# Patient Record
Sex: MALE | Race: WHITE | NOT HISPANIC OR LATINO | Employment: OTHER | URBAN - METROPOLITAN AREA
[De-identification: names, ages, dates, MRNs, and addresses within clinical notes are randomized per-mention and may not be internally consistent; named-entity substitution may affect disease eponyms.]

---

## 2017-02-15 ENCOUNTER — PATIENT MESSAGE (OUTPATIENT)
Dept: RHEUMATOLOGY | Facility: CLINIC | Age: 72
End: 2017-02-15

## 2017-02-22 ENCOUNTER — PATIENT MESSAGE (OUTPATIENT)
Dept: RHEUMATOLOGY | Facility: CLINIC | Age: 72
End: 2017-02-22

## 2017-02-22 ENCOUNTER — LAB VISIT (OUTPATIENT)
Dept: LAB | Facility: HOSPITAL | Age: 72
End: 2017-02-22
Attending: INTERNAL MEDICINE
Payer: MEDICARE

## 2017-02-22 DIAGNOSIS — M35.3 PMR (POLYMYALGIA RHEUMATICA): ICD-10-CM

## 2017-02-22 LAB — ERYTHROCYTE [SEDIMENTATION RATE] IN BLOOD BY WESTERGREN METHOD: 2 MM/HR

## 2017-02-22 PROCEDURE — 86140 C-REACTIVE PROTEIN: CPT

## 2017-02-22 PROCEDURE — 85651 RBC SED RATE NONAUTOMATED: CPT

## 2017-02-22 PROCEDURE — 36415 COLL VENOUS BLD VENIPUNCTURE: CPT

## 2017-02-23 ENCOUNTER — TELEPHONE (OUTPATIENT)
Dept: RHEUMATOLOGY | Facility: CLINIC | Age: 72
End: 2017-02-23

## 2017-02-23 ENCOUNTER — PATIENT MESSAGE (OUTPATIENT)
Dept: RHEUMATOLOGY | Facility: CLINIC | Age: 72
End: 2017-02-23

## 2017-02-23 DIAGNOSIS — M35.3 PMR (POLYMYALGIA RHEUMATICA): Primary | ICD-10-CM

## 2017-02-23 LAB — CRP SERPL-MCNC: 3.1 MG/L

## 2017-02-23 RX ORDER — PREDNISONE 1 MG/1
4 TABLET ORAL DAILY
Qty: 120 TABLET | Refills: 1 | Status: SHIPPED | OUTPATIENT
Start: 2017-02-23 | End: 2017-03-05

## 2017-02-28 ENCOUNTER — PATIENT MESSAGE (OUTPATIENT)
Dept: RHEUMATOLOGY | Facility: CLINIC | Age: 72
End: 2017-02-28

## 2017-03-08 ENCOUNTER — PATIENT MESSAGE (OUTPATIENT)
Dept: ORTHOPEDICS | Facility: CLINIC | Age: 72
End: 2017-03-08

## 2017-03-17 ENCOUNTER — LAB VISIT (OUTPATIENT)
Dept: LAB | Facility: HOSPITAL | Age: 72
End: 2017-03-17
Attending: INTERNAL MEDICINE
Payer: MEDICARE

## 2017-03-17 DIAGNOSIS — M35.3 PMR (POLYMYALGIA RHEUMATICA): ICD-10-CM

## 2017-03-17 LAB
CORTIS SERPL-MCNC: 16.7 UG/DL
CRP SERPL-MCNC: 4 MG/L
ERYTHROCYTE [SEDIMENTATION RATE] IN BLOOD BY WESTERGREN METHOD: 12 MM/HR

## 2017-03-17 PROCEDURE — 36415 COLL VENOUS BLD VENIPUNCTURE: CPT

## 2017-03-17 PROCEDURE — 82024 ASSAY OF ACTH: CPT

## 2017-03-17 PROCEDURE — 85651 RBC SED RATE NONAUTOMATED: CPT

## 2017-03-17 PROCEDURE — 86140 C-REACTIVE PROTEIN: CPT

## 2017-03-17 PROCEDURE — 82533 TOTAL CORTISOL: CPT

## 2017-03-21 LAB — ACTH PLAS-MCNC: 34 PG/ML

## 2017-03-22 ENCOUNTER — LAB VISIT (OUTPATIENT)
Dept: LAB | Facility: HOSPITAL | Age: 72
End: 2017-03-22
Attending: STUDENT IN AN ORGANIZED HEALTH CARE EDUCATION/TRAINING PROGRAM
Payer: MEDICARE

## 2017-03-22 ENCOUNTER — OFFICE VISIT (OUTPATIENT)
Dept: ORTHOPEDICS | Facility: CLINIC | Age: 72
End: 2017-03-22
Payer: MEDICARE

## 2017-03-22 ENCOUNTER — OFFICE VISIT (OUTPATIENT)
Dept: RHEUMATOLOGY | Facility: CLINIC | Age: 72
End: 2017-03-22
Payer: MEDICARE

## 2017-03-22 ENCOUNTER — PATIENT MESSAGE (OUTPATIENT)
Dept: ORTHOPEDICS | Facility: CLINIC | Age: 72
End: 2017-03-22

## 2017-03-22 ENCOUNTER — TELEPHONE (OUTPATIENT)
Dept: RHEUMATOLOGY | Facility: CLINIC | Age: 72
End: 2017-03-22

## 2017-03-22 VITALS
WEIGHT: 192 LBS | HEART RATE: 61 BPM | SYSTOLIC BLOOD PRESSURE: 143 MMHG | DIASTOLIC BLOOD PRESSURE: 74 MMHG | BODY MASS INDEX: 26.88 KG/M2 | HEIGHT: 71 IN

## 2017-03-22 VITALS
HEART RATE: 56 BPM | DIASTOLIC BLOOD PRESSURE: 69 MMHG | WEIGHT: 191.5 LBS | SYSTOLIC BLOOD PRESSURE: 130 MMHG | BODY MASS INDEX: 26.81 KG/M2 | HEIGHT: 71 IN | RESPIRATION RATE: 16 BRPM

## 2017-03-22 DIAGNOSIS — Z15.89 HLA B27 (HLA B27 POSITIVE): ICD-10-CM

## 2017-03-22 DIAGNOSIS — M17.11 PRIMARY OSTEOARTHRITIS OF RIGHT KNEE: ICD-10-CM

## 2017-03-22 DIAGNOSIS — M35.3 PMR (POLYMYALGIA RHEUMATICA): ICD-10-CM

## 2017-03-22 DIAGNOSIS — S83.511A RIGHT ACL TEAR: ICD-10-CM

## 2017-03-22 DIAGNOSIS — Z98.890 S/P MEDIAL MENISCECTOMY OF RIGHT KNEE: ICD-10-CM

## 2017-03-22 DIAGNOSIS — I10 ESSENTIAL HYPERTENSION: ICD-10-CM

## 2017-03-22 DIAGNOSIS — M17.0 PRIMARY OSTEOARTHRITIS OF BOTH KNEES: Primary | ICD-10-CM

## 2017-03-22 DIAGNOSIS — E83.39 HYPOPHOSPHATEMIA: Primary | ICD-10-CM

## 2017-03-22 DIAGNOSIS — M79.10 MYALGIA: Primary | ICD-10-CM

## 2017-03-22 DIAGNOSIS — M79.10 MYALGIA: ICD-10-CM

## 2017-03-22 DIAGNOSIS — G89.29 CHRONIC PAIN OF RIGHT KNEE: ICD-10-CM

## 2017-03-22 DIAGNOSIS — M25.561 CHRONIC PAIN OF RIGHT KNEE: ICD-10-CM

## 2017-03-22 LAB
MAGNESIUM SERPL-MCNC: 2.4 MG/DL
PHOSPHATE SERPL-MCNC: 2.2 MG/DL

## 2017-03-22 PROCEDURE — 99214 OFFICE O/P EST MOD 30 MIN: CPT | Mod: S$PBB,,, | Performed by: INTERNAL MEDICINE

## 2017-03-22 PROCEDURE — 99999 PR PBB SHADOW E&M-EST. PATIENT-LVL III: CPT | Mod: PBBFAC,,, | Performed by: INTERNAL MEDICINE

## 2017-03-22 PROCEDURE — 36415 COLL VENOUS BLD VENIPUNCTURE: CPT

## 2017-03-22 PROCEDURE — 83735 ASSAY OF MAGNESIUM: CPT

## 2017-03-22 PROCEDURE — 84100 ASSAY OF PHOSPHORUS: CPT

## 2017-03-22 PROCEDURE — 99999 PR PBB SHADOW E&M-EST. PATIENT-LVL IV: CPT | Mod: PBBFAC,,, | Performed by: PHYSICIAN ASSISTANT

## 2017-03-22 PROCEDURE — 99213 OFFICE O/P EST LOW 20 MIN: CPT | Mod: S$PBB,,, | Performed by: PHYSICIAN ASSISTANT

## 2017-03-22 RX ORDER — PREDNISONE 1 MG/1
1 TABLET ORAL DAILY
COMMUNITY
End: 2018-01-30

## 2017-03-22 RX ORDER — DICLOFENAC SODIUM 10 MG/G
2 GEL TOPICAL 4 TIMES DAILY
Qty: 400 G | Refills: 0 | Status: SHIPPED | OUTPATIENT
Start: 2017-03-22 | End: 2018-01-30

## 2017-03-22 RX ORDER — LEVOFLOXACIN 500 MG/1
500 TABLET, FILM COATED ORAL DAILY
COMMUNITY
End: 2018-01-30

## 2017-03-22 ASSESSMENT — ROUTINE ASSESSMENT OF PATIENT INDEX DATA (RAPID3)
MDHAQ FUNCTION SCORE: 0
PAIN SCORE: 4
AM STIFFNESS SCORE: 1, YES
WHEN YOU AWAKENED IN THE MORNING OVER THE LAST WEEK, PLEASE INDICATE THE AMOUNT OF TIME IT TAKES UNTIL YOU ARE AS LIMBER AS YOU WILL BE FOR THE DAY: 24HRS
TOTAL RAPID3 SCORE: 3
PATIENT GLOBAL ASSESSMENT SCORE: 5
FATIGUE SCORE: 5
PSYCHOLOGICAL DISTRESS SCORE: 3.3

## 2017-03-22 NOTE — PROGRESS NOTES
"I have personally taken the history and examined the patient and agree with the resident,s note as stated above. Complains only of burning pain right distal hamstring, popliteal and prox calf, occ medial joint line right knee. No fever, warmth, swelling, erythema. Goes to gym most days of the week. No shoulder or hip girdle symptoms. + am stiffness right post distal thigh, popliteal and proximal calf only.  No headache, eye symptoms, jaw pain    Exam: mild limitation of neck rom. Chest is clear to ausc. Chest expansion 6 cm. Fabere neg. ASIS neg. Right knee cool, neg bulge, from, + PF crepitus, normal stability. Pulses 2 + no edema.          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 However now pt states "never helped"  May need to consider trial of gabapentin for neuropathic sounding pain(or pregabalin or duloxetine)  RTC 3 months  "

## 2017-03-22 NOTE — PROGRESS NOTES
SUBJECTIVE:     Chief Complaint & History of Present Illness:  Ghanshyam Sanford Jr. is a Established patient 71 y.o. male who is seen here today with a complaint of    Chief Complaint   Patient presents with    Right Knee - Pain    Left Knee - Pain    .  He is patient well known to me last seen treated in the clinic 10/31/2016 at which time he completed a round of the Euflexxa injections.  He is having some recurrence of pain in the right knee although not severe seen earlier today by rheumatology 's polymyalgia is well-controlled  On a scale of 1-10, with 10 being worst pain imaginable, he rates this pain as 3 on good days and 5 on bad days.  he describes the pain as sore and achy.    Review of patient's allergies indicates:   Allergen Reactions    Dexamethasone sod phosphate Other (See Comments)    Cortisone      Inj- pt reports hiccups and feels spasm     Flomax [tamsulosin] Diarrhea         Current Outpatient Prescriptions   Medication Sig Dispense Refill    aspirin (ECOTRIN) 81 MG EC tablet Take 81 mg by mouth once daily.      diltiazem (TIAZAC) 360 MG CpSR Take 360 mg by mouth once daily.      docusate sodium (COLACE) 100 MG capsule Take 100 mg by mouth 2 (two) times daily.      fish oil-omega-3 fatty acids 300-1,000 mg capsule Take 2 g by mouth once daily.      irbesartan-hydrochlorothiazide (AVALIDE) 300-12.5 mg per tablet Take 1 tablet by mouth once daily.      lansoprazole (PREVACID) 30 MG capsule Take 30 mg by mouth once daily.      levoFLOXacin (LEVAQUIN) 500 MG tablet Take 500 mg by mouth once daily.      multivitamin (THERAGRAN) per tablet Take 1 tablet by mouth once daily.      pravastatin (PRAVACHOL) 40 MG tablet Take 40 mg by mouth once daily.      predniSONE (DELTASONE) 1 MG tablet Take 4 mg by mouth once daily.       vitamin D 1000 units Tab Take 370 mg by mouth once daily.      zolpidem (AMBIEN) 10 mg Tab Take 5 mg by mouth nightly as needed.      diclofenac sodium 1 % Gel  "Apply 2 g topically 4 (four) times daily. 400 g 0     No current facility-administered medications for this visit.        Past Medical History:   Diagnosis Date    Heart valve disease     patient states leaking valve    Hypertension     Osteoarthritis of right knee 3/22/2017       Past Surgical History:   Procedure Laterality Date    HERNIA REPAIR      KNEE ARTHROPLASTY Right        Vital Signs (Most Recent)  Vitals:    03/22/17 1129   BP: 130/69   Pulse: (!) 56   Resp: 16           Review of Systems:  ROS:  Constitutional: no fever or chills  Eyes: no visual changes  ENT: no nasal congestion or sore throat  Respiratory: no cough or shortness of breath  Cardiovascular: no chest pain or palpitations  Gastrointestinal: no nausea or vomiting, tolerating diet  Genitourinary: no hematuria or dysuria  Integument/Breast: no rash or pruritis  Hematologic/Lymphatic: no easy bruising or lymphadenopathy  Musculoskeletal: positive for arthralgias and stiff joints, negative for bone pain and muscle weakness polymyalgia rheumatica  Neurological: no seizures or tremors  Behavioral/Psych: no auditory or visual hallucinations  Endocrine: no heat or cold intolerance              OBJECTIVE:     PHYSICAL EXAM:  Height: 5' 10.5" (179.1 cm) Weight: 86.8 kg (191 lb 7.5 oz), General Appearance: Well nourished, well developed, in no acute distress.  Neurological: Mood & affect are normal.  right Knee Exam:  Knee Range of Motion:0-120 degrees flexion   Effusion:not significant  Condition of skin:intact  Location of tenderness: Globally   Strength:5 of 5  Stability:  stable to testing  Varus /Valgus stress:  normal  Heaven:   negative/negative  Hip Examination:  normal          ASSESSMENT/PLAN:     Plan: We discussed with the patient at length all the different treatment options available for arthrosis of the knee including anti-inflammatories, acetaminophen, rest, ice, knee strengthening exercise, occasional cortisone injections for " temporary relief, Viscosupplimentation injections, arthroscopic debridement osteotomy, and finally knee arthroplasty.   Patient is unable to undergo a cortisone injection secondary to ALLERGIC reaction to injected steroids.  With this in mind we'll prescribe  Diclofenac 1% gel to apply to affected area up to 4 times a day when necessary  We will see him back in approximately 6 weeks with plans to restart Euflexxa injections

## 2017-03-22 NOTE — PROGRESS NOTES
Subjective:        Patient ID: Ghanshyam Sanford Jr. is a 71 y.o. male.     Chief Complaint: h/o PMR, OA knees, HLA B27+.  HPI:   Last seen 11/21/16. Continues to have calf pain increasing since last visit R>L- most pain during the day when walking around but also at night, the pain feels like muscle stiffness and he feels like his legs are heavy, does not radiate into feet. Was off pravastatin for a month without remission of calf pain. Now on Levoquin for 14 days for prostatitis from PCP. Now on 4mg prednisone daily without remission of pain. Denies fevers, constitutional symptoms, scalp tenderness, acute visual changes or jaw pain. Endorses AM stiffness since the calf pain has been going on and nocturnal pain and myalgia consistent with calf pain. Goes to gym 4 x/wk 45 min each with elliptical, weight machines etc.    Review of Systems   Constitutional: Negative for appetite change, chills, fatigue, fever and unexpected weight change.   HENT: Negative for mouth sores.   Eyes: Negative for pain, redness and visual disturbance.   Respiratory: Negative for cough, shortness of breath and wheezing.   Cardiovascular: Negative for chest pain, palpitations and leg swelling.   Gastrointestinal: Negative for abdominal pain, blood in stool, constipation, diarrhea and nausea.   Genitourinary: Negative for difficulty urinating, dysuria and hematuria.   Musculoskeletal: Negative for arthralgias, back pain, gait problem, joint swelling, myalgias, neck pain and neck stiffness.   Skin: Negative for color change, pallor and rash.   Neurological: Negative for weakness and headaches.   Psychiatric/Behavioral: Negative for dysphoric mood and sleep disturbance.      Objective:     Vitals:    03/22/17 0934   BP: (!) 143/74   Pulse: 61     Physical Exam   Constitutional: He is oriented to person, place, and time and well-developed, well-nourished, and in no distress.   HENT:   Head: Normocephalic and atraumatic.   Mouth/Throat: Oropharynx  is clear and moist.   Eyes: Conjunctivae and EOM are normal. Pupils are equal, round, and reactive to light.   Neck: Normal range of motion. Neck supple. No thyromegaly present.   No cervical bruits   Cardiovascular: Normal rate, regular rhythm, intact distal pulses, blowing systolic heart sound noted. Exam reveals no gallop and no friction rub.   No murmur heard.  Pulmonary/Chest: Breath sounds normal. He has no wheezes. He has no rales. He exhibits no tenderness.   Abdominal: Soft. He exhibits no distension and no mass. There is no tenderness.   Ext: Peripheral pulses intact in BUE and BLE including BL popliteal and DP and PT.     Right Side Rheumatological Exam   Examination finds the shoulder, elbow, wrist, knee, 1st PIP, 1st MCP, 2nd PIP, 2nd MCP, 3rd PIP, 3rd MCP, 4th PIP, 4th MCP, 5th PIP and 5th MCP normal.     Knee Exam   Tenderness Location: medial joint line  Patellofemoral Crepitus: positive  Effusion: negative  Warmth: negative     Left Side Rheumatological Exam   Examination finds the shoulder, elbow, wrist, knee, 1st PIP, 1st MCP, 2nd PIP, 2nd MCP, 3rd PIP, 3rd MCP, 4th PIP, 4th MCP and 5th PIP normal.     Knee Exam   Patellofemoral Crepitus: negative  Effusion: negative  Warmth: negative    Lymphadenopathy:   He has no cervical adenopathy.   Neurological: He is alert and oriented to person, place, and time. He displays normal reflexes. He exhibits normal muscle tone. Gait normal.   Motor strength 5/5 in UE and LE bilateral   Skin: Skin is warm and dry. No rash noted. No erythema. No pallor.   Psychiatric: Mood, memory, affect and judgment normal.   Chest expansion- 6cm  Minimally reduced cervical ROM    Results for ELISABETH MCCOLLUM JR. (MRN [id number]) as of 3/22/2017 09:37   Ref. Range 3/17/2017 07:50   Sed Rate Latest Ref Range: 0 - 10 mm/Hr 12 (H)   CRP Latest Ref Range: 0.0 - 8.2 mg/L 4.0   Cortisol -8 AM Latest Ref Range: 4.30 - 22.40 ug/dL 16.7   ACTH Latest Ref Range: 0 - 46 pg/mL 34     BL Knee  Xray AP and Lateral 10/7/16  3 views both knees. Bone, joint and soft tissues.  IMPRESSION:   Normal knee exam.      Assessment:   1) Right > left knee pain and posterior and proximal calf pain R>L, s/p medial meniscectomy 3/10/16, h/o ACL tear markedly improved with Euflexxa previously  2) PMR in remission, no GCA  3) HLA-B27 Positivity  4) Osteopenia < NOF FRAX therapy threshold     Plan:   1) Continue seeing Ortho for Euflexxa injections for calf pain- likely referred from knee pain. CBC, CMP, Mg, Phos.  2) Decrease prednisone 4 mg daily x1wk, then 3mg daily x1wk, then 2mg daily x1wk, then 1mg daily until next visit. Repeat ESR and CRP in 1 month.  3) No symptoms, signs or radiographic evidence of spondylitis  4) 1000mg dietary calcium, 1000units vitamin D3 OTC daily    RTC 3 months. ESR, CRP in 1 month.

## 2017-03-22 NOTE — PATIENT INSTRUCTIONS
1) Continue seeing Ortho for Euflexxa injections for calf pain- likely referred from knee pain.  3) Decrease prednisone 4 mg daily x1wk, then 3mg daily x1wk, then 2mg daily x1wk, then 1mg daily until next visit. Repeat ESR and CRP in 1 month  4) No symptoms, signs or radiographic evidence of spondylitis  5) 1000mg dietary calcium, 1000units vitamin D3 OTC daily  6) CBC, CMP, Mg, Phos     RTC 3 months. ESR, CRP in 1 month.

## 2017-03-22 NOTE — MR AVS SNAPSHOT
[facility] - Rheumatology  [address]  [facility] [address]  Phone: [phone]  Fax: [phone]                  Ghanshyam Sanford Jr.   3/22/2017 10:00 AM   Office Visit    Description:  Male : 1945   Provider:  Georgi Smith MD   Department:  [facility] - Rheumatology           Diagnoses this Visit        Comments    Myalgia    -  Primary            To Do List           Future Appointments        Provider Department Dept Phone    3/22/2017 11:15 AM SHASHI Petersen [facility] - Orthopedics [phone]      Goals (5 Years of Data)     None      [facility] On Call     [facility] On Call Nurse Care Line -  Assistance  Registered nurses in the [facility] On Call Center provide clinical advisement, health education, appointment booking, and other advisory services.  Call for this free service at [phone].             Medications           Message regarding Medications     Verify the changes and/or additions to your medication regime listed below are the same as discussed with your clinician today.  If any of these changes or additions are incorrect, please notify your healthcare provider.             Verify that the below list of medications is an accurate representation of the medications you are currently taking.  If none reported, the list may be blank. If incorrect, please contact your healthcare provider. Carry this list with you in case of emergency.           Current Medications     aspirin (ECOTRIN) 81 MG EC tablet Take 81 mg by mouth once daily.    diltiazem (TIAZAC) 360 MG CpSR Take 360 mg by mouth once daily.    docusate sodium (COLACE) 100 MG capsule Take 100 mg by mouth 2 (two) times daily.    fish oil-omega-3 fatty acids 300-1,000 mg capsule Take 2 g by mouth once daily.    lansoprazole (PREVACID) 30 MG capsule Take 30 mg by mouth once daily.    levoFLOXacin (LEVAQUIN) 500 MG tablet Take 500 mg by mouth once daily.    multivitamin (THERAGRAN) per tablet Take 1 tablet by  "mouth once daily.    pravastatin (PRAVACHOL) 40 MG tablet Take 40 mg by mouth once daily.    predniSONE (DELTASONE) 1 MG tablet Take 4 mg by mouth once daily.     vitamin D 1000 units Tab Take 370 mg by mouth once daily.    zolpidem (AMBIEN) 10 mg Tab Take 5 mg by mouth nightly as needed.    irbesartan-hydrochlorothiazide (AVALIDE) 300-12.5 mg per tablet Take 1 tablet by mouth once daily.           Clinical Reference Information           Your Vitals Were     BP Pulse Height Weight BMI    143/74 61 5' 10.8" (1.798 m) 87.1 kg (192 lb) 26.93 kg/m2      Blood Pressure          Most Recent Value    BP  (!)  143/74      Allergies as of 3/22/2017     Dexamethasone Sod Phosphate    Cortisone    Flomax [Tamsulosin]      Immunizations Administered on Date of Encounter - 3/22/2017     None      Orders Placed During Today's Visit     Future Labs/Procedures Expected by Expires    CBC W/ AUTO DIFFERENTIAL  3/22/2017 5/21/2018    COMPREHENSIVE METABOLIC PANEL  3/22/2017 5/21/2018    Magnesium  3/22/2017 5/21/2018    PHOSPHORUS  3/22/2017 5/21/2018      Instructions    1) Continue seeing Ortho for Euflexxa injections for calf pain- likely referred from knee pain.  3) Decrease prednisone 4 mg daily x1wk, then 3mg daily x1wk, then 2mg daily x1wk, then 1mg daily until next visit. Repeat ESR and CRP in 1 month  4) No symptoms, signs or radiographic evidence of spondylitis  5) 1000mg dietary calcium, 1000units vitamin D3 OTC daily  6) CBC, CMP, Mg, Phos     RTC 3 months. ESR, CRP in 1 month.       Language Assistance Services     ATTENTION: Language assistance services are available, free of charge. Please call [phone].      ATENCIÓN: Si habla souleymane, tiene a dsouza disposición servicios gratuitos de asistencia lingüística. Llame al [phone].     MARY Ý: N?u b?n nói Ti?ng Vi?t, có các d?ch v? h? tr? ngôn ng? mi?n phí dành cho b?n. G?i s? [phone].         [facility] complies with applicable Federal civil " rights laws and does not discriminate on the basis of race, color, national origin, age, disability, or sex.

## 2017-03-23 ENCOUNTER — TELEPHONE (OUTPATIENT)
Dept: RHEUMATOLOGY | Facility: CLINIC | Age: 72
End: 2017-03-23

## 2017-03-23 ENCOUNTER — PATIENT MESSAGE (OUTPATIENT)
Dept: RHEUMATOLOGY | Facility: CLINIC | Age: 72
End: 2017-03-23

## 2017-03-23 NOTE — TELEPHONE ENCOUNTER
Please tell  Pt the magnesium is normal. The phosphorus mildly reduced, suggest he drink 2 glasses of skim milk daily. Please schedule repeat phosphorus and vitamin D and immunofixation in 2 wks. Thanks. BRISEYDA

## 2017-03-29 ENCOUNTER — PATIENT MESSAGE (OUTPATIENT)
Dept: RHEUMATOLOGY | Facility: CLINIC | Age: 72
End: 2017-03-29

## 2017-03-31 ENCOUNTER — PATIENT MESSAGE (OUTPATIENT)
Dept: ORTHOPEDICS | Facility: CLINIC | Age: 72
End: 2017-03-31

## 2017-04-04 ENCOUNTER — LAB VISIT (OUTPATIENT)
Dept: LAB | Facility: HOSPITAL | Age: 72
End: 2017-04-04
Attending: INTERNAL MEDICINE
Payer: MEDICARE

## 2017-04-04 DIAGNOSIS — E83.39 HYPOPHOSPHATEMIA: ICD-10-CM

## 2017-04-04 LAB — PHOSPHATE SERPL-MCNC: 2.3 MG/DL

## 2017-04-04 PROCEDURE — 36415 COLL VENOUS BLD VENIPUNCTURE: CPT

## 2017-04-04 PROCEDURE — 86334 IMMUNOFIX E-PHORESIS SERUM: CPT | Mod: 26,,, | Performed by: PATHOLOGY

## 2017-04-04 PROCEDURE — 84100 ASSAY OF PHOSPHORUS: CPT

## 2017-04-04 PROCEDURE — 82306 VITAMIN D 25 HYDROXY: CPT

## 2017-04-04 PROCEDURE — 86334 IMMUNOFIX E-PHORESIS SERUM: CPT

## 2017-04-04 PROCEDURE — 84105 ASSAY OF URINE PHOSPHORUS: CPT

## 2017-04-05 ENCOUNTER — TELEPHONE (OUTPATIENT)
Dept: RHEUMATOLOGY | Facility: CLINIC | Age: 72
End: 2017-04-05

## 2017-04-05 DIAGNOSIS — E83.39 HYPOPHOSPHATEMIA: Primary | ICD-10-CM

## 2017-04-05 LAB
25(OH)D3+25(OH)D2 SERPL-MCNC: 44 NG/ML
INTERPRETATION SERPL IFE-IMP: NORMAL
PATHOLOGIST INTERPRETATION IFE: NORMAL
PHOSPHATE 24H UR-MRATE: 47.1 MG/HR
PHOSPHATE UR-MCNC: 58 MG/DL
PHOSPHORUS, URINE (MG/SPEC): 1131 MG/SPEC
URINE COLLECTION DURATION: 24 HR
URINE VOLUME: 1950 ML

## 2017-04-05 NOTE — TELEPHONE ENCOUNTER
Please add iPTH, PTHRP and 1, 25 vitamin D  to labs. If not able to, please draw separately. Thanks. BRISEYDA

## 2017-04-06 ENCOUNTER — LAB VISIT (OUTPATIENT)
Dept: LAB | Facility: HOSPITAL | Age: 72
End: 2017-04-06
Attending: INTERNAL MEDICINE
Payer: MEDICARE

## 2017-04-06 DIAGNOSIS — E83.39 HYPOPHOSPHATEMIA: ICD-10-CM

## 2017-04-06 LAB — PTH-INTACT SERPL-MCNC: 57 PG/ML

## 2017-04-06 PROCEDURE — 82652 VIT D 1 25-DIHYDROXY: CPT

## 2017-04-06 PROCEDURE — 36415 COLL VENOUS BLD VENIPUNCTURE: CPT

## 2017-04-06 PROCEDURE — 83970 ASSAY OF PARATHORMONE: CPT

## 2017-04-10 LAB — 1,25(OH)2D3 SERPL-MCNC: 70 PG/ML

## 2017-04-11 ENCOUNTER — TELEPHONE (OUTPATIENT)
Dept: RHEUMATOLOGY | Facility: CLINIC | Age: 72
End: 2017-04-11

## 2017-04-11 DIAGNOSIS — E83.39 HYPOPHOSPHATEMIA: Primary | ICD-10-CM

## 2017-04-11 DIAGNOSIS — E83.39: ICD-10-CM

## 2017-04-11 LAB — PTH RELATED PROT SERPL-SCNC: 0.2 PMOL/L

## 2017-04-19 ENCOUNTER — TELEPHONE (OUTPATIENT)
Dept: RHEUMATOLOGY | Facility: CLINIC | Age: 72
End: 2017-04-19

## 2017-04-19 ENCOUNTER — PATIENT MESSAGE (OUTPATIENT)
Dept: RHEUMATOLOGY | Facility: CLINIC | Age: 72
End: 2017-04-19

## 2017-04-19 ENCOUNTER — LAB VISIT (OUTPATIENT)
Dept: LAB | Facility: HOSPITAL | Age: 72
End: 2017-04-19
Attending: INTERNAL MEDICINE
Payer: MEDICARE

## 2017-04-19 DIAGNOSIS — M35.3 PMR (POLYMYALGIA RHEUMATICA): Primary | ICD-10-CM

## 2017-04-19 DIAGNOSIS — M35.3 PMR (POLYMYALGIA RHEUMATICA): ICD-10-CM

## 2017-04-19 LAB — ERYTHROCYTE [SEDIMENTATION RATE] IN BLOOD BY WESTERGREN METHOD: 4 MM/HR

## 2017-04-19 PROCEDURE — 36415 COLL VENOUS BLD VENIPUNCTURE: CPT

## 2017-04-19 PROCEDURE — 85651 RBC SED RATE NONAUTOMATED: CPT

## 2017-04-19 PROCEDURE — 86140 C-REACTIVE PROTEIN: CPT

## 2017-04-19 NOTE — TELEPHONE ENCOUNTER
Please schedule esr and crp today. Please find out how much prednisone he is currently taking as well.  Thanks. BRISEYDA

## 2017-04-19 NOTE — TELEPHONE ENCOUNTER
Received note from AARON Roldan MD [facility] [place]  4/18/17: cc: itchiness scalp x 7-10 days.    Assessment: 1. Scar left posterior neck; 2. SK scattered upper body; 3. Dermatitis, ant scalp, erythematous and scaly plaque; LPP v seb derm: Rx clobetasol, if no better consider biopsy.

## 2017-04-20 LAB — CRP SERPL-MCNC: 1.8 MG/L

## 2017-04-24 ENCOUNTER — LAB VISIT (OUTPATIENT)
Dept: LAB | Facility: HOSPITAL | Age: 72
End: 2017-04-24
Attending: INTERNAL MEDICINE
Payer: MEDICARE

## 2017-04-24 DIAGNOSIS — M35.3 PMR (POLYMYALGIA RHEUMATICA): ICD-10-CM

## 2017-04-24 LAB
CRP SERPL-MCNC: 1.4 MG/L
ERYTHROCYTE [SEDIMENTATION RATE] IN BLOOD BY WESTERGREN METHOD: 3 MM/HR

## 2017-04-24 PROCEDURE — 85651 RBC SED RATE NONAUTOMATED: CPT

## 2017-04-24 PROCEDURE — 36415 COLL VENOUS BLD VENIPUNCTURE: CPT

## 2017-04-24 PROCEDURE — 86140 C-REACTIVE PROTEIN: CPT

## 2017-04-27 ENCOUNTER — OFFICE VISIT (OUTPATIENT)
Dept: NEPHROLOGY | Facility: CLINIC | Age: 72
End: 2017-04-27
Payer: MEDICARE

## 2017-04-27 ENCOUNTER — TELEPHONE (OUTPATIENT)
Dept: RHEUMATOLOGY | Facility: CLINIC | Age: 72
End: 2017-04-27

## 2017-04-27 ENCOUNTER — LAB VISIT (OUTPATIENT)
Dept: LAB | Facility: HOSPITAL | Age: 72
End: 2017-04-27
Attending: INTERNAL MEDICINE
Payer: MEDICARE

## 2017-04-27 VITALS
HEART RATE: 63 BPM | DIASTOLIC BLOOD PRESSURE: 62 MMHG | BODY MASS INDEX: 27.55 KG/M2 | SYSTOLIC BLOOD PRESSURE: 130 MMHG | HEIGHT: 70 IN | WEIGHT: 192.44 LBS | OXYGEN SATURATION: 99 %

## 2017-04-27 DIAGNOSIS — E83.39 HYPOPHOSPHATEMIA: ICD-10-CM

## 2017-04-27 DIAGNOSIS — E83.39 HYPOPHOSPHATEMIA: Primary | ICD-10-CM

## 2017-04-27 LAB
BILIRUB UR QL STRIP: NEGATIVE
CLARITY UR REFRACT.AUTO: CLEAR
COLOR UR AUTO: YELLOW
CREAT UR-MCNC: 102 MG/DL
GLUCOSE UR QL STRIP: NEGATIVE
HGB UR QL STRIP: NEGATIVE
KETONES UR QL STRIP: NEGATIVE
LEUKOCYTE ESTERASE UR QL STRIP: NEGATIVE
NITRITE UR QL STRIP: NEGATIVE
PH UR STRIP: 5 [PH] (ref 5–8)
PROT UR QL STRIP: NEGATIVE
SP GR UR STRIP: 1.02 (ref 1–1.03)
URN SPEC COLLECT METH UR: NORMAL
UROBILINOGEN UR STRIP-ACNC: NEGATIVE EU/DL

## 2017-04-27 PROCEDURE — 99205 OFFICE O/P NEW HI 60 MIN: CPT | Mod: S$PBB,,, | Performed by: INTERNAL MEDICINE

## 2017-04-27 PROCEDURE — 81003 URINALYSIS AUTO W/O SCOPE: CPT

## 2017-04-27 PROCEDURE — 82570 ASSAY OF URINE CREATININE: CPT

## 2017-04-27 PROCEDURE — 99999 PR PBB SHADOW E&M-EST. PATIENT-LVL III: CPT | Mod: PBBFAC,,, | Performed by: INTERNAL MEDICINE

## 2017-04-27 NOTE — LETTER
April 27, 2017      Georgi Smith MD  [address]  [facility] [address]           [facility]  [address]  [facility] [address]  Phone: [phone]  Fax: [phone]          Patient: Ghanshyam Sanford Jr.   MR Number: [id number]   YOB: 1945   Date of Visit: 4/27/2017       Dear Dr. Georgi Smith:    Thank you for referring Ghanshyam Sanford to me for evaluation. Attached you will find relevant portions of my assessment and plan of care.    If you have questions, please do not hesitate to call me. I look forward to following Ghanshyam Sanford along with you.    Sincerely,    Jed Corona MD    Enclosure  CC:  No Recipients    If you would like to receive this communication electronically, please contact [email or web address] or [phone] to request more information on [employer] access.    For providers and/or their staff who would like to refer a patient to [facility], please contact us through our one-stop-shop provider referral line, [facility], at [phone].    If you feel you have received this communication in error or would no longer like to receive these types of communications, please e-mail [email or web address]

## 2017-04-27 NOTE — PROGRESS NOTES
New Consultation Report  Nephrology      Consult Requested By: Dr. Smith, rheumatology  Reason for Consult: hypophosphatemia    History of Present Illness:  Patient is a 71 y.o. male presents for a new consultation for evaluation of hypophosphatemia identified on routine laboratory testing. One month ago, his serum phosphorus was 2.2 mg/dL. Three weeks ago, it was again low at 2.3 mg/dL. No prior value available on our records for comparison. He denies a prior diagnosis of phosphorus disorder. Denies weight loss, diarrhea, weakness. He has PMH of polymyalgia rheumatica well controlled with very low dose of prednisone. He recently took levofloxacin for an infection, but no there new medications.    The patient denies taking NSAIDs, plant extracts, herbal supplements, recreational drugs, recent episode of dehydration, nausea or vomiting, acute illness or hospitalization.     Past Medical History:   Diagnosis Date    Heart valve disease     patient states leaking valve    Hypertension     Osteoarthritis of right knee 3/22/2017         Current Outpatient Prescriptions:     aspirin (ECOTRIN) 81 MG EC tablet, Take 81 mg by mouth once daily., Disp: , Rfl:     diltiazem (TIAZAC) 360 MG CpSR, Take 360 mg by mouth once daily., Disp: , Rfl:     docusate sodium (COLACE) 100 MG capsule, Take 100 mg by mouth 2 (two) times daily., Disp: , Rfl:     fish oil-omega-3 fatty acids 300-1,000 mg capsule, Take 2 g by mouth once daily., Disp: , Rfl:     irbesartan-hydrochlorothiazide (AVALIDE) 300-12.5 mg per tablet, Take 1 tablet by mouth once daily., Disp: , Rfl:     lansoprazole (PREVACID) 30 MG capsule, Take 30 mg by mouth once daily., Disp: , Rfl:     levoFLOXacin (LEVAQUIN) 500 MG tablet, Take 500 mg by mouth once daily., Disp: , Rfl:     multivitamin (THERAGRAN) per tablet, Take 1 tablet by mouth once daily., Disp: , Rfl:     pravastatin (PRAVACHOL) 40 MG tablet, Take 40 mg by mouth once daily., Disp: , Rfl:      predniSONE (DELTASONE) 1 MG tablet, Take 4 mg by mouth once daily. , Disp: , Rfl:     vitamin D 1000 units Tab, Take 370 mg by mouth once daily., Disp: , Rfl:     zolpidem (AMBIEN) 10 mg Tab, Take 5 mg by mouth nightly as needed., Disp: , Rfl:     diclofenac sodium 1 % Gel, Apply 2 g topically 4 (four) times daily., Disp: 400 g, Rfl: 0  Review of patient's allergies indicates:   Allergen Reactions    Dexamethasone sod phosphate Other (See Comments)    Cortisone      Inj- pt reports hiccups and feels spasm     Flomax [tamsulosin] Diarrhea        Past Surgical History:   Procedure Laterality Date    HERNIA REPAIR      KNEE ARTHROPLASTY Right      Family History   Problem Relation Age of Onset    Heart disease Mother     Hypertension Mother     Stroke Mother     Diabetes Mellitus Father     Heart disease Paternal Grandmother     Stroke Paternal Grandmother      Social History   Substance Use Topics    Smoking status: Never Smoker    Smokeless tobacco: Not on file    Alcohol use Not on file       Review of Systems   Constitutional: Negative.    Eyes: Negative.    Respiratory: Negative.    Cardiovascular: Negative.    Gastrointestinal: Positive for constipation. Negative for abdominal pain, blood in stool, diarrhea, heartburn, melena, nausea and vomiting.   Genitourinary: Negative.    Musculoskeletal: Positive for joint pain.        Right knee pain   Skin: Negative.    All other systems reviewed and are negative.      Vitals:    04/27/17 1305   BP: 130/62   Pulse: 63       PHYSICAL EXAMINATION:  General: no distress, well nourished  Skin: color, texture, turgor normal. No rash or lesions  HEENT: Eyes: reactive pupils, normal conjunctiva. Oral mucosa moist, no ulcers. Throat: no erythema.  Neck: supple, symmetrical, trachea midline, no JVD, no carotid bruit  Lungs: clear to auscultation bilaterally and normal respiratory effort  Cardiovascular: Heart: regular rate and rhythm, S1, S2 normal, loud  holosystolic murmur, no rub or gallop. Pulses: 2+ and symmetric.  Abdomen: bowel sounds present, no abdominal bruit, soft, non-tender non-distented; no masses, organomegaly or ascites.   Musculoskeletal: no pitting edema in lower extremities, no clubbing or cyanosis  Lymph Nodes: No cervical or supraclavicular adenopathy  Neurologic: AAOx3, normal strength and tone. No focal deficit. No asterixis.       LABORATORY DATA:  Lab Results   Component Value Date    CREATININE 1.0 05/13/2016       No results found for: UTPCR    Lab Results   Component Value Date     05/13/2016    K 4.3 05/13/2016    CO2 33 (H) 05/13/2016       last PTH   Lab Results   Component Value Date    PTH 57.0 04/06/2017    CALCIUM 9.6 05/13/2016    PHOS 2.3 (L) 04/04/2017       Lab Results   Component Value Date    HGB 14.6 05/13/2016        IMPRESSION / RECOMMENDATIONS:     1. Hypophosphatemia  No obvious etiology. He is chronically on a thiazide diuretic that only rarely can cause urinary phosphate wasting. No evidence of vitamin D deficiency or hyperparathyroidism when values were checked 3 weeks ago. Will order the following labs, including urine phosphorus to look for urine phos wasting as a source.   Comprehensive metabolic panel    Phosphorus    Magnesium    Urinalysis    Creatinine, urine, random    Phosphorus, urine, timed    PTH, intact    Vitamin D    FIBROBLAST GROWTH FACTOR 23, PLASMA     SUMMARY OF PLAN:  1. Ordering above listed labs  2. RTC in 6 weeks

## 2017-04-27 NOTE — MR AVS SNAPSHOT
[facility]  [address]  [facility] [address]  Phone: [phone]  Fax: [phone]                  Ghanshyam Sanford Jr.   2017 1:20 PM   Office Visit    Description:  Male : 1945   Provider:  Jed Corona MD   Department:  [facility]           Diagnoses this Visit        Comments    Hypophosphatemia    -  Primary            To Do List           Future Appointments        Provider Department Dept Phone    2017 10:10 AM LAB, [facility] [phone]    2017 11:00 AM Georgi Smith MD [facility] - Rhe[facility]ology [phone]      Goals (5 Years of Data)     None      Follow-Up and Disposition     Return in about 6 weeks (around 2017).      [facility]     [facility] -  Assistance  Unless otherwise directed by your provider, please contact [facility], our nurse care line that is available for  assistance.     Registered nurses in the [facility] provide: appointment scheduling, clinical advisement, health education, and other advisory services.  Call: [phone] (toll free)               Medications           Message regarding Medications     Verify the changes and/or additions to your medication regime listed below are the same as discussed with your clinician today.  If any of these changes or additions are incorrect, please notify your healthcare provider.             Verify that the below list of medications is an accurate representation of the medications you are currently taking.  If none reported, the list may be blank. If incorrect, please contact your healthcare provider. Carry this list with you in case of emergency.           Current Medications     aspirin (ECOTRIN) 81 MG EC tablet Take 81 mg by mouth once daily.    diltiazem (TIAZAC) 360 MG CpSR Take 360 mg by mouth once daily.    docusate sodium (COLACE) 100 MG capsule Take 100 mg by mouth 2 (two)  "times daily.    fish oil-omega-3 fatty acids 300-1,000 mg capsule Take 2 g by mouth once daily.    irbesartan-hydrochlorothiazide (AVALIDE) 300-12.5 mg per tablet Take 1 tablet by mouth once daily.    lansoprazole (PREVACID) 30 MG capsule Take 30 mg by mouth once daily.    levoFLOXacin (LEVAQUIN) 500 MG tablet Take 500 mg by mouth once daily.    multivitamin (THERAGRAN) per tablet Take 1 tablet by mouth once daily.    pravastatin (PRAVACHOL) 40 MG tablet Take 40 mg by mouth once daily.    predniSONE (DELTASONE) 1 MG tablet Take 4 mg by mouth once daily.     vitamin D 1000 units Tab Take 370 mg by mouth once daily.    zolpidem (AMBIEN) 10 mg Tab Take 5 mg by mouth nightly as needed.    diclofenac sodium 1 % Gel Apply 2 g topically 4 (four) times daily.           Clinical Reference Information           Your Vitals Were     BP Pulse Height Weight SpO2 BMI    130/62 63 5' 10" (1.778 m) 87.3 kg (192 lb 7.4 oz) 99% 27.62 kg/m2      Blood Pressure          Most Recent Value    BP  130/62      Allergies as of 4/27/2017     Dexamethasone Sod Phosphate    Cortisone    Flomax [Tamsulosin]      Immunizations Administered on Date of Encounter - 4/27/2017     None      Orders Placed During Today's Visit     Future Labs/Procedures Expected by Expires    Comprehensive metabolic panel  4/27/2017 6/26/2018    Creatinine, urine, random  4/27/2017 4/27/2018    Magnesium  4/27/2017 6/26/2018    Phosphorus  4/27/2017 6/26/2018    PTH, intact  4/27/2017 6/26/2018    Urinalysis  4/27/2017 4/27/2018    Vitamin D  4/27/2017 6/26/2018    Phosphorus, urine, timed  As directed 4/27/2018      Language Assistance Services     ATTENTION: Language assistance services are available, free of charge. Please call [phone].      ATENCIÓN: Si edsonla souleymane, tiene a dsouza disposición servicios gratuitos de asistencia lingüística. Llame al [phone].     CHÚ Ý: N?u b?n nói Ti?ng Vi?t, có các d?ch v? h? tr? ngôn ng? mi?n phí dành cho b?n. G?i s? " [phone].         [facility] complies with applicable Federal civil rights laws and does not discriminate on the basis of race, color, national origin, age, disability, or sex.

## 2017-05-30 ENCOUNTER — PATIENT MESSAGE (OUTPATIENT)
Dept: RHEUMATOLOGY | Facility: CLINIC | Age: 72
End: 2017-05-30

## 2017-05-30 ENCOUNTER — PATIENT MESSAGE (OUTPATIENT)
Dept: NEPHROLOGY | Facility: CLINIC | Age: 72
End: 2017-05-30

## 2017-05-31 DIAGNOSIS — E83.39 HYPOPHOSPHATEMIA: Primary | ICD-10-CM

## 2017-06-22 ENCOUNTER — LAB VISIT (OUTPATIENT)
Dept: LAB | Facility: HOSPITAL | Age: 72
End: 2017-06-22
Attending: INTERNAL MEDICINE
Payer: MEDICARE

## 2017-06-22 DIAGNOSIS — E83.39 HYPOPHOSPHATEMIA: ICD-10-CM

## 2017-06-22 DIAGNOSIS — M79.10 MYALGIA: ICD-10-CM

## 2017-06-22 DIAGNOSIS — M35.3 PMR (POLYMYALGIA RHEUMATICA): ICD-10-CM

## 2017-06-22 LAB
ALBUMIN SERPL BCP-MCNC: 3.8 G/DL
ALP SERPL-CCNC: 63 U/L
ALT SERPL W/O P-5'-P-CCNC: 16 U/L
ANION GAP SERPL CALC-SCNC: 7 MMOL/L
AST SERPL-CCNC: 16 U/L
BASOPHILS # BLD AUTO: 0.05 K/UL
BASOPHILS NFR BLD: 0.6 %
BILIRUB SERPL-MCNC: 0.7 MG/DL
BUN SERPL-MCNC: 21 MG/DL
CALCIUM SERPL-MCNC: 9.4 MG/DL
CHLORIDE SERPL-SCNC: 105 MMOL/L
CO2 SERPL-SCNC: 28 MMOL/L
CREAT SERPL-MCNC: 1.1 MG/DL
CRP SERPL-MCNC: 2.3 MG/L
DIFFERENTIAL METHOD: NORMAL
EOSINOPHIL # BLD AUTO: 0.3 K/UL
EOSINOPHIL NFR BLD: 3.6 %
ERYTHROCYTE [DISTWIDTH] IN BLOOD BY AUTOMATED COUNT: 13.7 %
ERYTHROCYTE [SEDIMENTATION RATE] IN BLOOD BY WESTERGREN METHOD: 3 MM/HR
EST. GFR  (AFRICAN AMERICAN): >60 ML/MIN/1.73 M^2
EST. GFR  (NON AFRICAN AMERICAN): >60 ML/MIN/1.73 M^2
GLUCOSE SERPL-MCNC: 93 MG/DL
HCT VFR BLD AUTO: 44.3 %
HGB BLD-MCNC: 15 G/DL
LYMPHOCYTES # BLD AUTO: 2.6 K/UL
LYMPHOCYTES NFR BLD: 30.3 %
MCH RBC QN AUTO: 28.6 PG
MCHC RBC AUTO-ENTMCNC: 33.9 %
MCV RBC AUTO: 84 FL
MONOCYTES # BLD AUTO: 0.7 K/UL
MONOCYTES NFR BLD: 7.8 %
NEUTROPHILS # BLD AUTO: 5 K/UL
NEUTROPHILS NFR BLD: 57.7 %
PHOSPHATE SERPL-MCNC: 2.9 MG/DL
PLATELET # BLD AUTO: 211 K/UL
PMV BLD AUTO: 10.8 FL
POTASSIUM SERPL-SCNC: 4.7 MMOL/L
PROT SERPL-MCNC: 7.2 G/DL
RBC # BLD AUTO: 5.25 M/UL
SODIUM SERPL-SCNC: 140 MMOL/L
WBC # BLD AUTO: 8.7 K/UL

## 2017-06-22 PROCEDURE — 86140 C-REACTIVE PROTEIN: CPT

## 2017-06-22 PROCEDURE — 80053 COMPREHEN METABOLIC PANEL: CPT

## 2017-06-22 PROCEDURE — 85651 RBC SED RATE NONAUTOMATED: CPT

## 2017-06-22 PROCEDURE — 84100 ASSAY OF PHOSPHORUS: CPT

## 2017-06-22 PROCEDURE — 85025 COMPLETE CBC W/AUTO DIFF WBC: CPT

## 2017-06-22 PROCEDURE — 36415 COLL VENOUS BLD VENIPUNCTURE: CPT

## 2017-07-18 ENCOUNTER — TELEPHONE (OUTPATIENT)
Dept: RHEUMATOLOGY | Facility: CLINIC | Age: 72
End: 2017-07-18

## 2017-07-18 ENCOUNTER — OFFICE VISIT (OUTPATIENT)
Dept: RHEUMATOLOGY | Facility: CLINIC | Age: 72
End: 2017-07-18
Payer: MEDICARE

## 2017-07-18 VITALS
HEART RATE: 53 BPM | WEIGHT: 190.88 LBS | SYSTOLIC BLOOD PRESSURE: 145 MMHG | BODY MASS INDEX: 26.72 KG/M2 | HEIGHT: 71 IN | DIASTOLIC BLOOD PRESSURE: 68 MMHG

## 2017-07-18 DIAGNOSIS — S83.511A RIGHT ACL TEAR: ICD-10-CM

## 2017-07-18 DIAGNOSIS — M25.562 CHRONIC PAIN OF BOTH KNEES: ICD-10-CM

## 2017-07-18 DIAGNOSIS — Z15.89 HLA B27 (HLA B27 POSITIVE): ICD-10-CM

## 2017-07-18 DIAGNOSIS — G89.29 CHRONIC PAIN OF BOTH KNEES: ICD-10-CM

## 2017-07-18 DIAGNOSIS — M25.561 CHRONIC PAIN OF BOTH KNEES: ICD-10-CM

## 2017-07-18 DIAGNOSIS — M35.3 PMR (POLYMYALGIA RHEUMATICA): ICD-10-CM

## 2017-07-18 DIAGNOSIS — M17.11 PRIMARY OSTEOARTHRITIS OF RIGHT KNEE: ICD-10-CM

## 2017-07-18 DIAGNOSIS — Z98.890 S/P MEDIAL MENISCECTOMY OF RIGHT KNEE: ICD-10-CM

## 2017-07-18 DIAGNOSIS — M25.561 CHRONIC PAIN OF RIGHT KNEE: Primary | ICD-10-CM

## 2017-07-18 DIAGNOSIS — G89.29 CHRONIC PAIN OF RIGHT KNEE: Primary | ICD-10-CM

## 2017-07-18 PROCEDURE — 1125F AMNT PAIN NOTED PAIN PRSNT: CPT | Mod: ,,, | Performed by: INTERNAL MEDICINE

## 2017-07-18 PROCEDURE — 99213 OFFICE O/P EST LOW 20 MIN: CPT | Mod: S$PBB,,, | Performed by: INTERNAL MEDICINE

## 2017-07-18 PROCEDURE — 99213 OFFICE O/P EST LOW 20 MIN: CPT | Mod: PBBFAC | Performed by: INTERNAL MEDICINE

## 2017-07-18 PROCEDURE — 99999 PR PBB SHADOW E&M-EST. PATIENT-LVL III: CPT | Mod: PBBFAC,,, | Performed by: INTERNAL MEDICINE

## 2017-07-18 PROCEDURE — 1159F MED LIST DOCD IN RCRD: CPT | Mod: ,,, | Performed by: INTERNAL MEDICINE

## 2017-07-18 RX ORDER — GABAPENTIN 100 MG/1
100 CAPSULE ORAL 3 TIMES DAILY
COMMUNITY
End: 2018-01-30

## 2017-07-18 ASSESSMENT — ROUTINE ASSESSMENT OF PATIENT INDEX DATA (RAPID3)
PATIENT GLOBAL ASSESSMENT SCORE: 5.5
TOTAL RAPID3 SCORE: 3.5
MDHAQ FUNCTION SCORE: .3
PAIN SCORE: 4
PSYCHOLOGICAL DISTRESS SCORE: 3.3
FATIGUE SCORE: 0
AM STIFFNESS SCORE: 0, NO

## 2017-07-18 NOTE — ASSESSMENT & PLAN NOTE
-No classic signs or symptoms of HLA-B27 diseases.   -no symptoms or radiographic evidence of spondylitis, inflammatory back pain or spondyloarthritis  osteopenia < NOF FRAX therapy threshold  -Patient's back pain is 2/2 to a fall on dock while fishing 3 weeks ago. PCP appointment today.

## 2017-07-18 NOTE — PATIENT INSTRUCTIONS
1. Discontinue Prednisone.     2. F/U PCP for back pain s/p fall.    3. F/U with Ortho 7/20/17 for knee and calf pain s/p R knee scope.

## 2017-07-18 NOTE — ASSESSMENT & PLAN NOTE
-Patient has full ROM and strength, no fluid or swelling appreciated. His only complaint is pain behind R knee, for which he is f/u with Ortho on 7/20/17 (Dr. Latham, [place]).  -Discussed aggressive stretching for 20-30 minutes a day for 2 weeks to see if he finds relief.

## 2017-07-18 NOTE — PROGRESS NOTES
"Subjective:       Patient ID: Ghanshyam Sanford Jr. is a 72 y.o. male.    Chief Complaint: 3 month f/u    Patient is doing great today. His only complaint is pain behind is right knee that extends into his upper calf, for which his is seeing ortho (Dr. Latham in [place] 7/20/17). No continual PMR signs or symptoms. Pt staying active working out 3-4 times a week for about 1 hour, including bicycling, stretching, and other activities.       Review of Systems   Constitutional: Negative for activity change, fever and unexpected weight change.   HENT: Negative for trouble swallowing.    Eyes: Negative for redness.   Respiratory: Negative for cough and shortness of breath.    Cardiovascular: Negative for chest pain and leg swelling.   Gastrointestinal: Positive for constipation. Negative for diarrhea.   Musculoskeletal: Positive for back pain and myalgias. Negative for gait problem, joint swelling and neck pain.        Back pain from fall 3 weeks ago.  Pain in Right upper calf.   Skin: Negative for rash.   Neurological: Negative for headaches.   Hematological: Does not bruise/bleed easily.   Psychiatric/Behavioral: Positive for sleep disturbance. Negative for agitation, behavioral problems and confusion.       Answers for HPI/ROS submitted by the patient on 7/15/2017   swollen glands: No  heartburn: No  tingling: No    Objective:   BP (!) 145/68   Pulse (!) 53   Ht 5' 10.8" (1.798 m)   Wt 86.6 kg (190 lb 14.4 oz)   BMI 26.78 kg/m²      Physical Exam   Constitutional: He is oriented to person, place, and time and well-developed, well-nourished, and in no distress.   HENT:   Head: Normocephalic and atraumatic.   Neck: Normal range of motion. Neck supple.       Right Side Rheumatological Exam     Examination finds the shoulder, elbow, wrist, knee, 1st PIP, 1st MCP, 2nd PIP, 2nd MCP, 3rd PIP, 3rd MCP, 4th PIP, 4th MCP, 5th PIP and 5th MCP normal.    Knee Exam   Tenderness Location: medial joint line    Range of Motion "   The patient has normal right knee ROM.  Extension: normal   Flexion: normal   Patellofemoral Crepitus: positive  Sensation: normal    Muscle Strength (0-5 scale):  Neck Flexion:  5  Neck Extension: 5  Deltoid:  5  Biceps: 5/5   Triceps:  5  : 5/5   Iliopsoas: 5  Quadriceps:  5   Distal Lower Extremity: 5    Left Side Rheumatological Exam     Examination finds the shoulder, elbow, wrist, knee, 1st PIP, 1st MCP, 2nd PIP, 2nd MCP, 3rd PIP, 3rd MCP, 4th PIP, 4th MCP, 5th PIP and 5th MCP normal.    Knee Exam     Range of Motion   The patient has normal left knee ROM.  Extension: normal   Flexion: normal     Patellofemoral Crepitus: positive  Sensation: normal    Muscle Strength (0-5 scale):  Neck Flexion:  5  Neck Extension: 5  Deltoid:  5  Biceps: 5/5   Triceps:  5  :  5/5   Iliopsoas: 5  Quadriceps:  5   Distal Lower Extremity: 5      Back/Neck Exam   General Inspection   Gait: normal         Neurological: He is alert and oriented to person, place, and time.   Reflex Scores:       Tricep reflexes are 2+ on the right side and 2+ on the left side.       Bicep reflexes are 2+ on the right side and 2+ on the left side.       Brachioradialis reflexes are 2+ on the right side and 2+ on the left side.       Patellar reflexes are 2+ on the right side and 2+ on the left side.       Achilles reflexes are 2+ on the right side and 2+ on the left side.  Skin: Skin is warm and dry. No rash noted. No erythema.     Psychiatric: Affect normal.   Musculoskeletal: Normal range of motion. He exhibits edema and deformity. He exhibits no tenderness.           Assessment and Plan:     1.PMR  --ESR 2.3; SED 3  -No signs or symptoms of active PMR, inflamatory markers have normalized.   -D/C Prednisone    2.HLA-B27+  -No classic signs or symptoms of HLA-B27 diseases.   -no symptoms or radiographic evidence of spondylitis, inflammatory back pain or spondyloarthritis  osteopenia < NOF FRAX therapy threshold  -Patient's back pain is 2/2  to a fall on dock while fishing 3 weeks ago. PCP appointment today.       3.Right knee and calf pain (s/p meniscus repair)  -Patient has full ROM and strength, no fluid or swelling appreciated. His only complaint is pain behind R knee extending into his calf, for which he is meant to f/u with Ortho on 7/20/17 (Dr. Latham, [place]).  -Discussed aggressive calf stretching for 20-30 minutes a day for 2 weeks to see if he finds relief.

## 2017-07-18 NOTE — ASSESSMENT & PLAN NOTE
-ESR 2.3; SED 3  -No signs or symptoms of current PMR, inflamatory markers have normalized.   -D/C Prednisone

## 2017-07-19 ENCOUNTER — PATIENT MESSAGE (OUTPATIENT)
Dept: RHEUMATOLOGY | Facility: CLINIC | Age: 72
End: 2017-07-19

## 2017-09-13 ENCOUNTER — PATIENT MESSAGE (OUTPATIENT)
Dept: RHEUMATOLOGY | Facility: CLINIC | Age: 72
End: 2017-09-13

## 2017-09-25 ENCOUNTER — PATIENT MESSAGE (OUTPATIENT)
Dept: RHEUMATOLOGY | Facility: CLINIC | Age: 72
End: 2017-09-25

## 2017-11-08 ENCOUNTER — PATIENT MESSAGE (OUTPATIENT)
Dept: RHEUMATOLOGY | Facility: CLINIC | Age: 72
End: 2017-11-08

## 2017-11-27 ENCOUNTER — PATIENT MESSAGE (OUTPATIENT)
Dept: RHEUMATOLOGY | Facility: CLINIC | Age: 72
End: 2017-11-27

## 2017-11-27 ENCOUNTER — LAB VISIT (OUTPATIENT)
Dept: LAB | Facility: HOSPITAL | Age: 72
End: 2017-11-27
Attending: INTERNAL MEDICINE
Payer: MEDICARE

## 2017-11-27 ENCOUNTER — TELEPHONE (OUTPATIENT)
Dept: RHEUMATOLOGY | Facility: CLINIC | Age: 72
End: 2017-11-27

## 2017-11-27 DIAGNOSIS — M35.3 PMR (POLYMYALGIA RHEUMATICA): ICD-10-CM

## 2017-11-27 DIAGNOSIS — M13.0 POLYARTHRITIS: Primary | ICD-10-CM

## 2017-11-27 DIAGNOSIS — M13.0 POLYARTHRITIS: ICD-10-CM

## 2017-11-27 LAB
CRP SERPL-MCNC: 2 MG/L
ERYTHROCYTE [SEDIMENTATION RATE] IN BLOOD BY WESTERGREN METHOD: 0 MM/HR

## 2017-11-27 PROCEDURE — 86200 CCP ANTIBODY: CPT

## 2017-11-27 PROCEDURE — 86431 RHEUMATOID FACTOR QUANT: CPT

## 2017-11-27 PROCEDURE — 86038 ANTINUCLEAR ANTIBODIES: CPT

## 2017-11-27 PROCEDURE — 85651 RBC SED RATE NONAUTOMATED: CPT

## 2017-11-27 PROCEDURE — 86140 C-REACTIVE PROTEIN: CPT

## 2017-11-27 PROCEDURE — 36415 COLL VENOUS BLD VENIPUNCTURE: CPT

## 2017-11-28 ENCOUNTER — TELEPHONE (OUTPATIENT)
Dept: RHEUMATOLOGY | Facility: CLINIC | Age: 72
End: 2017-11-28

## 2017-11-28 LAB
ANA SER QL IF: NORMAL
CCP AB SER IA-ACNC: 1 U/ML
RHEUMATOID FACT SERPL-ACNC: <10 IU/ML

## 2018-01-26 ENCOUNTER — LAB VISIT (OUTPATIENT)
Dept: LAB | Facility: HOSPITAL | Age: 73
End: 2018-01-26
Attending: INTERNAL MEDICINE
Payer: MEDICARE

## 2018-01-26 DIAGNOSIS — M35.3 PMR (POLYMYALGIA RHEUMATICA): ICD-10-CM

## 2018-01-26 LAB
CRP SERPL-MCNC: 2.5 MG/L
ERYTHROCYTE [SEDIMENTATION RATE] IN BLOOD BY WESTERGREN METHOD: 1 MM/HR

## 2018-01-26 PROCEDURE — 36415 COLL VENOUS BLD VENIPUNCTURE: CPT

## 2018-01-26 PROCEDURE — 86140 C-REACTIVE PROTEIN: CPT

## 2018-01-26 PROCEDURE — 85651 RBC SED RATE NONAUTOMATED: CPT

## 2018-01-30 ENCOUNTER — OFFICE VISIT (OUTPATIENT)
Dept: RHEUMATOLOGY | Facility: CLINIC | Age: 73
End: 2018-01-30
Payer: MEDICARE

## 2018-01-30 ENCOUNTER — PATIENT MESSAGE (OUTPATIENT)
Dept: RHEUMATOLOGY | Facility: CLINIC | Age: 73
End: 2018-01-30

## 2018-01-30 VITALS
SYSTOLIC BLOOD PRESSURE: 148 MMHG | HEIGHT: 71 IN | BODY MASS INDEX: 27.53 KG/M2 | WEIGHT: 196.63 LBS | DIASTOLIC BLOOD PRESSURE: 76 MMHG | HEART RATE: 62 BPM

## 2018-01-30 DIAGNOSIS — M85.88 OTHER SPECIFIED DISORDERS OF BONE DENSITY AND STRUCTURE, OTHER SITE: ICD-10-CM

## 2018-01-30 DIAGNOSIS — M25.561 CHRONIC PAIN OF RIGHT KNEE: ICD-10-CM

## 2018-01-30 DIAGNOSIS — M89.9 DISORDER OF BONE: ICD-10-CM

## 2018-01-30 DIAGNOSIS — M85.80 OSTEOPENIA, UNSPECIFIED LOCATION: ICD-10-CM

## 2018-01-30 DIAGNOSIS — S83.511S RUPTURE OF ANTERIOR CRUCIATE LIGAMENT OF RIGHT KNEE, SEQUELA: ICD-10-CM

## 2018-01-30 DIAGNOSIS — M17.11 PRIMARY OSTEOARTHRITIS OF RIGHT KNEE: ICD-10-CM

## 2018-01-30 DIAGNOSIS — Z98.890 S/P MEDIAL MENISCECTOMY OF RIGHT KNEE: Primary | ICD-10-CM

## 2018-01-30 DIAGNOSIS — Z15.89 HLA B27 (HLA B27 POSITIVE): ICD-10-CM

## 2018-01-30 DIAGNOSIS — R53.83 OTHER FATIGUE: ICD-10-CM

## 2018-01-30 DIAGNOSIS — S32.010D CLOSED COMPRESSION FRACTURE OF L1 LUMBAR VERTEBRA WITH ROUTINE HEALING, SUBSEQUENT ENCOUNTER: ICD-10-CM

## 2018-01-30 DIAGNOSIS — G89.29 CHRONIC PAIN OF RIGHT KNEE: ICD-10-CM

## 2018-01-30 DIAGNOSIS — M35.3 PMR (POLYMYALGIA RHEUMATICA): ICD-10-CM

## 2018-01-30 DIAGNOSIS — S22.000S CLOSED COMPRESSION FRACTURE OF THORACIC VERTEBRA, SEQUELA: ICD-10-CM

## 2018-01-30 DIAGNOSIS — I10 ESSENTIAL HYPERTENSION: ICD-10-CM

## 2018-01-30 PROBLEM — S32.010A COMPRESSION FRACTURE OF FIRST LUMBAR VERTEBRA: Status: ACTIVE | Noted: 2018-01-30

## 2018-01-30 PROBLEM — S22.000A CLOSED COMPRESSION FRACTURE OF THORACIC VERTEBRA: Status: ACTIVE | Noted: 2018-01-30

## 2018-01-30 PROCEDURE — 99213 OFFICE O/P EST LOW 20 MIN: CPT | Mod: S$PBB,,, | Performed by: INTERNAL MEDICINE

## 2018-01-30 PROCEDURE — 1159F MED LIST DOCD IN RCRD: CPT | Mod: ,,, | Performed by: INTERNAL MEDICINE

## 2018-01-30 PROCEDURE — 99214 OFFICE O/P EST MOD 30 MIN: CPT | Mod: PBBFAC | Performed by: INTERNAL MEDICINE

## 2018-01-30 PROCEDURE — 99999 PR PBB SHADOW E&M-EST. PATIENT-LVL IV: CPT | Mod: PBBFAC,,, | Performed by: INTERNAL MEDICINE

## 2018-01-30 RX ORDER — DULOXETIN HYDROCHLORIDE 30 MG/1
30 CAPSULE, DELAYED RELEASE ORAL DAILY
Qty: 60 CAPSULE | Refills: 2 | Status: SHIPPED | OUTPATIENT
Start: 2018-01-30 | End: 2018-01-31 | Stop reason: SDUPTHER

## 2018-01-30 RX ORDER — ALENDRONATE SODIUM 70 MG/1
70 TABLET ORAL
Qty: 4 TABLET | Refills: 11 | Status: SHIPPED | OUTPATIENT
Start: 2018-01-30 | End: 2018-01-31 | Stop reason: SDUPTHER

## 2018-01-30 ASSESSMENT — ROUTINE ASSESSMENT OF PATIENT INDEX DATA (RAPID3)
FATIGUE SCORE: 0
PAIN SCORE: 6
AM STIFFNESS SCORE: 1, YES
PATIENT GLOBAL ASSESSMENT SCORE: 3
WHEN YOU AWAKENED IN THE MORNING OVER THE LAST WEEK, PLEASE INDICATE THE AMOUNT OF TIME IT TAKES UNTIL YOU ARE AS LIMBER AS YOU WILL BE FOR THE DAY: 1 HR
TOTAL RAPID3 SCORE: 3
PSYCHOLOGICAL DISTRESS SCORE: 1.1
MDHAQ FUNCTION SCORE: 0

## 2018-01-30 NOTE — PATIENT INSTRUCTIONS
Alendronate weekly tablets  What is this medicine?  ALENDRONATE (a UDSTY droe melissa) slows calcium loss from bones. It helps to make healthy bone and to slow bone loss in people with osteoporosis. It may be used to treat Paget's disease.  How should I use this medicine?  You must take this medicine exactly as directed or you will lower the amount of medicine you absorb into your body or you may cause yourself harm. Take your dose by mouth first thing in the morning, after you are up for the day. Do not eat or drink anything before you take this medicine. Swallow your medicine with a full glass (6 to 8 fluid ounces) of plain water. Do not take this tablet with any other drink. Do not chew or crush the tablet. After taking this medicine, do not eat breakfast, drink, or take any medicines or vitamins for at least 30 minutes. Stand or sit up for at least 30 minutes after you take this medicine; do not lie down. Take this medicine on the same day every week. Do not take your medicine more often than directed.  Talk to your pediatrician regarding the use of this medicine in children. Special care may be needed.  What side effects may I notice from receiving this medicine?  Side effects that you should report to your doctor or health care professional as soon as possible:  · allergic reactions like skin rash, itching or hives, swelling of the face, lips, or tongue  · black or tarry stools  · bone, muscle or joint pain  · changes in vision  · chest pain  · heartburn or stomach pain  · jaw pain, especially after dental work  · pain or trouble when swallowing  · redness, blistering, peeling or loosening of the skin, including inside the mouth  Side effects that usually do not require medical attention (report to your doctor or health care professional if they continue or are bothersome):  · changes in taste  · diarrhea or constipation  · eye pain or itching  · headache  · nausea or vomiting  · stomach gas or fullness  What may  interact with this medicine?  · aluminum hydroxide  · antacids  · aspirin  · calcium supplements  · drugs for inflammation like ibuprofen, naproxen, and others  · iron supplements  · magnesium supplements  · vitamins with minerals  What if I miss a dose?  If you miss a dose, take the dose on the morning after you remember. Then take your next dose on your regular day of the week. Never take 2 tablets on the same day. Do not take double or extra doses.  Where should I keep my medicine?  Keep out of the reach of children.  Store at room temperature of 15 and 30 degrees C (59 and 86 degrees F). Throw away any unused medicine after the expiration date.  What should I tell my health care provider before I take this medicine?  They need to know if you have any of these conditions:  · esophagus, stomach, or intestine problems, like acid-reflux or GERD  · dental disease  · kidney disease  · low blood calcium  · low vitamin D  · problems swallowing  · problems sitting or standing for 30 minutes  · an unusual or allergic reaction to alendronate, other medicines, foods, dyes, or preservatives  · pregnant or trying to get pregnant  · breast-feeding  What should I watch for while using this medicine?  Visit your doctor or health care professional for regular checks ups. It may be some time before you see benefit from this medicine. Do not stop taking your medication except on your doctor's advice. Your doctor or health care professional may order blood tests and other tests to see how you are doing.  You should make sure you get enough calcium and vitamin D while you are taking this medicine, unless your doctor tells you not to. Discuss the foods you eat and the vitamins you take with your health care professional.  Some people who take this medicine have severe bone, joint, and/or muscle pain. This medicine may also increase your risk for a broken thigh bone. Tell your doctor right away if you have pain in your upper leg or  groin. Tell your doctor if you have any pain that does not go away or that gets worse.  This medicine can make you more sensitive to the sun. If you get a rash while taking this medicine, sunlight may cause the rash to get worse. Keep out of the sun. If you cannot avoid being in the sun, wear protective clothing and use sunscreen. Do not use sun lamps or tanning beds/booths.  NOTE:This sheet is a summary. It may not cover all possible information. If you have questions about this medicine, talk to your doctor, pharmacist, or health care provider. Copyright© 2017 [employer]        Duloxetine delayed-release capsules  What is this medicine?  DULOXETINE (doo LOX e teen) is used to treat depression, anxiety, and different types of chronic pain.  How should I use this medicine?  Take this medicine by mouth with a glass of water. Follow the directions on the prescription label. Do not cut, crush or chew this medicine. You can take this medicine with or without food. Take your medicine at regular intervals. Do not take your medicine more often than directed. Do not stop taking this medicine suddenly except upon the advice of your doctor. Stopping this medicine too quickly may cause serious side effects or your condition may worsen.  A special MedGuide will be given to you by the pharmacist with each prescription and refill. Be sure to read this information carefully each time.  Talk to your pediatrician regarding the use of this medicine in children. While this drug may be prescribed for children as young as 7 years of age for selected conditions, precautions do apply.  What side effects may I notice from receiving this medicine?  Side effects that you should report to your doctor or health care professional as soon as possible:  · allergic reactions like skin rash, itching or hives, swelling of the face, lips, or tongue  · changes in blood pressure  · confusion  · dark urine  · dizziness  · fast talking and excited  feelings or actions that are out of control  · fast, irregular heartbeat  · fever  · general ill feeling or flu-like symptoms  · hallucination, loss of contact with reality  · light-colored stools  · loss of balance or coordination  · redness, blistering, peeling or loosening of the skin, including inside the mouth  · right upper belly pain  · seizures  · suicidal thoughts or other mood changes  · trouble concentrating  · trouble passing urine or change in the amount of urine  · unusual bleeding or bruising  · unusually weak or tired  · yellowing of the eyes or skin  Side effects that usually do not require medical attention (report to your doctor or health care professional if they continue or are bothersome):  · blurred vision  · change in appetite  · change in sex drive or performance  · headache  · increased sweating  · nausea  What may interact with this medicine?  Do not take this medicine with any of the following medications:  · certain diet drugs like dexfenfluramine, fenfluramine  · desvenlafaxine  · linezolid  · MAOIs like Azilect, Carbex, Eldepryl, Marplan, Nardil, and Parnate  · methylene blue (intravenous)  · milnacipran  · thioridazine  · venlafaxine  This medicine may also interact with the following medications:  · alcohol  · aspirin and aspirin-like medicines  · certain antibiotics like ciprofloxacin and enoxacin  · certain medicines for blood pressure, heart disease, irregular heart beat  · certain medicines for depression, anxiety, or psychotic disturbances  · certain medicines for migraine headache like almotriptan, eletriptan, frovatriptan, naratriptan, rizatriptan, sumatriptan, zolmitriptan  · certain medicines that treat or prevent blood clots like warfarin, enoxaparin, and dalteparin  · cimetidine  · fentanyl  · lithium  · NSAIDS, medicines for pain and inflammation, like ibuprofen or naproxen  · phentermine  · procarbazine  · sibutramine  · Alena's  wort  · theophylline  · tramadol  · tryptophan  What if I miss a dose?  If you miss a dose, take it as soon as you can. If it is almost time for your next dose, take only that dose. Do not take double or extra doses.  Where should I keep my medicine?  Keep out of the reach of children.  Store at room temperature between 20 and 25 degrees C (68 to 77 degrees F). Throw away any unused medicine after the expiration date.  What should I tell my health care provider before I take this medicine?  They need to know if you have any of these conditions:  · bipolar disorder or a family history of bipolar disorder  · glaucoma  · kidney disease  · liver disease  · suicidal thoughts or a previous suicide attempt  · taken medicines called MAOIs like Carbex, Eldepryl, Marplan, Nardil, and Parnate within 14 days  · an unusual reaction to duloxetine, other medicines, foods, dyes, or preservatives  · pregnant or trying to get pregnant  · breast-feeding  What should I watch for while using this medicine?  Tell your doctor if your symptoms do not get better or if they get worse. Visit your doctor or health care professional for regular checks on your progress. Because it may take several weeks to see the full effects of this medicine, it is important to continue your treatment as prescribed by your doctor.  Patients and their families should watch out for new or worsening thoughts of suicide or depression. Also watch out for sudden changes in feelings such as feeling anxious, agitated, panicky, irritable, hostile, aggressive, impulsive, severely restless, overly excited and hyperactive, or not being able to sleep. If this happens, especially at the beginning of treatment or after a change in dose, call your health care professional.  You may get drowsy or dizzy. Do not drive, use machinery, or do anything that needs mental alertness until you know how this medicine affects you. Do not stand or sit up quickly, especially if you are an  older patient. This reduces the risk of dizzy or fainting spells. Alcohol may interfere with the effect of this medicine. Avoid alcoholic drinks.  This medicine can cause an increase in blood pressure. This medicine can also cause a sudden drop in your blood pressure, which may make you feel faint and increase the chance of a fall. These effects are most common when you first start the medicine or when the dose is increased, or during use of other medicines that can cause a sudden drop in blood pressure. Check with your doctor for instructions on monitoring your blood pressure while taking this medicine.  Your mouth may get dry. Chewing sugarless gum or sucking hard candy, and drinking plenty of water may help. Contact your doctor if the problem does not go away or is severe.  NOTE:This sheet is a summary. It may not cover all possible information. If you have questions about this medicine, talk to your doctor, pharmacist, or health care provider. Copyright© 2017 [employer]

## 2018-01-30 NOTE — PROGRESS NOTES
"Subjective:       Patient ID: Ghanshyam Sanford Jr. is a 72 y.o. male.    Chief Complaint: H/o PMR, + B27, s/p right meniscal repair/ ACL tear, minimal OA knees ;osteopenia   HPI off prednisone since 7/18/17. Has 1 hr am stiffness right knee. Still has pain right medial knee and slightly posterior. Saw Dr. Yeison Alfonso in  for second opinion, multiple x-rays all normal. Rec exercise and stretching.  Wonders about MRI. Discomfort mainly when resting and at night. No trouble at gym or walking. Taking naproxen 500mg twice daily prescribed for back, taking now for right knee, without help. Attended PT prior to last visit without relief. Attends gym 1 hr 6 days/wk. No trouble 90 rpms for 1 hr without pain.  Only when I relax. EMG nl 2016. Tried gabapentin. No Lyrica Cymbalta.  Review of Systems   Constitutional: Negative for appetite change, chills, fatigue, fever and unexpected weight change.   HENT: Negative for mouth sores.    Eyes: Negative for pain, redness and visual disturbance.   Respiratory: Negative for cough, shortness of breath and wheezing.    Cardiovascular: Negative for chest pain, palpitations and leg swelling.   Gastrointestinal: Negative for abdominal pain, blood in stool, constipation, diarrhea and nausea.   Genitourinary: Negative for difficulty urinating, dysuria and hematuria.   Musculoskeletal: Negative for arthralgias, back pain, gait problem, joint swelling, myalgias, neck pain and neck stiffness.   Skin: Negative for color change, pallor and rash.   Neurological: Negative for weakness and headaches.   Psychiatric/Behavioral: Negative for dysphoric mood and sleep disturbance.         Objective:   BP (!) 148/76   Pulse 62   Ht 5' 10.8" (1.798 m)   Wt 89.2 kg (196 lb 9.6 oz)   BMI 27.58 kg/m²      Physical Exam       Right Side Rheumatological Exam     The patient is tender to palpation of the knee    Knee Exam   Patellofemoral Crepitus: positive  Effusion: negative  Warmth: negative    Left " Side Rheumatological Exam     Knee Exam     Range of Motion   The patient has normal left knee ROM.    Patellofemoral Crepitus: positive  Effusion: negative  Warmth: negative            Assessment/Plan         Problem List Items Addressed This Visit     Hypertension    Overview     148/76         HLA B27 (HLA B27 positive)    Overview     No classic signs or symptoms of HLA-B27 diseases.   -no symptoms or radiographic evidence of spondylitis, inflammatory back pain or spondyloarthritis  osteopenia < NOF FRAX therapy threshold  -Patient's back pain is 2/2 to a fall on dock while fishing 3 weeks ago. PCP appointment today.          PMR (polymyalgia rheumatica)    Current Assessment & Plan     ESR= 1  CRP 2.5         S/P medial meniscectomy of right knee - Primary    Right ACL tear    Right knee pain    Overview     3 views both knees.  Bone, joint and soft tissues.   Impression      Normal knee exam.      Electronically signed by: MYA CAPONE MD  Date: 10/07/16  Time: 13:36               Current Assessment & Plan     Normal knee exam except tender medial joint line  No response to NSAIDs topical and oral, PT, injection of steroids, viscosupplementation;  Will try duloxetine 30mg daily x 1 wk, then may increase to twice daily as  Needed/tolerated  Cont gym program  RTC 3months         Relevant Medications    DULoxetine (CYMBALTA) 30 MG capsule    Osteoarthritis of right knee    Overview     Patient has full ROM and strength, no fluid or swelling appreciated. His only complaint is pain behind R knee extending into his calf, for which he is meant to f/u with Ortho on 7/20/17 (Dr. Latham, [place]).  -Discussed aggressive calf stretching for 20-30 minutes a day for 2 weeks to see if he finds relief.          Osteopenia    Overview     The bone density shows osteopenia/low bone mass, but not low enough to warrant drug therapy. RJQ   External Result Report     External Result Report   Narrative     Procedure: Bone  mineral density DEXA.     Technique: Dual energy x-ray absorptiometry.    Comparison: None         Results: Bone density measurement was performed to evaluate for bone mineral loss. Dual energy X-ray absorptiometry was used to determine the bone density of the AP spine and hips using a dual energy X-ray bone densitometer.    The bone mineral density of the Lumbar spine measured at the L1-L4 region was 1.018 g/cm2. This corresponds to a Z-score of  -1.3 and a T-score of -1.8, which is in the osteopenic category of bone density by WHO criteria.      The bone mineral density of the bilateral hips measured at the neck was 0.952 g/cm2. This corresponds to a Z-score of 0.3 and a T-score of -0.9, which is in the normal category of bone density by WHO criteria.   Impression       Compared to the young normal reference, this study indicates osteopenia of the Lumbar spine and normal bone mineral density of the bilateral hips.    The probability of a major osteoporotic fracture is 2.3% within the next ten years.    The probability of a hip fracture is 0.3% within the next ten years.    **Note: Degenerative changes can falsely elevate measured bone mineral density, particularly in the lumbar spine, and should be considered as part of the final clinical recommendation.  ______________________________________     Electronically signed by: Drea Catalan MD  Date: 07/07/16  Time: 13:17     Encounter     View Encounter           MRI thoracic spine 7/14/17  Post major trauma acute compression fractures of T 11 and L1 no canal narrowing or neural foraminal narrowing.    NM WB bone scan 8/10/17:    Fairly acute compression deformities L1 and T 11 other fracture deformities or arthritic degenerative changes right costovertebral junctions, T 3, T4 and T 7      Had vertebroplasty at both levels         Current Assessment & Plan     Cmp, vitamin d, tsh today  Then start alendronate 70mg po once weekly  Per protocol reviewed verbally and  printed lit    DXA > 7/7/18         Relevant Medications    alendronate (FOSAMAX) 70 MG tablet    Compression fracture of first lumbar vertebra    Relevant Medications    alendronate (FOSAMAX) 70 MG tablet    Other Relevant Orders    DXA Bone Density Spine And Hip    Comprehensive metabolic panel    Vitamin D    Closed compression fracture of thoracic vertebra      Other Visit Diagnoses     Other specified disorders of bone density and structure, other site         Relevant Orders    DXA Bone Density Spine And Hip    Disorder of bone         Relevant Orders    Vitamin D    TSH    Other fatigue         Relevant Orders    TSH

## 2018-01-30 NOTE — ASSESSMENT & PLAN NOTE
Cmp, vitamin d, tsh today  Then start alendronate 70mg po once weekly  Per protocol reviewed verbally and printed lit    DXA > 7/7/18

## 2018-01-30 NOTE — ASSESSMENT & PLAN NOTE
Normal knee exam except tender medial joint line  No response to NSAIDs topical and oral, PT, injection of steroids, viscosupplementation;  Will try duloxetine 30mg daily x 1 wk, then may increase to twice daily as  Needed/tolerated  Cont gym program  RTC 3months

## 2018-01-31 DIAGNOSIS — M25.561 CHRONIC PAIN OF RIGHT KNEE: ICD-10-CM

## 2018-01-31 DIAGNOSIS — S32.010D CLOSED COMPRESSION FRACTURE OF L1 LUMBAR VERTEBRA WITH ROUTINE HEALING, SUBSEQUENT ENCOUNTER: ICD-10-CM

## 2018-01-31 DIAGNOSIS — G89.29 CHRONIC PAIN OF RIGHT KNEE: ICD-10-CM

## 2018-01-31 DIAGNOSIS — M85.80 OSTEOPENIA, UNSPECIFIED LOCATION: ICD-10-CM

## 2018-01-31 RX ORDER — ALENDRONATE SODIUM 70 MG/1
70 TABLET ORAL
Qty: 4 TABLET | Refills: 11 | Status: CANCELLED | OUTPATIENT
Start: 2018-01-31 | End: 2019-01-31

## 2018-01-31 RX ORDER — ALENDRONATE SODIUM 70 MG/1
70 TABLET ORAL
Qty: 4 TABLET | Refills: 11 | Status: SHIPPED | OUTPATIENT
Start: 2018-01-31 | End: 2018-05-10

## 2018-01-31 RX ORDER — DULOXETIN HYDROCHLORIDE 30 MG/1
30 CAPSULE, DELAYED RELEASE ORAL DAILY
Qty: 60 CAPSULE | Refills: 2 | Status: CANCELLED | OUTPATIENT
Start: 2018-01-31 | End: 2019-01-31

## 2018-01-31 RX ORDER — DULOXETIN HYDROCHLORIDE 30 MG/1
30 CAPSULE, DELAYED RELEASE ORAL DAILY
Qty: 60 CAPSULE | Refills: 2 | Status: SHIPPED | OUTPATIENT
Start: 2018-01-31 | End: 2018-05-10

## 2018-02-02 ENCOUNTER — PATIENT MESSAGE (OUTPATIENT)
Dept: RHEUMATOLOGY | Facility: CLINIC | Age: 73
End: 2018-02-02

## 2018-05-07 ENCOUNTER — LAB VISIT (OUTPATIENT)
Dept: LAB | Facility: HOSPITAL | Age: 73
End: 2018-05-07
Attending: INTERNAL MEDICINE
Payer: MEDICARE

## 2018-05-07 DIAGNOSIS — M35.3 PMR (POLYMYALGIA RHEUMATICA): ICD-10-CM

## 2018-05-07 LAB
CRP SERPL-MCNC: 1.6 MG/L
ERYTHROCYTE [SEDIMENTATION RATE] IN BLOOD BY WESTERGREN METHOD: 1 MM/HR

## 2018-05-07 PROCEDURE — 86140 C-REACTIVE PROTEIN: CPT

## 2018-05-07 PROCEDURE — 85651 RBC SED RATE NONAUTOMATED: CPT

## 2018-05-07 PROCEDURE — 36415 COLL VENOUS BLD VENIPUNCTURE: CPT

## 2018-05-10 ENCOUNTER — OFFICE VISIT (OUTPATIENT)
Dept: RHEUMATOLOGY | Facility: CLINIC | Age: 73
End: 2018-05-10
Payer: MEDICARE

## 2018-05-10 VITALS
BODY MASS INDEX: 26.38 KG/M2 | WEIGHT: 188.13 LBS | DIASTOLIC BLOOD PRESSURE: 67 MMHG | RESPIRATION RATE: 18 BRPM | HEART RATE: 49 BPM | SYSTOLIC BLOOD PRESSURE: 159 MMHG

## 2018-05-10 DIAGNOSIS — M35.3 PMR (POLYMYALGIA RHEUMATICA): ICD-10-CM

## 2018-05-10 DIAGNOSIS — G25.81 RESTLESS LEGS SYNDROME (RLS): ICD-10-CM

## 2018-05-10 DIAGNOSIS — G25.81 RESTLESS LEG SYNDROME: Primary | ICD-10-CM

## 2018-05-10 DIAGNOSIS — Z98.890 S/P MEDIAL MENISCECTOMY OF RIGHT KNEE: ICD-10-CM

## 2018-05-10 DIAGNOSIS — M85.80 OSTEOPENIA, UNSPECIFIED LOCATION: ICD-10-CM

## 2018-05-10 PROCEDURE — 99999 PR PBB SHADOW E&M-EST. PATIENT-LVL IV: CPT | Mod: PBBFAC,,, | Performed by: INTERNAL MEDICINE

## 2018-05-10 PROCEDURE — 99213 OFFICE O/P EST LOW 20 MIN: CPT | Mod: S$PBB,,, | Performed by: INTERNAL MEDICINE

## 2018-05-10 PROCEDURE — 99214 OFFICE O/P EST MOD 30 MIN: CPT | Mod: PBBFAC | Performed by: INTERNAL MEDICINE

## 2018-05-10 RX ORDER — ROPINIROLE 0.25 MG/1
0.25 TABLET, FILM COATED ORAL NIGHTLY PRN
Qty: 30 TABLET | Refills: 0 | Status: SHIPPED | OUTPATIENT
Start: 2018-05-10 | End: 2018-09-24 | Stop reason: SDUPTHER

## 2018-05-10 RX ORDER — MELOXICAM 15 MG/1
TABLET ORAL
COMMUNITY
Start: 2018-03-18 | End: 2018-05-10

## 2018-05-10 ASSESSMENT — ROUTINE ASSESSMENT OF PATIENT INDEX DATA (RAPID3)
PATIENT GLOBAL ASSESSMENT SCORE: 2
TOTAL RAPID3 SCORE: 1.33
PAIN SCORE: 2
MDHAQ FUNCTION SCORE: 0
FATIGUE SCORE: 7
AM STIFFNESS SCORE: 1, YES
PSYCHOLOGICAL DISTRESS SCORE: 2.2
WHEN YOU AWAKENED IN THE MORNING OVER THE LAST WEEK, PLEASE INDICATE THE AMOUNT OF TIME IT TAKES UNTIL YOU ARE AS LIMBER AS YOU WILL BE FOR THE DAY: 30 MIN

## 2018-05-10 NOTE — PATIENT INSTRUCTIONS
Please forward recent MRI right knee report  Please forward bone density from 2017  Denosumab injection  What is this medicine?  DENOSUMAB (den oh elba mab) slows bone breakdown. Prolia is used to treat osteoporosis in women after menopause and in men. Xgeva is used to prevent bone fractures and other bone problems caused by cancer bone metastases. Xgeva is also used to treat giant cell tumor of the bone.  How should I use this medicine?  This medicine is for injection under the skin. It is given by a health care professional in a hospital or clinic setting.  If you are getting Prolia, a special MedGuide will be given to you by the pharmacist with each prescription and refill. Be sure to read this information carefully each time.  For Prolia, talk to your pediatrician regarding the use of this medicine in children. Special care may be needed. For Xgeva, talk to your pediatrician regarding the use of this medicine in children. While this drug may be prescribed for children as young as 13 years for selected conditions, precautions do apply.  What side effects may I notice from receiving this medicine?  Side effects that you should report to your doctor or health care professional as soon as possible:  · allergic reactions like skin rash, itching or hives, swelling of the face, lips, or tongue  · breathing problems  · chest pain  · fast, irregular heartbeat  · feeling faint or lightheaded, falls  · fever, chills, or any other sign of infection  · muscle spasms, tightening, or twitches  · numbness or tingling  · skin blisters or bumps, or is dry, peels, or red  · slow healing or unexplained pain in the mouth or jaw  · unusual bleeding or bruising  Side effects that usually do not require medical attention (Report these to your doctor or health care professional if they continue or are bothersome.):  · muscle pain  · stomach upset, gas  What may interact with this medicine?  Do not take this medicine with any of the  following medications:  · other medicines containing denosumab  This medicine may also interact with the following medications:  · medicines that suppress the immune system  · medicines that treat cancer  · steroid medicines like prednisone or cortisone  What if I miss a dose?  It is important not to miss your dose. Call your doctor or health care professional if you are unable to keep an appointment.  Where should I keep my medicine?  This medicine is only given in a clinic, doctor's office, or other health care setting and will not be stored at home.  What should I tell my health care provider before I take this medicine?  They need to know if you have any of these conditions:  · dental disease  · eczema  · infection or history of infections  · kidney disease or on dialysis  · low blood calcium or vitamin D  · malabsorption syndrome  · scheduled to have surgery or tooth extraction  · taking medicine that contains denosumab  · thyroid or parathyroid disease  · an unusual reaction to denosumab, other medicines, foods, dyes, or preservatives  · pregnant or trying to get pregnant  · breast-feeding  What should I watch for while using this medicine?  Visit your doctor or health care professional for regular checks on your progress. Your doctor or health care professional may order blood tests and other tests to see how you are doing.  Call your doctor or health care professional if you get a cold or other infection while receiving this medicine. Do not treat yourself. This medicine may decrease your body's ability to fight infection.  You should make sure you get enough calcium and vitamin D while you are taking this medicine, unless your doctor tells you not to. Discuss the foods you eat and the vitamins you take with your health care professional.  See your dentist regularly. Brush and floss your teeth as directed. Before you have any dental work done, tell your dentist you are receiving this medicine.  Do not become  pregnant while taking this medicine or for 5 months after stopping it. Women should inform their doctor if they wish to become pregnant or think they might be pregnant. There is a potential for serious side effects to an unborn child. Talk to your health care professional or pharmacist for more information.  NOTE:This sheet is a summary. It may not cover all possible information. If you have questions about this medicine, talk to your doctor, pharmacist, or health care provider. Copyright© 2017 [employer]        Denosumab injection  What is this medicine?  DENOSUMAB (den oh elba mab) slows bone breakdown. Prolia is used to treat osteoporosis in women after menopause and in men. Xgeva is used to prevent bone fractures and other bone problems caused by cancer bone metastases. Xgeva is also used to treat giant cell tumor of the bone.  How should I use this medicine?  This medicine is for injection under the skin. It is given by a health care professional in a hospital or clinic setting.  If you are getting Prolia, a special MedGuide will be given to you by the pharmacist with each prescription and refill. Be sure to read this information carefully each time.  For Prolia, talk to your pediatrician regarding the use of this medicine in children. Special care may be needed. For Xgeva, talk to your pediatrician regarding the use of this medicine in children. While this drug may be prescribed for children as young as 13 years for selected conditions, precautions do apply.  What side effects may I notice from receiving this medicine?  Side effects that you should report to your doctor or health care professional as soon as possible:  · allergic reactions like skin rash, itching or hives, swelling of the face, lips, or tongue  · breathing problems  · chest pain  · fast, irregular heartbeat  · feeling faint or lightheaded, falls  · fever, chills, or any other sign of infection  · muscle spasms, tightening, or  twitches  · numbness or tingling  · skin blisters or bumps, or is dry, peels, or red  · slow healing or unexplained pain in the mouth or jaw  · unusual bleeding or bruising  Side effects that usually do not require medical attention (Report these to your doctor or health care professional if they continue or are bothersome.):  · muscle pain  · stomach upset, gas  What may interact with this medicine?  Do not take this medicine with any of the following medications:  · other medicines containing denosumab  This medicine may also interact with the following medications:  · medicines that suppress the immune system  · medicines that treat cancer  · steroid medicines like prednisone or cortisone  What if I miss a dose?  It is important not to miss your dose. Call your doctor or health care professional if you are unable to keep an appointment.  Where should I keep my medicine?  This medicine is only given in a clinic, doctor's office, or other health care setting and will not be stored at home.  What should I tell my health care provider before I take this medicine?  They need to know if you have any of these conditions:  · dental disease  · eczema  · infection or history of infections  · kidney disease or on dialysis  · low blood calcium or vitamin D  · malabsorption syndrome  · scheduled to have surgery or tooth extraction  · taking medicine that contains denosumab  · thyroid or parathyroid disease  · an unusual reaction to denosumab, other medicines, foods, dyes, or preservatives  · pregnant or trying to get pregnant  · breast-feeding  What should I watch for while using this medicine?  Visit your doctor or health care professional for regular checks on your progress. Your doctor or health care professional may order blood tests and other tests to see how you are doing.  Call your doctor or health care professional if you get a cold or other infection while receiving this medicine. Do not treat yourself. This  medicine may decrease your body's ability to fight infection.  You should make sure you get enough calcium and vitamin D while you are taking this medicine, unless your doctor tells you not to. Discuss the foods you eat and the vitamins you take with your health care professional.  See your dentist regularly. Brush and floss your teeth as directed. Before you have any dental work done, tell your dentist you are receiving this medicine.  Do not become pregnant while taking this medicine or for 5 months after stopping it. Women should inform their doctor if they wish to become pregnant or think they might be pregnant. There is a potential for serious side effects to an unborn child. Talk to your health care professional or pharmacist for more information.  NOTE:This sheet is a summary. It may not cover all possible information. If you have questions about this medicine, talk to your doctor, pharmacist, or health care provider. Copyright© 2017 [employer]

## 2018-05-10 NOTE — ASSESSMENT & PLAN NOTE
off prednisone since 7/18/17    Results for ELISABETH MCCOLLUM JR. (MRN [id number]) as of 5/10/2018 10:43   Ref. Range 5/7/2018 10:30   Sed Rate Latest Ref Range: 0 - 10 mm/Hr 1   CRP Latest Ref Range: 0.0 - 8.2 mg/L 1.6

## 2018-05-10 NOTE — ASSESSMENT & PLAN NOTE
Intolerant alendronate b/o dyspepsia  Renal function panel just prior to denosumab([facility])  denosumab 60mg sc q  6months([facility]). Risks and benefits discussed including minimally increased risk of infection, ben cellulitis  Len and 's brochures both provided.  DXA > 7/7/18. Clarify if he had DXA last year or not at home.  RTC  6months

## 2018-05-10 NOTE — ASSESSMENT & PLAN NOTE
Pt. Requests ropinirole 0.25mg hs prn(takes about once a week) tolerates and helps    Ropinirole 0.25mg hs prn #30 nr

## 2018-05-10 NOTE — PROGRESS NOTES
"Subjective:       Patient ID: Ghanshyam Sanford Jr. is a 72 y.o. male.    Chief Complaint: right knee pain, OA,  s/p medial meniscectomy/ACL tear ; h/o PMR; B27+; osteopenia with h/o L1 T 11 compression fractures  7/7/16 s/p vertebroplasty T11 L1  HPI still right knee pain,medial. Saw Dr. Latham in [place] ; MRI "still some remaining cartilage" Advised if symptoms continue/worsen, consider repeat arthroscopy. Advised additional stretches which he does at gym 5 d/wk Also had dyspepsia with alendronate and stopped after one month. No symptoms of pmr. No back pain  Review of Systems   Constitutional: Positive for fatigue. Negative for appetite change, chills, fever and unexpected weight change.   HENT: Negative for mouth sores.         Dry     Eyes: Negative for pain, redness and visual disturbance.   Respiratory: Negative for cough, shortness of breath and wheezing.    Cardiovascular: Negative for chest pain, palpitations and leg swelling.   Gastrointestinal: Negative for abdominal pain, blood in stool, constipation, diarrhea and nausea.   Genitourinary: Negative for difficulty urinating, dysuria and hematuria.   Musculoskeletal: Negative for arthralgias, back pain, gait problem, joint swelling, myalgias, neck pain and neck stiffness.   Skin: Negative for color change, pallor and rash.   Neurological: Negative for weakness and headaches.   Psychiatric/Behavioral: Negative for dysphoric mood and sleep disturbance.         Objective:   BP (!) 159/67   Pulse (!) 49   Resp 18   Wt 85.3 kg (188 lb 1.6 oz)   BMI 26.38 kg/m²      Physical Exam       Right Side Rheumatological Exam     Examination finds the shoulder, elbow, wrist, knee, 1st PIP, 1st MCP, 2nd PIP, 2nd MCP, 3rd PIP, 3rd MCP, 4th PIP, 4th MCP, 5th PIP and 5th MCP normal.    Knee Exam   Tenderness Location: medial joint line    Range of Motion   The patient has normal right knee ROM.  Patellofemoral Crepitus: positive  Effusion: negative  Warmth: negative    Left " Side Rheumatological Exam     Examination finds the shoulder, elbow, wrist, knee, 1st PIP, 1st MCP, 2nd PIP, 2nd MCP, 3rd PIP, 3rd MCP, 4th PIP, 4th MCP, 5th PIP and 5th MCP normal.    Knee Exam     Range of Motion   The patient has normal left knee ROM.    Patellofemoral Crepitus: positive  Effusion: negative            Assessment/Plan         Problem List Items Addressed This Visit     PMR (polymyalgia rheumatica)    Current Assessment & Plan     off prednisone since 7/18/17    Results for ELISABETH MCCOLLUM JR. (MRN [id number]) as of 5/10/2018 10:43   Ref. Range 5/7/2018 10:30   Sed Rate Latest Ref Range: 0 - 10 mm/Hr 1   CRP Latest Ref Range: 0.0 - 8.2 mg/L 1.6            S/P medial meniscectomy of right knee    Current Assessment & Plan     Cont knee exercises, gym 5 days/wk  Request recent MRI right knee         Osteopenia    Overview     The bone density shows osteopenia/low bone mass, but not low enough to warrant drug therapy. RJQ   External Result Report     External Result Report   Narrative     Procedure: Bone mineral density DEXA.     Technique: Dual energy x-ray absorptiometry.    Comparison: None         Results: Bone density measurement was performed to evaluate for bone mineral loss. Dual energy X-ray absorptiometry was used to determine the bone density of the AP spine and hips using a dual energy X-ray bone densitometer.    The bone mineral density of the Lumbar spine measured at the L1-L4 region was 1.018 g/cm2. This corresponds to a Z-score of  -1.3 and a T-score of -1.8, which is in the osteopenic category of bone density by WHO criteria.      The bone mineral density of the bilateral hips measured at the neck was 0.952 g/cm2. This corresponds to a Z-score of 0.3 and a T-score of -0.9, which is in the normal category of bone density by WHO criteria.   Impression       Compared to the young normal reference, this study indicates osteopenia of the Lumbar spine and normal bone mineral density of the  bilateral hips.    The probability of a major osteoporotic fracture is 2.3% within the next ten years.    The probability of a hip fracture is 0.3% within the next ten years.    **Note: Degenerative changes can falsely elevate measured bone mineral density, particularly in the lumbar spine, and should be considered as part of the final clinical recommendation.  ______________________________________     Electronically signed by: Drea Catalan MD  Date: 07/07/16  Time: 13:17     Encounter     View Encounter           MRI thoracic spine 7/14/17  Post major trauma acute compression fractures of T 11 and L1 no canal narrowing or neural foraminal narrowing.    NM WB bone scan 8/10/17:    Fairly acute compression deformities L1 and T 11 other fracture deformities or arthritic degenerative changes right costovertebral junctions, T 3, T4 and T 7.  Had vertebroplasty at both levels  Results for ELISABETH MCCOLLUM JR. (MRN [id number]) as of 5/10/2018 10:43   Ref. Range 1/30/2018 15:11   Sodium Latest Ref Range: 136 - 145 mmol/L 139   Potassium Latest Ref Range: 3.5 - 5.1 mmol/L 4.4   Chloride Latest Ref Range: 95 - 110 mmol/L 103   CO2 Latest Ref Range: 23 - 29 mmol/L 27   Anion Gap Latest Ref Range: 8 - 16 mmol/L 9   BUN, Bld Latest Ref Range: 8 - 23 mg/dL 21   Creatinine Latest Ref Range: 0.5 - 1.4 mg/dL 1.1   eGFR if non African American Latest Ref Range: >60 mL/min/1.73 m^2 >60.0   eGFR if African American Latest Ref Range: >60 mL/min/1.73 m^2 >60.0   Glucose Latest Ref Range: 70 - 110 mg/dL 85   Calcium Latest Ref Range: 8.7 - 10.5 mg/dL 9.8   Alkaline Phosphatase Latest Ref Range: 55 - 135 U/L 72   Total Protein Latest Ref Range: 6.0 - 8.4 g/dL 7.4   Albumin Latest Ref Range: 3.5 - 5.2 g/dL 3.8   Total Bilirubin Latest Ref Range: 0.1 - 1.0 mg/dL 0.8   AST Latest Ref Range: 10 - 40 U/L 18   ALT Latest Ref Range: 10 - 44 U/L 20   Results for ELISABETH MCCOLLUM JR. (MRN [id number]) as of 5/10/2018 10:43   Ref. Range 1/30/2018  15:11   Vit D, 25-Hydroxy Latest Ref Range: 30 - 96 ng/mL 44     Results for ELISABETH MCCOLLUM JR. (MRN [id number]) as of 5/10/2018 10:43   Ref. Range 1/30/2018 15:11   TSH Latest Ref Range: 0.400 - 4.000 uIU/mL 2.166            Current Assessment & Plan     Intolerant alendronate b/o dyspepsia  Renal function panel just prior to denosumab([facility])  denosumab 60mg sc q  6months([facility]). Risks and benefits discussed including minimally increased risk of infection, ben cellulitis  Len and 's brochures both provided.  DXA > 7/7/18. Clarify if he had DXA last year or not at home.  RTC  6months         Relevant Orders    RENAL FUNCTION PANEL    Restless legs syndrome (RLS)    Current Assessment & Plan     Pt. Requests ropinirole 0.25mg hs prn(takes about once a week) tolerates and helps    Ropinirole 0.25mg hs prn #30 nr           Other Visit Diagnoses     Restless leg syndrome    -  Primary    Relevant Medications    rOPINIRole (REQUIP) 0.25 MG tablet

## 2018-05-15 ENCOUNTER — PATIENT MESSAGE (OUTPATIENT)
Dept: CARDIOLOGY | Facility: CLINIC | Age: 73
End: 2018-05-15

## 2018-05-28 ENCOUNTER — PATIENT MESSAGE (OUTPATIENT)
Dept: RHEUMATOLOGY | Facility: CLINIC | Age: 73
End: 2018-05-28

## 2018-07-25 ENCOUNTER — HOSPITAL ENCOUNTER (OUTPATIENT)
Dept: RADIOLOGY | Facility: HOSPITAL | Age: 73
Discharge: HOME | End: 2018-07-25
Attending: INTERNAL MEDICINE
Payer: MEDICARE

## 2018-07-25 DIAGNOSIS — S32.010D CLOSED COMPRESSION FRACTURE OF L1 LUMBAR VERTEBRA WITH ROUTINE HEALING, SUBSEQUENT ENCOUNTER: ICD-10-CM

## 2018-07-25 DIAGNOSIS — M85.88 OTHER SPECIFIED DISORDERS OF BONE DENSITY AND STRUCTURE, OTHER SITE: ICD-10-CM

## 2018-07-25 PROCEDURE — 77080 DXA BONE DENSITY AXIAL: CPT | Mod: TC

## 2018-07-25 PROCEDURE — 77080 DXA BONE DENSITY AXIAL: CPT | Mod: 26,,, | Performed by: RADIOLOGY

## 2018-07-26 ENCOUNTER — PATIENT MESSAGE (OUTPATIENT)
Dept: RHEUMATOLOGY | Facility: CLINIC | Age: 73
End: 2018-07-26

## 2018-09-24 DIAGNOSIS — G25.81 RESTLESS LEG SYNDROME: ICD-10-CM

## 2018-09-24 RX ORDER — ROPINIROLE 0.25 MG/1
TABLET, FILM COATED ORAL
Qty: 90 TABLET | Refills: 0 | Status: SHIPPED | OUTPATIENT
Start: 2018-09-24 | End: 2019-10-14 | Stop reason: SDUPTHER

## 2018-11-13 ENCOUNTER — LAB VISIT (OUTPATIENT)
Dept: LAB | Facility: HOSPITAL | Age: 73
End: 2018-11-13
Attending: INTERNAL MEDICINE
Payer: MEDICARE

## 2018-11-13 DIAGNOSIS — M35.3 PMR (POLYMYALGIA RHEUMATICA): ICD-10-CM

## 2018-11-13 LAB
CRP SERPL-MCNC: 1.2 MG/L
ERYTHROCYTE [SEDIMENTATION RATE] IN BLOOD BY WESTERGREN METHOD: 0 MM/HR

## 2018-11-13 PROCEDURE — 36415 COLL VENOUS BLD VENIPUNCTURE: CPT

## 2018-11-13 PROCEDURE — 85651 RBC SED RATE NONAUTOMATED: CPT

## 2018-11-13 PROCEDURE — 86140 C-REACTIVE PROTEIN: CPT

## 2018-11-15 ENCOUNTER — OFFICE VISIT (OUTPATIENT)
Dept: RHEUMATOLOGY | Facility: CLINIC | Age: 73
End: 2018-11-15
Payer: MEDICARE

## 2018-11-15 VITALS
HEART RATE: 50 BPM | DIASTOLIC BLOOD PRESSURE: 65 MMHG | WEIGHT: 188 LBS | BODY MASS INDEX: 26.32 KG/M2 | HEIGHT: 71 IN | SYSTOLIC BLOOD PRESSURE: 147 MMHG

## 2018-11-15 DIAGNOSIS — M79.662 BILATERAL CALF PAIN: ICD-10-CM

## 2018-11-15 DIAGNOSIS — G25.81 RESTLESS LEGS SYNDROME (RLS): ICD-10-CM

## 2018-11-15 DIAGNOSIS — M17.11 PRIMARY OSTEOARTHRITIS OF RIGHT KNEE: ICD-10-CM

## 2018-11-15 DIAGNOSIS — M25.561 CHRONIC PAIN OF RIGHT KNEE: ICD-10-CM

## 2018-11-15 DIAGNOSIS — G89.29 CHRONIC PAIN OF RIGHT KNEE: ICD-10-CM

## 2018-11-15 DIAGNOSIS — Z15.89 HLA B27 (HLA B27 POSITIVE): Primary | ICD-10-CM

## 2018-11-15 DIAGNOSIS — M79.661 BILATERAL CALF PAIN: ICD-10-CM

## 2018-11-15 DIAGNOSIS — M85.80 OSTEOPENIA, UNSPECIFIED LOCATION: ICD-10-CM

## 2018-11-15 PROCEDURE — 99214 OFFICE O/P EST MOD 30 MIN: CPT | Mod: PBBFAC | Performed by: INTERNAL MEDICINE

## 2018-11-15 PROCEDURE — 99213 OFFICE O/P EST LOW 20 MIN: CPT | Mod: S$PBB,,, | Performed by: INTERNAL MEDICINE

## 2018-11-15 PROCEDURE — 99999 PR PBB SHADOW E&M-EST. PATIENT-LVL IV: CPT | Mod: PBBFAC,,, | Performed by: INTERNAL MEDICINE

## 2018-11-15 ASSESSMENT — ROUTINE ASSESSMENT OF PATIENT INDEX DATA (RAPID3)
WHEN YOU AWAKENED IN THE MORNING OVER THE LAST WEEK, PLEASE INDICATE THE AMOUNT OF TIME IT TAKES UNTIL YOU ARE AS LIMBER AS YOU WILL BE FOR THE DAY: 30 MINS
FATIGUE SCORE: 0
PSYCHOLOGICAL DISTRESS SCORE: 3.3
MDHAQ FUNCTION SCORE: 0
TOTAL RAPID3 SCORE: 1.33
PAIN SCORE: 3
AM STIFFNESS SCORE: 1, YES
PATIENT GLOBAL ASSESSMENT SCORE: 1

## 2018-11-15 NOTE — PATIENT INSTRUCTIONS
Would get Shingrix when available x 2  Check with Dr. Lombardi about Prevnar 13, Pneumovax,  Tdap in [facility]

## 2018-11-15 NOTE — PROGRESS NOTES
"Subjective:       Patient ID: Ghanshyam Sanford Jr. is a 73 y.o. male.    Chief Complaint: PMR, restless leg syndrome, osteopenia, OA right knee    He has been going to the gym 3-4 times. Right knee pain is not quite as bad, but he is now complaining of bilateral calf pain for about 3 months. His calf pain is worse when he gets up in the morning. Denies any fevers or chills. His restless leg syndrome has been "acting up" lately. He is taking the ropinerole. He is still taking vitamin D everyday. Denies any weight changes or appetite changes.       Review of Systems   Constitutional: Negative for fever and unexpected weight change.   HENT: Negative for trouble swallowing.    Eyes: Negative for redness.   Respiratory: Negative for cough and shortness of breath.    Cardiovascular: Negative for chest pain.   Gastrointestinal: Negative for constipation and diarrhea.   Genitourinary: Negative for genital sores.   Skin: Negative for rash.   Neurological: Negative for headaches.   Hematological: Does not bruise/bleed easily.         Objective:   BP (!) 147/65   Pulse (!) 50   Ht 5' 11" (1.803 m)   Wt 85.3 kg (188 lb)   BMI 26.22 kg/m²      Physical Exam   Constitutional: He is oriented to person, place, and time and well-developed, well-nourished, and in no distress. No distress.   HENT:   Head: Normocephalic and atraumatic.   Right Ear: External ear normal.   Left Ear: External ear normal.   Nose: Nose normal.   Mouth/Throat: No oropharyngeal exudate.   Eyes: Conjunctivae and EOM are normal. Right eye exhibits no discharge. Left eye exhibits no discharge. No scleral icterus.   Cardiovascular: Normal rate.    Murmur heard.  Pulmonary/Chest: Effort normal and breath sounds normal. No respiratory distress. He has no wheezes.   Abdominal: He exhibits no distension. There is no tenderness.       Right Side Rheumatological Exam     Examination finds the shoulder, elbow, wrist, knee, 1st PIP, 1st MCP, 2nd PIP, 2nd MCP, 3rd PIP, " 3rd MCP, 4th PIP, 4th MCP, 5th PIP and 5th MCP normal.    Shoulder Exam     Range of Motion   The patient has normal right shoulder ROM.    Knee Exam   Patellofemoral Crepitus: positive    Muscle Strength (0-5 scale):  Biceps: 5/5   Triceps:  5  : 5/5   Iliopsoas: 5  Quadriceps:  5   Distal Lower Extremity: 5    Left Side Rheumatological Exam     Examination finds the shoulder, elbow, wrist, knee, 1st PIP, 1st MCP, 2nd PIP, 2nd MCP, 3rd PIP, 3rd MCP, 4th PIP, 4th MCP, 5th PIP and 5th MCP normal.    Shoulder Exam     Range of Motion   The patient has normal left shoulder ROM.    Muscle Strength (0-5 scale):  Deltoid:  5  Biceps: 5/5   Triceps:  5  :  5/5   Iliopsoas: 5  Quadriceps:  5   Distal Lower Extremity: 5      Neurological: He is alert and oriented to person, place, and time. He has normal reflexes. He displays normal reflexes. No cranial nerve deficit. He exhibits normal muscle tone. Gait normal. Coordination normal. GCS score is 15.   Reflex Scores:       Bicep reflexes are 2+ on the right side and 2+ on the left side.       Brachioradialis reflexes are 2+ on the right side and 2+ on the left side.       Patellar reflexes are 2+ on the right side and 2+ on the left side.       Achilles reflexes are 2+ on the right side and 2+ on the left side.  Skin: Skin is warm and dry. No rash noted. He is not diaphoretic. No erythema. No pallor.     Psychiatric: Mood, memory, affect and judgment normal.   Musculoskeletal: Normal range of motion. He exhibits no edema, tenderness or deformity.       Results for ELISABETH MCCOLLUM JR. (MRN [id number]) as of 11/15/2018 12:09   Ref. Range 11/13/2018 13:17   Sed Rate Latest Ref Range: 0 - 10 mm/Hr 0   CRP Latest Ref Range: 0.0 - 8.2 mg/L 1.2     Results for ELISABETH MCCOLLUM JR. (MRN [id number]) as of 11/15/2018 12:09   Ref. Range 7/25/2018 13:10   DEXA BONE DENSITY SPINE HIP Unknown Rpt   The bone density is low(osteopenia) but not low enough to require drug therapy. Cont  vitamin D3 1000 units daily and 1200mg daily dietary calcium.    Assessment:     PMR, remission: symptoms well controlled, functioning well  Restless leg syndrome: Still having symptoms periodically-on Ropinirole 0.25 mg  Osteopenia w/low FRAX score; prior vertebral fx due to high impact: Density not low enough to require drug therapy-He says he is exercising 3-4 days per week and taking his Vitamin D  Aortic Stenosis  Minimal OA right knee  Intermittent calf pain as before    Plan:   Continue Exercise program and Vitamin D for Osteopenia  Continue Ropinirole 0.25 mg twice weekly PRN for Restless leg syndrome  Tdap and Shingrix printed for patient to take to [facility]  Return to clinic in 1 year/PRN

## 2018-11-15 NOTE — PROGRESS NOTES
I have personally taken the history and examined the patient and agree with the resident,s note as stated above Feels well. Intermittent calf pain as before.     Exam: min crepitus right knee, from rom. No edema Nl peripheral pulses 3/6 aortic stenosis murmur     PMR in remission   RLS  Minimal  OA right knee  Aortic stenosis  Osteopenia with low FRAX, prior vertebral fracture due to high impact    Ropinirole 0.25mg twice weekly prn  Cont exercise program  RTC 1 yea/prn

## 2019-03-15 ENCOUNTER — PATIENT MESSAGE (OUTPATIENT)
Dept: RHEUMATOLOGY | Facility: CLINIC | Age: 74
End: 2019-03-15

## 2019-03-15 ENCOUNTER — LAB VISIT (OUTPATIENT)
Dept: LAB | Facility: HOSPITAL | Age: 74
End: 2019-03-15
Attending: PHYSICAL MEDICINE & REHABILITATION
Payer: MEDICARE

## 2019-03-15 ENCOUNTER — TELEPHONE (OUTPATIENT)
Dept: RHEUMATOLOGY | Facility: CLINIC | Age: 74
End: 2019-03-15

## 2019-03-15 DIAGNOSIS — M35.3 PMR (POLYMYALGIA RHEUMATICA): ICD-10-CM

## 2019-03-15 LAB
CRP SERPL-MCNC: 2.1 MG/L
ERYTHROCYTE [SEDIMENTATION RATE] IN BLOOD BY WESTERGREN METHOD: 2 MM/HR

## 2019-03-15 PROCEDURE — 36415 COLL VENOUS BLD VENIPUNCTURE: CPT

## 2019-03-15 PROCEDURE — 85651 RBC SED RATE NONAUTOMATED: CPT

## 2019-03-15 PROCEDURE — 86140 C-REACTIVE PROTEIN: CPT

## 2019-04-01 ENCOUNTER — OFFICE VISIT (OUTPATIENT)
Dept: RHEUMATOLOGY | Facility: CLINIC | Age: 74
End: 2019-04-01
Payer: MEDICARE

## 2019-04-01 VITALS
SYSTOLIC BLOOD PRESSURE: 125 MMHG | HEART RATE: 60 BPM | DIASTOLIC BLOOD PRESSURE: 63 MMHG | WEIGHT: 192.25 LBS | BODY MASS INDEX: 26.91 KG/M2 | HEIGHT: 71 IN

## 2019-04-01 DIAGNOSIS — I10 ESSENTIAL HYPERTENSION: ICD-10-CM

## 2019-04-01 DIAGNOSIS — G62.9 NEUROPATHY: ICD-10-CM

## 2019-04-01 DIAGNOSIS — G25.81 RESTLESS LEGS SYNDROME (RLS): ICD-10-CM

## 2019-04-01 DIAGNOSIS — M77.12 LATERAL EPICONDYLITIS OF LEFT ELBOW: ICD-10-CM

## 2019-04-01 DIAGNOSIS — M77.02 MEDIAL EPICONDYLITIS OF LEFT ELBOW: ICD-10-CM

## 2019-04-01 DIAGNOSIS — M35.3 PMR (POLYMYALGIA RHEUMATICA): ICD-10-CM

## 2019-04-01 PROBLEM — M77.00 MEDIAL EPICONDYLITIS: Status: ACTIVE | Noted: 2019-04-01

## 2019-04-01 PROBLEM — M77.10 LATERAL EPICONDYLITIS (TENNIS ELBOW): Status: ACTIVE | Noted: 2019-04-01

## 2019-04-01 PROCEDURE — 99999 PR PBB SHADOW E&M-EST. PATIENT-LVL IV: CPT | Mod: PBBFAC,,, | Performed by: INTERNAL MEDICINE

## 2019-04-01 PROCEDURE — 99213 OFFICE O/P EST LOW 20 MIN: CPT | Mod: S$PBB,,, | Performed by: INTERNAL MEDICINE

## 2019-04-01 PROCEDURE — 99214 OFFICE O/P EST MOD 30 MIN: CPT | Mod: PBBFAC | Performed by: INTERNAL MEDICINE

## 2019-04-01 PROCEDURE — 99213 PR OFFICE/OUTPT VISIT, EST, LEVL III, 20-29 MIN: ICD-10-PCS | Mod: S$PBB,,, | Performed by: INTERNAL MEDICINE

## 2019-04-01 PROCEDURE — 99999 PR PBB SHADOW E&M-EST. PATIENT-LVL IV: ICD-10-PCS | Mod: PBBFAC,,, | Performed by: INTERNAL MEDICINE

## 2019-04-01 RX ORDER — DULOXETIN HYDROCHLORIDE 30 MG/1
30 CAPSULE, DELAYED RELEASE ORAL DAILY
Qty: 60 CAPSULE | Refills: 2 | Status: SHIPPED | OUTPATIENT
Start: 2019-04-01 | End: 2019-07-08

## 2019-04-01 ASSESSMENT — ROUTINE ASSESSMENT OF PATIENT INDEX DATA (RAPID3)
PATIENT GLOBAL ASSESSMENT SCORE: 1.5
PSYCHOLOGICAL DISTRESS SCORE: 1.1
PAIN SCORE: 2
FATIGUE SCORE: .5
MDHAQ FUNCTION SCORE: 0
TOTAL RAPID3 SCORE: 1.17
AM STIFFNESS SCORE: 0, NO

## 2019-04-01 NOTE — PROGRESS NOTES
"Subjective:       Patient ID: Ghanshyam Sanford Jr. is a 73 y.o. male.    Chief Complaint: h/o PMR; RLS; minimal OA right knee; osteopenia with low FRAX s/p  high impact vertebral fracture T11-L1(kyphoplasty)  HPI In  Jan developed pain calves. Had US LEs which was normal. Had lumbar MRI without signs of nerve or spinal compression. The calf pain resolved spontaneously after 4 wks.  Also had transient left medial and lateral epicondyle after using upper body equipment SNAPS after doubling weight lifted now resolving. Denies headaches states made mistake on checking it on intake ROS form.  Review of Systems   Constitutional: Positive for fatigue. Negative for appetite change, chills, fever and unexpected weight change.   HENT: Positive for mouth sores. Negative for trouble swallowing.         Dry  mouth   Eyes: Negative for pain, redness and visual disturbance.        Dry   Respiratory: Negative for cough, shortness of breath and wheezing.    Cardiovascular: Negative for chest pain, palpitations and leg swelling.   Gastrointestinal: Negative for abdominal pain, blood in stool, constipation, diarrhea and nausea.   Genitourinary: Negative for difficulty urinating, dysuria, genital sores and hematuria.   Musculoskeletal: Negative for arthralgias, back pain, gait problem, joint swelling, myalgias, neck pain and neck stiffness.   Skin: Negative for color change, pallor and rash.   Neurological: Negative for weakness.   Hematological: Bruises/bleeds easily.   Psychiatric/Behavioral: Negative for dysphoric mood and sleep disturbance.         Objective:   /63   Pulse 60   Ht 5' 10.5" (1.791 m)   Wt 87.2 kg (192 lb 3.9 oz)   BMI 27.19 kg/m²      Physical Exam   Constitutional: He is oriented to person, place, and time and well-developed, well-nourished, and in no distress.   HENT:   Head: Normocephalic and atraumatic.   Mouth/Throat: Oropharynx is clear and moist.   Eyes: Conjunctivae and EOM are normal. Pupils are " equal, round, and reactive to light.   Neck: Normal range of motion. Neck supple. No thyromegaly present.   No cervical bruits   Cardiovascular: Normal rate, regular rhythm and intact distal pulses.  Exam reveals no gallop and no friction rub.    Murmur (4/6) heard.  Pulmonary/Chest: Breath sounds normal. He has no wheezes. He has no rales. He exhibits no tenderness.   Abdominal: Soft. He exhibits no distension and no mass. There is no tenderness.       Right Side Rheumatological Exam     Examination finds the shoulder, elbow, wrist, knee, 1st PIP, 1st MCP, 2nd PIP, 2nd MCP, 3rd PIP, 3rd MCP, 4th PIP, 4th MCP, 5th PIP and 5th MCP normal.    Knee Exam     Range of Motion   The patient has normal right knee ROM.  Patellofemoral Crepitus: positive  Effusion: negative  Warmth: negative    Left Side Rheumatological Exam     Examination finds the shoulder, elbow, wrist, knee, 1st PIP, 1st MCP, 2nd PIP, 2nd MCP, 3rd PIP, 3rd MCP, 4th PIP, 4th MCP, 5th PIP and 5th MCP normal.      Lymphadenopathy:     He has no cervical adenopathy.   Neurological: He is alert and oriented to person, place, and time. He displays normal reflexes. He exhibits normal muscle tone. Gait normal.   Nl motor strength UE and LE bilateral   Skin: Skin is warm and dry. No rash noted. No erythema. No pallor.     Psychiatric: Mood, memory, affect and judgment normal.   Musculoskeletal: He exhibits no edema.           Assessment/Plan         Problem List Items Addressed This Visit     Hypertension    Current Assessment & Plan     125/63         RESOLVED: PMR (polymyalgia rheumatica)    Current Assessment & Plan     Results for ELISABETH MCCOLLUM JR. (MRN [id number]) as of 4/1/2019 14:52   Ref. Range 3/15/2019 13:03   Sed Rate Latest Ref Range: 0 - 10 mm/Hr 2   CRP Latest Ref Range: 0.0 - 8.2 mg/L 2.1            Restless legs syndrome (RLS)    Current Assessment & Plan     Ropinirole 0.25mg hs prn about 3x/wk currently         Neuropathy    Current Assessment  & Plan     Right medial knee post arthroscopic surgery    Failed gabapentin in  Past  Trial of duloxetine 30mg daily x 1wk, then may increase to 60mg daily as needed/tolerated  Disc risks and benefits. Len handout provided.  RTC 3 months         Medial epicondylitis    Lateral epicondylitis (tennis elbow)

## 2019-04-01 NOTE — ASSESSMENT & PLAN NOTE
Results for ELISABETH MCCOLLUM JR. (MRN [id number]) as of 4/1/2019 14:52   Ref. Range 3/15/2019 13:03   Sed Rate Latest Ref Range: 0 - 10 mm/Hr 2   CRP Latest Ref Range: 0.0 - 8.2 mg/L 2.1

## 2019-04-01 NOTE — ASSESSMENT & PLAN NOTE
Right medial knee post arthroscopic surgery    Failed gabapentin in  Past  Trial of duloxetine 30mg daily x 1wk, then may increase to 60mg daily as needed/tolerated  Disc risks and benefits. Len handout provided.  RTC 3 months

## 2019-04-01 NOTE — PROGRESS NOTES
Rapid3 Question Responses and Scores 3/31/2019   MDHAQ Score 0   Psychologic Score 1.1   Pain Score 2   When you awakened in the morning OVER THE LAST WEEK, did you feel stiff? No   If Yes, please indicate the number of hours until you are as limber as you will be for the day -   Fatigue Score 0.5   Global Health Score 1.5   RAPID3 Score 1.16       Answers for HPI/ROS submitted by the patient on 3/31/2019   fever: No  eye redness: No  headaches: No  shortness of breath: No  chest pain: No  trouble swallowing: No  diarrhea: No  constipation: No  unexpected weight change: No  genital sore: No  During the last 3 days, have you had a skin rash?: No  Bruises or bleeds easily: No  cough: No

## 2019-04-01 NOTE — PATIENT INSTRUCTIONS
Duloxetine delayed-release capsules  What is this medicine?  DULOXETINE (doo LOX e teen) is used to treat depression, anxiety, and different types of chronic pain.  How should I use this medicine?  Take this medicine by mouth with a glass of water. Follow the directions on the prescription label. Do not cut, crush or chew this medicine. You can take this medicine with or without food. Take your medicine at regular intervals. Do not take your medicine more often than directed. Do not stop taking this medicine suddenly except upon the advice of your doctor. Stopping this medicine too quickly may cause serious side effects or your condition may worsen.  A special MedGuide will be given to you by the pharmacist with each prescription and refill. Be sure to read this information carefully each time.  Talk to your pediatrician regarding the use of this medicine in children. While this drug may be prescribed for children as young as 7 years of age for selected conditions, precautions do apply.  What side effects may I notice from receiving this medicine?  Side effects that you should report to your doctor or health care professional as soon as possible:  · allergic reactions like skin rash, itching or hives, swelling of the face, lips, or tongue  · changes in blood pressure  · confusion  · dark urine  · dizziness  · fast talking and excited feelings or actions that are out of control  · fast, irregular heartbeat  · fever  · general ill feeling or flu-like symptoms  · hallucination, loss of contact with reality  · light-colored stools  · loss of balance or coordination  · redness, blistering, peeling or loosening of the skin, including inside the mouth  · right upper belly pain  · seizures  · suicidal thoughts or other mood changes  · trouble concentrating  · trouble passing urine or change in the amount of urine  · unusual bleeding or bruising  · unusually weak or tired  · yellowing of the eyes or skin  Side effects that  usually do not require medical attention (report to your doctor or health care professional if they continue or are bothersome):  · blurred vision  · change in appetite  · change in sex drive or performance  · headache  · increased sweating  · nausea  What may interact with this medicine?  Do not take this medicine with any of the following medications:  · certain diet drugs like dexfenfluramine, fenfluramine  · desvenlafaxine  · linezolid  · MAOIs like Azilect, Carbex, Eldepryl, Marplan, Nardil, and Parnate  · methylene blue (intravenous)  · milnacipran  · thioridazine  · venlafaxine  This medicine may also interact with the following medications:  · alcohol  · aspirin and aspirin-like medicines  · certain antibiotics like ciprofloxacin and enoxacin  · certain medicines for blood pressure, heart disease, irregular heart beat  · certain medicines for depression, anxiety, or psychotic disturbances  · certain medicines for migraine headache like almotriptan, eletriptan, frovatriptan, naratriptan, rizatriptan, sumatriptan, zolmitriptan  · certain medicines that treat or prevent blood clots like warfarin, enoxaparin, and dalteparin  · cimetidine  · fentanyl  · lithium  · NSAIDS, medicines for pain and inflammation, like ibuprofen or naproxen  · phentermine  · procarbazine  · sibutramine  · Alena's wort  · theophylline  · tramadol  · tryptophan  What if I miss a dose?  If you miss a dose, take it as soon as you can. If it is almost time for your next dose, take only that dose. Do not take double or extra doses.  Where should I keep my medicine?  Keep out of the reach of children.  Store at room temperature between 20 and 25 degrees C (68 to 77 degrees F). Throw away any unused medicine after the expiration date.  What should I tell my health care provider before I take this medicine?  They need to know if you have any of these conditions:  · bipolar disorder or a family history of bipolar  disorder  · glaucoma  · kidney disease  · liver disease  · suicidal thoughts or a previous suicide attempt  · taken medicines called MAOIs like Carbex, Eldepryl, Marplan, Nardil, and Parnate within 14 days  · an unusual reaction to duloxetine, other medicines, foods, dyes, or preservatives  · pregnant or trying to get pregnant  · breast-feeding  What should I watch for while using this medicine?  Tell your doctor if your symptoms do not get better or if they get worse. Visit your doctor or health care professional for regular checks on your progress. Because it may take several weeks to see the full effects of this medicine, it is important to continue your treatment as prescribed by your doctor.  Patients and their families should watch out for new or worsening thoughts of suicide or depression. Also watch out for sudden changes in feelings such as feeling anxious, agitated, panicky, irritable, hostile, aggressive, impulsive, severely restless, overly excited and hyperactive, or not being able to sleep. If this happens, especially at the beginning of treatment or after a change in dose, call your health care professional.  You may get drowsy or dizzy. Do not drive, use machinery, or do anything that needs mental alertness until you know how this medicine affects you. Do not stand or sit up quickly, especially if you are an older patient. This reduces the risk of dizzy or fainting spells. Alcohol may interfere with the effect of this medicine. Avoid alcoholic drinks.  This medicine can cause an increase in blood pressure. This medicine can also cause a sudden drop in your blood pressure, which may make you feel faint and increase the chance of a fall. These effects are most common when you first start the medicine or when the dose is increased, or during use of other medicines that can cause a sudden drop in blood pressure. Check with your doctor for instructions on monitoring your blood pressure while taking this  medicine.  Your mouth may get dry. Chewing sugarless gum or sucking hard candy, and drinking plenty of water may help. Contact your doctor if the problem does not go away or is severe.  NOTE:This sheet is a summary. It may not cover all possible information. If you have questions about this medicine, talk to your doctor, pharmacist, or health care provider. Copyright© 2017 [employer]        Duloxetine delayed-release capsules  What is this medicine?  DULOXETINE (doo LOX e teen) is used to treat depression, anxiety, and different types of chronic pain.  How should I use this medicine?  Take this medicine by mouth with a glass of water. Follow the directions on the prescription label. Do not cut, crush or chew this medicine. You can take this medicine with or without food. Take your medicine at regular intervals. Do not take your medicine more often than directed. Do not stop taking this medicine suddenly except upon the advice of your doctor. Stopping this medicine too quickly may cause serious side effects or your condition may worsen.  A special MedGuide will be given to you by the pharmacist with each prescription and refill. Be sure to read this information carefully each time.  Talk to your pediatrician regarding the use of this medicine in children. While this drug may be prescribed for children as young as 7 years of age for selected conditions, precautions do apply.  What side effects may I notice from receiving this medicine?  Side effects that you should report to your doctor or health care professional as soon as possible:  · allergic reactions like skin rash, itching or hives, swelling of the face, lips, or tongue  · changes in blood pressure  · confusion  · dark urine  · dizziness  · fast talking and excited feelings or actions that are out of control  · fast, irregular heartbeat  · fever  · general ill feeling or flu-like symptoms  · hallucination, loss of contact with reality  · light-colored  stools  · loss of balance or coordination  · redness, blistering, peeling or loosening of the skin, including inside the mouth  · right upper belly pain  · seizures  · suicidal thoughts or other mood changes  · trouble concentrating  · trouble passing urine or change in the amount of urine  · unusual bleeding or bruising  · unusually weak or tired  · yellowing of the eyes or skin  Side effects that usually do not require medical attention (report to your doctor or health care professional if they continue or are bothersome):  · blurred vision  · change in appetite  · change in sex drive or performance  · headache  · increased sweating  · nausea  What may interact with this medicine?  Do not take this medicine with any of the following medications:  · certain diet drugs like dexfenfluramine, fenfluramine  · desvenlafaxine  · linezolid  · MAOIs like Azilect, Carbex, Eldepryl, Marplan, Nardil, and Parnate  · methylene blue (intravenous)  · milnacipran  · thioridazine  · venlafaxine  This medicine may also interact with the following medications:  · alcohol  · aspirin and aspirin-like medicines  · certain antibiotics like ciprofloxacin and enoxacin  · certain medicines for blood pressure, heart disease, irregular heart beat  · certain medicines for depression, anxiety, or psychotic disturbances  · certain medicines for migraine headache like almotriptan, eletriptan, frovatriptan, naratriptan, rizatriptan, sumatriptan, zolmitriptan  · certain medicines that treat or prevent blood clots like warfarin, enoxaparin, and dalteparin  · cimetidine  · fentanyl  · lithium  · NSAIDS, medicines for pain and inflammation, like ibuprofen or naproxen  · phentermine  · procarbazine  · sibutramine  · Alena's wort  · theophylline  · tramadol  · tryptophan  What if I miss a dose?  If you miss a dose, take it as soon as you can. If it is almost time for your next dose, take only that dose. Do not take double or extra doses.  Where  should I keep my medicine?  Keep out of the reach of children.  Store at room temperature between 20 and 25 degrees C (68 to 77 degrees F). Throw away any unused medicine after the expiration date.  What should I tell my health care provider before I take this medicine?  They need to know if you have any of these conditions:  · bipolar disorder or a family history of bipolar disorder  · glaucoma  · kidney disease  · liver disease  · suicidal thoughts or a previous suicide attempt  · taken medicines called MAOIs like Carbex, Eldepryl, Marplan, Nardil, and Parnate within 14 days  · an unusual reaction to duloxetine, other medicines, foods, dyes, or preservatives  · pregnant or trying to get pregnant  · breast-feeding  What should I watch for while using this medicine?  Tell your doctor if your symptoms do not get better or if they get worse. Visit your doctor or health care professional for regular checks on your progress. Because it may take several weeks to see the full effects of this medicine, it is important to continue your treatment as prescribed by your doctor.  Patients and their families should watch out for new or worsening thoughts of suicide or depression. Also watch out for sudden changes in feelings such as feeling anxious, agitated, panicky, irritable, hostile, aggressive, impulsive, severely restless, overly excited and hyperactive, or not being able to sleep. If this happens, especially at the beginning of treatment or after a change in dose, call your health care professional.  You may get drowsy or dizzy. Do not drive, use machinery, or do anything that needs mental alertness until you know how this medicine affects you. Do not stand or sit up quickly, especially if you are an older patient. This reduces the risk of dizzy or fainting spells. Alcohol may interfere with the effect of this medicine. Avoid alcoholic drinks.  This medicine can cause an increase in blood pressure. This medicine can also  cause a sudden drop in your blood pressure, which may make you feel faint and increase the chance of a fall. These effects are most common when you first start the medicine or when the dose is increased, or during use of other medicines that can cause a sudden drop in blood pressure. Check with your doctor for instructions on monitoring your blood pressure while taking this medicine.  Your mouth may get dry. Chewing sugarless gum or sucking hard candy, and drinking plenty of water may help. Contact your doctor if the problem does not go away or is severe.  NOTE:This sheet is a summary. It may not cover all possible information. If you have questions about this medicine, talk to your doctor, pharmacist, or health care provider. Copyright© 2017 [employer]

## 2019-04-08 ENCOUNTER — PATIENT MESSAGE (OUTPATIENT)
Dept: RHEUMATOLOGY | Facility: CLINIC | Age: 74
End: 2019-04-08

## 2019-06-22 ENCOUNTER — PATIENT MESSAGE (OUTPATIENT)
Dept: RHEUMATOLOGY | Facility: CLINIC | Age: 74
End: 2019-06-22

## 2019-07-02 ENCOUNTER — LAB VISIT (OUTPATIENT)
Dept: LAB | Facility: HOSPITAL | Age: 74
End: 2019-07-02
Attending: INTERNAL MEDICINE
Payer: MEDICARE

## 2019-07-02 DIAGNOSIS — M35.3 PMR (POLYMYALGIA RHEUMATICA): ICD-10-CM

## 2019-07-02 LAB
CRP SERPL-MCNC: 1.8 MG/L (ref 0–8.2)
ERYTHROCYTE [SEDIMENTATION RATE] IN BLOOD BY WESTERGREN METHOD: 1 MM/HR (ref 0–10)

## 2019-07-02 PROCEDURE — 85651 RBC SED RATE NONAUTOMATED: CPT

## 2019-07-02 PROCEDURE — 36415 COLL VENOUS BLD VENIPUNCTURE: CPT

## 2019-07-02 PROCEDURE — 86140 C-REACTIVE PROTEIN: CPT

## 2019-07-08 ENCOUNTER — OFFICE VISIT (OUTPATIENT)
Dept: RHEUMATOLOGY | Facility: CLINIC | Age: 74
End: 2019-07-08
Payer: MEDICARE

## 2019-07-08 VITALS
BODY MASS INDEX: 26.55 KG/M2 | HEART RATE: 59 BPM | HEIGHT: 71 IN | DIASTOLIC BLOOD PRESSURE: 68 MMHG | SYSTOLIC BLOOD PRESSURE: 148 MMHG | WEIGHT: 189.63 LBS

## 2019-07-08 DIAGNOSIS — G62.9 NEUROPATHY: ICD-10-CM

## 2019-07-08 DIAGNOSIS — M94.9 DISORDER OF CARTILAGE: ICD-10-CM

## 2019-07-08 DIAGNOSIS — G25.81 RESTLESS LEGS SYNDROME (RLS): ICD-10-CM

## 2019-07-08 DIAGNOSIS — Z00.00 PREVENTATIVE HEALTH CARE: ICD-10-CM

## 2019-07-08 DIAGNOSIS — S22.089S CLOSED FRACTURE OF ELEVENTH THORACIC VERTEBRA, UNSPECIFIED FRACTURE MORPHOLOGY, SEQUELA: Primary | ICD-10-CM

## 2019-07-08 PROBLEM — M77.00 MEDIAL EPICONDYLITIS: Status: RESOLVED | Noted: 2019-04-01 | Resolved: 2019-07-08

## 2019-07-08 PROBLEM — M77.10 LATERAL EPICONDYLITIS (TENNIS ELBOW): Status: RESOLVED | Noted: 2019-04-01 | Resolved: 2019-07-08

## 2019-07-08 PROCEDURE — 99999 PR PBB SHADOW E&M-EST. PATIENT-LVL IV: CPT | Mod: PBBFAC,,, | Performed by: INTERNAL MEDICINE

## 2019-07-08 PROCEDURE — 99213 OFFICE O/P EST LOW 20 MIN: CPT | Mod: S$PBB,,, | Performed by: INTERNAL MEDICINE

## 2019-07-08 PROCEDURE — 99999 PR PBB SHADOW E&M-EST. PATIENT-LVL IV: ICD-10-PCS | Mod: PBBFAC,,, | Performed by: INTERNAL MEDICINE

## 2019-07-08 PROCEDURE — 99213 PR OFFICE/OUTPT VISIT, EST, LEVL III, 20-29 MIN: ICD-10-PCS | Mod: S$PBB,,, | Performed by: INTERNAL MEDICINE

## 2019-07-08 PROCEDURE — 99214 OFFICE O/P EST MOD 30 MIN: CPT | Mod: PBBFAC | Performed by: INTERNAL MEDICINE

## 2019-07-08 ASSESSMENT — ROUTINE ASSESSMENT OF PATIENT INDEX DATA (RAPID3)
MDHAQ FUNCTION SCORE: 1
AM STIFFNESS SCORE: 0, NO
TOTAL RAPID3 SCORE: 1.44
FATIGUE SCORE: 1
PATIENT GLOBAL ASSESSMENT SCORE: 0
PAIN SCORE: 1
PSYCHOLOGICAL DISTRESS SCORE: 1.1

## 2019-07-08 NOTE — PROGRESS NOTES
"I have personally taken the history and examined the patient and agree with the resident,s note as stated above.  Dulloxetine 30mg daily "made me crazy" lost 10#, stopped after one week.      Results for ELISABETH MCCOLLUM JRNorm (MRN [id number]) as of 7/8/2019 11:44   Ref. Range 7/2/2019 10:03   Sed Rate Latest Ref Range: 0 - 10 mm/Hr 1   CRP Latest Ref Range: 0.0 - 8.2 mg/L 1.8         H/o PMR, in remission no symptoms or signs of GCA  RLS only needs ropinorole every 1-2 wks  Minimal OA right knee  Osteopenia with low FRAX, s/p high impact vertebral fracture T11-L1(kyphoplasty)   Hypertension 148/68, 144/75  HLA B27+ but no evidence of spondyloarthritis    *Shingrix  Cont exercise program at gym  Monitor BP  ropinorole prn  Knee sleeve hs is fine  RTC 1 year with DXA > 7/25/19  "

## 2019-07-08 NOTE — PATIENT INSTRUCTIONS
Follow up in one year unless you feel the need to return before then.  Continue exercises and wearing knee brace at night.  Continue Ropinirole at night for restless legs.  Get Shingles vaccine. If [facility] is out of stock contact our office and [facility] can schedule the vaccination.

## 2019-07-08 NOTE — PROGRESS NOTES
"Subjective:       Patient ID: Ghanshyam Sanford Jr. is a 74 y.o. male.    Chief Complaint: No chief complaint on file.    HPI   Hx PMR, osteopenia with low FRAX, restless leg syndrome, OA right knee, high impact vertebral fracture s/p kyphoplasty    He received his Tdap with Dr. Lombardi in [place] 2 months.  He discontinued duloxetine due to dizziness, weight loss, cognitive slowing.  Calf pain is no longer existent.  His right medial neuropathic type knee pain continues but is bearable and usually only occurs at night. It is decreased by wearing a knee brace at night.   Exercising 4 x a week, including light weight machines, walking, and stationary bike.  States ankle pain and knee pain from last time have resolved.  Fever  RLS - taking ropinerole  Continues to take Vit D daily  No Wt changes or appetitie    Review of Systems   Constitutional: Negative for fever and unexpected weight change.   HENT: Negative for trouble swallowing.    Eyes: Negative for redness.   Respiratory: Negative for cough and shortness of breath.    Cardiovascular: Negative for chest pain.   Gastrointestinal: Negative for constipation and diarrhea.   Genitourinary: Negative for genital sores.   Skin: Negative for rash.   Neurological: Negative for headaches.   Hematological: Does not bruise/bleed easily.         Objective:   BP (!) 148/68   Pulse (!) 59   Ht 5' 11" (1.803 m)   Wt 86 kg (189 lb 9.6 oz)   BMI 26.44 kg/m²       Physical Exam   Constitutional: He is well-developed, well-nourished, and in no distress.   HENT:   Head: Normocephalic.   Eyes: Pupils are equal, round, and reactive to light.   Neck: Normal range of motion.   Cardiovascular:    Murmur heard.  Holosystolic murmur auscultated, patient confirms history of aortic stenosis   Pulmonary/Chest: Effort normal and breath sounds normal.   Abdominal: Soft.       Right Side Rheumatological Exam     Examination finds the shoulder, elbow, wrist, knee, 1st PIP, 1st MCP, 2nd PIP, " 2nd MCP, 3rd PIP, 3rd MCP, 4th PIP, 4th MCP, 5th PIP and 5th MCP normal.    Left Side Rheumatological Exam     Examination finds the shoulder, elbow, wrist, knee, 1st PIP, 1st MCP, 2nd PIP, 2nd MCP, 3rd PIP, 3rd MCP, 4th PIP, 4th MCP, 5th PIP and 5th MCP normal.      Lymphadenopathy:     He has no cervical adenopathy.   Neurological: He is alert. Gait normal.   Skin: Skin is warm and dry.     Musculoskeletal: Normal range of motion. He exhibits no edema, tenderness or deformity.         Results for ELISABETH MCCOLLUM MATT SANDERS (MRN [id number]) as of 7/8/2019 12:11   Ref. Range 7/2/2019 10:03   Sed Rate Latest Ref Range: 0 - 10 mm/Hr 1   CRP Latest Ref Range: 0.0 - 8.2 mg/L 1.8     Assessment:       1. Closed fracture of eleventh thoracic vertebra, unspecified fracture morphology, sequela    2. Disorder of cartilage     3. Restless legs syndrome (RLS)    4. Neuropathy        1. Blood Pressure elevated, patient will follow up with his cardiologist.  2. PMR in remission  3. OA knees asymptomatic currently  4. Right knee local neuropathic pain controlled by knee brace, no physiologic explanation but treatment is innocuous, advised to continue  5. DEXA scan repeat near next clinic visit in one year  6. Ropinerole effective for RLS, used once every 1-2 weeks      Plan:   Shingrix  Cont exercise program at gym  Monitor BP, f/u cardiologist  ropinorole prn  Knee sleeve hs is fine  RTC 1 year with DXA > 7/25/19

## 2019-10-14 DIAGNOSIS — G25.81 RESTLESS LEG SYNDROME: ICD-10-CM

## 2019-10-15 RX ORDER — ROPINIROLE 0.25 MG/1
TABLET, FILM COATED ORAL
Qty: 90 TABLET | Refills: 0 | Status: SHIPPED | OUTPATIENT
Start: 2019-10-15 | End: 2020-03-31 | Stop reason: SDUPTHER

## 2019-12-30 ENCOUNTER — PATIENT MESSAGE (OUTPATIENT)
Dept: RHEUMATOLOGY | Facility: CLINIC | Age: 74
End: 2019-12-30

## 2020-01-07 ENCOUNTER — LAB VISIT (OUTPATIENT)
Dept: LAB | Facility: HOSPITAL | Age: 75
End: 2020-01-07
Attending: PHYSICAL MEDICINE & REHABILITATION
Payer: MEDICARE

## 2020-01-07 DIAGNOSIS — M35.3 PMR (POLYMYALGIA RHEUMATICA): ICD-10-CM

## 2020-01-07 LAB
CRP SERPL-MCNC: 7.6 MG/L (ref 0–8.2)
ERYTHROCYTE [SEDIMENTATION RATE] IN BLOOD BY WESTERGREN METHOD: 2 MM/HR (ref 0–10)

## 2020-01-07 PROCEDURE — 36415 COLL VENOUS BLD VENIPUNCTURE: CPT

## 2020-01-07 PROCEDURE — 85651 RBC SED RATE NONAUTOMATED: CPT

## 2020-01-07 PROCEDURE — 86140 C-REACTIVE PROTEIN: CPT

## 2020-03-18 ENCOUNTER — PATIENT MESSAGE (OUTPATIENT)
Dept: RHEUMATOLOGY | Facility: CLINIC | Age: 75
End: 2020-03-18

## 2020-03-30 ENCOUNTER — TELEPHONE (OUTPATIENT)
Dept: RHEUMATOLOGY | Facility: CLINIC | Age: 75
End: 2020-03-30

## 2020-03-31 ENCOUNTER — OFFICE VISIT (OUTPATIENT)
Dept: RHEUMATOLOGY | Facility: CLINIC | Age: 75
End: 2020-03-31
Payer: MEDICARE

## 2020-03-31 ENCOUNTER — PATIENT MESSAGE (OUTPATIENT)
Dept: RHEUMATOLOGY | Facility: CLINIC | Age: 75
End: 2020-03-31

## 2020-03-31 ENCOUNTER — TELEPHONE (OUTPATIENT)
Dept: RHEUMATOLOGY | Facility: CLINIC | Age: 75
End: 2020-03-31

## 2020-03-31 VITALS
HEART RATE: 62 BPM | DIASTOLIC BLOOD PRESSURE: 65 MMHG | HEIGHT: 70 IN | BODY MASS INDEX: 26.77 KG/M2 | WEIGHT: 187 LBS | SYSTOLIC BLOOD PRESSURE: 115 MMHG

## 2020-03-31 DIAGNOSIS — G25.81 RESTLESS LEG SYNDROME: ICD-10-CM

## 2020-03-31 PROCEDURE — 99212 OFFICE O/P EST SF 10 MIN: CPT | Mod: S$PBB,,, | Performed by: INTERNAL MEDICINE

## 2020-03-31 PROCEDURE — 99212 PR OFFICE/OUTPT VISIT, EST, LEVL II, 10-19 MIN: ICD-10-PCS | Mod: S$PBB,,, | Performed by: INTERNAL MEDICINE

## 2020-03-31 PROCEDURE — 99213 OFFICE O/P EST LOW 20 MIN: CPT | Mod: PBBFAC | Performed by: INTERNAL MEDICINE

## 2020-03-31 PROCEDURE — 99999 PR PBB SHADOW E&M-EST. PATIENT-LVL III: ICD-10-PCS | Mod: PBBFAC,,, | Performed by: INTERNAL MEDICINE

## 2020-03-31 PROCEDURE — 99999 PR PBB SHADOW E&M-EST. PATIENT-LVL III: CPT | Mod: PBBFAC,,, | Performed by: INTERNAL MEDICINE

## 2020-03-31 RX ORDER — ROPINIROLE 0.25 MG/1
TABLET, FILM COATED ORAL
Qty: 90 TABLET | Refills: 0 | Status: SHIPPED | OUTPATIENT
Start: 2020-03-31 | End: 2021-03-31 | Stop reason: SDUPTHER

## 2020-03-31 RX ORDER — PRASUGREL 10 MG/1
10 TABLET, FILM COATED ORAL DAILY
COMMUNITY
End: 2021-10-25

## 2020-03-31 ASSESSMENT — ROUTINE ASSESSMENT OF PATIENT INDEX DATA (RAPID3)
PATIENT GLOBAL ASSESSMENT SCORE: 0
AM STIFFNESS SCORE: 0, NO
FATIGUE SCORE: 0
TOTAL RAPID3 SCORE: 0
PSYCHOLOGICAL DISTRESS SCORE: 1.1
MDHAQ FUNCTION SCORE: 0
PAIN SCORE: 0

## 2020-03-31 NOTE — PROGRESS NOTES
I have personally taken the history and examined the patient and agree with the resident,s note as stated above     The patient location is: home  The chief complaint leading to consultation is: right knee pain; h/o PMR;RLS osteopenia/osteoporosis  Visit type: Virtual visit with synchronous audio and video  Total time spent with patient: 15 min  Each patient to whom he or she provides medical services by telemedicine is:  (1) informed of the relationship between the physician and patient and the respective role of any other health care provider with respect to management of the patient; and (2) notified that he or she may decline to receive medical services by telemedicine and may withdraw from such care at any time.    Notes:       OA right knee  RLS  Osteopenia  S/p 4 stents 6 wks ago @ cardiac cath done b/o abnormal nuclear stress test, asymptomatic    Turmeric 500-1000 mg daily for right knee  Ropinirole 0.25mg hs prn  Cont walking program

## 2020-03-31 NOTE — PROGRESS NOTES
"Subjective:       Patient ID: Ghanshyam Mccollum Jr. is a 74 y.o. male.    Chief Complaint: PMR, osteopenia with low FRAX, restless leg syndrome, OA right knee, high impact vertebral fracture s/p kyphoplasty  HPI   Mr. Mccollum here for 6 month follow up. Sed rate 2, CRP 2.6.   Since last being here he is doing very well. He underwent multiple coronary vessel stent placement about 1.5 months ago. Otherwise his health status is unchanged and he has no complaints. He denies any muscle or joint pain today. He does endorse continued pain in his knee secondary to right knee OA. He has been told he needs to undergo a scope procedure which is delayed for now. He denies any other symptoms or issues. Denies cough, SOB, rash, fever, weight loss, night sweats, hair loss, oral ulcers, N/V/D or otherwise. He continues to take reqiup for RLS. He is still exercising 4 x a week, including light weight machines, walking, and stationary bike. He continues to take Vit D daily. He did receive the shingles vaccine since last visit.         Review of Systems   Constitutional: Negative.    HENT: Negative.    Respiratory: Negative.    Cardiovascular: Negative.    Gastrointestinal: Negative.    Genitourinary: Negative.    Musculoskeletal: Positive for arthralgias (only in the knee as mentioned in the HPI).   Neurological: Negative.    Psychiatric/Behavioral: Negative.          Objective:   /65   Pulse 62   Ht 5' 10" (1.778 m)   Wt 84.8 kg (187 lb)   BMI 26.83 kg/m²      Physical Exam      Results for GHANSHYAM MCCOLLUM JR. (MRN [id number]) as of 3/31/2020 13:10   Ref. Range 1/7/2020 10:04   Sed Rate Latest Ref Range: 0 - 10 mm/Hr 2   CRP Latest Ref Range: 0.0 - 8.2 mg/L 7.6     Assessment:       H/o PMR, in remission no symptoms or signs of GCA  RLS only needs ropinorole every 1-2 wks  Minimal OA right knee  Osteopenia with low FRAX, s/p high impact vertebral fracture T11-L1(kyphoplasty)   Hypertension 115/65  HLA B27+ but no evidence of " spondyloarthritis      Plan:       Problem List Items Addressed This Visit     None      - continue exercise program  - reqiup re-ordered  - continue taking tumeric 500-1000 mg daily or Cucumin  - bone scan in August  - return to clinic in one year

## 2020-05-22 ENCOUNTER — PATIENT MESSAGE (OUTPATIENT)
Dept: RHEUMATOLOGY | Facility: CLINIC | Age: 75
End: 2020-05-22

## 2020-06-05 ENCOUNTER — OFFICE VISIT (OUTPATIENT)
Dept: URGENT CARE | Facility: CLINIC | Age: 75
End: 2020-06-05
Payer: MEDICARE

## 2020-06-05 VITALS
HEIGHT: 70 IN | TEMPERATURE: 98 F | DIASTOLIC BLOOD PRESSURE: 70 MMHG | HEART RATE: 57 BPM | RESPIRATION RATE: 16 BRPM | OXYGEN SATURATION: 99 % | BODY MASS INDEX: 26.63 KG/M2 | WEIGHT: 186 LBS | SYSTOLIC BLOOD PRESSURE: 141 MMHG

## 2020-06-05 DIAGNOSIS — Z20.822 CLOSE EXPOSURE TO COVID-19 VIRUS: Primary | ICD-10-CM

## 2020-06-05 PROCEDURE — 99202 PR OFFICE/OUTPT VISIT, NEW, LEVL II, 15-29 MIN: ICD-10-PCS | Mod: S$GLB,,, | Performed by: NURSE PRACTITIONER

## 2020-06-05 PROCEDURE — U0003 INFECTIOUS AGENT DETECTION BY NUCLEIC ACID (DNA OR RNA); SEVERE ACUTE RESPIRATORY SYNDROME CORONAVIRUS 2 (SARS-COV-2) (CORONAVIRUS DISEASE [COVID-19]), AMPLIFIED PROBE TECHNIQUE, MAKING USE OF HIGH THROUGHPUT TECHNOLOGIES AS DESCRIBED BY CMS-2020-01-R: HCPCS

## 2020-06-05 PROCEDURE — 99202 OFFICE O/P NEW SF 15 MIN: CPT | Mod: S$GLB,,, | Performed by: NURSE PRACTITIONER

## 2020-06-05 NOTE — PROGRESS NOTES
Subjective:       Patient ID: Ghanshyam Sanford Jr. is a 75 y.o. male.    Chief Complaint: COVID-19 Concerns (EXPOSED X6 DAYS )    HPI: pt new to me presents with c/o exp to covid 19. No s/s.     Review of Systems   Constitutional: Negative for chills, diaphoresis, fatigue and fever.   HENT: Negative for congestion, postnasal drip, sinus pressure, sinus pain, sneezing, sore throat and trouble swallowing.    Eyes: Negative for visual disturbance.   Respiratory: Negative for cough, shortness of breath and wheezing.    Cardiovascular: Negative for chest pain.   Gastrointestinal: Negative for abdominal distention, abdominal pain, constipation, diarrhea, nausea and vomiting.   Musculoskeletal: Negative for arthralgias, back pain and myalgias.   Neurological: Negative for headaches.   Psychiatric/Behavioral: Negative for sleep disturbance.       Objective:      Physical Exam   Constitutional: He is oriented to person, place, and time. He appears well-developed and well-nourished.   HENT:   Head: Normocephalic and atraumatic.   Pulmonary/Chest: Effort normal.   Musculoskeletal: He exhibits no edema.   Neurological: He is alert and oriented to person, place, and time.   Skin: Skin is warm and dry. No pallor.   Psychiatric: He has a normal mood and affect. His behavior is normal. Judgment and thought content normal.   Nursing note and vitals reviewed.      Assessment:       1. Close Exposure to Covid-19 Virus        Plan:     1. Close Exposure to Covid-19 Virus    Close Exposure to Covid-19 Virus  -     COVID-19 Routine Screening    Extensive counseling done in regards to covid screening as well as antibody testing. Advised pt to wear mask until 14 day post exposure lapses.     - COVID-19 Routine Screening

## 2020-06-05 NOTE — PATIENT INSTRUCTIONS
Instructions for Patients with Confirmed or Suspected COVID-19    If you are awaiting your test result, you will either be called or it will be released to the patient portal.  If you have any questions about your test, please visit [email or web address] or call our COVID-19 information line at [phone].      Preventing the Spread of Coronavirus Disease 2019 (COVID-19) in Homes and Residential Communities -- Patients     Prevention steps for people with confirmed or suspected COVID-19 (including persons under investigation) who do not need to be hospitalized and people with confirmed COVID-19 who were hospitalized and determined to be medically stable to go home.    Stay home except to get medical care.    Separate yourself from other people and animals in your home.    Call ahead before visiting your doctor.    Wear a face mask.    Cover your coughs and sneezes.    Clean your hands often.    Avoid sharing personal household items.    Clean all high-touch surfaces every day.    Monitor your symptoms. Seek prompt medical attention if your illness is worsening (e.g., difficulty breathing). Before seeking care, call your healthcare provider.    If you have a medical emergency and must call 911, notify the dispatcher that you have or are being evaluated for COVID-19. If possible, put on a face mask before emergency medical services arrive.    Use the following symptom-based strategy to return to normal activity following a suspected or confirmed case of COVID-19. Continue isolation until:   o At least 3 days (72 hours) have passed since recovery defined as resolution of fever without the use of fever-reducing medications and improvement in respiratory symptoms (e.g. cough, shortness of breath), and   o At least 10 days have passed since symptoms first appeared.     Precautions for household members, intimate partners and caregivers in a non-healthcare setting of a patient with symptomatic  laboratory-confirmed COVID-19 or a patient under investigation.     Household members, intimate partners and caregivers in a non-healthcare setting may have close contact with a person with symptomatic, laboratory-confirmed COVID-19 or a person under investigation. Close contacts should monitor their health; they should call their healthcare provider right away if they develop symptoms suggestive of COVID-19 (e.g., fever, cough, shortness of breath). Close contacts should also follow these recommendations:      Make sure that you understand and can help the patient follow their healthcare providers instructions for medication(s) and care. You should help the patient with basic needs in the home and provide support for getting groceries, prescriptions, and other personal needs.    Monitor the patients symptoms. If the patient is getting sicker, call his or her healthcare provider and tell them that the patient has laboratory-confirmed COVID-19. This will help the healthcare providers office take steps to keep people in the office or waiting room from getting infected. Ask the healthcare provider to call the local or [facility] health department for additional guidance. If the patient has a medical emergency and you need to call 911, notify the dispatch personnel that the patient has or is being evaluated for COVID-19.    Household members should stay in another room or be  from the patient as much as possible. Household members should use a separate bedroom and bathroom, if available.    Prohibit visitors who do not have an essential need to be in the home.    Household members should care for any pets. Do not handle pets or other animals while sick.    Make sure that shared spaces in the home have good air flow, such as by an air conditioner.    Perform hand hygiene frequently. Wash your hands often with soap and water for at least 20 seconds or use an alcohol-based hand  that contains 60 to  95% alcohol, covering all surfaces of your hands and rubbing them together until they feel dry. Soap and water are preferred if hands are visibly dirty.    Avoid touching your eyes, nose and mouth with unwashed hands.    The patient should wear a face mask when you are around other people. If the patient is not able to wear a face mask (for example, because it causes trouble breathing), you, as the caregiver, should wear a mask when you are in the same room as the patient.    Wear a disposable face mask and gloves when you touch or have contact with the patients blood, stool or body fluids, such as saliva, sputum, nasal mucus, vomit and urine.   o Throw out disposable face masks and gloves after using them. Do not reuse.   o When removing personal protective equipment, first remove and dispose of gloves. Then, immediately clean your hands with soap and water or alcohol-based hand . Next, remove and dispose of face mask, and immediately clean your hands again with soap and water or alcohol-based hand .    Avoid sharing household items with the patient. You should not share dishes, drinking glasses, cups, eating utensils, towels, bedding or other items. After the patient uses these items, you should wash them thoroughly (see below Wash laundry thoroughly).    Clean all high-touch surfaces, such as counters, tabletops, doorknobs, bathroom fixtures, toilets, phones, keyboards, tablets and bedside tables, every day. Also, clean any surfaces that may have blood, stool or body fluids on them.   o Use a household cleaning spray or wipe, according to the label instructions. Labels contain instructions for safe and effective use of the cleaning product including precautions you should take when applying the product, such as wearing gloves and making sure you have good ventilation during use of the product.    Wash laundry thoroughly.   o Immediately remove and wash clothes or bedding that have  blood, stool or body fluids on them.  o Wear disposable gloves while handling soiled items and keep soiled items away from your body. Clean your hands (with soap and water or an alcohol-based hand ) immediately after removing your gloves.   o Read and follow directions on labels of laundry or clothing items and detergent. In general, using a normal laundry detergent according to washing machine instructions and dry thoroughly using the warmest temperatures recommended on the clothing label.    Place all used disposable gloves, face masks and other contaminated items in a lined container before disposing of them with other household waste. Clean your hands (with soap and water or an alcohol-based hand ) immediately after handling these items. Soap and water should be used preferentially if hands are visibly dirty.   Discuss any additional questions with your state or local health department

## 2020-06-06 LAB — SARS-COV-2 RNA RESP QL NAA+PROBE: NOT DETECTED

## 2020-07-31 ENCOUNTER — OFFICE VISIT (OUTPATIENT)
Dept: URGENT CARE | Facility: CLINIC | Age: 75
End: 2020-07-31
Payer: MEDICARE

## 2020-07-31 VITALS
RESPIRATION RATE: 16 BRPM | HEART RATE: 62 BPM | HEIGHT: 70 IN | BODY MASS INDEX: 26.92 KG/M2 | OXYGEN SATURATION: 98 % | SYSTOLIC BLOOD PRESSURE: 134 MMHG | WEIGHT: 188 LBS | TEMPERATURE: 97 F | DIASTOLIC BLOOD PRESSURE: 65 MMHG

## 2020-07-31 DIAGNOSIS — R30.0 DYSURIA: ICD-10-CM

## 2020-07-31 DIAGNOSIS — N30.00 ACUTE CYSTITIS WITHOUT HEMATURIA: Primary | ICD-10-CM

## 2020-07-31 LAB
BILIRUB UR QL STRIP: NEGATIVE
GLUCOSE UR QL STRIP: NEGATIVE
KETONES UR QL STRIP: NEGATIVE
LEUKOCYTE ESTERASE UR QL STRIP: POSITIVE
PH, POC UA: 7 (ref 5–8)
POC BLOOD, URINE: NEGATIVE
POC NITRATES, URINE: NEGATIVE
PROT UR QL STRIP: NEGATIVE
SP GR UR STRIP: 1 (ref 1–1.03)
UROBILINOGEN UR STRIP-ACNC: NORMAL (ref 0.3–2.2)

## 2020-07-31 PROCEDURE — 99203 PR OFFICE/OUTPT VISIT, NEW, LEVL III, 30-44 MIN: ICD-10-PCS | Mod: 25,S$GLB,, | Performed by: INTERNAL MEDICINE

## 2020-07-31 PROCEDURE — 99203 OFFICE O/P NEW LOW 30 MIN: CPT | Mod: 25,S$GLB,, | Performed by: INTERNAL MEDICINE

## 2020-07-31 PROCEDURE — 81003 POCT URINALYSIS, DIPSTICK, AUTOMATED, W/O SCOPE: ICD-10-PCS | Mod: QW,S$GLB,, | Performed by: INTERNAL MEDICINE

## 2020-07-31 PROCEDURE — 81003 URINALYSIS AUTO W/O SCOPE: CPT | Mod: QW,S$GLB,, | Performed by: INTERNAL MEDICINE

## 2020-07-31 RX ORDER — CLOPIDOGREL BISULFATE 75 MG/1
75 TABLET ORAL DAILY
COMMUNITY

## 2020-07-31 RX ORDER — DOXAZOSIN 4 MG/1
4 TABLET ORAL NIGHTLY
COMMUNITY

## 2020-07-31 RX ORDER — SULFAMETHOXAZOLE AND TRIMETHOPRIM 800; 160 MG/1; MG/1
1 TABLET ORAL 2 TIMES DAILY
Qty: 14 TABLET | Refills: 0 | Status: SHIPPED | OUTPATIENT
Start: 2020-07-31 | End: 2020-08-07

## 2020-07-31 RX ORDER — AZITHROMYCIN 250 MG/1
250 TABLET, FILM COATED ORAL DAILY
Qty: 6 TABLET | Refills: 0 | Status: SHIPPED | OUTPATIENT
Start: 2020-07-31 | End: 2020-08-05

## 2020-07-31 RX ORDER — DILTIAZEM HYDROCHLORIDE 300 MG/1
300 CAPSULE, COATED, EXTENDED RELEASE ORAL DAILY
COMMUNITY
End: 2023-04-06

## 2020-07-31 RX ORDER — HYDROCHLOROTHIAZIDE 12.5 MG/1
12.5 CAPSULE ORAL DAILY
COMMUNITY
End: 2021-10-25

## 2020-07-31 NOTE — PATIENT INSTRUCTIONS
Bladder Infection, Male (Adult)    You have a bladder infection.  Urine is normally free of bacteria. But bacteria can get into the urinary tract from the skin around the rectum or it may travel in the blood from elsewhere in the body.  This is called a urinary tract infection (UTI). An infection can occur anywhere in the urinary tract. It could be in a kidney (pyelonephritis)or in the bladder (cystitis) and urethra (urethritis). The urethra is the tube that drains the urine from the bladder through the tip of the penis.  The most common place for a UTI is in the bladder. This is called a bladder infection. Most bladder infections are easily treated. They are not serious unless the infection spreads up to the kidney.  The terms bladder infection, UTI, and cystitis are often used to describe the same thing, but they arent always the same. Cystitis is an inflammation of the bladder. The most common cause of cystitis is an infection.   Keep in mind:  · Infections in the urine are called UTIs.  · Cystitis is usually caused by a UTI.  · Not all UTIs and cases of cystitis are bladder infections.  · Bladder infections are the most common type of cystitis.  Symptoms of a bladder infection  The infection causes inflammation in the urethra and bladder. This inflammation causes many of the symptoms. The most common symptoms of a bladder infection are:  · Pain or burning when urinating  · Having to go more often than usual  · Feeling like you need to go right away  · Only a small amount comes out  · Blood in urine  · Discomfort in your belly (abdomen), usually in the lower abdomen, above the pubic bone  · Cloudy, strong, or bad smelling urine  · Unable to urinate (retention)  · Urinary incontinence  · Fever  · Loss of appetite  Older adults may also feel confused.  Causes of a bladder infection  Bladder infections are not contagious. You can't get one from someone else, from a toilet seat, or from sharing a bath.  The most  common cause of bladder infections is bacteria from the bowels. The bacteria get onto the skin around the opening of the urethra. From there they can get into the urine and travel up to the bladder. This causes inflammation and an infection. This usually happens because of:  · An enlarged prostate  · Poor cleaning of the genitals  · Procedures that put a tube in your bladder, like a Ramirez catheter  · Bowel incontinence  · Older age  · Not emptying your bladder (The urine stays there, giving the bacteria a chance to grow.)  · Dehydration (This allows urine to stay in the bladder longer.)  · Constipation (This can cause the bowels to push on the bladder or urethra and keep the bladder from emptying.)  Treatment  Bladder infections are treated with antibiotics. They usually clear up quickly without complications. Treatment helps prevent a more serious kidney infection.  Medicines  Medicines can help in the treatment of a bladder infection:  · You may have been given phenazopyridine to ease burning when you urinate. It will cause your urine to be bright orange. It can stain clothing.  · You may have been prescribed antibiotics. Take this medicine until you have finished it, even if you feel better. Taking all of the medicine will make sure the infection has cleared.  You can use acetaminophen or ibuprofen for pain, fever, or discomfort, unless another medicine was prescribed. You can also alternate them, or use both together. They work differently and are a different class of medicines, so taking them together is not an overdose. If you have chronic liver or kidney disease, talk with your healthcare provider before using these medicines. Also talk with your provider if youve had a stomach ulcer or GI bleeding or are taking blood thinner medicines.  Home care  Here are some guidelines to help you care for yourself at home:  · Drink plenty of fluids, unless your healthcare provider told you not to. Fluids will prevent  dehydration and flush out your bladder.  · Use good personal hygiene. Wipe from front to back after using the toilet, and clean your penis regularly. If you arent circumcised, retract the foreskin when cleaning.  · Urinate more frequently, and dont try to hold it in for long periods of time, if possible.  · Wear loose-fitting clothes and cotton underwear. Avoid tight-fitting pants. This helps keep you clean and dry.  · Change your diet to prevent constipation. This means eating more fresh foods and more fiber, and less junk and fatty foods.  · Avoid sex until your symptoms are gone.  · Avoid caffeine, alcohol, and spicy foods. These can irritate the bladder.  Follow-up care  Follow up with your healthcare provider, or as advised if all symptoms have not cleared up within 5 days. It is important to keep your follow-up appointment. You can talk with your provider to see if you need more tests of the urinary tract. This is especially important if you have infections that keep coming back.  If a culture was done, you will be told if your treatment needs to be changed. If directed, you can call to find out the results.  If X-rays were taken, you will be told of any findings that may affect your care.  Call 911  Call 911 if any of these occur:  · Trouble breathing  · Difficulty waking up  · Feeling confused  · Fainting or loss of consciousness  · Rapid heart rate  When to seek medical advice  Call your healthcare provider right away if any of these occur:  · Fever of 100.4ºF (38ºC) or higher, or as directed by your healthcare provider  · Your symptoms dont improve after 2 days of treatment  · Back or abdominal pain that gets worse  · Repeated vomiting, or you arent able to keep medicine down  · Weakness or dizziness  Date Last Reviewed: 10/1/2016  © 3185-7147 [employer]. [address] [place] [address], [place] [address]. All rights reserved. This information is not intended as a substitute for professional  medical care. Always follow your healthcare professional's instructions.    *Urinary Tract Infections*    1) Urinate after [place](Adults).  2)No Fizzy drinks/Soda drinks.  3)Wear only Cotton Underwear.  4) Take the antibiotic you were given until it is all gone and drink a lot of fluids for 5 to 7 days to Flush Your Kidneys. Studies Show that cranberry Juice does not cure a UTI nor drinking it daily or taking Cranberry tablets will prevent a UTI.

## 2020-07-31 NOTE — PROGRESS NOTES
"Subjective:       Patient ID: Ghanshyam Sanford Jr. is a 75 y.o. male.    Vitals:  height is 5' 10" (1.778 m) and weight is 85.3 kg (188 lb). His temperature is 97.2 °F (36.2 °C). His blood pressure is 134/65 and his pulse is 62. His respiration is 16 and oxygen saturation is 98%.     Chief Complaint: Dysuria    Dysuria   This is a new problem. The current episode started acute onset (Burning sensation x1 day ). The problem occurs every urination. The quality of the pain is described as burning. The pain is at a severity of 0/10. The patient is experiencing no pain. He is sexually active. There is no history of pyelonephritis. Pertinent negatives include no chills, frequency, hematuria, nausea, urgency, vomiting or rash. He has tried increased fluids for the symptoms. The treatment provided no relief. His past medical history is significant for hypertension.       Constitution: Negative. Negative for chills and fever.   HENT: Negative.    Neck: negative. Negative for painful lymph nodes.   Cardiovascular: Negative.    Eyes: Negative.    Respiratory: Negative.    Gastrointestinal: Negative.  Negative for abdominal pain, nausea and vomiting.   Endocrine: negative.   Genitourinary: Positive for dysuria. Negative for frequency, urgency, urine decreased, hematuria, history of kidney stones, genital trauma, painful intercourse, genital sore, penile discharge, painful ejaculation, penile pain, penile swelling, scrotal swelling and testicular pain.   Musculoskeletal: Negative.  Negative for back pain.   Skin: Negative.  Negative for rash and lesion.   Allergic/Immunologic: Negative.    Neurological: Negative.    Hematologic/Lymphatic: Negative.  Negative for swollen lymph nodes.   Psychiatric/Behavioral: Negative.        Objective:      Physical Exam   Constitutional: He is oriented to person, place, and time. He appears well-developed. He is cooperative.  Non-toxic appearance. He does not appear ill. No distress.   HENT: "   Head: Normocephalic and atraumatic.   Ears:   Right Ear: Hearing, tympanic membrane, external ear and ear canal normal.   Left Ear: Hearing, tympanic membrane, external ear and ear canal normal.   Nose: Nose normal. No mucosal edema, rhinorrhea or nasal deformity. No epistaxis. Right sinus exhibits no maxillary sinus tenderness and no frontal sinus tenderness. Left sinus exhibits no maxillary sinus tenderness and no frontal sinus tenderness.   Mouth/Throat: Uvula is midline, oropharynx is clear and moist and mucous membranes are normal. No trismus in the jaw. Normal dentition. No uvula swelling. No oropharyngeal exudate, posterior oropharyngeal edema or posterior oropharyngeal erythema.   Eyes: Conjunctivae and lids are normal. No scleral icterus.   Neck: Trachea normal, full passive range of motion without pain and phonation normal. Neck supple. No neck rigidity. No edema and no erythema present.   Cardiovascular: Normal rate, regular rhythm, S1 normal, S2 normal, normal heart sounds and normal pulses.   No murmur heard.  Pulmonary/Chest: Effort normal and breath sounds normal. No accessory muscle usage. No respiratory distress. He has no decreased breath sounds. He has no wheezes. He has no rhonchi. He has no rales.   Abdominal: Normal appearance.   Genitourinary:    Testes normal.   Cremasteric reflex is present. Circumcised. No discharge found.         Musculoskeletal: Normal range of motion.         General: No deformity.   Neurological: He is alert and oriented to person, place, and time. He exhibits normal muscle tone. Coordination normal.   Skin: Skin is warm, dry, intact, not diaphoretic and not pale. Psychiatric: His speech is normal and behavior is normal. Judgment and thought content normal.   Nursing note and vitals reviewed.        Assessment:       1. Acute cystitis without hematuria    2. Dysuria        Plan:         Acute cystitis without hematuria  -     POCT Urinalysis, Dipstick, Automated, W/O  Scope  -     sulfamethoxazole-trimethoprim 800-160mg (BACTRIM DS) 800-160 mg Tab; Take 1 tablet by mouth 2 (two) times daily. for 7 days  Dispense: 14 tablet; Refill: 0  -     azithromycin (ZITHROMAX) 250 MG tablet; Take 1 tablet (250 mg total) by mouth once daily. Double first dose for 5 doses  Dispense: 6 tablet; Refill: 0    Dysuria  -     POCT Urinalysis, Dipstick, Automated, W/O Scope  -     sulfamethoxazole-trimethoprim 800-160mg (BACTRIM DS) 800-160 mg Tab; Take 1 tablet by mouth 2 (two) times daily. for 7 days  Dispense: 14 tablet; Refill: 0  -     azithromycin (ZITHROMAX) 250 MG tablet; Take 1 tablet (250 mg total) by mouth once daily. Double first dose for 5 doses  Dispense: 6 tablet; Refill: 0    take meds

## 2020-08-03 ENCOUNTER — TELEPHONE (OUTPATIENT)
Dept: UROLOGY | Facility: CLINIC | Age: 75
End: 2020-08-03

## 2020-08-03 NOTE — TELEPHONE ENCOUNTER
----- Message from Adore Person sent at 8/3/2020  9:42 AM CDT -----  Regarding: Appt  Contact: Pt. [phone]  The patient would like to speak to someone regarding scheduling an appointment. He would like to discuss his symptoms with a nurse prior to scheduling. Please contact the patient to discuss further.

## 2020-08-03 NOTE — TELEPHONE ENCOUNTER
Pt was seen in urgent care and given antibiotics for urine. poct was positive for leuks. Pt reports sxs better. He will complete rx and call if sxs persist or return.

## 2020-09-16 ENCOUNTER — TELEPHONE (OUTPATIENT)
Dept: RHEUMATOLOGY | Facility: CLINIC | Age: 75
End: 2020-09-16

## 2020-09-16 ENCOUNTER — PATIENT MESSAGE (OUTPATIENT)
Dept: RHEUMATOLOGY | Facility: CLINIC | Age: 75
End: 2020-09-16

## 2020-09-16 DIAGNOSIS — M79.10 MYALGIA: Primary | ICD-10-CM

## 2020-09-17 ENCOUNTER — LAB VISIT (OUTPATIENT)
Dept: LAB | Facility: HOSPITAL | Age: 75
End: 2020-09-17
Attending: INTERNAL MEDICINE
Payer: MEDICARE

## 2020-09-17 DIAGNOSIS — M79.10 MYALGIA: ICD-10-CM

## 2020-09-17 LAB
ALBUMIN SERPL BCP-MCNC: 4 G/DL (ref 3.5–5.2)
ALP SERPL-CCNC: 66 U/L (ref 55–135)
ALT SERPL W/O P-5'-P-CCNC: 21 U/L (ref 10–44)
ANION GAP SERPL CALC-SCNC: 9 MMOL/L (ref 8–16)
AST SERPL-CCNC: 20 U/L (ref 10–40)
BASOPHILS # BLD AUTO: 0.05 K/UL (ref 0–0.2)
BASOPHILS NFR BLD: 0.7 % (ref 0–1.9)
BILIRUB SERPL-MCNC: 0.9 MG/DL (ref 0.1–1)
BUN SERPL-MCNC: 24 MG/DL (ref 8–23)
CALCIUM SERPL-MCNC: 9.4 MG/DL (ref 8.7–10.5)
CHLORIDE SERPL-SCNC: 102 MMOL/L (ref 95–110)
CK SERPL-CCNC: 133 U/L (ref 20–200)
CO2 SERPL-SCNC: 29 MMOL/L (ref 23–29)
CREAT SERPL-MCNC: 1.2 MG/DL (ref 0.5–1.4)
DIFFERENTIAL METHOD: NORMAL
EOSINOPHIL # BLD AUTO: 0.2 K/UL (ref 0–0.5)
EOSINOPHIL NFR BLD: 2.5 % (ref 0–8)
ERYTHROCYTE [DISTWIDTH] IN BLOOD BY AUTOMATED COUNT: 13.8 % (ref 11.5–14.5)
ERYTHROCYTE [SEDIMENTATION RATE] IN BLOOD BY WESTERGREN METHOD: 0 MM/HR (ref 0–10)
EST. GFR  (AFRICAN AMERICAN): >60 ML/MIN/1.73 M^2
EST. GFR  (NON AFRICAN AMERICAN): 60 ML/MIN/1.73 M^2
GLUCOSE SERPL-MCNC: 87 MG/DL (ref 70–110)
HCT VFR BLD AUTO: 44 % (ref 40–54)
HGB BLD-MCNC: 14.5 G/DL (ref 14–18)
IMM GRANULOCYTES # BLD AUTO: 0.01 K/UL (ref 0–0.04)
IMM GRANULOCYTES NFR BLD AUTO: 0.1 % (ref 0–0.5)
LYMPHOCYTES # BLD AUTO: 2.1 K/UL (ref 1–4.8)
LYMPHOCYTES NFR BLD: 31.3 % (ref 18–48)
MCH RBC QN AUTO: 28.8 PG (ref 27–31)
MCHC RBC AUTO-ENTMCNC: 33 G/DL (ref 32–36)
MCV RBC AUTO: 88 FL (ref 82–98)
MONOCYTES # BLD AUTO: 0.6 K/UL (ref 0.3–1)
MONOCYTES NFR BLD: 9 % (ref 4–15)
NEUTROPHILS # BLD AUTO: 3.8 K/UL (ref 1.8–7.7)
NEUTROPHILS NFR BLD: 56.4 % (ref 38–73)
NRBC BLD-RTO: 0 /100 WBC
PLATELET # BLD AUTO: 178 K/UL (ref 150–350)
PMV BLD AUTO: 10.5 FL (ref 9.2–12.9)
POTASSIUM SERPL-SCNC: 4.6 MMOL/L (ref 3.5–5.1)
PROT SERPL-MCNC: 7.1 G/DL (ref 6–8.4)
RBC # BLD AUTO: 5.03 M/UL (ref 4.6–6.2)
SODIUM SERPL-SCNC: 140 MMOL/L (ref 136–145)
TSH SERPL DL<=0.005 MIU/L-ACNC: 2.14 UIU/ML (ref 0.4–4)
WBC # BLD AUTO: 6.75 K/UL (ref 3.9–12.7)

## 2020-09-17 PROCEDURE — 82550 ASSAY OF CK (CPK): CPT

## 2020-09-17 PROCEDURE — 84443 ASSAY THYROID STIM HORMONE: CPT

## 2020-09-17 PROCEDURE — 82085 ASSAY OF ALDOLASE: CPT

## 2020-09-17 PROCEDURE — 85025 COMPLETE CBC W/AUTO DIFF WBC: CPT

## 2020-09-17 PROCEDURE — 80053 COMPREHEN METABOLIC PANEL: CPT

## 2020-09-17 PROCEDURE — 85651 RBC SED RATE NONAUTOMATED: CPT

## 2020-09-17 PROCEDURE — 36415 COLL VENOUS BLD VENIPUNCTURE: CPT

## 2020-09-17 PROCEDURE — 86140 C-REACTIVE PROTEIN: CPT

## 2020-09-18 LAB
ALDOLASE SERPL-CCNC: 3.7 U/L (ref 1.2–7.6)
CRP SERPL-MCNC: 1.8 MG/L (ref 0–8.2)

## 2021-01-06 ENCOUNTER — PATIENT MESSAGE (OUTPATIENT)
Dept: RHEUMATOLOGY | Facility: CLINIC | Age: 76
End: 2021-01-06

## 2021-01-14 ENCOUNTER — PATIENT MESSAGE (OUTPATIENT)
Dept: RHEUMATOLOGY | Facility: CLINIC | Age: 76
End: 2021-01-14

## 2021-01-22 ENCOUNTER — PATIENT MESSAGE (OUTPATIENT)
Dept: ADMINISTRATIVE | Facility: OTHER | Age: 76
End: 2021-01-22

## 2021-01-30 ENCOUNTER — IMMUNIZATION (OUTPATIENT)
Dept: OBSTETRICS AND GYNECOLOGY | Facility: CLINIC | Age: 76
End: 2021-01-30
Payer: MEDICARE

## 2021-01-30 DIAGNOSIS — Z23 NEED FOR VACCINATION: Primary | ICD-10-CM

## 2021-01-30 PROCEDURE — 0002A COVID-19, MRNA, LNP-S, PF, 30 MCG/0.3 ML DOSE VACCINE: CPT | Mod: PBBFAC

## 2021-01-30 PROCEDURE — 91300 COVID-19, MRNA, LNP-S, PF, 30 MCG/0.3 ML DOSE VACCINE: CPT | Mod: PBBFAC

## 2021-02-23 ENCOUNTER — PATIENT MESSAGE (OUTPATIENT)
Dept: RHEUMATOLOGY | Facility: CLINIC | Age: 76
End: 2021-02-23

## 2021-03-29 ENCOUNTER — LAB VISIT (OUTPATIENT)
Dept: LAB | Facility: HOSPITAL | Age: 76
End: 2021-03-29
Attending: INTERNAL MEDICINE
Payer: MEDICARE

## 2021-03-29 DIAGNOSIS — M35.3 PMR (POLYMYALGIA RHEUMATICA): ICD-10-CM

## 2021-03-29 LAB
CRP SERPL-MCNC: 1.9 MG/L (ref 0–8.2)
ERYTHROCYTE [SEDIMENTATION RATE] IN BLOOD BY WESTERGREN METHOD: 3 MM/HR (ref 0–10)

## 2021-03-29 PROCEDURE — 86140 C-REACTIVE PROTEIN: CPT | Performed by: PHYSICAL MEDICINE & REHABILITATION

## 2021-03-29 PROCEDURE — 85651 RBC SED RATE NONAUTOMATED: CPT | Performed by: PHYSICAL MEDICINE & REHABILITATION

## 2021-03-29 PROCEDURE — 36415 COLL VENOUS BLD VENIPUNCTURE: CPT | Performed by: PHYSICAL MEDICINE & REHABILITATION

## 2021-03-31 ENCOUNTER — OFFICE VISIT (OUTPATIENT)
Dept: RHEUMATOLOGY | Facility: CLINIC | Age: 76
End: 2021-03-31
Payer: MEDICARE

## 2021-03-31 VITALS
BODY MASS INDEX: 26.74 KG/M2 | DIASTOLIC BLOOD PRESSURE: 63 MMHG | HEIGHT: 71 IN | WEIGHT: 191 LBS | HEART RATE: 62 BPM | SYSTOLIC BLOOD PRESSURE: 132 MMHG

## 2021-03-31 DIAGNOSIS — M84.68XA PATHOLOGICAL FRACTURE IN OTHER DISEASE, OTHER SITE, INITIAL ENCOUNTER FOR FRACTURE: ICD-10-CM

## 2021-03-31 DIAGNOSIS — G25.81 RESTLESS LEG SYNDROME: Primary | ICD-10-CM

## 2021-03-31 DIAGNOSIS — M85.80 OSTEOPENIA, UNSPECIFIED LOCATION: ICD-10-CM

## 2021-03-31 DIAGNOSIS — S32.010S COMPRESSION FRACTURE OF L1 VERTEBRA, SEQUELA: ICD-10-CM

## 2021-03-31 DIAGNOSIS — M35.3 PMR (POLYMYALGIA RHEUMATICA): ICD-10-CM

## 2021-03-31 DIAGNOSIS — M17.11 PRIMARY OSTEOARTHRITIS OF RIGHT KNEE: ICD-10-CM

## 2021-03-31 DIAGNOSIS — Z15.89 HLA B27 (HLA B27 POSITIVE): ICD-10-CM

## 2021-03-31 PROCEDURE — 99999 PR PBB SHADOW E&M-EST. PATIENT-LVL III: ICD-10-PCS | Mod: PBBFAC,,, | Performed by: INTERNAL MEDICINE

## 2021-03-31 PROCEDURE — 99214 OFFICE O/P EST MOD 30 MIN: CPT | Mod: S$PBB,,, | Performed by: INTERNAL MEDICINE

## 2021-03-31 PROCEDURE — 99999 PR PBB SHADOW E&M-EST. PATIENT-LVL III: CPT | Mod: PBBFAC,,, | Performed by: INTERNAL MEDICINE

## 2021-03-31 PROCEDURE — 99214 PR OFFICE/OUTPT VISIT, EST, LEVL IV, 30-39 MIN: ICD-10-PCS | Mod: S$PBB,,, | Performed by: INTERNAL MEDICINE

## 2021-03-31 PROCEDURE — 99213 OFFICE O/P EST LOW 20 MIN: CPT | Mod: PBBFAC | Performed by: INTERNAL MEDICINE

## 2021-03-31 RX ORDER — ALFUZOSIN HYDROCHLORIDE 10 MG/1
10 TABLET, EXTENDED RELEASE ORAL
COMMUNITY

## 2021-03-31 RX ORDER — ROPINIROLE 0.25 MG/1
TABLET, FILM COATED ORAL
Qty: 90 TABLET | Refills: 1 | Status: SHIPPED | OUTPATIENT
Start: 2021-03-31 | End: 2024-02-01

## 2021-03-31 RX ORDER — ROSUVASTATIN CALCIUM 20 MG/1
20 TABLET, COATED ORAL DAILY
COMMUNITY

## 2021-06-07 ENCOUNTER — PATIENT MESSAGE (OUTPATIENT)
Dept: RHEUMATOLOGY | Facility: CLINIC | Age: 76
End: 2021-06-07

## 2021-06-07 ENCOUNTER — TELEPHONE (OUTPATIENT)
Dept: RHEUMATOLOGY | Facility: CLINIC | Age: 76
End: 2021-06-07

## 2021-06-07 DIAGNOSIS — M79.10 MYALGIA: Primary | ICD-10-CM

## 2021-06-08 ENCOUNTER — LAB VISIT (OUTPATIENT)
Dept: LAB | Facility: HOSPITAL | Age: 76
End: 2021-06-08
Attending: INTERNAL MEDICINE
Payer: MEDICARE

## 2021-06-08 ENCOUNTER — TELEPHONE (OUTPATIENT)
Dept: RHEUMATOLOGY | Facility: CLINIC | Age: 76
End: 2021-06-08

## 2021-06-08 DIAGNOSIS — N17.9 AKI (ACUTE KIDNEY INJURY): Primary | ICD-10-CM

## 2021-06-08 DIAGNOSIS — M79.10 MYALGIA: ICD-10-CM

## 2021-06-08 LAB
ALBUMIN SERPL BCP-MCNC: 3.7 G/DL (ref 3.5–5.2)
ALP SERPL-CCNC: 75 U/L (ref 55–135)
ALT SERPL W/O P-5'-P-CCNC: 16 U/L (ref 10–44)
ANION GAP SERPL CALC-SCNC: 9 MMOL/L (ref 8–16)
AST SERPL-CCNC: 13 U/L (ref 10–40)
BASOPHILS # BLD AUTO: 0.07 K/UL (ref 0–0.2)
BASOPHILS NFR BLD: 0.8 % (ref 0–1.9)
BILIRUB SERPL-MCNC: 0.9 MG/DL (ref 0.1–1)
BUN SERPL-MCNC: 24 MG/DL (ref 8–23)
CALCIUM SERPL-MCNC: 9.7 MG/DL (ref 8.7–10.5)
CHLORIDE SERPL-SCNC: 103 MMOL/L (ref 95–110)
CK SERPL-CCNC: 61 U/L (ref 20–200)
CO2 SERPL-SCNC: 27 MMOL/L (ref 23–29)
CREAT SERPL-MCNC: 1.3 MG/DL (ref 0.5–1.4)
DIFFERENTIAL METHOD: NORMAL
EOSINOPHIL # BLD AUTO: 0.2 K/UL (ref 0–0.5)
EOSINOPHIL NFR BLD: 2.8 % (ref 0–8)
ERYTHROCYTE [DISTWIDTH] IN BLOOD BY AUTOMATED COUNT: 13.2 % (ref 11.5–14.5)
ERYTHROCYTE [SEDIMENTATION RATE] IN BLOOD BY WESTERGREN METHOD: 2 MM/HR (ref 0–10)
EST. GFR  (AFRICAN AMERICAN): >60 ML/MIN/1.73 M^2
EST. GFR  (NON AFRICAN AMERICAN): 54 ML/MIN/1.73 M^2
GLUCOSE SERPL-MCNC: 100 MG/DL (ref 70–110)
HCT VFR BLD AUTO: 43.7 % (ref 40–54)
HGB BLD-MCNC: 14.2 G/DL (ref 14–18)
IMM GRANULOCYTES # BLD AUTO: 0.02 K/UL (ref 0–0.04)
IMM GRANULOCYTES NFR BLD AUTO: 0.2 % (ref 0–0.5)
LYMPHOCYTES # BLD AUTO: 2.2 K/UL (ref 1–4.8)
LYMPHOCYTES NFR BLD: 26.1 % (ref 18–48)
MCH RBC QN AUTO: 28.2 PG (ref 27–31)
MCHC RBC AUTO-ENTMCNC: 32.5 G/DL (ref 32–36)
MCV RBC AUTO: 87 FL (ref 82–98)
MONOCYTES # BLD AUTO: 0.6 K/UL (ref 0.3–1)
MONOCYTES NFR BLD: 7.3 % (ref 4–15)
NEUTROPHILS # BLD AUTO: 5.2 K/UL (ref 1.8–7.7)
NEUTROPHILS NFR BLD: 62.8 % (ref 38–73)
NRBC BLD-RTO: 0 /100 WBC
PLATELET # BLD AUTO: 202 K/UL (ref 150–450)
PMV BLD AUTO: 10.2 FL (ref 9.2–12.9)
POTASSIUM SERPL-SCNC: 3.9 MMOL/L (ref 3.5–5.1)
PROT SERPL-MCNC: 6.9 G/DL (ref 6–8.4)
RBC # BLD AUTO: 5.04 M/UL (ref 4.6–6.2)
SODIUM SERPL-SCNC: 139 MMOL/L (ref 136–145)
WBC # BLD AUTO: 8.35 K/UL (ref 3.9–12.7)

## 2021-06-08 PROCEDURE — 82550 ASSAY OF CK (CPK): CPT | Performed by: INTERNAL MEDICINE

## 2021-06-08 PROCEDURE — 36415 COLL VENOUS BLD VENIPUNCTURE: CPT | Performed by: INTERNAL MEDICINE

## 2021-06-08 PROCEDURE — 82085 ASSAY OF ALDOLASE: CPT | Performed by: INTERNAL MEDICINE

## 2021-06-08 PROCEDURE — 80053 COMPREHEN METABOLIC PANEL: CPT | Performed by: INTERNAL MEDICINE

## 2021-06-08 PROCEDURE — 86140 C-REACTIVE PROTEIN: CPT | Performed by: INTERNAL MEDICINE

## 2021-06-08 PROCEDURE — 85025 COMPLETE CBC W/AUTO DIFF WBC: CPT | Performed by: INTERNAL MEDICINE

## 2021-06-08 PROCEDURE — 85651 RBC SED RATE NONAUTOMATED: CPT | Performed by: INTERNAL MEDICINE

## 2021-06-09 LAB
ALDOLASE SERPL-CCNC: 3.3 U/L (ref 1.2–7.6)
CRP SERPL-MCNC: 2.6 MG/L (ref 0–8.2)

## 2021-06-23 ENCOUNTER — LAB VISIT (OUTPATIENT)
Dept: LAB | Facility: HOSPITAL | Age: 76
End: 2021-06-23
Attending: INTERNAL MEDICINE
Payer: MEDICARE

## 2021-06-23 DIAGNOSIS — N17.9 AKI (ACUTE KIDNEY INJURY): ICD-10-CM

## 2021-06-23 LAB
ALBUMIN SERPL BCP-MCNC: 3.6 G/DL (ref 3.5–5.2)
ANION GAP SERPL CALC-SCNC: 8 MMOL/L (ref 8–16)
BUN SERPL-MCNC: 23 MG/DL (ref 8–23)
CALCIUM SERPL-MCNC: 9.3 MG/DL (ref 8.7–10.5)
CHLORIDE SERPL-SCNC: 103 MMOL/L (ref 95–110)
CO2 SERPL-SCNC: 27 MMOL/L (ref 23–29)
CREAT SERPL-MCNC: 1.1 MG/DL (ref 0.5–1.4)
EST. GFR  (AFRICAN AMERICAN): >60 ML/MIN/1.73 M^2
EST. GFR  (NON AFRICAN AMERICAN): >60 ML/MIN/1.73 M^2
GLUCOSE SERPL-MCNC: 93 MG/DL (ref 70–110)
PHOSPHATE SERPL-MCNC: 3.1 MG/DL (ref 2.7–4.5)
POTASSIUM SERPL-SCNC: 4.1 MMOL/L (ref 3.5–5.1)
SODIUM SERPL-SCNC: 138 MMOL/L (ref 136–145)

## 2021-06-23 PROCEDURE — 36415 COLL VENOUS BLD VENIPUNCTURE: CPT | Performed by: INTERNAL MEDICINE

## 2021-06-23 PROCEDURE — 80069 RENAL FUNCTION PANEL: CPT | Performed by: INTERNAL MEDICINE

## 2021-07-09 ENCOUNTER — HOSPITAL ENCOUNTER (OUTPATIENT)
Dept: RADIOLOGY | Facility: HOSPITAL | Age: 76
Discharge: HOME | End: 2021-07-09
Attending: INTERNAL MEDICINE
Payer: MEDICARE

## 2021-07-09 ENCOUNTER — PATIENT MESSAGE (OUTPATIENT)
Dept: RHEUMATOLOGY | Facility: CLINIC | Age: 76
End: 2021-07-09

## 2021-07-09 DIAGNOSIS — M85.80 OSTEOPENIA, UNSPECIFIED LOCATION: ICD-10-CM

## 2021-07-09 DIAGNOSIS — M84.68XA PATHOLOGICAL FRACTURE IN OTHER DISEASE, OTHER SITE, INITIAL ENCOUNTER FOR FRACTURE: ICD-10-CM

## 2021-07-09 DIAGNOSIS — S32.010S COMPRESSION FRACTURE OF L1 VERTEBRA, SEQUELA: ICD-10-CM

## 2021-07-09 PROCEDURE — 77080 DXA BONE DENSITY AXIAL: CPT | Mod: 26,,, | Performed by: RADIOLOGY

## 2021-07-09 PROCEDURE — 77080 DEXA BONE DENSITY SPINE HIP: ICD-10-PCS | Mod: 26,,, | Performed by: RADIOLOGY

## 2021-07-09 PROCEDURE — 77080 DXA BONE DENSITY AXIAL: CPT | Mod: TC

## 2021-08-15 ENCOUNTER — PATIENT MESSAGE (OUTPATIENT)
Dept: RHEUMATOLOGY | Facility: CLINIC | Age: 76
End: 2021-08-15

## 2021-08-16 ENCOUNTER — TELEPHONE (OUTPATIENT)
Dept: RHEUMATOLOGY | Facility: CLINIC | Age: 76
End: 2021-08-16

## 2021-08-16 ENCOUNTER — HOSPITAL ENCOUNTER (OUTPATIENT)
Dept: RADIOLOGY | Facility: HOSPITAL | Age: 76
Discharge: HOME | End: 2021-08-16
Attending: INTERNAL MEDICINE
Payer: MEDICARE

## 2021-08-16 ENCOUNTER — OFFICE VISIT (OUTPATIENT)
Dept: RHEUMATOLOGY | Facility: CLINIC | Age: 76
End: 2021-08-16
Payer: MEDICARE

## 2021-08-16 VITALS
HEIGHT: 70 IN | BODY MASS INDEX: 27.35 KG/M2 | SYSTOLIC BLOOD PRESSURE: 132 MMHG | DIASTOLIC BLOOD PRESSURE: 63 MMHG | WEIGHT: 191 LBS | HEART RATE: 62 BPM

## 2021-08-16 DIAGNOSIS — M54.50 CHRONIC LOW BACK PAIN WITHOUT SCIATICA, UNSPECIFIED BACK PAIN LATERALITY: Primary | ICD-10-CM

## 2021-08-16 DIAGNOSIS — M79.642 PAIN IN BOTH HANDS: Primary | ICD-10-CM

## 2021-08-16 DIAGNOSIS — M25.512 CHRONIC PAIN OF BOTH SHOULDERS: ICD-10-CM

## 2021-08-16 DIAGNOSIS — M35.3 PMR (POLYMYALGIA RHEUMATICA): ICD-10-CM

## 2021-08-16 DIAGNOSIS — M54.50 CHRONIC LOW BACK PAIN WITHOUT SCIATICA, UNSPECIFIED BACK PAIN LATERALITY: ICD-10-CM

## 2021-08-16 DIAGNOSIS — M54.9 DORSALGIA, UNSPECIFIED: ICD-10-CM

## 2021-08-16 DIAGNOSIS — G89.29 CHRONIC PAIN OF RIGHT KNEE: ICD-10-CM

## 2021-08-16 DIAGNOSIS — M25.559 HIP PAIN: ICD-10-CM

## 2021-08-16 DIAGNOSIS — M79.641 PAIN IN BOTH HANDS: ICD-10-CM

## 2021-08-16 DIAGNOSIS — M25.561 CHRONIC PAIN OF RIGHT KNEE: ICD-10-CM

## 2021-08-16 DIAGNOSIS — M25.511 CHRONIC PAIN OF BOTH SHOULDERS: ICD-10-CM

## 2021-08-16 DIAGNOSIS — G89.29 CHRONIC PAIN OF BOTH SHOULDERS: ICD-10-CM

## 2021-08-16 DIAGNOSIS — G89.29 CHRONIC LOW BACK PAIN WITHOUT SCIATICA, UNSPECIFIED BACK PAIN LATERALITY: ICD-10-CM

## 2021-08-16 DIAGNOSIS — M79.641 PAIN IN BOTH HANDS: Primary | ICD-10-CM

## 2021-08-16 DIAGNOSIS — G89.29 CHRONIC LOW BACK PAIN WITHOUT SCIATICA, UNSPECIFIED BACK PAIN LATERALITY: Primary | ICD-10-CM

## 2021-08-16 DIAGNOSIS — M79.642 PAIN IN BOTH HANDS: ICD-10-CM

## 2021-08-16 PROCEDURE — 73030 X-RAY EXAM OF SHOULDER: CPT | Mod: TC,50

## 2021-08-16 PROCEDURE — 99214 PR OFFICE/OUTPT VISIT, EST, LEVL IV, 30-39 MIN: ICD-10-PCS | Mod: S$PBB,,, | Performed by: INTERNAL MEDICINE

## 2021-08-16 PROCEDURE — 72100 X-RAY EXAM L-S SPINE 2/3 VWS: CPT | Mod: 26,,, | Performed by: RADIOLOGY

## 2021-08-16 PROCEDURE — 72100 X-RAY EXAM L-S SPINE 2/3 VWS: CPT | Mod: TC

## 2021-08-16 PROCEDURE — 73030 XR SHOULDER COMPLETE 2 OR MORE VIEWS BILATERAL: ICD-10-PCS | Mod: 26,50,, | Performed by: RADIOLOGY

## 2021-08-16 PROCEDURE — 73130 X-RAY EXAM OF HAND: CPT | Mod: TC,50

## 2021-08-16 PROCEDURE — 73521 X-RAY EXAM HIPS BI 2 VIEWS: CPT | Mod: TC

## 2021-08-16 PROCEDURE — 73030 X-RAY EXAM OF SHOULDER: CPT | Mod: 26,50,, | Performed by: RADIOLOGY

## 2021-08-16 PROCEDURE — 72100 XR LUMBAR SPINE AP AND LATERAL: ICD-10-PCS | Mod: 26,,, | Performed by: RADIOLOGY

## 2021-08-16 PROCEDURE — 73521 X-RAY EXAM HIPS BI 2 VIEWS: CPT | Mod: 26,,, | Performed by: RADIOLOGY

## 2021-08-16 PROCEDURE — 99999 PR PBB SHADOW E&M-EST. PATIENT-LVL V: CPT | Mod: PBBFAC,,, | Performed by: INTERNAL MEDICINE

## 2021-08-16 PROCEDURE — 73130 XR HAND COMPLETE 3 VIEWS BILATERAL: ICD-10-PCS | Mod: 26,50,, | Performed by: RADIOLOGY

## 2021-08-16 PROCEDURE — 73521 XR HIPS BILATERAL 2 VIEW INCL AP PELVIS: ICD-10-PCS | Mod: 26,,, | Performed by: RADIOLOGY

## 2021-08-16 PROCEDURE — 99999 PR PBB SHADOW E&M-EST. PATIENT-LVL V: ICD-10-PCS | Mod: PBBFAC,,, | Performed by: INTERNAL MEDICINE

## 2021-08-16 PROCEDURE — 73130 X-RAY EXAM OF HAND: CPT | Mod: 26,50,, | Performed by: RADIOLOGY

## 2021-08-16 PROCEDURE — 99215 OFFICE O/P EST HI 40 MIN: CPT | Mod: PBBFAC | Performed by: INTERNAL MEDICINE

## 2021-08-16 PROCEDURE — 99214 OFFICE O/P EST MOD 30 MIN: CPT | Mod: S$PBB,,, | Performed by: INTERNAL MEDICINE

## 2021-08-16 RX ORDER — PREDNISONE 2.5 MG/1
10 TABLET ORAL DAILY
Qty: 30 TABLET | Refills: 1 | Status: SHIPPED | OUTPATIENT
Start: 2021-08-16 | End: 2021-10-27

## 2021-08-16 RX ORDER — IRBESARTAN 300 MG/1
300 TABLET ORAL NIGHTLY
COMMUNITY
End: 2023-04-06

## 2021-08-16 RX ORDER — PREDNISONE 2.5 MG/1
2.5 TABLET ORAL DAILY
Qty: 30 TABLET | Refills: 1 | Status: SHIPPED | OUTPATIENT
Start: 2021-08-16 | End: 2021-08-16

## 2021-08-16 RX ORDER — PREDNISONE 2.5 MG/1
10 TABLET ORAL DAILY
Qty: 30 TABLET | Refills: 1 | Status: SHIPPED | OUTPATIENT
Start: 2021-08-16 | End: 2021-08-16 | Stop reason: SDUPTHER

## 2021-08-16 ASSESSMENT — ROUTINE ASSESSMENT OF PATIENT INDEX DATA (RAPID3)
FATIGUE SCORE: 5
TOTAL RAPID3 SCORE: 5.94
MDHAQ FUNCTION SCORE: 0.7
PSYCHOLOGICAL DISTRESS SCORE: 3.3
PATIENT GLOBAL ASSESSMENT SCORE: 5.5
AM STIFFNESS SCORE: 1, YES
PAIN SCORE: 10
WHEN YOU AWAKENED IN THE MORNING OVER THE LAST WEEK, PLEASE INDICATE THE AMOUNT OF TIME IT TAKES UNTIL YOU ARE AS LIMBER AS YOU WILL BE FOR THE DAY: 10

## 2021-08-17 ENCOUNTER — TELEPHONE (OUTPATIENT)
Dept: RHEUMATOLOGY | Facility: CLINIC | Age: 76
End: 2021-08-17

## 2021-08-17 DIAGNOSIS — M25.9 WRIST LESION: Primary | ICD-10-CM

## 2021-08-18 ENCOUNTER — TELEPHONE (OUTPATIENT)
Dept: RHEUMATOLOGY | Facility: CLINIC | Age: 76
End: 2021-08-18

## 2021-08-18 ENCOUNTER — PATIENT MESSAGE (OUTPATIENT)
Dept: RHEUMATOLOGY | Facility: CLINIC | Age: 76
End: 2021-08-18

## 2021-08-25 ENCOUNTER — HOSPITAL ENCOUNTER (OUTPATIENT)
Dept: RADIOLOGY | Facility: HOSPITAL | Age: 76
Discharge: HOME | End: 2021-08-25
Attending: INTERNAL MEDICINE
Payer: MEDICARE

## 2021-08-25 DIAGNOSIS — M25.9 WRIST LESION: ICD-10-CM

## 2021-08-25 PROCEDURE — 25500020 PHARM REV CODE 255: Performed by: INTERNAL MEDICINE

## 2021-08-25 PROCEDURE — 73223 MRI JOINT UPR EXTR W/O&W/DYE: CPT | Mod: TC,LT

## 2021-08-25 PROCEDURE — 73223 MRI WRIST W WO CONTRAST LEFT: ICD-10-PCS | Mod: 26,LT,, | Performed by: RADIOLOGY

## 2021-08-25 PROCEDURE — A9585 GADOBUTROL INJECTION: HCPCS | Performed by: INTERNAL MEDICINE

## 2021-08-25 PROCEDURE — 73223 MRI JOINT UPR EXTR W/O&W/DYE: CPT | Mod: 26,LT,, | Performed by: RADIOLOGY

## 2021-08-25 RX ORDER — GADOBUTROL 604.72 MG/ML
9 INJECTION INTRAVENOUS
Status: COMPLETED | OUTPATIENT
Start: 2021-08-25 | End: 2021-08-25

## 2021-08-25 RX ADMIN — GADOBUTROL 10 ML: 604.72 INJECTION INTRAVENOUS at 12:08

## 2021-11-18 ENCOUNTER — PATIENT MESSAGE (OUTPATIENT)
Dept: RHEUMATOLOGY | Facility: CLINIC | Age: 76
End: 2021-11-18
Payer: MEDICARE

## 2021-12-14 PROBLEM — I50.32 CHRONIC DIASTOLIC HEART FAILURE, NYHA CLASS 2: Status: ACTIVE | Noted: 2021-12-14

## 2021-12-15 PROBLEM — I44.7 LBBB (LEFT BUNDLE BRANCH BLOCK): Status: ACTIVE | Noted: 2021-12-15

## 2022-03-25 ENCOUNTER — LAB VISIT (OUTPATIENT)
Dept: LAB | Facility: HOSPITAL | Age: 77
End: 2022-03-25
Attending: INTERNAL MEDICINE
Payer: MEDICARE

## 2022-03-25 ENCOUNTER — TELEPHONE (OUTPATIENT)
Dept: RHEUMATOLOGY | Facility: CLINIC | Age: 77
End: 2022-03-25
Payer: MEDICARE

## 2022-03-25 DIAGNOSIS — N17.9 AKI (ACUTE KIDNEY INJURY): Primary | ICD-10-CM

## 2022-03-25 DIAGNOSIS — M79.10 MYALGIA: ICD-10-CM

## 2022-03-25 LAB
ALBUMIN SERPL BCP-MCNC: 4.1 G/DL (ref 3.5–5.2)
ALP SERPL-CCNC: 80 U/L (ref 55–135)
ALT SERPL W/O P-5'-P-CCNC: 19 U/L (ref 10–44)
ANION GAP SERPL CALC-SCNC: 8 MMOL/L (ref 8–16)
AST SERPL-CCNC: 20 U/L (ref 10–40)
BASOPHILS # BLD AUTO: 0.04 K/UL (ref 0–0.2)
BASOPHILS NFR BLD: 0.6 % (ref 0–1.9)
BILIRUB SERPL-MCNC: 0.8 MG/DL (ref 0.1–1)
BUN SERPL-MCNC: 20 MG/DL (ref 8–23)
CALCIUM SERPL-MCNC: 10.3 MG/DL (ref 8.7–10.5)
CHLORIDE SERPL-SCNC: 103 MMOL/L (ref 95–110)
CK SERPL-CCNC: 186 U/L (ref 20–200)
CO2 SERPL-SCNC: 30 MMOL/L (ref 23–29)
CREAT SERPL-MCNC: 1.2 MG/DL (ref 0.5–1.4)
CRP SERPL-MCNC: 4.4 MG/L (ref 0–8.2)
DIFFERENTIAL METHOD: NORMAL
EOSINOPHIL # BLD AUTO: 0.2 K/UL (ref 0–0.5)
EOSINOPHIL NFR BLD: 2.9 % (ref 0–8)
ERYTHROCYTE [DISTWIDTH] IN BLOOD BY AUTOMATED COUNT: 13.5 % (ref 11.5–14.5)
ERYTHROCYTE [SEDIMENTATION RATE] IN BLOOD BY WESTERGREN METHOD: 4 MM/HR (ref 0–10)
EST. GFR  (AFRICAN AMERICAN): >60 ML/MIN/1.73 M^2
EST. GFR  (NON AFRICAN AMERICAN): 59 ML/MIN/1.73 M^2
GLUCOSE SERPL-MCNC: 93 MG/DL (ref 70–110)
HCT VFR BLD AUTO: 45.3 % (ref 40–54)
HGB BLD-MCNC: 14.5 G/DL (ref 14–18)
IMM GRANULOCYTES # BLD AUTO: 0.02 K/UL (ref 0–0.04)
IMM GRANULOCYTES NFR BLD AUTO: 0.3 % (ref 0–0.5)
LYMPHOCYTES # BLD AUTO: 1.7 K/UL (ref 1–4.8)
LYMPHOCYTES NFR BLD: 25.7 % (ref 18–48)
MCH RBC QN AUTO: 27.5 PG (ref 27–31)
MCHC RBC AUTO-ENTMCNC: 32 G/DL (ref 32–36)
MCV RBC AUTO: 86 FL (ref 82–98)
MONOCYTES # BLD AUTO: 0.6 K/UL (ref 0.3–1)
MONOCYTES NFR BLD: 8.7 % (ref 4–15)
NEUTROPHILS # BLD AUTO: 4.1 K/UL (ref 1.8–7.7)
NEUTROPHILS NFR BLD: 61.8 % (ref 38–73)
NRBC BLD-RTO: 0 /100 WBC
PLATELET # BLD AUTO: 186 K/UL (ref 150–450)
PMV BLD AUTO: 10.5 FL (ref 9.2–12.9)
POTASSIUM SERPL-SCNC: 4.4 MMOL/L (ref 3.5–5.1)
PROT SERPL-MCNC: 7.5 G/DL (ref 6–8.4)
RBC # BLD AUTO: 5.28 M/UL (ref 4.6–6.2)
SODIUM SERPL-SCNC: 141 MMOL/L (ref 136–145)
WBC # BLD AUTO: 6.58 K/UL (ref 3.9–12.7)

## 2022-03-25 PROCEDURE — 85025 COMPLETE CBC W/AUTO DIFF WBC: CPT | Performed by: INTERNAL MEDICINE

## 2022-03-25 PROCEDURE — 82085 ASSAY OF ALDOLASE: CPT | Performed by: INTERNAL MEDICINE

## 2022-03-25 PROCEDURE — 82550 ASSAY OF CK (CPK): CPT | Performed by: INTERNAL MEDICINE

## 2022-03-25 PROCEDURE — 36415 COLL VENOUS BLD VENIPUNCTURE: CPT | Performed by: INTERNAL MEDICINE

## 2022-03-25 PROCEDURE — 80053 COMPREHEN METABOLIC PANEL: CPT | Performed by: INTERNAL MEDICINE

## 2022-03-25 PROCEDURE — 86140 C-REACTIVE PROTEIN: CPT | Performed by: INTERNAL MEDICINE

## 2022-03-25 PROCEDURE — 85651 RBC SED RATE NONAUTOMATED: CPT | Performed by: INTERNAL MEDICINE

## 2022-03-27 LAB — ALDOLASE SERPL-CCNC: 4.2 U/L (ref 1.2–7.6)

## 2022-03-31 ENCOUNTER — OFFICE VISIT (OUTPATIENT)
Dept: RHEUMATOLOGY | Facility: CLINIC | Age: 77
End: 2022-03-31
Payer: MEDICARE

## 2022-03-31 VITALS
WEIGHT: 191 LBS | HEART RATE: 63 BPM | HEIGHT: 70 IN | SYSTOLIC BLOOD PRESSURE: 136 MMHG | DIASTOLIC BLOOD PRESSURE: 62 MMHG | BODY MASS INDEX: 27.35 KG/M2

## 2022-03-31 DIAGNOSIS — M25.561 CHRONIC PAIN OF RIGHT KNEE: Primary | ICD-10-CM

## 2022-03-31 DIAGNOSIS — G89.29 CHRONIC PAIN OF RIGHT KNEE: Primary | ICD-10-CM

## 2022-03-31 PROCEDURE — 99999 PR PBB SHADOW E&M-EST. PATIENT-LVL V: ICD-10-PCS | Mod: PBBFAC,,, | Performed by: INTERNAL MEDICINE

## 2022-03-31 PROCEDURE — 99999 PR PBB SHADOW E&M-EST. PATIENT-LVL V: CPT | Mod: PBBFAC,,, | Performed by: INTERNAL MEDICINE

## 2022-03-31 PROCEDURE — 99213 OFFICE O/P EST LOW 20 MIN: CPT | Mod: S$PBB,,, | Performed by: INTERNAL MEDICINE

## 2022-03-31 PROCEDURE — 99213 PR OFFICE/OUTPT VISIT, EST, LEVL III, 20-29 MIN: ICD-10-PCS | Mod: S$PBB,,, | Performed by: INTERNAL MEDICINE

## 2022-03-31 PROCEDURE — 99215 OFFICE O/P EST HI 40 MIN: CPT | Mod: PBBFAC | Performed by: INTERNAL MEDICINE

## 2022-03-31 RX ORDER — CAPSAICIN 0.75 MG/G
CREAM TOPICAL 4 TIMES DAILY PRN
Qty: 57 G | Refills: 3 | Status: SHIPPED | OUTPATIENT
Start: 2022-03-31 | End: 2023-04-06

## 2022-03-31 NOTE — PROGRESS NOTES
I have personally taken the history and examined the patient and agree with the resident,s note as stated above        Latest Reference Range & Units 08/16/21 12:35   CCP Antibodies <5.0 U/mL 1.1   Rheumatoid Factor 0.0 - 15.0 IU/mL <13.0      Latest Reference Range & Units 08/16/21 12:35   IMAN Screen Negative <1:80  Negative <1:80 [1]   [1] IMAN test was performed by Immunofluorescence on HEP2 cells.            Upon further review, there is a subtle lucency along the distal radius measuring 2.6 cm.  I see no cortical breakthrough, periosteal reaction, or associated soft tissue mass.  Finding can be correlated with MRI.        Electronically signed by: Ada Broussard  Date:                                            08/17/2021  Time:                                           15:27  Signed by Ada Broussard MD on 8/17/2021 15:29  Narrative & Impression  EXAMINATION:  XR HAND COMPLETE 3 VIEWS BILATERAL     CLINICAL HISTORY:  . Pain in right hand     TECHNIQUE:  PA, lateral, and oblique views of both hands were performed.     COMPARISON:  None     FINDINGS:  No acute fracture or dislocation seen.  No significant soft tissue edema or radiopaque retained foreign body.     No significant osteophyte formation, suspicious osseous erosions, or joint space narrowing.     Impression:     No acute osseous abnormality seen.        Electronically signed by: Ada Broussard  Date:                                            08/16/2021  Time:                                           14:33    EXAMINATION:  MRI WRIST W WO CONTRAST LEFT     CLINICAL HISTORY:  75 y/o Menchondroma like area left distal radius;.     COMPARISON:  08/16/2021     TECHNIQUE:  Imaging was performed without and with.  Multiplanar, multisequence MR images of the left wrist were obtained on a 1.5 Diann magnet.     FINDINGS:  There is a T1 hyperintense lesion in the left distal radius which follows marrow signal on the STIR images without associated  enhancement.  Findings are in keeping with a lipoma.  Subchondral cystic changes in the lunate.  Visualized flexor and extensor tendons appear intact.  Median nerve has a normal appearance.     No abnormal enhancement is seen.     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        Electronically signed by: Drea Catalan MD  Date:                                            08/25/2021  Time:                                           13:35  William iNeto MD  [phone]  [phone] 8/16/2021 Routine   Omayra Mcgregor MD  [phone]  [phone] 8/16/2021      Narrative & Impression  EXAMINATION:  XR HIPS BILATERAL 2 VIEW INCL AP PELVIS     CLINICAL HISTORY:  Pain in unspecified hip     TECHNIQUE:  AP view of the pelvis and frogleg lateral views of both hips were performed.     COMPARISON:  Bilateral hip radiograph 05/13/2016.     FINDINGS:  Right: No fracture or dislocation.  Mild joint space narrowing associated with mild marginal osteophyte and subchondral cyst formation, similar to prior exam.     Left: No fracture or dislocation.  Mild joint space narrowing associated with mild marginal osteophyte and subchondral cyst formation, similar to prior exam.     Pelvis: No lytic or blastic lesion.     Miscellaneous: Bony mineralization is within normal limits.  Bilateral SI joints are unremarkable.  No significant soft tissue abnormality.     Impression:     Mild DJD of bilateral hips, similar to prior exam.     Electronically signed by resident: Omayra Mcgregor  Date:                                            08/16/2021  Time:                                           14:23     Electronically signed by: William Nieto MD  Date:                                            08/16/2021  Time:                                           14:38             Exam Ended: 08/16/21 13:59           The lumbar x-rays show the previous vertebroplasty T11 and L1 as well as  degenerative disc disease at the lowest disc and some facet joint osteoarthritis. Sacroiliac joints appear normal nothing to suggest sacroiliitis. RJQ              PACS Images for [facility] [place] Viewer     Show images for X-Ray Lumbar Spine AP And Lateral    All Reviewers List    Georgi Smtih MD on 8/16/2021 19:19     X-Ray Lumbar Spine AP And Lateral  Order: [id number]   Status: Final result       Visible to patient: Yes (seen)       Next appt: 04/08/2022 at 10:10 AM in Lab (LAB, [facility])       Dx: Dorsalgia, unspecified; Chronic low b...       1 Result Note       1 Patient Communication      Details    Reading Physician Reading Date Result Priority   Ada Broussard MD  [phone]  [phone] 8/16/2021 Routine     Narrative & Impression  EXAMINATION:  XR LUMBAR SPINE AP AND LATERAL     CLINICAL HISTORY:  Back pain or radiculopathy, > 6 wks;Dorsalgia, unspecified     TECHNIQUE:  AP, lateral and spot images were performed of the lumbar spine.     COMPARISON:  None     FINDINGS:  Previous vertebroplasty T11 and L1.  Remaining vertebral body heights are maintained.  Mild disc space narrowing L5-S1.  Facet arthropathy L4-5 and L5-S1.  AP alignment is anatomic.     Impression:     Please see above.        Electronically signed by: Ada Broussard  Date:                                            08/16/2021  Time:                                           14:24     EXAMINATION:  XR SHOULDER COMPLETE 2 OR MORE VIEWS BILATERAL     CLINICAL HISTORY:  Polymyalgia rheumatica     TECHNIQUE:  Three views right and left shoulder     COMPARISON:  None     FINDINGS:  No acute fracture or dislocation seen.  Mild osteoarthritis of the acromioclavicular joints.  No soft tissue edema or radiopaque retained foreign body.     Impression:     No acute osseous abnormality seen.        Electronically signed by: Ada Broussard  Date:                                            08/16/2021  Time:                                            14:22    CLINICAL HISTORY:  suspected osteoporosis; Other specified disorders of bone density and structure, unspecified site     TECHNIQUE:  DXA scanning was performed over the both hip and lumbar spine.  Review of the images confirms satisfactory positioning and technique.     COMPARISON:  None     FINDINGS:  The L1 to L4 vertebral bone mineral density is equal to 1.099 g/cm squared with a T score of -1.1.  There has been no significant change relative to the prior study.     The left femoral neck bone mineral density is equal to 0.946 g/cm squared with a T score of -1.0.  There has been  no significant change relative to the prior study.     The right femoral neck bone mineral density is equal to 0.985 grams/centimeter sq with a T-score of -0.7.     There is a 2.4% risk of a major osteoporotic fracture and a 0.5% risk of hip fracture in the next 10 years (FRAX).     Impression:     Osteopenia of the lumbar spine with the left femoral neck at the junction of normal and osteopenia.  Normal bone mineral density of the right femoral neck.     ESR 4  CRP 4.4  Creat 1.2 eGFR 59      Burning pain posterolateral right knee of of longstanding, remains major problem, occurs only at night. Has seen multiple orthopedists in past, and is s/p arthroscopic meniscectomy and multiple injections, burning preceded these.   S/p hand and shoulder pain resolved with brief course of prednisone 10mg daily for 2 wks  Lumbar spondylosis asymptomatic  OA hips asymptomatic  B27+ no evidence of peripheral or axial spondyloarthritis      Capsaicin to posterolateral right knee  Ref to Pain Management to consider RFA right knee  Renal function panel, UA UPCR 4/8/22  RTC 6 months  Answers for HPI/ROS submitted by the patient on 3/28/2022  fever: No  eye redness: No  mouth sores: No  headaches: No  shortness of breath: No  chest pain: No  trouble swallowing: No  diarrhea: No  constipation: No  unexpected weight change: No  genital  sore: No  During the last 3 days, have you had a skin rash?: No  Bruises or bleeds easily: No  cough: No

## 2022-03-31 NOTE — PROGRESS NOTES
"  Subjective:       Patient ID: Ghanshyam Sanford Jr. is a 76 y.o. male.    Chief Complaint: PMR, b/l hand pain, b/l shoulder pain    75 yo male presenting for f/u. At [facility] he was having pain in b/l shoulders, hands and low back with morning stiffness in these areas. He had multiple XRs performed all of which demonstrated mild to moderate OA without signs of inflammatory arthritis.     Today he reports his hands and shoulders have improved. He still has some hand stiffness in the morning but it resolves within an hour and does not bother him to a great deal. He still has some low back soreness but reports he thinks it is related to all of the yard work he performs. His main concern is chronic right knee pain that is burning in nature. He reports that since 2015 he has had burning pain in the back of his right knee. Imaging and knee scope were all WNL. He recently saw an orthopaedic surgeon who told him he had minimal arthritis but that he should consider a TKA if the pain persists. The patient wishes to avoid surgery. Gabapentin 300 mg qhs helps with pain at night and Voltaren gel works intermittently. Not doing anything else for pain. Very active, exercises at gym and performs yard work every day.     Review of Systems   Constitutional: Negative for fever and unexpected weight change.   HENT: Negative for mouth sores and trouble swallowing.    Eyes: Negative for redness.   Respiratory: Negative for cough and shortness of breath.    Cardiovascular: Negative for chest pain.   Gastrointestinal: Negative for constipation and diarrhea.   Genitourinary: Negative for genital sores.   Skin: Negative for rash.   Neurological: Negative for headaches.   Hematological: Does not bruise/bleed easily.         Objective:   /62   Pulse 63   Ht 5' 10" (1.778 m)   Wt 86.6 kg (191 lb)   BMI 27.41 kg/m²          Physical Exam   Constitutional: No distress.   HENT:   Head: Normocephalic and atraumatic.   Eyes: Pupils are equal, " round, and reactive to light. Conjunctivae are normal.   Cardiovascular: Normal rate, regular rhythm and normal pulses.   Pulmonary/Chest: Effort normal and breath sounds normal. No respiratory distress.   Abdominal: Soft. Normal appearance. He exhibits no distension. There is no abdominal tenderness.   Musculoskeletal:      Cervical back: Normal range of motion. No tenderness.   Neurological: He is alert.   Skin: No rash noted. No erythema.   Psychiatric: His behavior is normal. Mood and thought content normal.     Spine Exam:  Mild paraspinal TTP b/l in lumbar region  Negative facet loading  Negative SLR  Negative SIJ TTP    Right Side Rheumatological Exam     Knee Exam   Range of Motion   The patient has normal right knee ROM.  Patellofemoral Crepitus: mildly positive    Muscle Strength (0-5 scale):  Deltoid:  5  Biceps: 5/5   Triceps:  5  : 5/5   Iliopsoas: 5  Quadriceps:  5   Distal Lower Extremity: 5    Left Side Rheumatological Exam     Muscle Strength (0-5 scale):  Deltoid:  5  Biceps: 5/5   Triceps:  5  :  5/5   Iliopsoas: 5  Quadriceps:  5   Distal Lower Extremity: 5      Component      Latest Ref Rng & Units 3/25/2022 8/16/2021   WBC      3.90 - 12.70 K/uL 6.58    RBC      4.60 - 6.20 M/uL 5.28    Hemoglobin      14.0 - 18.0 g/dL 14.5    Hematocrit      40.0 - 54.0 % 45.3    MCV      82 - 98 fL 86    MCH      27.0 - 31.0 pg 27.5    MCHC      32.0 - 36.0 g/dL 32.0    RDW      11.5 - 14.5 % 13.5    Platelets      150 - 450 K/uL 186    MPV      9.2 - 12.9 fL 10.5    Immature Granulocytes      0.0 - 0.5 % 0.3    Gran # (ANC)      1.8 - 7.7 K/uL 4.1    Immature Grans (Abs)      0.00 - 0.04 K/uL 0.02    Lymph #      1.0 - 4.8 K/uL 1.7    Mono #      0.3 - 1.0 K/uL 0.6    Eos #      0.0 - 0.5 K/uL 0.2    Baso #      0.00 - 0.20 K/uL 0.04    nRBC      0 /100 WBC 0    Gran %      38.0 - 73.0 % 61.8    Lymph %      18.0 - 48.0 % 25.7    Mono %      4.0 - 15.0 % 8.7    Eosinophil %      0.0 - 8.0 % 2.9     Basophil %      0.0 - 1.9 % 0.6    Differential Method       Automated    Sodium      136 - 145 mmol/L 141    Potassium      3.5 - 5.1 mmol/L 4.4    Chloride      95 - 110 mmol/L 103    CO2      23 - 29 mmol/L 30 (H)    Glucose      70 - 110 mg/dL 93    BUN      8 - 23 mg/dL 20    Creatinine      0.5 - 1.4 mg/dL 1.2    Calcium      8.7 - 10.5 mg/dL 10.3    PROTEIN TOTAL      6.0 - 8.4 g/dL 7.5    Albumin      3.5 - 5.2 g/dL 4.1    BILIRUBIN TOTAL      0.1 - 1.0 mg/dL 0.8    Alkaline Phosphatase      55 - 135 U/L 80    AST      10 - 40 U/L 20    ALT      10 - 44 U/L 19    Anion Gap      8 - 16 mmol/L 8    eGFR if African American      >60 mL/min/1.73 m:2 >60    eGFR if non African American      >60 mL/min/1.73 m:2 59 (A)    IMAN Screen      Negative <1:80  Negative <1:80   Rheumatoid Factor      0.0 - 15.0 IU/mL  <13.0   CCP Antibodies      <5.0 U/mL  1.1   Sed Rate      0 - 10 mm/Hr 4 7   CRP      0.0 - 8.2 mg/L 4.4 2.8   CPK      20 - 200 U/L 186    Aldolase      1.2 - 7.6 U/L 4.2        Assessment/Plan   75 yo male with h/o PMR. No signs of active disease. Patient has mild OA in the right knee but it is not impacting his functional activity currently. He does not appear to be in need of TKA at this point. Will trial Capsaicin cream for possible neuropathic pain behind knee as well as refer to Pain Management for possible right genicular nerve RFA.     - Capsaicin cream topically up to four times daily for right knee pain  - Referral to Pain Management for possible radiofrequency ablation of the right genicular nerves   - Kidney function labs and urine sample in two weeks as scheduled  - Return to clinic in 6 months       Chronic pain of right knee  -     Ambulatory referral/consult to Pain Clinic; Future; Expected date: 04/07/2022    Other orders  -     capsicum 0.075% (ZOSTRIX) 0.075 % topical cream; Apply topically 4 (four) times daily as needed (Burning knee pain).  Dispense: 57 g; Refill: 3       Problem List  Items Addressed This Visit        Orthopedic    Right knee pain - Primary    Overview     3 views both knees.  Bone, joint and soft tissues.   Impression      Normal knee exam.      Electronically signed by: MYA CAPONE MD  Date: 10/07/16  Time: 13:36                 Relevant Orders    Ambulatory referral/consult to Pain Clinic          Hermes Hutchinson MD  [facility] PM&R PGY1  Pt was seen and discussed with Dr. Smith who is in agreement with the plan.

## 2022-03-31 NOTE — PATIENT INSTRUCTIONS
- Apply Capsaicin cream topically up to four times daily for right knee pain    - Referral to Pain Management for possible radiofrequency ablation of the right genicular nerves     - Kidney function labs and urine sample in two weeks as scheduled    - Return to clinic in 6 months

## 2022-03-31 NOTE — PROGRESS NOTES
Pre chart    Answers for HPI/ROS submitted by the patient on 3/28/2022  fever: No  eye redness: No  mouth sores: No  headaches: No  shortness of breath: No  chest pain: No  trouble swallowing: No  diarrhea: No  constipation: No  unexpected weight change: No  genital sore: No  During the last 3 days, have you had a skin rash?: No  Bruises or bleeds easily: No  cough: No

## 2022-04-08 ENCOUNTER — LAB VISIT (OUTPATIENT)
Dept: LAB | Facility: HOSPITAL | Age: 77
End: 2022-04-08
Attending: INTERNAL MEDICINE
Payer: MEDICARE

## 2022-04-08 DIAGNOSIS — N17.9 AKI (ACUTE KIDNEY INJURY): ICD-10-CM

## 2022-04-08 LAB
ALBUMIN SERPL BCP-MCNC: 3.6 G/DL (ref 3.5–5.2)
ANION GAP SERPL CALC-SCNC: 9 MMOL/L (ref 8–16)
BILIRUB UR QL STRIP: NEGATIVE
BUN SERPL-MCNC: 23 MG/DL (ref 8–23)
CALCIUM SERPL-MCNC: 9.3 MG/DL (ref 8.7–10.5)
CHLORIDE SERPL-SCNC: 104 MMOL/L (ref 95–110)
CLARITY UR: CLEAR
CO2 SERPL-SCNC: 27 MMOL/L (ref 23–29)
COLOR UR: YELLOW
CREAT SERPL-MCNC: 1.1 MG/DL (ref 0.5–1.4)
CREAT UR-MCNC: 83.7 MG/DL (ref 23–375)
EST. GFR  (AFRICAN AMERICAN): >60 ML/MIN/1.73 M^2
EST. GFR  (NON AFRICAN AMERICAN): >60 ML/MIN/1.73 M^2
GLUCOSE SERPL-MCNC: 87 MG/DL (ref 70–110)
GLUCOSE UR QL STRIP: NEGATIVE
HGB UR QL STRIP: NEGATIVE
KETONES UR QL STRIP: NEGATIVE
LEUKOCYTE ESTERASE UR QL STRIP: NEGATIVE
NITRITE UR QL STRIP: NEGATIVE
PH UR STRIP: 7 [PH] (ref 5–8)
PHOSPHATE SERPL-MCNC: 2.7 MG/DL (ref 2.7–4.5)
POTASSIUM SERPL-SCNC: 4 MMOL/L (ref 3.5–5.1)
PROT UR QL STRIP: NEGATIVE
PROT UR-MCNC: <7 MG/DL (ref 0–15)
PROT/CREAT UR: NORMAL MG/G{CREAT} (ref 0–0.2)
SODIUM SERPL-SCNC: 140 MMOL/L (ref 136–145)
SP GR UR STRIP: 1.02 (ref 1–1.03)
URN SPEC COLLECT METH UR: NORMAL
UROBILINOGEN UR STRIP-ACNC: NEGATIVE EU/DL

## 2022-04-08 PROCEDURE — 81003 URINALYSIS AUTO W/O SCOPE: CPT | Performed by: INTERNAL MEDICINE

## 2022-04-08 PROCEDURE — 80069 RENAL FUNCTION PANEL: CPT | Performed by: INTERNAL MEDICINE

## 2022-04-08 PROCEDURE — 84156 ASSAY OF PROTEIN URINE: CPT | Performed by: INTERNAL MEDICINE

## 2022-04-08 PROCEDURE — 36415 COLL VENOUS BLD VENIPUNCTURE: CPT | Performed by: INTERNAL MEDICINE

## 2022-07-01 ENCOUNTER — PATIENT MESSAGE (OUTPATIENT)
Dept: RHEUMATOLOGY | Facility: CLINIC | Age: 77
End: 2022-07-01
Payer: MEDICARE

## 2022-07-01 ENCOUNTER — TELEPHONE (OUTPATIENT)
Dept: RHEUMATOLOGY | Facility: CLINIC | Age: 77
End: 2022-07-01
Payer: MEDICARE

## 2022-07-01 ENCOUNTER — LAB VISIT (OUTPATIENT)
Dept: LAB | Facility: HOSPITAL | Age: 77
End: 2022-07-01
Attending: INTERNAL MEDICINE
Payer: MEDICARE

## 2022-07-01 DIAGNOSIS — M35.3 PMR (POLYMYALGIA RHEUMATICA): Primary | ICD-10-CM

## 2022-07-01 DIAGNOSIS — M35.3 PMR (POLYMYALGIA RHEUMATICA): ICD-10-CM

## 2022-07-01 LAB
ALBUMIN SERPL BCP-MCNC: 3.7 G/DL (ref 3.5–5.2)
ALP SERPL-CCNC: 65 U/L (ref 55–135)
ALT SERPL W/O P-5'-P-CCNC: 12 U/L (ref 10–44)
ANION GAP SERPL CALC-SCNC: 9 MMOL/L (ref 8–16)
AST SERPL-CCNC: 14 U/L (ref 10–40)
BASOPHILS # BLD AUTO: 0.04 K/UL (ref 0–0.2)
BASOPHILS NFR BLD: 0.5 % (ref 0–1.9)
BILIRUB SERPL-MCNC: 1 MG/DL (ref 0.1–1)
BUN SERPL-MCNC: 19 MG/DL (ref 8–23)
CALCIUM SERPL-MCNC: 9.5 MG/DL (ref 8.7–10.5)
CHLORIDE SERPL-SCNC: 104 MMOL/L (ref 95–110)
CO2 SERPL-SCNC: 25 MMOL/L (ref 23–29)
CREAT SERPL-MCNC: 1 MG/DL (ref 0.5–1.4)
CRP SERPL-MCNC: 3.3 MG/L (ref 0–8.2)
DIFFERENTIAL METHOD: NORMAL
EOSINOPHIL # BLD AUTO: 0.3 K/UL (ref 0–0.5)
EOSINOPHIL NFR BLD: 4 % (ref 0–8)
ERYTHROCYTE [DISTWIDTH] IN BLOOD BY AUTOMATED COUNT: 13.6 % (ref 11.5–14.5)
ERYTHROCYTE [SEDIMENTATION RATE] IN BLOOD BY WESTERGREN METHOD: 4 MM/HR (ref 0–10)
EST. GFR  (AFRICAN AMERICAN): >60 ML/MIN/1.73 M^2
EST. GFR  (NON AFRICAN AMERICAN): >60 ML/MIN/1.73 M^2
GLUCOSE SERPL-MCNC: 93 MG/DL (ref 70–110)
HCT VFR BLD AUTO: 43.6 % (ref 40–54)
HGB BLD-MCNC: 14.1 G/DL (ref 14–18)
IMM GRANULOCYTES # BLD AUTO: 0.02 K/UL (ref 0–0.04)
IMM GRANULOCYTES NFR BLD AUTO: 0.3 % (ref 0–0.5)
LYMPHOCYTES # BLD AUTO: 2.3 K/UL (ref 1–4.8)
LYMPHOCYTES NFR BLD: 29.3 % (ref 18–48)
MCH RBC QN AUTO: 27.6 PG (ref 27–31)
MCHC RBC AUTO-ENTMCNC: 32.3 G/DL (ref 32–36)
MCV RBC AUTO: 85 FL (ref 82–98)
MONOCYTES # BLD AUTO: 0.6 K/UL (ref 0.3–1)
MONOCYTES NFR BLD: 7.8 % (ref 4–15)
NEUTROPHILS # BLD AUTO: 4.7 K/UL (ref 1.8–7.7)
NEUTROPHILS NFR BLD: 58.1 % (ref 38–73)
NRBC BLD-RTO: 0 /100 WBC
PLATELET # BLD AUTO: 193 K/UL (ref 150–450)
PMV BLD AUTO: 10.9 FL (ref 9.2–12.9)
POTASSIUM SERPL-SCNC: 4 MMOL/L (ref 3.5–5.1)
PROT SERPL-MCNC: 6.9 G/DL (ref 6–8.4)
RBC # BLD AUTO: 5.11 M/UL (ref 4.6–6.2)
SODIUM SERPL-SCNC: 138 MMOL/L (ref 136–145)
WBC # BLD AUTO: 8 K/UL (ref 3.9–12.7)

## 2022-07-01 PROCEDURE — 85651 RBC SED RATE NONAUTOMATED: CPT | Performed by: INTERNAL MEDICINE

## 2022-07-01 PROCEDURE — 80053 COMPREHEN METABOLIC PANEL: CPT | Performed by: INTERNAL MEDICINE

## 2022-07-01 PROCEDURE — 85025 COMPLETE CBC W/AUTO DIFF WBC: CPT | Performed by: INTERNAL MEDICINE

## 2022-07-01 PROCEDURE — 36415 COLL VENOUS BLD VENIPUNCTURE: CPT | Performed by: INTERNAL MEDICINE

## 2022-07-01 PROCEDURE — 86140 C-REACTIVE PROTEIN: CPT | Performed by: INTERNAL MEDICINE

## 2022-08-10 ENCOUNTER — PATIENT MESSAGE (OUTPATIENT)
Dept: RHEUMATOLOGY | Facility: CLINIC | Age: 77
End: 2022-08-10
Payer: MEDICARE

## 2022-08-12 ENCOUNTER — PATIENT MESSAGE (OUTPATIENT)
Dept: RHEUMATOLOGY | Facility: CLINIC | Age: 77
End: 2022-08-12
Payer: MEDICARE

## 2022-08-12 ENCOUNTER — TELEPHONE (OUTPATIENT)
Dept: RHEUMATOLOGY | Facility: CLINIC | Age: 77
End: 2022-08-12
Payer: MEDICARE

## 2022-08-15 ENCOUNTER — TELEPHONE (OUTPATIENT)
Dept: RHEUMATOLOGY | Facility: CLINIC | Age: 77
End: 2022-08-15
Payer: MEDICARE

## 2022-08-15 ENCOUNTER — LAB VISIT (OUTPATIENT)
Dept: LAB | Facility: HOSPITAL | Age: 77
End: 2022-08-15
Attending: INTERNAL MEDICINE
Payer: MEDICARE

## 2022-08-15 DIAGNOSIS — M35.3 PMR (POLYMYALGIA RHEUMATICA): ICD-10-CM

## 2022-08-15 LAB — ERYTHROCYTE [SEDIMENTATION RATE] IN BLOOD BY WESTERGREN METHOD: 20 MM/HR (ref 0–10)

## 2022-08-15 PROCEDURE — 86140 C-REACTIVE PROTEIN: CPT | Performed by: INTERNAL MEDICINE

## 2022-08-15 PROCEDURE — 85651 RBC SED RATE NONAUTOMATED: CPT | Performed by: INTERNAL MEDICINE

## 2022-08-15 PROCEDURE — 36415 COLL VENOUS BLD VENIPUNCTURE: CPT | Performed by: INTERNAL MEDICINE

## 2022-08-16 ENCOUNTER — TELEPHONE (OUTPATIENT)
Dept: RHEUMATOLOGY | Facility: CLINIC | Age: 77
End: 2022-08-16
Payer: MEDICARE

## 2022-08-16 DIAGNOSIS — M13.0 POLYARTHRITIS: ICD-10-CM

## 2022-08-16 DIAGNOSIS — R70.0 ELEVATED SED RATE: ICD-10-CM

## 2022-08-16 DIAGNOSIS — M35.3 PMR (POLYMYALGIA RHEUMATICA): Primary | ICD-10-CM

## 2022-08-16 LAB — CRP SERPL-MCNC: 3.1 MG/L (ref 0–8.2)

## 2022-08-16 RX ORDER — PREDNISONE 2.5 MG/1
10 TABLET ORAL DAILY
Qty: 120 TABLET | Refills: 1 | Status: SHIPPED | OUTPATIENT
Start: 2022-08-16 | End: 2023-04-06

## 2022-08-16 NOTE — TELEPHONE ENCOUNTER
Esperanza please tell him the sed rate is mildly elevated and let me speak with him. Thank you BRISEYDA

## 2022-08-16 NOTE — TELEPHONE ENCOUNTER
Has developed pain both shoulders, right upper arm, and pain and swelling both hands, small finger joints. Marked am stiffness. No fever, headache, acute visual symptoms, jaw claudication or scalp tenderness.   ESR 20    PMR v. RA v. CPPD    X-ray hands  RF, ACPA, IMAN  Prednisone 10mg daily  Message me with status in one week  F/u appt

## 2022-08-24 ENCOUNTER — HOSPITAL ENCOUNTER (OUTPATIENT)
Dept: RADIOLOGY | Facility: HOSPITAL | Age: 77
Discharge: HOME | End: 2022-08-24
Attending: INTERNAL MEDICINE
Payer: MEDICARE

## 2022-08-24 DIAGNOSIS — M35.3 PMR (POLYMYALGIA RHEUMATICA): ICD-10-CM

## 2022-08-24 DIAGNOSIS — R70.0 ELEVATED SED RATE: ICD-10-CM

## 2022-08-24 DIAGNOSIS — M13.0 POLYARTHRITIS: ICD-10-CM

## 2022-08-24 PROCEDURE — 73130 X-RAY EXAM OF HAND: CPT | Mod: 26,50,, | Performed by: RADIOLOGY

## 2022-08-24 PROCEDURE — 73130 X-RAY EXAM OF HAND: CPT | Mod: TC,50

## 2022-08-24 PROCEDURE — 73130 XR HAND COMPLETE 3 VIEWS BILATERAL: ICD-10-PCS | Mod: 26,50,, | Performed by: RADIOLOGY

## 2022-08-24 NOTE — PATIENT INSTRUCTIONS
- reqiup re-ordered  - continue taking tumeric 500-1000 mg daily or Cucumin  - bone scan in August  - return to clinic in one year    Transposition Flap Text: The defect edges were debeveled with a #15 scalpel blade.  Given the location of the defect and the proximity to free margins a transposition flap was deemed most appropriate.  Using a sterile surgical marker, an appropriate transposition flap was drawn incorporating the defect.    The area thus outlined was incised deep to adipose tissue with a #15 scalpel blade.  The skin margins were undermined to an appropriate distance in all directions utilizing iris scissors.

## 2022-08-26 ENCOUNTER — PATIENT MESSAGE (OUTPATIENT)
Dept: RHEUMATOLOGY | Facility: CLINIC | Age: 77
End: 2022-08-26
Payer: MEDICARE

## 2022-10-10 ENCOUNTER — PATIENT MESSAGE (OUTPATIENT)
Dept: RHEUMATOLOGY | Facility: CLINIC | Age: 77
End: 2022-10-10
Payer: MEDICARE

## 2022-10-25 ENCOUNTER — PATIENT MESSAGE (OUTPATIENT)
Dept: RHEUMATOLOGY | Facility: CLINIC | Age: 77
End: 2022-10-25
Payer: MEDICARE

## 2023-01-11 ENCOUNTER — TELEPHONE (OUTPATIENT)
Dept: RHEUMATOLOGY | Facility: CLINIC | Age: 78
End: 2023-01-11
Payer: MEDICARE

## 2023-01-11 ENCOUNTER — PATIENT MESSAGE (OUTPATIENT)
Dept: RHEUMATOLOGY | Facility: CLINIC | Age: 78
End: 2023-01-11
Payer: MEDICARE

## 2023-01-18 ENCOUNTER — LAB VISIT (OUTPATIENT)
Dept: LAB | Facility: HOSPITAL | Age: 78
End: 2023-01-18
Attending: INTERNAL MEDICINE
Payer: MEDICARE

## 2023-01-18 ENCOUNTER — TELEPHONE (OUTPATIENT)
Dept: RHEUMATOLOGY | Facility: CLINIC | Age: 78
End: 2023-01-18
Payer: MEDICARE

## 2023-01-18 DIAGNOSIS — N17.9 AKI (ACUTE KIDNEY INJURY): Primary | ICD-10-CM

## 2023-01-18 DIAGNOSIS — M35.3 PMR (POLYMYALGIA RHEUMATICA): ICD-10-CM

## 2023-01-18 LAB
ALBUMIN SERPL BCP-MCNC: 3.7 G/DL (ref 3.5–5.2)
ALP SERPL-CCNC: 60 U/L (ref 55–135)
ALT SERPL W/O P-5'-P-CCNC: 17 U/L (ref 10–44)
ANION GAP SERPL CALC-SCNC: 9 MMOL/L (ref 8–16)
AST SERPL-CCNC: 17 U/L (ref 10–40)
BASOPHILS # BLD AUTO: 0.07 K/UL (ref 0–0.2)
BASOPHILS NFR BLD: 0.8 % (ref 0–1.9)
BILIRUB SERPL-MCNC: 0.9 MG/DL (ref 0.1–1)
BUN SERPL-MCNC: 23 MG/DL (ref 8–23)
CALCIUM SERPL-MCNC: 9.6 MG/DL (ref 8.7–10.5)
CHLORIDE SERPL-SCNC: 104 MMOL/L (ref 95–110)
CO2 SERPL-SCNC: 26 MMOL/L (ref 23–29)
CREAT SERPL-MCNC: 1.3 MG/DL (ref 0.5–1.4)
DIFFERENTIAL METHOD: NORMAL
EOSINOPHIL # BLD AUTO: 0.3 K/UL (ref 0–0.5)
EOSINOPHIL NFR BLD: 3.7 % (ref 0–8)
ERYTHROCYTE [DISTWIDTH] IN BLOOD BY AUTOMATED COUNT: 13.6 % (ref 11.5–14.5)
ERYTHROCYTE [SEDIMENTATION RATE] IN BLOOD BY WESTERGREN METHOD: 9 MM/HR (ref 0–10)
EST. GFR  (NO RACE VARIABLE): 57 ML/MIN/1.73 M^2
GLUCOSE SERPL-MCNC: 92 MG/DL (ref 70–110)
HCT VFR BLD AUTO: 43.5 % (ref 40–54)
HGB BLD-MCNC: 14 G/DL (ref 14–18)
IMM GRANULOCYTES # BLD AUTO: 0.03 K/UL (ref 0–0.04)
IMM GRANULOCYTES NFR BLD AUTO: 0.3 % (ref 0–0.5)
LYMPHOCYTES # BLD AUTO: 2.5 K/UL (ref 1–4.8)
LYMPHOCYTES NFR BLD: 26.9 % (ref 18–48)
MCH RBC QN AUTO: 27.9 PG (ref 27–31)
MCHC RBC AUTO-ENTMCNC: 32.2 G/DL (ref 32–36)
MCV RBC AUTO: 87 FL (ref 82–98)
MONOCYTES # BLD AUTO: 0.8 K/UL (ref 0.3–1)
MONOCYTES NFR BLD: 9.2 % (ref 4–15)
NEUTROPHILS # BLD AUTO: 5.4 K/UL (ref 1.8–7.7)
NEUTROPHILS NFR BLD: 59.1 % (ref 38–73)
NRBC BLD-RTO: 0 /100 WBC
PLATELET # BLD AUTO: 201 K/UL (ref 150–450)
PMV BLD AUTO: 10.3 FL (ref 9.2–12.9)
POTASSIUM SERPL-SCNC: 3.7 MMOL/L (ref 3.5–5.1)
PROT SERPL-MCNC: 7 G/DL (ref 6–8.4)
RBC # BLD AUTO: 5.02 M/UL (ref 4.6–6.2)
SODIUM SERPL-SCNC: 139 MMOL/L (ref 136–145)
WBC # BLD AUTO: 9.13 K/UL (ref 3.9–12.7)

## 2023-01-18 PROCEDURE — 85651 RBC SED RATE NONAUTOMATED: CPT | Performed by: INTERNAL MEDICINE

## 2023-01-18 PROCEDURE — 36415 COLL VENOUS BLD VENIPUNCTURE: CPT | Performed by: INTERNAL MEDICINE

## 2023-01-18 PROCEDURE — 85025 COMPLETE CBC W/AUTO DIFF WBC: CPT | Performed by: INTERNAL MEDICINE

## 2023-01-18 PROCEDURE — 80053 COMPREHEN METABOLIC PANEL: CPT | Performed by: INTERNAL MEDICINE

## 2023-01-18 PROCEDURE — 86140 C-REACTIVE PROTEIN: CPT | Performed by: INTERNAL MEDICINE

## 2023-01-19 LAB — CRP SERPL-MCNC: 4.2 MG/L (ref 0–8.2)

## 2023-02-01 ENCOUNTER — LAB VISIT (OUTPATIENT)
Dept: LAB | Facility: HOSPITAL | Age: 78
End: 2023-02-01
Attending: INTERNAL MEDICINE
Payer: MEDICARE

## 2023-02-01 DIAGNOSIS — N17.9 AKI (ACUTE KIDNEY INJURY): ICD-10-CM

## 2023-02-01 LAB
ALBUMIN SERPL BCP-MCNC: 3.6 G/DL (ref 3.5–5.2)
ANION GAP SERPL CALC-SCNC: 8 MMOL/L (ref 8–16)
BILIRUB UR QL STRIP: NEGATIVE
BUN SERPL-MCNC: 14 MG/DL (ref 8–23)
CALCIUM SERPL-MCNC: 9.5 MG/DL (ref 8.7–10.5)
CHLORIDE SERPL-SCNC: 104 MMOL/L (ref 95–110)
CLARITY UR: CLEAR
CO2 SERPL-SCNC: 27 MMOL/L (ref 23–29)
COLOR UR: YELLOW
CREAT SERPL-MCNC: 1 MG/DL (ref 0.5–1.4)
CREAT UR-MCNC: 45.3 MG/DL (ref 23–375)
EST. GFR  (NO RACE VARIABLE): >60 ML/MIN/1.73 M^2
GLUCOSE SERPL-MCNC: 100 MG/DL (ref 70–110)
GLUCOSE UR QL STRIP: NEGATIVE
HGB UR QL STRIP: NEGATIVE
KETONES UR QL STRIP: NEGATIVE
LEUKOCYTE ESTERASE UR QL STRIP: NEGATIVE
NITRITE UR QL STRIP: NEGATIVE
PH UR STRIP: 6 [PH] (ref 5–8)
PHOSPHATE SERPL-MCNC: 2.7 MG/DL (ref 2.7–4.5)
POTASSIUM SERPL-SCNC: 3.6 MMOL/L (ref 3.5–5.1)
PROT UR QL STRIP: NEGATIVE
PROT UR-MCNC: <7 MG/DL (ref 0–15)
PROT/CREAT UR: NORMAL MG/G{CREAT} (ref 0–0.2)
SODIUM SERPL-SCNC: 139 MMOL/L (ref 136–145)
SP GR UR STRIP: 1.01 (ref 1–1.03)
URN SPEC COLLECT METH UR: NORMAL
UROBILINOGEN UR STRIP-ACNC: NEGATIVE EU/DL

## 2023-02-01 PROCEDURE — 81003 URINALYSIS AUTO W/O SCOPE: CPT | Performed by: INTERNAL MEDICINE

## 2023-02-01 PROCEDURE — 80069 RENAL FUNCTION PANEL: CPT | Performed by: INTERNAL MEDICINE

## 2023-02-01 PROCEDURE — 36415 COLL VENOUS BLD VENIPUNCTURE: CPT | Performed by: INTERNAL MEDICINE

## 2023-02-01 PROCEDURE — 84156 ASSAY OF PROTEIN URINE: CPT | Performed by: INTERNAL MEDICINE

## 2023-02-02 ENCOUNTER — OFFICE VISIT (OUTPATIENT)
Dept: RHEUMATOLOGY | Facility: CLINIC | Age: 78
End: 2023-02-02
Payer: MEDICARE

## 2023-02-02 VITALS
DIASTOLIC BLOOD PRESSURE: 70 MMHG | WEIGHT: 199.31 LBS | BODY MASS INDEX: 28.53 KG/M2 | HEART RATE: 69 BPM | SYSTOLIC BLOOD PRESSURE: 144 MMHG | HEIGHT: 70 IN

## 2023-02-02 DIAGNOSIS — M75.81 ROTATOR CUFF TENDONITIS, RIGHT: Primary | ICD-10-CM

## 2023-02-02 PROCEDURE — 99214 OFFICE O/P EST MOD 30 MIN: CPT | Mod: PBBFAC | Performed by: INTERNAL MEDICINE

## 2023-02-02 PROCEDURE — 99213 PR OFFICE/OUTPT VISIT, EST, LEVL III, 20-29 MIN: ICD-10-PCS | Mod: S$PBB,,, | Performed by: INTERNAL MEDICINE

## 2023-02-02 PROCEDURE — 99213 OFFICE O/P EST LOW 20 MIN: CPT | Mod: S$PBB,,, | Performed by: INTERNAL MEDICINE

## 2023-02-02 PROCEDURE — 99999 PR PBB SHADOW E&M-EST. PATIENT-LVL IV: ICD-10-PCS | Mod: PBBFAC,,, | Performed by: INTERNAL MEDICINE

## 2023-02-02 PROCEDURE — 99999 PR PBB SHADOW E&M-EST. PATIENT-LVL IV: CPT | Mod: PBBFAC,,, | Performed by: INTERNAL MEDICINE

## 2023-02-02 RX ORDER — GABAPENTIN 300 MG/1
300 CAPSULE ORAL 3 TIMES DAILY
Qty: 90 CAPSULE | Refills: 2 | Status: SHIPPED | OUTPATIENT
Start: 2023-02-02 | End: 2023-02-13 | Stop reason: SDUPTHER

## 2023-02-02 NOTE — PROGRESS NOTES
Subjective:       Patient ID: Ghanshyam Sanford Jr. is a 77 y.o. male.    Chief Complaint: No chief complaint on file.    77 year old male with PMHx of PMR and Osteopenia. At last visit, there were no signs of active PMR disease. Patient was with some mild OA to Right knee. We trialed Capsaicin cream and referred him to Pain Management for possible right genicular nerve RFA.     Interval: Since last visit 3/2022, patient reports continued Right knee pain. Last knee xray was at [facility] approximately 1 year ago. Described as burning from postero-medial aspect of knee to calf. He had a cortisone shot, gel injection to right knee which only gave days of improvement. Radiofrequency ablation in July 2022 followed by Nerve block in August 2022, both of which lasted approximately 2 weeks. He went to PT 3x/week all November and December. The best thing he found that helped him was the myofascial release done by PT which was the best relief but only lasted ~6 hours. He has taken Gabapentin 300 BID off and on but restarted it 2 weeks ago. He was told by Ortho that he was a candidate for TKA but also that it may not relieve his pain; patient is hoping for clarification of this so they can decide how to proceed. Also with injections for Right trigger finger. Continues with BL shoulder and hip pain on waking in the morning which he says is at his baseline. Recent Renal function panel, Urinalysis, and Urine Protein/Creatinine ratio were all WNLs. He continues with gardening, fishing, gym.     Review of Systems   Constitutional:  Negative for fever and unexpected weight change.   HENT:  Negative for mouth sores and trouble swallowing.    Eyes:  Negative for redness.   Respiratory:  Negative for cough and shortness of breath.    Cardiovascular:  Negative for chest pain.   Gastrointestinal:  Negative for constipation and diarrhea.   Genitourinary:  Negative for genital sores.   Musculoskeletal:  Positive for arthralgias and  myalgias (Pain in L3 distribution to Right posteromedial knee thigh, calf).   Skin:  Negative for rash.   Neurological:  Negative for headaches.   Hematological:  Does not bruise/bleed easily.       Objective:   There were no vitals taken for this visit.     Physical Exam   Constitutional: He is oriented to person, place, and time. normal appearance.   HENT:   Head: Normocephalic and atraumatic.   Nose: No rhinorrhea or nasal congestion.   Mouth/Throat: Mucous membranes are moist. No oropharyngeal exudate or posterior oropharyngeal erythema. Oropharynx is clear.   Diminished hearing bilaterally   Eyes: Conjunctivae are normal. Right eye exhibits no discharge. Left eye exhibits no discharge.   Cardiovascular: Normal rate and regular rhythm.   Murmur (Mechanical valve present) heard.  Pulmonary/Chest: Effort normal and breath sounds normal.   Abdominal: Soft. He exhibits no distension and no mass. There is no abdominal tenderness. There is no guarding.   Musculoskeletal:         General: No swelling or tenderness. Normal range of motion.      Cervical back: Normal range of motion and neck supple. No rigidity or tenderness.      Right lower leg: No edema.      Left lower leg: No edema.      Comments: Crepitus to Right knee. Pain on BUE Abduction. Positive Perez, Neers, Empty Can   Neurological: He is alert and oriented to person, place, and time. A cranial nerve deficit (Intact except with diminished hearing BL) is present.   Skin: Skin is warm and dry.   Psychiatric: His behavior is normal. Mood normal.      No data to display     Assessment:       78 y/o male with PMHx of PMR. No signs of active disease. Patient continues with OA in the right knee which is very bothersome. Also with Right rotator cuff tendonitis, and some diminished hearing bilaterally.    Plan:       -Increase gabapentin to 300mg BID for 1 week and if tolerated increase dose to 300mg morning and 600mg at night before bed.  -Referral to PT for  Right shoulder   -Patient was told to follow up with PCP regarding diminished hearing  -Return to clinic in 6 months

## 2023-02-02 NOTE — PROGRESS NOTES
Rapid3 Question Responses and Scores 1/26/2023   MDHAQ Score 0   Psychologic Score 2.2   Pain Score 8   When you awakened in the morning OVER THE LAST WEEK, did you feel stiff? Yes   If Yes, please indicate the number of hours until you are as limber as you will be for the day -   Fatigue Score 1   Global Health Score 8   RAPID3 Score 5.33     Answers submitted by the patient for this visit:  Rheumatology Questionnaire (Submitted on 1/26/2023)  fever: No  eye redness: No  mouth sores: No  headaches: No  shortness of breath: No  chest pain: No  trouble swallowing: No  diarrhea: No  constipation: No  unexpected weight change: No  genital sore: No  During the last 3 days, have you had a skin rash?: No  Bruises or bleeds easily: No  cough: No

## 2023-02-02 NOTE — PATIENT INSTRUCTIONS
-Increase gabapentin to 300mg BID for 1 week and if tolerated increase dose to 300mg morning and 600mg at night before bed.  -Referral to PT for Right shoulder   -Return to clinic in 6 months

## 2023-02-02 NOTE — PROGRESS NOTES
I have personally taken the history and examined the patient and agree with the resident,s note as stated above      Latest Reference Range & Units 08/16/21 12:35   CCP Antibodies <5.0 U/mL 1.1   Rheumatoid Factor 0.0 - 15.0 IU/mL <13.0        Latest Reference Range & Units 08/16/21 12:35   IMAN Screen Negative <1:80  Negative <1:80 [1]   [1] IMAN test was performed by Immunofluorescence on HEP2 cells.              Upon further review, there is a subtle lucency along the distal radius measuring 2.6 cm.  I see no cortical breakthrough, periosteal reaction, or associated soft tissue mass.  Finding can be correlated with MRI.        Electronically signed by: Ada Broussard  Date:                                            08/17/2021  Time:                                           15:27  Signed by Ada Broussard MD on 8/17/2021 15:29  Narrative & Impression  EXAMINATION:  XR HAND COMPLETE 3 VIEWS BILATERAL     CLINICAL HISTORY:  . Pain in right hand     TECHNIQUE:  PA, lateral, and oblique views of both hands were performed.     COMPARISON:  None     FINDINGS:  No acute fracture or dislocation seen.  No significant soft tissue edema or radiopaque retained foreign body.     No significant osteophyte formation, suspicious osseous erosions, or joint space narrowing.     Impression:     No acute osseous abnormality seen.        Electronically signed by: Ada Broussard  Date:                                            08/16/2021  Time:                                           14:33     EXAMINATION:  MRI WRIST W WO CONTRAST LEFT     CLINICAL HISTORY:  77 y/o Menchondroma like area left distal radius;.     COMPARISON:  08/16/2021     TECHNIQUE:  Imaging was performed without and with.  Multiplanar, multisequence MR images of the left wrist were obtained on a 1.5 Diann magnet.     FINDINGS:  There is a T1 hyperintense lesion in the left distal radius which follows marrow signal on the STIR images without associated  enhancement.  Findings are in keeping with a lipoma.  Subchondral cystic changes in the lunate.  Visualized flexor and extensor tendons appear intact.  Median nerve has a normal appearance.     No abnormal enhancement is seen.     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        Electronically signed by: Drea Catalan MD  Date:                                            08/25/2021  Time:                                           13:35  William Nieto MD  [phone]  [phone] 8/16/2021 Routine   Omayra Mcgregor MD  [phone]  [phone] 8/16/2021        Narrative & Impression  EXAMINATION:  XR HIPS BILATERAL 2 VIEW INCL AP PELVIS     CLINICAL HISTORY:  Pain in unspecified hip     TECHNIQUE:  AP view of the pelvis and frogleg lateral views of both hips were performed.     COMPARISON:  Bilateral hip radiograph 05/13/2016.     FINDINGS:  Right: No fracture or dislocation.  Mild joint space narrowing associated with mild marginal osteophyte and subchondral cyst formation, similar to prior exam.     Left: No fracture or dislocation.  Mild joint space narrowing associated with mild marginal osteophyte and subchondral cyst formation, similar to prior exam.     Pelvis: No lytic or blastic lesion.     Miscellaneous: Bony mineralization is within normal limits.  Bilateral SI joints are unremarkable.  No significant soft tissue abnormality.     Impression:     Mild DJD of bilateral hips, similar to prior exam.     Electronically signed by resident: Omayra Mcgregor  Date:                                            08/16/2021  Time:                                           14:23     Electronically signed by: William Nieto MD  Date:                                            08/16/2021  Time:                                           14:38                Exam Ended: 08/16/21 13:59            The lumbar x-rays show the previous vertebroplasty T11 and L1 as well as  degenerative disc disease at the lowest disc and some facet joint osteoarthritis. Sacroiliac joints appear normal nothing to suggest sacroiliitis. RJQ                 PACS Images for [facility] [employer] [place] Viewer      Show images for X-Ray Lumbar Spine AP And Lateral     All Reviewers List     Georgi Smith MD on 8/16/2021 19:19      X-Ray Lumbar Spine AP And Lateral  Order: [id number]  Status: Final result       Visible to patient: Yes (seen)       Next appt: 04/08/2022 at 10:10 AM in Lab (LAB, [facility])       Dx: Dorsalgia, unspecified; Chronic low b...       1 Result Note       1 Patient Communication        Details     Reading Physician Reading Date Result Priority   Ada Broussard MD  [phone]  [phone] 8/16/2021 Routine      Narrative & Impression  EXAMINATION:  XR LUMBAR SPINE AP AND LATERAL     CLINICAL HISTORY:  Back pain or radiculopathy, > 6 wks;Dorsalgia, unspecified     TECHNIQUE:  AP, lateral and spot images were performed of the lumbar spine.     COMPARISON:  None     FINDINGS:  Previous vertebroplasty T11 and L1.  Remaining vertebral body heights are maintained.  Mild disc space narrowing L5-S1.  Facet arthropathy L4-5 and L5-S1.  AP alignment is anatomic.     Impression:     Please see above.        Electronically signed by: Ada Broussard  Date:                                            08/16/2021  Time:                                           14:24      EXAMINATION:  XR SHOULDER COMPLETE 2 OR MORE VIEWS BILATERAL     CLINICAL HISTORY:  Polymyalgia rheumatica     TECHNIQUE:  Three views right and left shoulder     COMPARISON:  None     FINDINGS:  No acute fracture or dislocation seen.  Mild osteoarthritis of the acromioclavicular joints.  No soft tissue edema or radiopaque retained foreign body.     Impression:     No acute osseous abnormality seen.        Electronically signed by: Ada Broussard  Date:                                            08/16/2021  Time:                                            14:22     CLINICAL HISTORY:  suspected osteoporosis; Other specified disorders of bone density and structure, unspecified site     TECHNIQUE:  DXA scanning was performed over the both hip and lumbar spine.  Review of the images confirms satisfactory positioning and technique.     COMPARISON:  None     FINDINGS:  The L1 to L4 vertebral bone mineral density is equal to 1.099 g/cm squared with a T score of -1.1.  There has been no significant change relative to the prior study.     The left femoral neck bone mineral density is equal to 0.946 g/cm squared with a T score of -1.0.  There has been  no significant change relative to the prior study.     The right femoral neck bone mineral density is equal to 0.985 grams/centimeter sq with a T-score of -0.7.     There is a 2.4% risk of a major osteoporotic fracture and a 0.5% risk of hip fracture in the next 10 years (FRAX).     Impression:     Osteopenia of the lumbar spine with the left femoral neck at the junction of normal and osteopenia.  Normal bone mineral density of the right femoral neck.     ESR 4  CRP 4.4  Creat 1.2 eGFR 59       8/16/22:Has developed pain both shoulders, right upper arm, and pain and swelling both hands, small finger joints. Marked am stiffness. No fever, headache, acute visual symptoms, jaw claudication or scalp tenderness.   ESR 20    EXAMINATION:  XR HAND COMPLETE 3 VIEWS BILATERAL     CLINICAL HISTORY:  Polymyalgia rheumatica     TECHNIQUE:  Three views of the bilateral hands     COMPARISON:  08/16/2021     FINDINGS:  Right: Mild radiocarpal joint space narrowing is seen.  Subchondral cyst seen in the scaphoid.  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  Metacarpophalangeal interphalangeal joint spaces are intact.  No osseous erosions are seen.     Left: Lucency in the distal radius compatible with a lipoma as seen on prior MRI.  There is radiocarpal joint space narrowing.  Metacarpophalangeal and interphalangeal  joint spaces are intact.  No osseous erosions.     Impression:     As above.        Electronically signed by: Drea Catalan MD  Date:                                            08/24/2022  Time:                                           10:23     Latest Reference Range & Units 08/24/22 10:09   CCP Antibodies <5.0 U/mL 2.7   Rheumatoid Factor 0.0 - 15.0 IU/mL <13.0      Latest Reference Range & Units 08/24/22 10:09   IMAN Screen Negative <1:80  Negative <1:80   ESR 20 8/15/22 ,  9   1/18/23  CRP 3.1 8/15/22   4.2 1/18/23      Lipid panel  1/25/23        LDL 64  HDA 41           Dr. Montana arthroscopy right knee 2017   Dr. Sebastien Adams right knee arthroscopy 2022  Dr.Jason Clifton cortisone shot in knee, get injection in knee 2022  [facility]    Spinal needling back 7/18/22  RFA  right knee July 222  Nerve block knee August 2022  PT x 2 months     all transiently helpful  Started gabapentin 300mg once or twice daily with some improvement    Prednisone 10mg daily ordered 8/16/22 decreased to 7.5mg daily 8/26/22, then 10/25/22 to decrease prednisone to 5mg daily x 2 wks, then 2.5mg daily. Has been off x 2 months      PMR no evidence of RA. Peripheral spondyloarthritis, CPPD  nl ESR and CRP currently asymptomatic  Bilateral rotator cuff tendinitis  Burning pain posterolateral right knee of of longstanding, remains major problem, occurs only at night. Has seen multiple orthopedists in past, and is s/p arthroscopic meniscectomy and multiple injections, burning preceded these.   In past, S/p hand and shoulder pain resolved with brief course of prednisone 10mg daily for 2 wks  Lumbar spondylosis asymptomatic  OA hips asymptomatic  B27+ no evidence of peripheral or axial spondyloarthritis  TAVR implant 34mm Dr. Lockett 12/21/21    kyphoplasty vertebro plasty T 11 L1  8/20/17  S/p right 3,4 5 flexor tenosynovitis  Rx with [facility]  1 month ago    Gabapentin 300mg twice daily, may increase to 300mg am  and 600mg pm as needed/tolerated. Decrease evening dose if daytime sedation  Ref to PT for bilateral rotator cuff tendinitis  RTC 6 months  Answers submitted by the patient for this visit:  Rheumatology Questionnaire (Submitted on 1/26/2023)  fever: No  eye redness: No  mouth sores: No  headaches: No  shortness of breath: No  chest pain: No  trouble swallowing: No  diarrhea: No  constipation: No  unexpected weight change: No  genital sore: No  During the last 3 days, have you had a skin rash?: No  Bruises or bleeds easily: No  cough: No

## 2023-02-12 ENCOUNTER — PATIENT MESSAGE (OUTPATIENT)
Dept: RHEUMATOLOGY | Facility: CLINIC | Age: 78
End: 2023-02-12
Payer: MEDICARE

## 2023-02-13 RX ORDER — GABAPENTIN 300 MG/1
CAPSULE ORAL
Qty: 270 CAPSULE | Refills: 1 | Status: SHIPPED | OUTPATIENT
Start: 2023-02-13 | End: 2023-04-24

## 2023-03-16 ENCOUNTER — TELEPHONE (OUTPATIENT)
Dept: RHEUMATOLOGY | Facility: CLINIC | Age: 78
End: 2023-03-16
Payer: MEDICARE

## 2023-03-16 ENCOUNTER — PATIENT MESSAGE (OUTPATIENT)
Dept: RHEUMATOLOGY | Facility: CLINIC | Age: 78
End: 2023-03-16
Payer: MEDICARE

## 2023-03-16 DIAGNOSIS — Z87.39 H/O BURNING PAIN IN LEG: Primary | ICD-10-CM

## 2023-03-28 ENCOUNTER — HOSPITAL ENCOUNTER (OUTPATIENT)
Dept: RADIOLOGY | Facility: HOSPITAL | Age: 78
Discharge: HOME | End: 2023-03-28
Attending: ORTHOPAEDIC SURGERY
Payer: MEDICARE

## 2023-03-28 ENCOUNTER — TELEPHONE (OUTPATIENT)
Dept: ORTHOPEDICS | Facility: CLINIC | Age: 78
End: 2023-03-28
Payer: MEDICARE

## 2023-03-28 ENCOUNTER — OFFICE VISIT (OUTPATIENT)
Dept: ORTHOPEDICS | Facility: CLINIC | Age: 78
End: 2023-03-28
Payer: MEDICARE

## 2023-03-28 VITALS — WEIGHT: 199 LBS | HEIGHT: 70 IN | BODY MASS INDEX: 28.49 KG/M2

## 2023-03-28 DIAGNOSIS — S86.111A STRAIN OF RIGHT GASTROCNEMIUS MUSCLE, INITIAL ENCOUNTER: ICD-10-CM

## 2023-03-28 DIAGNOSIS — M79.661 RIGHT CALF PAIN: ICD-10-CM

## 2023-03-28 DIAGNOSIS — M62.89 HAMSTRING TIGHTNESS OF RIGHT LOWER EXTREMITY: Primary | ICD-10-CM

## 2023-03-28 DIAGNOSIS — M25.561 RIGHT KNEE PAIN, UNSPECIFIED CHRONICITY: Primary | ICD-10-CM

## 2023-03-28 DIAGNOSIS — M25.561 RIGHT KNEE PAIN, UNSPECIFIED CHRONICITY: ICD-10-CM

## 2023-03-28 PROCEDURE — 73564 XR KNEE ORTHO RIGHT WITH FLEXION: ICD-10-PCS | Mod: 26,RT,, | Performed by: RADIOLOGY

## 2023-03-28 PROCEDURE — 73562 XR KNEE ORTHO RIGHT WITH FLEXION: ICD-10-PCS | Mod: 26,LT,, | Performed by: RADIOLOGY

## 2023-03-28 PROCEDURE — 99204 OFFICE O/P NEW MOD 45 MIN: CPT | Mod: S$PBB,,, | Performed by: ORTHOPAEDIC SURGERY

## 2023-03-28 PROCEDURE — 73562 X-RAY EXAM OF KNEE 3: CPT | Mod: 26,LT,, | Performed by: RADIOLOGY

## 2023-03-28 PROCEDURE — 99999 PR PBB SHADOW E&M-EST. PATIENT-LVL IV: CPT | Mod: PBBFAC,,, | Performed by: ORTHOPAEDIC SURGERY

## 2023-03-28 PROCEDURE — 93971 EXTREMITY STUDY: CPT | Mod: 26,RT,, | Performed by: RADIOLOGY

## 2023-03-28 PROCEDURE — 73564 X-RAY EXAM KNEE 4 OR MORE: CPT | Mod: TC,RT

## 2023-03-28 PROCEDURE — 99214 OFFICE O/P EST MOD 30 MIN: CPT | Mod: PBBFAC,25 | Performed by: ORTHOPAEDIC SURGERY

## 2023-03-28 PROCEDURE — 73564 X-RAY EXAM KNEE 4 OR MORE: CPT | Mod: 26,RT,, | Performed by: RADIOLOGY

## 2023-03-28 PROCEDURE — 93971 EXTREMITY STUDY: CPT | Mod: TC,RT

## 2023-03-28 PROCEDURE — 93971 US LOWER EXTREMITY VEINS RIGHT: ICD-10-PCS | Mod: 26,RT,, | Performed by: RADIOLOGY

## 2023-03-28 PROCEDURE — 99204 PR OFFICE/OUTPT VISIT, NEW, LEVL IV, 45-59 MIN: ICD-10-PCS | Mod: S$PBB,,, | Performed by: ORTHOPAEDIC SURGERY

## 2023-03-28 PROCEDURE — 99999 PR PBB SHADOW E&M-EST. PATIENT-LVL IV: ICD-10-PCS | Mod: PBBFAC,,, | Performed by: ORTHOPAEDIC SURGERY

## 2023-03-28 RX ORDER — OLMESARTAN MEDOXOMIL 40 MG/1
20 TABLET ORAL DAILY
COMMUNITY

## 2023-03-28 NOTE — TELEPHONE ENCOUNTER
Called patient regarding message below. Patient wants second opinion on right knee. Previously treated by Dr. Clifton at [facility] in [place]. Scheduled with patient preference.     ----- Message from Marine Nur sent at 3/28/2023 11:02 AM CDT -----  Regarding: appt  Contact: [phone]  Pt requesting NP appt type. Pt needs second opinion about right leg/knee. Pt is over 55. Pls call to discuss.

## 2023-03-28 NOTE — PROGRESS NOTES
Patient ID: Ghanshyam Sanford Jr.  YOB: 1945  MRN: [id number]    Chief Complaint: Pain of the Right Knee      Referred By: Dr. Kevin Lombardi    History of Present Illness: Ghanshyam Sanford Jr. is a  77 y.o. male   Data Unavailable with a chief complaint of Pain of the Right Knee    Ghanshyam presents to the clinic today with R knee pain and a history of knee pain. Sees pain management Dr. Dorian [facility] who completed R knee radiculopathy in July 2022 and a nerve block of the R knee in August 2022. He had previous treatment with physical therapy in 2022 with [facility].     HPI    Past Medical History:   Past Medical History:   Diagnosis Date    Anticoagulant long-term use     Aortic valve regurgitation     Aortic valve stenosis, nonrheumatic     Carotid stenosis, bilateral     Chronic diastolic heart failure, NYHA class 2     Chronic total occlusion of coronary artery     Coronary artery disease     Elevated PSA     UNDER CARE    Hypertension     Hypertensive heart disease     Osteoarthritis of right knee 3/22/2017    Polymyalgia     Restless legs     SOB (shortness of breath)      Past Surgical History:   Procedure Laterality Date    BACK SURGERY      CAROTID STENT      HERNIA REPAIR      KNEE ARTHROPLASTY Right     PERCUTANEOUS TRANSCATHETER AORTIC VALVE REPLACEMENT (TAVR) Right 12/14/2021    Procedure: REPLACEMENT, AORTIC VALVE, PERCUTANEOUS, TRANSCATHETER;  Surgeon: Johny Lockett MD;  Location: [facility] CATH;  Service: Cardiology;  Laterality: Right;  ops # [id number]    PERCUTANEOUS TRANSCATHETER AORTIC VALVE REPLACEMENT (TAVR) Right 12/14/2021    Procedure: REPLACEMENT, AORTIC VALVE, PERCUTANEOUS, TRANSCATHETER;  Surgeon: Sushma Piña MD;  Location: [facility] CATH;  Service: Cardiovascular;  Laterality: Right;     Family History   Problem Relation Age of Onset    Heart disease Mother     Hypertension Mother     Stroke Mother     Diabetes Mellitus Father     Heart disease Paternal Grandmother      Stroke Paternal Grandmother      Social History     Socioeconomic History    Marital status:    Tobacco Use    Smoking status: Never    Smokeless tobacco: Never   Substance and Sexual Activity    Alcohol use: Never    Drug use: Never     Medication List with Changes/Refills   Current Medications    ALFUZOSIN (UROXATRAL) 10 MG TB24    Take 10 mg by mouth daily with breakfast.    ASCORBIC ACID, VITAMIN C, (VITAMIN C) 1000 MG TABLET    Take 1,000 mg by mouth once daily.    ASPIRIN (ECOTRIN) 81 MG EC TABLET    Take 81 mg by mouth once daily.    CAPSICUM 0.075% (ZOSTRIX) 0.075 % TOPICAL CREAM    Apply topically 4 (four) times daily as needed (Burning knee pain).    CLOPIDOGREL (PLAVIX) 75 MG TABLET    Take 75 mg by mouth once daily.    COENZYME Q10 100 MG CAPSULE    Take 200 mg by mouth once daily.    DILTIAZEM (CARDIZEM CD) 300 MG 24 HR CAPSULE    Take 300 mg by mouth once daily.    DOCUSATE SODIUM (COLACE) 100 MG CAPSULE    Take 100 mg by mouth 2 (two) times daily.    DOXAZOSIN (CARDURA) 2 MG TABLET    Take 2 mg by mouth every evening.    GABAPENTIN (NEURONTIN) 300 MG CAPSULE    Take 300mg twice daily. May increase to 300mg in am and 600mg in pm as tolerated/needed    IRBESARTAN (AVAPRO) 300 MG TABLET    Take 300 mg by mouth every evening.    MAGNESIUM 250 MG TAB    Take 1 tablet by mouth once daily.    MULTIVITAMIN (THERAGRAN) PER TABLET    Take 1 tablet by mouth once daily.    MV-MN/IRON/FOLIC ACID/HERB 190 (VITAMIN D3 COMPLETE ORAL)    Take 5,000 Units by mouth Daily.    NITROGLYCERIN (NITROSTAT) 0.4 MG SL TABLET    Place 0.4 mg under the tongue every 5 (five) minutes as needed.    OLMESARTAN (BENICAR) 40 MG TABLET    Take 20 mg by mouth once daily.    PREDNISONE (DELTASONE) 2.5 MG TABLET    Take 4 tablets (10 mg total) by mouth once daily.    ROPINIROLE (REQUIP) 0.25 MG TABLET    TAKE 1 TABLET BY MOUTH ONCE NIGHTLY AS NEEDED    ROSUVASTATIN (CRESTOR) 20 MG TABLET    Take 20 mg by mouth once daily.     TURMERIC (CURCUMIN MISC)    500 mg by Misc.(Non-Drug; Combo Route) route once daily.    ZOLPIDEM (AMBIEN) 10 MG TAB    Take 5 mg by mouth nightly as needed.     Review of patient's allergies indicates:   Allergen Reactions    Dexamethasone sodium phosphate Other (See Comments)    Atorvastatin calcium Other (See Comments)     Leg cramps    Cortisone      Inj- pt reports hiccups and feels spasm     Duloxetine Other (See Comments)     DRY MONTH  NOT ABLE TO SLEEP  NOT HUNDRY  SWEATING A LOT  FEELING TIRED AND WEAK  DIZZINESS  WEIGHT LOSS ( 9 LB.)    Flomax [tamsulosin] Diarrhea    Pravastatin Other (See Comments)     Leg cramps     ROS    Physical Exam:   Body mass index is 28.55 kg/m².  There were no vitals filed for this visit.   GENERAL: Well appearing, appropriate for stated age, no acute distress.  CARDIOVASCULAR: Pulses regular by peripheral palpation.  PULMONARY: Respirations are even and non-labored.  NEURO: Awake, alert, and oriented x 3.  PSYCH: Mood & affect are appropriate.  HEENT: Head is normocephalic and atraumatic.  Ortho/SPM Exam  ***    Imaging:    X-ray Knee Ortho Right with Flexion  Narrative: EXAM: XR KNEE ORTHO RIGHT WITH FLEXION    CLINICAL HISTORY: Bilateral knee joint pain    FINDINGS:  AP standing, flexion and sunrise views bilaterally and lateral view of the right knee.  There is mild joint space loss of the bilateral medial compartments.  Lateral compartment joint spaces appear well maintained.  No fracture, suspicious bone lesion, erosive change, osteochondral lesion, or loose osteochondral body is identified.  Joint alignment appears anatomic.  Impression:  Mild joint space loss of the bilateral medial compartments.  Otherwise negative exam.    Finalized on: 3/28/2023 3:22 PM By:  Arnel Lomax MD  [facility]# [id number]      2023-03-28 15:24:11.108    [facility]    ***  Relevant imaging results reviewed and interpreted by me, discussed with the patient and / or family today. ***    Other Tests:      ***    There are no Patient Instructions on file for this visit.  Provider Note/Medical Decision Making: ***      I discussed worrisome and red flag signs and symptoms with the patient. The patient expressed understanding and agreed to alert me immediately or to go to the emergency room if they experience any of these.   Treatment plan was developed with input from the patient/family, and they expressed understanding and agreement with the plan. All questions were answered today.          Song Briggs MD  Orthopaedic Surgery & Sports Medicine       Disclaimer: This note was prepared using a voice recognition system and is likely to have sound alike errors within the text.

## 2023-03-28 NOTE — PATIENT INSTRUCTIONS
Assessment:  Ghanshyam Sanford Jr. is a  77 y.o. male   retired with a chief complaint of Pain of the Right Knee    Right knee pain w/ history    Encounter Diagnoses   Name Primary?    Hamstring tightness of right lower extremity Yes    Right calf pain     Strain of right gastrocnemius muscle, initial encounter       Plan:  Restart PT continue to work with [facility] at [facility]: work on eccentric exercises targeting the gastroc and hamstring  Do not recommend further knee arthroscopy at this point  Ultrasound of right calf  Continue follow up with neurologist next week   Potential order of new MRI based on quality     Follow-up: 3 weeks or sooner if there are any problems between now and then.    Leave Review:   Google: Leave Google Review  Healthgrades: Leave Healthgrades Review    After Hours Number: [phone]

## 2023-03-28 NOTE — PROGRESS NOTES
Patient ID: Ghanshyam Sanford Jr.  YOB: 1945  MRN: [id number]    Chief Complaint: Pain of the Right Knee    Referred By: Dr. Kevin Lombardi    History of Present Illness: Ghanshyam Sanford Jr. is a  77 y.o. male   retired with a chief complaint of Pain of the Right Knee    Ghanshyam presents to the clinic today with R knee pain and a history of knee pain. The patient reports his knee pain began in 2017 after undergoing a scope with Dr. Staley. He reports another Right knee scope with Dr. Latham in 2019. He also has Sees pain management Dr. Dorian iRchards who completed R knee radiculopathy in July 2022 and a nerve block of the R knee in August 2022. He had previous treatment with physical therapy in December and January of 2022/23 with Wayne Ansari. He does reports a CSI two weeks ago with improvement for only 2 days. He does have a history of rheumatology condition. He reports knee pain posteriorly and especially with the knee extended and foot dorsiflexed. He reports pain mostly at night. He does     HPI    Past Medical History:   Past Medical History:   Diagnosis Date    Anticoagulant long-term use     Aortic valve regurgitation     Aortic valve stenosis, nonrheumatic     Carotid stenosis, bilateral     Chronic diastolic heart failure, NYHA class 2     Chronic total occlusion of coronary artery     Coronary artery disease     Elevated PSA     UNDER CARE    Hypertension     Hypertensive heart disease     Osteoarthritis of right knee 3/22/2017    Polymyalgia     Restless legs     SOB (shortness of breath)      Past Surgical History:   Procedure Laterality Date    BACK SURGERY      CAROTID STENT      HERNIA REPAIR      KNEE ARTHROPLASTY Right     PERCUTANEOUS TRANSCATHETER AORTIC VALVE REPLACEMENT (TAVR) Right 12/14/2021    Procedure: REPLACEMENT, AORTIC VALVE, PERCUTANEOUS, TRANSCATHETER;  Surgeon: Johny Lockett MD;  Location: [facility] CATH;  Service: Cardiology;  Laterality: Right;  ops # 8     PERCUTANEOUS TRANSCATHETER AORTIC VALVE REPLACEMENT (TAVR) Right 12/14/2021    Procedure: REPLACEMENT, AORTIC VALVE, PERCUTANEOUS, TRANSCATHETER;  Surgeon: Sushma Piña MD;  Location: [facility] CATH;  Service: Cardiovascular;  Laterality: Right;     Family History   Problem Relation Age of Onset    Heart disease Mother     Hypertension Mother     Stroke Mother     Diabetes Mellitus Father     Heart disease Paternal Grandmother     Stroke Paternal Grandmother      Social History     Socioeconomic History    Marital status:    Tobacco Use    Smoking status: Never    Smokeless tobacco: Never   Substance and Sexual Activity    Alcohol use: Never    Drug use: Never     Medication List with Changes/Refills   Current Medications    ALFUZOSIN (UROXATRAL) 10 MG TB24    Take 10 mg by mouth daily with breakfast.    ASCORBIC ACID, VITAMIN C, (VITAMIN C) 1000 MG TABLET    Take 1,000 mg by mouth once daily.    ASPIRIN (ECOTRIN) 81 MG EC TABLET    Take 81 mg by mouth once daily.    CAPSICUM 0.075% (ZOSTRIX) 0.075 % TOPICAL CREAM    Apply topically 4 (four) times daily as needed (Burning knee pain).    CLOPIDOGREL (PLAVIX) 75 MG TABLET    Take 75 mg by mouth once daily.    COENZYME Q10 100 MG CAPSULE    Take 200 mg by mouth once daily.    DILTIAZEM (CARDIZEM CD) 300 MG 24 HR CAPSULE    Take 300 mg by mouth once daily.    DOCUSATE SODIUM (COLACE) 100 MG CAPSULE    Take 100 mg by mouth 2 (two) times daily.    DOXAZOSIN (CARDURA) 2 MG TABLET    Take 2 mg by mouth every evening.    GABAPENTIN (NEURONTIN) 300 MG CAPSULE    Take 300mg twice daily. May increase to 300mg in am and 600mg in pm as tolerated/needed    IRBESARTAN (AVAPRO) 300 MG TABLET    Take 300 mg by mouth every evening.    MAGNESIUM 250 MG TAB    Take 1 tablet by mouth once daily.    MULTIVITAMIN (THERAGRAN) PER TABLET    Take 1 tablet by mouth once daily.    MV-MN/IRON/FOLIC ACID/HERB 190 (VITAMIN D3 COMPLETE ORAL)    Take 5,000 Units by mouth Daily.     NITROGLYCERIN (NITROSTAT) 0.4 MG SL TABLET    Place 0.4 mg under the tongue every 5 (five) minutes as needed.    OLMESARTAN (BENICAR) 40 MG TABLET    Take 20 mg by mouth once daily.    PREDNISONE (DELTASONE) 2.5 MG TABLET    Take 4 tablets (10 mg total) by mouth once daily.    ROPINIROLE (REQUIP) 0.25 MG TABLET    TAKE 1 TABLET BY MOUTH ONCE NIGHTLY AS NEEDED    ROSUVASTATIN (CRESTOR) 20 MG TABLET    Take 20 mg by mouth once daily.    TURMERIC (CURCUMIN MISC)    500 mg by Misc.(Non-Drug; Combo Route) route once daily.    ZOLPIDEM (AMBIEN) 10 MG TAB    Take 5 mg by mouth nightly as needed.     Review of patient's allergies indicates:   Allergen Reactions    Dexamethasone sodium phosphate Other (See Comments)    Atorvastatin calcium Other (See Comments)     Leg cramps    Cortisone      Inj- pt reports hiccups and feels spasm     Duloxetine Other (See Comments)     DRY MONTH  NOT ABLE TO SLEEP  NOT HUNDRY  SWEATING A LOT  FEELING TIRED AND WEAK  DIZZINESS  WEIGHT LOSS ( 9 LB.)    Flomax [tamsulosin] Diarrhea    Pravastatin Other (See Comments)     Leg cramps     ROS    Physical Exam:   Body mass index is 28.55 kg/m².  There were no vitals filed for this visit.   GENERAL: Well appearing, appropriate for stated age, no acute distress.  CARDIOVASCULAR: Pulses regular by peripheral palpation.  PULMONARY: Respirations are even and non-labored.  NEURO: Awake, alert, and oriented x 3.  PSYCH: Mood & affect are appropriate.  HEENT: Head is normocephalic and atraumatic.            Right Knee Exam     Inspection   Effusion: absent    Range of Motion   Extension:  0   Flexion:  120     Tests   Meniscus   Heaven:  Medial - negative Lateral - negative  Ligament Examination   Lachman: normal (-1 to 2mm)   PCL-Posterior Drawer: normal (0 to 2mm)     MCL - Valgus: normal (0 to 2mm)  LCL - Varus: normal    Other   Muscle Tightness: hamstring tightness  Sensation: normal    Comments:  TTP medial hamstring and medial gastroc  -TTP  medial meniscus  Pain w/ knee extension and dorsiflexion posteriorly, only mild pain with knee flexion and dorsiflexion       Muscle Strength   Right Lower Extremity   Hip Abduction: 5/5   Quadriceps:  5/5   Hamstrin/5     Vascular Exam     Right Pulses  Dorsalis Pedis:      2+  Posterior Tibial:      2+        Imaging:    US Lower Extremity Veins Right  Narrative: EXAMINATION:  US LOWER EXTREMITY VEINS RIGHT    CLINICAL HISTORY:  Pain in right lower leg    TECHNIQUE:  Duplex and color flow Doppler evaluation and graded compression of the right lower extremity veins was performed.    COMPARISON:  None    FINDINGS:  Right thigh veins: The common femoral, femoral, popliteal, upper greater saphenous, and deep femoral veins are patent and free of thrombus. The veins are normally compressible and have normal phasic flow and augmentation response.    Right calf veins: The visualized calf veins are patent.    Contralateral CFV: The contralateral (left) common femoral vein is patent and free of thrombus.    Miscellaneous: None  Impression: No evidence of deep venous thrombosis in the right lower extremity.    Electronically signed by: Blaise Muniz  Date:    2023  Time:    18:59  X-ray Knee Ortho Right with Flexion  Narrative: EXAM: XR KNEE ORTHO RIGHT WITH FLEXION    CLINICAL HISTORY: Bilateral knee joint pain    FINDINGS:  AP standing, flexion and sunrise views bilaterally and lateral view of the right knee.  There is mild joint space loss of the bilateral medial compartments.  Lateral compartment joint spaces appear well maintained.  No fracture, suspicious bone lesion, erosive change, osteochondral lesion, or loose osteochondral body is identified.  Joint alignment appears anatomic.  Impression:  Mild joint space loss of the bilateral medial compartments.  Otherwise negative exam.    Finalized on: 3/28/2023 3:22 PM By:  Arnel Lomax MD  R# [id number]      2023 15:24:[id number]     [facility]                    Relevant imaging results reviewed and interpreted by me, discussed with the patient and / or family today.     Other Tests:       Patient Instructions   Assessment:  Ghanshyam Sanford Jr. is a  77 y.o. male   retired with a chief complaint of Pain of the Right Knee    Right knee pain w/ history    Encounter Diagnoses   Name Primary?    Hamstring tightness of right lower extremity Yes    Right calf pain     Strain of right gastrocnemius muscle, initial encounter       Plan:  Restart PT continue to work with PT at [facility]: work on eccentric exercises targeting the gastroc and hamstring  Do not recommend further knee arthroscopy at this point  Ultrasound of right calf  Continue follow up with neurologist next week   Potential order of new MRI based on quality     Follow-up: 3 weeks or sooner if there are any problems between now and then.    Leave Review:   Google: Leave Google Review  Healthgrades: Leave Healthgrades Review    After Hours Number: [phone]       Provider Note/Medical Decision Making:       I discussed worrisome and red flag signs and symptoms with the patient. The patient expressed understanding and agreed to alert me immediately or to go to the emergency room if they experience any of these.   Treatment plan was developed with input from the patient/family, and they expressed understanding and agreement with the plan. All questions were answered today.          Song Briggs MD  Orthopaedic Surgery & Sports Medicine       Disclaimer: This note was prepared using a voice recognition system and is likely to have sound alike errors within the text.     I, Tonya Kramer, acted as a scribe for Song Briggs MD for the duration of this office visit.

## 2023-04-03 ENCOUNTER — TELEPHONE (OUTPATIENT)
Dept: SPORTS MEDICINE | Facility: CLINIC | Age: 78
End: 2023-04-03
Payer: MEDICARE

## 2023-04-03 NOTE — TELEPHONE ENCOUNTER
Spoke with patient her indicated he wanted to be sure order for PT was faxed to Wayne Ansari in [facility] at [phone]      Patient states he may have to potential  get new order of new MRI based on quality of pervious MRI wasn't able to be upload or reviewed on previous disc bought in last visit       ----- Message from Yahaira Jaeger sent at 4/3/2023 10:44 AM CDT -----  Contact: pt  Pt is calling in regard to auth for his knee and fax to his therapist  fax to [phone] (Dr. Wayne Ansari)  phone for the therapist is [phone]  If any questions, call pt at [phone] thanks/mpd  Also, pt has a print out of his MRI

## 2023-04-06 ENCOUNTER — LAB VISIT (OUTPATIENT)
Dept: LAB | Facility: HOSPITAL | Age: 78
End: 2023-04-06
Attending: PSYCHIATRY & NEUROLOGY
Payer: MEDICARE

## 2023-04-06 ENCOUNTER — OFFICE VISIT (OUTPATIENT)
Dept: NEUROLOGY | Facility: CLINIC | Age: 78
End: 2023-04-06
Payer: MEDICARE

## 2023-04-06 ENCOUNTER — TELEPHONE (OUTPATIENT)
Dept: NEUROLOGY | Facility: CLINIC | Age: 78
End: 2023-04-06

## 2023-04-06 VITALS
BODY MASS INDEX: 27.19 KG/M2 | SYSTOLIC BLOOD PRESSURE: 140 MMHG | WEIGHT: 194.25 LBS | HEIGHT: 71 IN | DIASTOLIC BLOOD PRESSURE: 64 MMHG | HEART RATE: 54 BPM | RESPIRATION RATE: 14 BRPM

## 2023-04-06 DIAGNOSIS — M54.50 LUMBAR PAIN: ICD-10-CM

## 2023-04-06 DIAGNOSIS — R79.89 OTHER SPECIFIED ABNORMAL FINDINGS OF BLOOD CHEMISTRY: ICD-10-CM

## 2023-04-06 DIAGNOSIS — M79.604 RIGHT LEG PAIN: Primary | ICD-10-CM

## 2023-04-06 DIAGNOSIS — Z87.39 H/O BURNING PAIN IN LEG: ICD-10-CM

## 2023-04-06 DIAGNOSIS — G25.81 RLS (RESTLESS LEGS SYNDROME): ICD-10-CM

## 2023-04-06 DIAGNOSIS — M79.604 RIGHT LEG PAIN: ICD-10-CM

## 2023-04-06 LAB — FERRITIN SERPL-MCNC: 46 NG/ML (ref 20–300)

## 2023-04-06 PROCEDURE — 99205 OFFICE O/P NEW HI 60 MIN: CPT | Mod: S$PBB | Performed by: PSYCHIATRY & NEUROLOGY

## 2023-04-06 PROCEDURE — 82728 ASSAY OF FERRITIN: CPT | Performed by: PSYCHIATRY & NEUROLOGY

## 2023-04-06 PROCEDURE — 99214 OFFICE O/P EST MOD 30 MIN: CPT | Mod: PBBFAC | Performed by: PSYCHIATRY & NEUROLOGY

## 2023-04-06 PROCEDURE — 36415 COLL VENOUS BLD VENIPUNCTURE: CPT | Performed by: PSYCHIATRY & NEUROLOGY

## 2023-04-06 PROCEDURE — 99999 PR STA SHADOW: CPT | Mod: PBBFAC,,, | Performed by: PSYCHIATRY & NEUROLOGY

## 2023-04-06 PROCEDURE — 99999 PR STA SHADOW: ICD-10-PCS | Mod: PBBFAC,,, | Performed by: PSYCHIATRY & NEUROLOGY

## 2023-04-06 PROCEDURE — 99999 PR PBB SHADOW E&M-EST. PATIENT-LVL IV: CPT | Mod: PBBFAC,,, | Performed by: PSYCHIATRY & NEUROLOGY

## 2023-04-06 NOTE — PROGRESS NOTES
Consult from Dr. Smith    HPI: Ghanshyam Sanford Jr. is a 77 y.o. male here for evaluation of burning in right leg for at least 2 years    Feels like this started with an extreme pain in the leg with muscle tightness when he was having active PMR    He is also seeing ortho for this of note  He has also seen pain management    He has a history of knee scopes in 2017 and 2019    He has seen Dr Dick Richards in pain management and has had right knee block which helped for 2 weeks and then stopped and NELSON of the lumbar region was the same    He has seen PT    Cardiology reportedly ruled him out for any PAD and suggested he see neurology    His pain starts in the right medial knee and radiates to the right and radiates to the right posterior lower leg just below knee and to the right medial thigh just above the knee    He has some back pain which does not radiate down the leg.        The majority of his pain is at the right medial knee joint and just behind the right knee posteriorly     Pain is describes as burning and pulling    Rarely has RLS and takes requip PRN.     He has to get up at night and stretch his right leg only. Gabapentin helps that pain.    He only had restlessness in the right leg    Gabapentin helps reduce his pain    PT does help his pain as well    No numbness or tingling    No weakness      Review of Systems   Constitutional:  Negative for fever.   HENT:  Negative for nosebleeds.    Eyes:  Negative for double vision.   Respiratory:  Negative for hemoptysis.    Cardiovascular:  Negative for leg swelling.   Gastrointestinal:  Negative for blood in stool.   Genitourinary:  Negative for hematuria.   Musculoskeletal:  Negative for falls.   Skin:  Negative for rash.   Neurological:  Negative for sensory change and focal weakness.   Endo/Heme/Allergies:  Does not bruise/bleed easily.       I have reviewed all of this patient's past medical and surgical histories as well as family and social histories and  "active allergies and medications as documented in the electronic medical record.        Exam:  Gen Appearance, well developed/nourished in no apparent distress  CV: 2+ distal pulses with no edema or swelling  Neuro:  MS: Awake, alert, oriented to place, person, time, situation. Sustains attention. Recent/remote memory intact, Language is full to spontaneous speech/repetition/naming/comprehension. Fund of Knowledge is full  CN: Optic discs are flat with normal vasculature, PERRL, Extraoccular movements and visual fields are full. Normal facial sensation and strength, Hearing symmetric, Tongue and Palate are midline and strong. Shoulder Shrug symmetric and strong.  Motor: Normal bulk, tone, no abnormal movements. 5/5 strength bilateral upper/lower extremities with 2+ reflexes and bilateral plantar response  Sensory: symmetric to light touch, pain, temp, and vibration, but increased sensitivity to pain in the right thigh medially and right calf.  Romberg negative  Cerebellar: Finger-nose,Heal-shin, Rapid alternating movements intact  Gait: Normal stance, no ataxia      Imaging:    MRI L spine 3/2023:  mild disc bulging from L3-S1 with mild NF narrowing at L4-5 bilaterally and left NF narrowing at L3-4 and L5-S1    Remote kyphoplasty at T11 and L1      Labs: CBC normal 2023      Assessment/Plan: Ghanshyam Sanford Jr. is a 77 y.o. male with 2 years of pain in the right medial knee and posterior knee (burning and "pulling") radiating up to the lower medial thigh and down to the upper lower leg posteriorly   I recommend:     He separately has lower back pain but this is not radicular at this time  Has seen pain management and PT  Had temporary relief with nerve block at the knee AND Lumbar NELSON  4.   Pain could be multifactorial from the back and the knee and ?RLS (pain is most severe at night)  -He is actively seeing ortho who notes hamstring tightness/ right gastrocnemius strain  may  be pending another knee MRI  5.   Check " EMG/NCS of the legs. Looking for radiculopathy +/- any focal nerve entrapment but pain does not follow a clear peripheral nerve pattern  -MRI L spine 3/2023 showed mild disc bulging and mild NF narrowing  6.   His best relief is with Gabapentin per Rheumatology  -In addition, he has some RLS symptoms in the right leg for which he uses Requip  -Check  ferritin level today  -otherwise, he may benefit from further intervention with pain management. He was offered further measures  7.  Note he seeing rheumatology with history of  PMR. No signs of active disease.      RTC for EMG/NCS    CC: Dr. Smith

## 2023-04-21 ENCOUNTER — OFFICE VISIT (OUTPATIENT)
Dept: ORTHOPEDICS | Facility: CLINIC | Age: 78
End: 2023-04-21
Payer: MEDICARE

## 2023-04-21 ENCOUNTER — TELEPHONE (OUTPATIENT)
Dept: SPORTS MEDICINE | Facility: CLINIC | Age: 78
End: 2023-04-21
Payer: MEDICARE

## 2023-04-21 VITALS — HEIGHT: 71 IN | WEIGHT: 194.25 LBS | BODY MASS INDEX: 27.19 KG/M2

## 2023-04-21 DIAGNOSIS — M35.3 PMR (POLYMYALGIA RHEUMATICA): ICD-10-CM

## 2023-04-21 DIAGNOSIS — G25.81 RESTLESS LEGS SYNDROME (RLS): ICD-10-CM

## 2023-04-21 DIAGNOSIS — S86.111A STRAIN OF RIGHT GASTROCNEMIUS MUSCLE, INITIAL ENCOUNTER: ICD-10-CM

## 2023-04-21 DIAGNOSIS — M17.11 PRIMARY OSTEOARTHRITIS OF RIGHT KNEE: ICD-10-CM

## 2023-04-21 DIAGNOSIS — M79.661 RIGHT CALF PAIN: ICD-10-CM

## 2023-04-21 DIAGNOSIS — M62.89 HAMSTRING TIGHTNESS OF RIGHT LOWER EXTREMITY: Primary | ICD-10-CM

## 2023-04-21 DIAGNOSIS — Z15.89 HLA B27 (HLA B27 POSITIVE): ICD-10-CM

## 2023-04-21 PROCEDURE — 99215 PR OFFICE/OUTPT VISIT, EST, LEVL V, 40-54 MIN: ICD-10-PCS | Mod: S$PBB,,, | Performed by: ORTHOPAEDIC SURGERY

## 2023-04-21 PROCEDURE — 99999 PR PBB SHADOW E&M-EST. PATIENT-LVL III: CPT | Mod: PBBFAC,,, | Performed by: ORTHOPAEDIC SURGERY

## 2023-04-21 PROCEDURE — 99213 OFFICE O/P EST LOW 20 MIN: CPT | Mod: PBBFAC | Performed by: ORTHOPAEDIC SURGERY

## 2023-04-21 PROCEDURE — 99999 PR PBB SHADOW E&M-EST. PATIENT-LVL III: ICD-10-PCS | Mod: PBBFAC,,, | Performed by: ORTHOPAEDIC SURGERY

## 2023-04-21 PROCEDURE — 99215 OFFICE O/P EST HI 40 MIN: CPT | Mod: S$PBB,,, | Performed by: ORTHOPAEDIC SURGERY

## 2023-04-21 NOTE — PROGRESS NOTES
Patient ID: Ghanshyam Sanford Jr.  YOB: 1945  MRN: [id number]    Chief Complaint: Follow-up of the Right Knee    Referred By: Dr. Keivn Lombardi    History of Present Illness: Ghanshyam Sanford Jr. is a  77 y.o. male   retired with a chief complaint of Follow-up of the Right Knee    The patient presents today for follow up evaluation of his right knee. He has seen his neurologist who reviewed a lumbar spine MRI. He is scheduled for an EMG in May and states she thinks its a combination of his back and knee contributing to the multifactorial issue. He reports continued posterior knee, hamstring and calf pain. He reports restarting physical therapy and he has significant if not complete relief in his posterior leg with scraping and other myofascial techniques.       Recall from last visit: Ghanshyam presents to the clinic today with R knee pain and a history of knee pain. The patient reports his knee pain began in 2017 after undergoing a scope with Dr. Staley. He reports another Right knee scope with Dr. Latham in 2019. He also has Sees pain management Dr. Dorian Richards who completed R knee radiculopathy in July 2022 and a nerve block of the R knee in August 2022. He had previous treatment with physical therapy in December and January of 2022/23 with Wayne Ansari. He does reports a CSI two weeks ago with improvement for only 2 days. He does have a history of rheumatology condition. He reports knee pain posteriorly and especially with the knee extended and foot dorsiflexed. He reports pain mostly at night.     HPI    Past Medical History:   Past Medical History:   Diagnosis Date    Anticoagulant long-term use     Aortic valve regurgitation     Aortic valve stenosis, nonrheumatic     Carotid stenosis, bilateral     Chronic diastolic heart failure, NYHA class 2     Chronic total occlusion of coronary artery     Coronary artery disease     Elevated PSA     UNDER CARE    Hypertension     Hypertensive heart  disease     Osteoarthritis of right knee 3/22/2017    Polymyalgia     Restless legs     SOB (shortness of breath)      Past Surgical History:   Procedure Laterality Date    BACK SURGERY      CAROTID STENT      HERNIA REPAIR      KNEE ARTHROPLASTY Right     PERCUTANEOUS TRANSCATHETER AORTIC VALVE REPLACEMENT (TAVR) Right 12/14/2021    Procedure: REPLACEMENT, AORTIC VALVE, PERCUTANEOUS, TRANSCATHETER;  Surgeon: Johny Lockett MD;  Location: [facility] CATH;  Service: Cardiology;  Laterality: Right;  ops # [id number]    PERCUTANEOUS TRANSCATHETER AORTIC VALVE REPLACEMENT (TAVR) Right 12/14/2021    Procedure: REPLACEMENT, AORTIC VALVE, PERCUTANEOUS, TRANSCATHETER;  Surgeon: Sushma Piña MD;  Location: [facility] CATH;  Service: Cardiovascular;  Laterality: Right;     Family History   Problem Relation Age of Onset    Heart disease Mother     Hypertension Mother     Stroke Mother     Diabetes Mellitus Father     Heart disease Paternal Grandmother     Stroke Paternal Grandmother      Social History     Socioeconomic History    Marital status:    Tobacco Use    Smoking status: Never    Smokeless tobacco: Never   Substance and Sexual Activity    Alcohol use: Never    Drug use: Never     Medication List with Changes/Refills   Current Medications    ALFUZOSIN (UROXATRAL) 10 MG TB24    Take 10 mg by mouth daily with breakfast.    ASCORBIC ACID, VITAMIN C, (VITAMIN C) 1000 MG TABLET    Take 1,000 mg by mouth once daily.    ASPIRIN (ECOTRIN) 81 MG EC TABLET    Take 81 mg by mouth once daily.    CLOPIDOGREL (PLAVIX) 75 MG TABLET    Take 75 mg by mouth once daily.    COENZYME Q10 100 MG CAPSULE    Take 200 mg by mouth once daily.    DOCUSATE SODIUM (COLACE) 100 MG CAPSULE    Take 100 mg by mouth 2 (two) times daily.    DOXAZOSIN (CARDURA) 2 MG TABLET    Take 2 mg by mouth every evening.    GABAPENTIN (NEURONTIN) 300 MG CAPSULE    Take 300mg twice daily. May increase to 300mg in am and 600mg in pm as tolerated/needed    MULTIVITAMIN  (THERAGRAN) PER TABLET    Take 1 tablet by mouth once daily.    MV-MN/IRON/FOLIC ACID/HERB 190 (VITAMIN D3 COMPLETE ORAL)    Take 5,000 Units by mouth Daily.    NITROGLYCERIN (NITROSTAT) 0.4 MG SL TABLET    Place 0.4 mg under the tongue every 5 (five) minutes as needed.    OLMESARTAN (BENICAR) 40 MG TABLET    Take 20 mg by mouth once daily.    ROPINIROLE (REQUIP) 0.25 MG TABLET    TAKE 1 TABLET BY MOUTH ONCE NIGHTLY AS NEEDED    ROSUVASTATIN (CRESTOR) 20 MG TABLET    Take 20 mg by mouth once daily.    ZOLPIDEM (AMBIEN) 10 MG TAB    Take 5 mg by mouth nightly as needed.     Review of patient's allergies indicates:   Allergen Reactions    Dexamethasone sodium phosphate Other (See Comments)    Atorvastatin calcium Other (See Comments)     Leg cramps    Cortisone      Inj- pt reports hiccups and feels spasm     Duloxetine Other (See Comments)     DRY MONTH  NOT ABLE TO SLEEP  NOT HUNDRY  SWEATING A LOT  FEELING TIRED AND WEAK  DIZZINESS  WEIGHT LOSS ( 9 LB.)    Flomax [tamsulosin] Diarrhea    Pravastatin Other (See Comments)     Leg cramps     ROS    Physical Exam:   Body mass index is 27.47 kg/m².  There were no vitals filed for this visit.   GENERAL: Well appearing, appropriate for stated age, no acute distress.  CARDIOVASCULAR: Pulses regular by peripheral palpation.  PULMONARY: Respirations are even and non-labored.  NEURO: Awake, alert, and oriented x 3.  PSYCH: Mood & affect are appropriate.  HEENT: Head is normocephalic and atraumatic.            Right Knee Exam     Inspection   Effusion: absent    Range of Motion   Extension:  0   Flexion:  120     Tests   Meniscus   Heaven:  Medial - negative Lateral - negative  Ligament Examination   Lachman: normal (-1 to 2mm)   PCL-Posterior Drawer: normal (0 to 2mm)     MCL - Valgus: normal (0 to 2mm)  LCL - Varus: normal    Other   Muscle Tightness: hamstring tightness  Sensation: normal    Comments:  TTP medial hamstring and medial gastroc  -TTP medial meniscus  Pain w/  knee extension and dorsiflexion posteriorly, only mild pain with knee flexion and dorsiflexion       Muscle Strength   Right Lower Extremity   Hip Abduction: 5/5   Quadriceps:  5/5   Hamstrin/5     Vascular Exam     Right Pulses  Dorsalis Pedis:      2+  Posterior Tibial:      2+        Imaging:    US Lower Extremity Veins Right  Narrative: EXAMINATION:  US LOWER EXTREMITY VEINS RIGHT    CLINICAL HISTORY:  Pain in right lower leg    TECHNIQUE:  Duplex and color flow Doppler evaluation and graded compression of the right lower extremity veins was performed.    COMPARISON:  None    FINDINGS:  Right thigh veins: The common femoral, femoral, popliteal, upper greater saphenous, and deep femoral veins are patent and free of thrombus. The veins are normally compressible and have normal phasic flow and augmentation response.    Right calf veins: The visualized calf veins are patent.    Contralateral CFV: The contralateral (left) common femoral vein is patent and free of thrombus.    Miscellaneous: None  Impression: No evidence of deep venous thrombosis in the right lower extremity.    Electronically signed by: Balise Muniz  Date:    2023  Time:    18:59  X-ray Knee Ortho Right with Flexion  Narrative: EXAM: XR KNEE ORTHO RIGHT WITH FLEXION    CLINICAL HISTORY: Bilateral knee joint pain    FINDINGS:  AP standing, flexion and sunrise views bilaterally and lateral view of the right knee.  There is mild joint space loss of the bilateral medial compartments.  Lateral compartment joint spaces appear well maintained.  No fracture, suspicious bone lesion, erosive change, osteochondral lesion, or loose osteochondral body is identified.  Joint alignment appears anatomic.  Impression:  Mild joint space loss of the bilateral medial compartments.  Otherwise negative exam.    Finalized on: 3/28/2023 3:22 PM By:  Arnel Lomax MD  [facility]# [id number]      2023 15:24:11.108    [facility]                    Relevant imaging results  "reviewed and interpreted by me, discussed with the patient and / or family today.     Other Tests:       Patient Instructions   Assessment:  Ghanshyam Sanford Jr. is a  77 y.o. male   retired with a chief complaint of Follow-up of the Right Knee    Right knee osteoarthritis w/ history of prior two scopes  Right myofascial pain of hamstring and calf  Possible radicuopathy    Encounter Diagnoses   Name Primary?    Hamstring tightness of right lower extremity Yes    Right calf pain     Strain of right gastrocnemius muscle, initial encounter     Primary osteoarthritis of right knee     HLA B27 (HLA B27 positive)     PMR (polymyalgia rheumatica)     Restless legs syndrome (RLS)         Plan:  Continue PT continue to work with PT at [facility]: work on eccentric exercises targeting the gastroc and hamstring  Do not recommend further knee arthroscopy at this point  Continue follow up with neurologist for emg/ncv  I do not think the majority of symptoms are coming from his knee, although some certainly are. When he describes previous symptoms from his PMR I am suspcious some of his muscular symptoms are more related to that  Regardless, I don't think that surgical treatment would help him. It would be a major surgery for him and he would have significantly increased risks for complications and limited improvement  Referral to Dr. Padron to discuss possible Iovera procedure. Discussed with Dr. Padron today.    Follow-up: With Dr. Padron or sooner if there are any problems between now and then.    Leave Review:   Google: Leave Google Review  Healthgrades: Leave Healthgrades Review    After Hours Number: [phone]       Provider Note/Medical Decision Making: reviewed multiple records today, discussed multiple options with patient   I"m concerned arthroscopy and arthroplasty would not improve his pain  He is at increased risk for both, arthroplasty would be a major procedure      I discussed worrisome and red flag " signs and symptoms with the patient. The patient expressed understanding and agreed to alert me immediately or to go to the emergency room if they experience any of these.   Treatment plan was developed with input from the patient/family, and they expressed understanding and agreement with the plan. All questions were answered today.          Song Briggs MD  Orthopaedic Surgery & Sports Medicine       Disclaimer: This note was prepared using a voice recognition system and is likely to have sound alike errors within the text.     I, Tonya Kramer, acted as a scribe for Song Briggs MD for the duration of this office visit.

## 2023-04-21 NOTE — PATIENT INSTRUCTIONS
Assessment:  Ghanshyam Sanford Jr. is a  77 y.o. male   retired with a chief complaint of Follow-up of the Right Knee    Right knee osteoarthritis w/ history of prior two scopes  Right myofascial pain of hamstring and calf  Possible radicuopathy    Encounter Diagnoses   Name Primary?    Hamstring tightness of right lower extremity Yes    Right calf pain     Strain of right gastrocnemius muscle, initial encounter     Primary osteoarthritis of right knee     HLA B27 (HLA B27 positive)     PMR (polymyalgia rheumatica)     Restless legs syndrome (RLS)         Plan:  Continue PT continue to work with PT at [facility]: work on eccentric exercises targeting the gastroc and hamstring  Do not recommend further knee arthroscopy at this point  Continue follow up with neurologist for emg/ncv  I do not think the majority of symptoms are coming from his knee, although some certainly are. When he describes previous symptoms from his PMR I am suspcious some of his muscular symptoms are more related to that  Regardless, I don't think that surgical treatment would help him. It would be a major surgery for him and he would have significantly increased risks for complications and limited improvement  Referral to Dr. Padron to discuss possible Iovera procedure. Discussed with Dr. Padron today.    Follow-up: With Dr. Padron or sooner if there are any problems between now and then.    Leave Review:   Google: Leave Google Review  Healthgrades: Leave Healthgrades Review    After Hours Number: [phone]

## 2023-04-22 ENCOUNTER — PATIENT MESSAGE (OUTPATIENT)
Dept: RHEUMATOLOGY | Facility: CLINIC | Age: 78
End: 2023-04-22
Payer: MEDICARE

## 2023-04-24 ENCOUNTER — TELEPHONE (OUTPATIENT)
Dept: NEUROLOGY | Facility: CLINIC | Age: 78
End: 2023-04-24
Payer: MEDICARE

## 2023-04-24 ENCOUNTER — PATIENT MESSAGE (OUTPATIENT)
Dept: NEUROLOGY | Facility: CLINIC | Age: 78
End: 2023-04-24

## 2023-04-24 ENCOUNTER — PATIENT MESSAGE (OUTPATIENT)
Dept: RHEUMATOLOGY | Facility: CLINIC | Age: 78
End: 2023-04-24
Payer: MEDICARE

## 2023-04-24 ENCOUNTER — TELEPHONE (OUTPATIENT)
Dept: RHEUMATOLOGY | Facility: CLINIC | Age: 78
End: 2023-04-24
Payer: MEDICARE

## 2023-04-24 ENCOUNTER — PROCEDURE VISIT (OUTPATIENT)
Dept: NEUROLOGY | Facility: CLINIC | Age: 78
End: 2023-04-24
Payer: MEDICARE

## 2023-04-24 ENCOUNTER — LAB VISIT (OUTPATIENT)
Dept: LAB | Facility: HOSPITAL | Age: 78
End: 2023-04-24
Attending: INTERNAL MEDICINE
Payer: MEDICARE

## 2023-04-24 DIAGNOSIS — M79.604 RIGHT LEG PAIN: ICD-10-CM

## 2023-04-24 DIAGNOSIS — M54.16 LUMBAR RADICULOPATHY: ICD-10-CM

## 2023-04-24 DIAGNOSIS — M35.3 PMR (POLYMYALGIA RHEUMATICA): ICD-10-CM

## 2023-04-24 DIAGNOSIS — Z87.39 H/O BURNING PAIN IN LEG: ICD-10-CM

## 2023-04-24 DIAGNOSIS — R20.0 ANESTHESIA OF SKIN: ICD-10-CM

## 2023-04-24 DIAGNOSIS — M54.50 LUMBAR PAIN: ICD-10-CM

## 2023-04-24 DIAGNOSIS — G62.9 POLYNEUROPATHY: Primary | ICD-10-CM

## 2023-04-24 DIAGNOSIS — G25.81 RLS (RESTLESS LEGS SYNDROME): ICD-10-CM

## 2023-04-24 DIAGNOSIS — G62.9 NEUROPATHY: ICD-10-CM

## 2023-04-24 DIAGNOSIS — M35.3 PMR (POLYMYALGIA RHEUMATICA): Primary | ICD-10-CM

## 2023-04-24 LAB
CRP SERPL-MCNC: 1.8 MG/L (ref 0–8.2)
ERYTHROCYTE [SEDIMENTATION RATE] IN BLOOD BY WESTERGREN METHOD: 4 MM/HR (ref 0–10)

## 2023-04-24 PROCEDURE — 99999 PR STA SHADOW: ICD-10-PCS | Mod: PBBFAC,,, | Performed by: PSYCHIATRY & NEUROLOGY

## 2023-04-24 PROCEDURE — 86140 C-REACTIVE PROTEIN: CPT | Performed by: INTERNAL MEDICINE

## 2023-04-24 PROCEDURE — 95886 MUSC TEST DONE W/N TEST COMP: CPT | Mod: PBBFAC | Performed by: PSYCHIATRY & NEUROLOGY

## 2023-04-24 PROCEDURE — 36415 COLL VENOUS BLD VENIPUNCTURE: CPT | Performed by: INTERNAL MEDICINE

## 2023-04-24 PROCEDURE — 95910 NRV CNDJ TEST 7-8 STUDIES: CPT | Mod: S$PBB | Performed by: PSYCHIATRY & NEUROLOGY

## 2023-04-24 PROCEDURE — 85651 RBC SED RATE NONAUTOMATED: CPT | Performed by: INTERNAL MEDICINE

## 2023-04-24 PROCEDURE — 99999 PR STA SHADOW: CPT | Mod: PBBFAC,,, | Performed by: PSYCHIATRY & NEUROLOGY

## 2023-04-24 RX ORDER — GABAPENTIN 300 MG/1
CAPSULE ORAL
Qty: 360 CAPSULE | Refills: 3 | Status: SHIPPED | OUTPATIENT
Start: 2023-04-24 | End: 2023-07-18

## 2023-04-24 NOTE — TELEPHONE ENCOUNTER
----- Message from Disha Romo sent at 2023 10:36 AM CDT -----  Contact: self  Ghanshyam Sanford Jr.  MRN: [id number]  : 1945  PCP: Kevin Lombardi  Home Phone      [phone]  Work Phone      Not on file.  [facility]          [phone]      MESSAGE: Have an appt on May 15th asking for a sooner appt due to not feeling well    Phone [phone]

## 2023-04-24 NOTE — PROCEDURES
EMG W/ ULTRASOUND AND NERVE CONDUCTION TEST 2 Extremities    Date/Time: 4/24/2023 4:30 PM  Performed by: Alvino Brewer MD  Authorized by: Alvino Brewer MD     REPORT OF EMG and NERVE CONDUCTION STUDY    Name: Ghanshyam Sanford  Date of Study:  4/24/2023  Referring Physician:  Osman  Test Performed by:  MD Osman  Full Values Attached  Informed Consent Scanned.   No anesthesia used.   Amount of Blood Loss: none. The patient tolerated this procedure well.       Informed consent was obtained prior to performing this study. Two patient identifiers were confirmed with the patient prior to performing this study. A time out to determine correct patient and and agreement on procedure performed was conducted prior to the concentric needle examination.    Reason for the study:  right leg pain      Findings:   Nerve conduction studies of the bilateral peroneal motor, bilateral tibial motor, bilateral sural nerves and bilateral H-reflexes were performed.   Amplitudes were reduced throughout without conduction block. Otherwise, distal latencies, conduction velocities, and F-waves were normal. H reflexes were lower amplitude bilaterally   EMG of selected muscles of the bilateral legs,and bilateral lumbar and sacral paraspinal muscles were performed as indicated on the attached sheets. There was mild increased insertional activity and large polyphasic units in the muscles of the bilateral L4 myotome with some increased insertional activity in the bilateral L4 paraspinal muscles. There was mild decreased recruitment in the feet. Otherwise,  Insertional activity was normal without fasciculation or fibrillation, normal sized and phasia of motor units.      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    Mild Lumbar Radiculopathy at L4 Bilaterally       Alvino Brewer M.D. [facility].     Recommendations (discussed at this  visit):    -Not sure how much his radiculopathy contributes to his pain. We discussed his pain could be from multiple factors. If lower back pain increases, he should see his pain management MD (Dr Richards) for consideration of intervention  -Suprisingly, he has distal polyneuropathy by NCS. This may be incidentally found as symptoms are more proximal. However: work up with B12,TSH, SPEP/MARIO. The patient's CMP and CBC have been unremarkable/ no history of fasting glucose abnormality and he does not report an alcohol abuse. Etiology of neuropathy may be idiopathic   -Raise gabapentin dose per orders Unless sedation, mood changes or other side effects  -Agree with Consideration of a Iovera procedure for his pain/ pending consultation with Sports medicine  -Also pending are Polymyalgia labs with rheumatology, but location of pain seems unusual for this  -Continue Iron     RTC 3 months

## 2023-04-25 ENCOUNTER — TELEPHONE (OUTPATIENT)
Dept: SPORTS MEDICINE | Facility: CLINIC | Age: 78
End: 2023-04-25

## 2023-04-25 ENCOUNTER — OFFICE VISIT (OUTPATIENT)
Dept: SPORTS MEDICINE | Facility: CLINIC | Age: 78
End: 2023-04-25
Payer: MEDICARE

## 2023-04-25 ENCOUNTER — TELEPHONE (OUTPATIENT)
Dept: SPORTS MEDICINE | Facility: CLINIC | Age: 78
End: 2023-04-25
Payer: MEDICARE

## 2023-04-25 VITALS — WEIGHT: 194 LBS | HEIGHT: 71 IN | BODY MASS INDEX: 27.16 KG/M2

## 2023-04-25 DIAGNOSIS — M17.11 PRIMARY OSTEOARTHRITIS OF RIGHT KNEE: ICD-10-CM

## 2023-04-25 DIAGNOSIS — M25.561 CHRONIC PAIN OF RIGHT KNEE: Primary | ICD-10-CM

## 2023-04-25 DIAGNOSIS — G89.29 CHRONIC PAIN OF RIGHT KNEE: Primary | ICD-10-CM

## 2023-04-25 PROCEDURE — 99214 PR OFFICE/OUTPT VISIT, EST, LEVL IV, 30-39 MIN: ICD-10-PCS | Mod: S$PBB,,, | Performed by: STUDENT IN AN ORGANIZED HEALTH CARE EDUCATION/TRAINING PROGRAM

## 2023-04-25 PROCEDURE — 99999 PR PBB SHADOW E&M-EST. PATIENT-LVL IV: CPT | Mod: PBBFAC,,, | Performed by: STUDENT IN AN ORGANIZED HEALTH CARE EDUCATION/TRAINING PROGRAM

## 2023-04-25 PROCEDURE — 99214 OFFICE O/P EST MOD 30 MIN: CPT | Mod: S$PBB,,, | Performed by: STUDENT IN AN ORGANIZED HEALTH CARE EDUCATION/TRAINING PROGRAM

## 2023-04-25 PROCEDURE — 99214 OFFICE O/P EST MOD 30 MIN: CPT | Mod: PBBFAC | Performed by: STUDENT IN AN ORGANIZED HEALTH CARE EDUCATION/TRAINING PROGRAM

## 2023-04-25 PROCEDURE — 99999 PR PBB SHADOW E&M-EST. PATIENT-LVL IV: ICD-10-PCS | Mod: PBBFAC,,, | Performed by: STUDENT IN AN ORGANIZED HEALTH CARE EDUCATION/TRAINING PROGRAM

## 2023-04-25 RX ORDER — EZETIMIBE 10 MG/1
10 TABLET ORAL DAILY
COMMUNITY
Start: 2023-01-02 | End: 2023-07-18

## 2023-04-25 RX ORDER — PREDNISONE 10 MG/1
TABLET ORAL
Status: ON HOLD | COMMUNITY
Start: 2022-12-27 | End: 2023-06-13

## 2023-04-25 RX ORDER — CHLORTHALIDONE 25 MG/1
12.5 TABLET ORAL
COMMUNITY
Start: 2023-03-05 | End: 2023-07-18

## 2023-04-25 NOTE — PROGRESS NOTES
Patient ID: Ghanshyam Sanford Jr.  YOB: 1945  MRN: [id number]    Chief Complaint: Pain of the Right Knee      Referred By: Song Briggs MD for Right Knee Pain    History of Present Illness: Ghanshyam Sanford Jr. is a left-hand dominant 77 y.o. male who presents today with right knee pain.  Patient reports today for Iovera consultation due to prolonged chronic knee pain that has been present for several years.  He has had two ATS and has been told that he needed a TKA and one point.  He was recently seen in clinic by Dr. Song Briggs and referred to this clinic to discuss if he would be a candidate for Iovera.  Patient states that currently his knee pain is a 2/10 and that it starts in his lower thigh and extends into the medial knee and upper medial calf region.  He states that the pain is greatest in the evening time and that it is hard to get comfortable and sleep.  He describes the pain as a burning pain that increases in intensity as the evening progresses.  He received a CSI on 3/15/2023 which provided him with approximately 2 weeks of relief.  He has tried gel injections in the past without success.  He has recently performed physical therapy without relief.  He has recently completed an EMG, which results are in chart.  He is currently taking Tylenol and Gabapentin.  He is unable to take NSAIDs due to an artifical heart valve.      The patient is active in none.  Occupation: Retired      Past Medical History:   Past Medical History:   Diagnosis Date    Anticoagulant long-term use     Aortic valve regurgitation     Aortic valve stenosis, nonrheumatic     Carotid stenosis, bilateral     Chronic diastolic heart failure, NYHA class 2     Chronic total occlusion of coronary artery     Coronary artery disease     Elevated PSA     UNDER CARE    Hypertension     Hypertensive heart disease     Osteoarthritis of right knee 3/22/2017    Polymyalgia     Restless legs     SOB (shortness of breath)       Past Surgical History:   Procedure Laterality Date    BACK SURGERY      CAROTID STENT      HERNIA REPAIR      KNEE ARTHROPLASTY Right     PERCUTANEOUS TRANSCATHETER AORTIC VALVE REPLACEMENT (TAVR) Right 12/14/2021    Procedure: REPLACEMENT, AORTIC VALVE, PERCUTANEOUS, TRANSCATHETER;  Surgeon: Johny Lockett MD;  Location: [facility] CATH;  Service: Cardiology;  Laterality: Right;  ops # [id number]    PERCUTANEOUS TRANSCATHETER AORTIC VALVE REPLACEMENT (TAVR) Right 12/14/2021    Procedure: REPLACEMENT, AORTIC VALVE, PERCUTANEOUS, TRANSCATHETER;  Surgeon: Sushma Piña MD;  Location: [facility] CATH;  Service: Cardiovascular;  Laterality: Right;     Family History   Problem Relation Age of Onset    Heart disease Mother     Hypertension Mother     Stroke Mother     Diabetes Mellitus Father     Heart disease Paternal Grandmother     Stroke Paternal Grandmother      Social History     Socioeconomic History    Marital status:    Tobacco Use    Smoking status: Never    Smokeless tobacco: Never   Substance and Sexual Activity    Alcohol use: Never    Drug use: Never     Medication List with Changes/Refills   Current Medications    ALFUZOSIN (UROXATRAL) 10 MG TB24    Take 10 mg by mouth daily with breakfast.    ASCORBIC ACID, VITAMIN C, (VITAMIN C) 1000 MG TABLET    Take 1,000 mg by mouth once daily.    ASPIRIN (ECOTRIN) 81 MG EC TABLET    Take 81 mg by mouth once daily.    CHLORTHALIDONE (HYGROTEN) 25 MG TAB    Take 12.5 mg by mouth.    CLOPIDOGREL (PLAVIX) 75 MG TABLET    Take 75 mg by mouth once daily.    COENZYME Q10 100 MG CAPSULE    Take 200 mg by mouth once daily.    DOCUSATE SODIUM (COLACE) 100 MG CAPSULE    Take 100 mg by mouth 2 (two) times daily.    DOXAZOSIN (CARDURA) 2 MG TABLET    Take 2 mg by mouth every evening.    EZETIMIBE (ZETIA) 10 MG TABLET    Take 10 mg by mouth.    GABAPENTIN (NEURONTIN) 300 MG CAPSULE    Take 300mg in the am, 300mg at noon and 600mg at night    MULTIVITAMIN (THERAGRAN) PER TABLET     Take 1 tablet by mouth once daily.    MV-MN/IRON/FOLIC ACID/HERB 190 (VITAMIN D3 COMPLETE ORAL)    Take 5,000 Units by mouth Daily.    NITROGLYCERIN (NITROSTAT) 0.4 MG SL TABLET    Place 0.4 mg under the tongue every 5 (five) minutes as needed.    OLMESARTAN (BENICAR) 40 MG TABLET    Take 20 mg by mouth once daily.    PREDNISONE (DELTASONE) 10 MG TABLET    Take by mouth.    ROPINIROLE (REQUIP) 0.25 MG TABLET    TAKE 1 TABLET BY MOUTH ONCE NIGHTLY AS NEEDED    ROSUVASTATIN (CRESTOR) 20 MG TABLET    Take 20 mg by mouth once daily.    ZOLPIDEM (AMBIEN) 10 MG TAB    Take 5 mg by mouth nightly as needed.     Review of patient's allergies indicates:   Allergen Reactions    Dexamethasone sodium phosphate Other (See Comments)    Atorvastatin calcium Other (See Comments)     Leg cramps    Cortisone      Inj- pt reports hiccups and feels spasm     Duloxetine Other (See Comments)     DRY MONTH  NOT ABLE TO SLEEP  NOT HUNDRY  SWEATING A LOT  FEELING TIRED AND WEAK  DIZZINESS  WEIGHT LOSS ( 9 LB.)    Flomax [tamsulosin] Diarrhea    Pravastatin Other (See Comments)     Leg cramps       Physical Exam:   Body mass index is 27.44 kg/m².    GENERAL: Well appearing, in no acute distress.  HEAD: Normocephalic and atraumatic.  ENT: External ears and nose grossly normal.  EYES: EOMI bilaterally  PULMONARY: Respirations are grossly even and non-labored.  NEURO: Awake, alert, and oriented x 3.  SKIN: No obvious rashes appreciated.  PSYCH: Mood & affect are appropriate.    Detailed MSK exam:     Right knee exam:   -ROM: extension 0, flexion 120  -TTP: Medial joint line, Medial hamstring, and Popliteal fossa  -effusion: none  -Patellar apprehension negative  -Heaven test positive  -stable to varus and valgus stress tests  -Lachman test negative, anterior drawer test negative, posterior drawer test negative        Imaging:  US Lower Extremity Veins Right  Narrative: EXAMINATION:  US LOWER EXTREMITY VEINS RIGHT    CLINICAL HISTORY:  Pain in  right lower leg    TECHNIQUE:  Duplex and color flow Doppler evaluation and graded compression of the right lower extremity veins was performed.    COMPARISON:  None    FINDINGS:  Right thigh veins: The common femoral, femoral, popliteal, upper greater saphenous, and deep femoral veins are patent and free of thrombus. The veins are normally compressible and have normal phasic flow and augmentation response.    Right calf veins: The visualized calf veins are patent.    Contralateral CFV: The contralateral (left) common femoral vein is patent and free of thrombus.    Miscellaneous: None  Impression: No evidence of deep venous thrombosis in the right lower extremity.    Electronically signed by: Blaise Muniz  Date:    03/28/2023  Time:    18:59  X-ray Knee Ortho Right with Flexion  Narrative: EXAM: XR KNEE ORTHO RIGHT WITH FLEXION    CLINICAL HISTORY: Bilateral knee joint pain    FINDINGS:  AP standing, flexion and sunrise views bilaterally and lateral view of the right knee.  There is mild joint space loss of the bilateral medial compartments.  Lateral compartment joint spaces appear well maintained.  No fracture, suspicious bone lesion, erosive change, osteochondral lesion, or loose osteochondral body is identified.  Joint alignment appears anatomic.  Impression:  Mild joint space loss of the bilateral medial compartments.  Otherwise negative exam.    Finalized on: 3/28/2023 3:22 PM By:  Arnel Lomax MD  [facility]# [id number]      2023-03-28 15:24:11.108    [facility]        Relevant imaging results were reviewed and interpreted by me and per my read shows medial compartment joint space narrowing.  This was discussed with the patient and / or family today.     Assessment:  Ghanshyam Sanford Jr. is a 77 y.o. male presenting with chronic right knee pain.   History, physical and radiographs are consistent with a likely diagnosis of OA, meniscus tear, lumbar radiculopathy, chronic right knee pain.   Plan: problem is multifactorial  but patient could benefit from iovera cryoneurolysis procedure to treat his pain and burning sensation in the knee/medial thigh. Iovera info given. Prior auth for iovera procedure. Recommend that he continue to do physical therapy if it is helping. Recommend continued follow up with other specialists regarding his back including neurology and pain management. Nerve stimulator in back by pain management could also be helpful. Continue conservative management for pain.   Follow up for iovera procedure. All questions answered.      Chronic pain of right knee  -     Iovera; Future    Primary osteoarthritis of right knee  -     Ambulatory referral/consult to Orthopedics    Time was spent discussing the cryoanalgesia procedure Iovera with the patient.  Risks and benefits were also discussed with patient.  X-rays were reviewed to use as a diagram to explain procedure. A detailed description of landmarks and placement of anesthetic used during procedure were also explained to patient.  Contraindications for procedure were discussed with patient.    More than 50% of the total time was spent face to face for counseling on diagnosis and treatment options. I also counseled patient  on common and most usual side effect of prescribed medications.  I reviewed Primary care, and other specialty's notes to better coordinate patient's care. Patient voiced understanding.      A copy of today's visit note has been sent to the referring provider.     Electronically signed:  Moe Padron MD, MPH  04/25/2023  12:55 PM

## 2023-04-25 NOTE — PATIENT INSTRUCTIONS
Assessment:  Ghanshyam Sanford Jr. is a 77 y.o. male   Chief Complaint   Patient presents with    Right Knee - Pain       Encounter Diagnosis   Name Primary?    Primary osteoarthritis of right knee         Plan:  Iovera consultation  Pre authorization for right knee Iovera            Follow-up: 3 weeks or sooner if there are any problems between now and then.    Thank you for choosing [facility] and Dr. Moe Padron for your orthopedic & sports medicine care. It is our goal to provide you with exceptional care that will help keep you healthy, active, and get you back in the game.    Please do not hesitate to reach out to us via email, phone, or MyChart with any questions, concerns, or feedback.    If you felt that you received exemplary care today, please consider leaving us feedback on [place] at:  [email or web address]    If you are experiencing pain/discomfort ,or have questions after 5pm and would like to be connected to the [facility] on-call team, please call this number and specify which Sports Medicine provider is treating you: [phone]

## 2023-05-08 ENCOUNTER — TELEPHONE (OUTPATIENT)
Dept: SPORTS MEDICINE | Facility: CLINIC | Age: 78
End: 2023-05-08
Payer: MEDICARE

## 2023-05-08 NOTE — TELEPHONE ENCOUNTER
LVM for patient to let him know that he did not have to stop taking any medication prior to procedure and that it is ok to eat prior to procedure.  He also does not need a  following procedure.  Asked patient to contact office if he has any further questions.   ----- Message from Balbina Gregory sent at 5/8/2023  8:12 AM CDT -----  Contact: Ghanshyam Morrissey is needing a call back in regards to his procedure. He wants to know if he should stop taking his blood thinner medication and aspirin. Please give him a call back at [phone]

## 2023-05-19 ENCOUNTER — PROCEDURE VISIT (OUTPATIENT)
Dept: SPORTS MEDICINE | Facility: CLINIC | Age: 78
End: 2023-05-19
Payer: MEDICARE

## 2023-05-19 DIAGNOSIS — G89.29 CHRONIC PAIN OF RIGHT KNEE: Primary | ICD-10-CM

## 2023-05-19 DIAGNOSIS — M25.561 CHRONIC PAIN OF RIGHT KNEE: ICD-10-CM

## 2023-05-19 DIAGNOSIS — M25.561 CHRONIC PAIN OF RIGHT KNEE: Primary | ICD-10-CM

## 2023-05-19 DIAGNOSIS — G89.29 CHRONIC PAIN OF RIGHT KNEE: ICD-10-CM

## 2023-05-19 PROCEDURE — 76942 PR U/S GUIDANCE FOR NEEDLE GUIDANCE: ICD-10-PCS | Mod: 26,S$PBB,, | Performed by: STUDENT IN AN ORGANIZED HEALTH CARE EDUCATION/TRAINING PROGRAM

## 2023-05-19 PROCEDURE — 76942 ECHO GUIDE FOR BIOPSY: CPT | Mod: 26,S$PBB,, | Performed by: STUDENT IN AN ORGANIZED HEALTH CARE EDUCATION/TRAINING PROGRAM

## 2023-05-19 PROCEDURE — 99499 NO LOS: ICD-10-PCS | Mod: S$PBB,,, | Performed by: STUDENT IN AN ORGANIZED HEALTH CARE EDUCATION/TRAINING PROGRAM

## 2023-05-19 PROCEDURE — 64640 PR DESTRUCT BY NEURO AGENT; OTHER PERIPH NERVE: ICD-10-PCS | Mod: S$PBB,RT,, | Performed by: STUDENT IN AN ORGANIZED HEALTH CARE EDUCATION/TRAINING PROGRAM

## 2023-05-19 PROCEDURE — 99499 UNLISTED E&M SERVICE: CPT | Mod: S$PBB,,, | Performed by: STUDENT IN AN ORGANIZED HEALTH CARE EDUCATION/TRAINING PROGRAM

## 2023-05-19 PROCEDURE — 64640 INJECTION TREATMENT OF NERVE: CPT | Mod: S$PBB,RT,, | Performed by: STUDENT IN AN ORGANIZED HEALTH CARE EDUCATION/TRAINING PROGRAM

## 2023-05-19 PROCEDURE — 64640 INJECTION TREATMENT OF NERVE: CPT | Mod: PBBFAC | Performed by: STUDENT IN AN ORGANIZED HEALTH CARE EDUCATION/TRAINING PROGRAM

## 2023-05-19 PROCEDURE — 76942 ECHO GUIDE FOR BIOPSY: CPT | Mod: PBBFAC | Performed by: STUDENT IN AN ORGANIZED HEALTH CARE EDUCATION/TRAINING PROGRAM

## 2023-05-19 NOTE — PROCEDURES
Sports Medicine US - Guidance for Needle Placement    Date/Time: 5/19/2023 3:00 PM  Performed by: Moe Padron MD  Authorized by: Moe Padron MD   Preparation: Patient was prepped and draped in the usual sterile fashion.  Local anesthesia used: no    Anesthesia:  Local anesthesia used: no    Sedation:  Patient sedated: no    Patient tolerance: patient tolerated the procedure well with no immediate complications  Comments: Ultrasound guidance was used for needle localization. Images were saved and stored for documentation. The appropriate structures were visualized. Dynamic visualization of the needle was continuous throughout the procedures and maintained good position.

## 2023-05-19 NOTE — PROCEDURES
Iovera    Date/Time: 5/19/2023 3:00 PM  Performed by: Moe Padron MD  Authorized by: Moe Padron MD   Preparation: Patient was prepped and draped in the usual sterile fashion.  Local anesthesia used: yes  Anesthesia: local infiltration    Anesthesia:  Local anesthesia used: yes  Local Anesthetic: lidocaine 1% without epinephrine  Anesthetic total: 15 mL    Sedation:  Patient sedated: no    Patient tolerance: patient tolerated the procedure well with no immediate complications  Comments: CC: right knee pain    77 y.o. Male presents today for Iovera procedure for right knee pain.    INFORMED CONSENT: I verify that I personally obtained Ghanshyam GUTIERREZ Miraantonietademar Kennedy's consent which was signed and uploaded into [employer].     PAIN SCORE:  Pre-procedure:  1-2/10  Gait: normal    Inspection/Palpation:   +Skin warm and dry without open wounds or erythema  -Rubor   -Calor    Procedure Note Iovera:    DATE OF PROCEDURE:  05/19/2023    PREOPERATIVE DIAGNOSIS: right knee pain.     POSTOPERATIVE DIAGNOSIS: right knee pain.     PROCEDURE: Iovera treatment of anterior femoral cutaneous nerve, lateral femoral cutaneous nerve, medial femoral cutaneous nerve, and superior and inferior branches of infrapatellar saphenous nerve using 5 different punctures to treat all 5 nerves. (cpt 64640 x5)    COMPLICATIONS: None.     IMPLANTS:  None    ESTIMATED BLOOD LOSS:  < 5cc    SPECIMENS REMOVED:  None    ANESTHESIA: 15cc Local lidocaine without epinephrine    INDICATIONS FOR PROCEDURE: This is a 77 y.o. male with longstanding knee pain. They have failed non operative management including injections. I discussed a new treatment therapy called Iovera, which is cryotherapy, to provide symptomatic relief along the sensory distribution of the infrapatellar tendon branch of the saphenous nerve  and AFCN. The patient elected to move forward with this  We did discuss the fact that this is a fairly novel procedure and there is very limited scientific  data around this.  However, it is FDA approved.  The patient was given patient information and literature to review prior to the procedure as well.  Based on this, the patient agreed to move forward with doing the procedure.    Time was spent discussing the cryoanalgesia procedure Iovera with the patient.  Risks and benefits were also discussed with patient.  X-rays were reviewed to use as a diagram to explain procedure. A detailed description of landmarks and placement of anesthetic used during procedure were also explained to patient.  Contraindications for procedure were discussed with patient.    CONSENT:  Verbal and written consent were obtained from the patient.     PROCEDURE:    A surgical time out was performed, including verification of patient ID, procedure to be performed, site and side, availability of information and equipment, review of safety issues, and the patient's agreement and consent.  The patient was placed supine on the exam table and the proximal medial aspect of the right tibia and anterior aspect of distal femur was prepped with sterile alcohol. Ultrasound was used to visualize and map out the targeted nerves. We then infiltrated the skin with lidocaine without epinephrine using a 25g needle. We then hydrodissected the nerve with 1cc lidocaine without epinephrine using a 20g spinal needle under ultrasound guidance. We then introduced the Iovera device using ultrasound guidance and this device penetrated the skin, creating cryotherapy to the superior and inferior branches of the infrapatellar saphenous nerve, a third treatment to the medial femoral cutaneous nerve, a fourth treatment to the anterior femoral cutaneous nerve, and fifth treatment to the lateral femoral cutaneous nerve. 5 punctures of the skin were made to treat the superior and inferior branches of the ISN, the MFCN, the AFCN and LFCN. There were a total of 5 nerves treated with iovera. The patient tolerated the procedure well  with no problems.    PAIN SCORES:  Pre-procedure:  1-2/10  Post-procedure:  0/10  Gait: same      ASSESSMENT:    (M25.561,  G89.29) Chronic pain of right knee      PLAN:  Post-procedure instructions given.   Follow up in 2 months.   All questions were answered to the best of my ability and all concerns were addressed at this time.

## 2023-05-19 NOTE — PATIENT INSTRUCTIONS
Assessment:  Ghanshyam Sanford Jr. is a 77 y.o. male No chief complaint on file.      Encounter Diagnosis   Name Primary?    Chronic pain of right knee         Plan:  Iovera procedure to right knee  You had the Iovera procedure done to your knee today.  There may be bruising over the procedure area for the next few days.  Avoid soaking in standing water (ie bath or hot tub) for the next 24hrs.  Bandages can be removed this evening.  Follow up in 2 months    Follow-up: 2 months or sooner if there are any problems between now and then.    Thank you for choosing [facility] and Dr. Moe Padron for your orthopedic & sports medicine care. It is our goal to provide you with exceptional care that will help keep you healthy, active, and get you back in the game.    Please do not hesitate to reach out to us via email, phone, or MyChart with any questions, concerns, or feedback.    If you felt that you received exemplary care today, please consider leaving us feedback on [place] at:  [email or web address]    If you are experiencing pain/discomfort ,or have questions after 5pm and would like to be connected to the [facility] on-call team, please call this number and specify which Sports Medicine provider is treating you: [phone]

## 2023-05-25 ENCOUNTER — PATIENT MESSAGE (OUTPATIENT)
Dept: SPORTS MEDICINE | Facility: CLINIC | Age: 78
End: 2023-05-25
Payer: MEDICARE

## 2023-05-26 ENCOUNTER — PATIENT MESSAGE (OUTPATIENT)
Dept: SPORTS MEDICINE | Facility: CLINIC | Age: 78
End: 2023-05-26
Payer: MEDICARE

## 2023-05-28 ENCOUNTER — PATIENT MESSAGE (OUTPATIENT)
Dept: ORTHOPEDICS | Facility: CLINIC | Age: 78
End: 2023-05-28
Payer: MEDICARE

## 2023-05-29 ENCOUNTER — PATIENT MESSAGE (OUTPATIENT)
Dept: ORTHOPEDICS | Facility: CLINIC | Age: 78
End: 2023-05-29
Payer: MEDICARE

## 2023-05-30 ENCOUNTER — PATIENT MESSAGE (OUTPATIENT)
Dept: RHEUMATOLOGY | Facility: CLINIC | Age: 78
End: 2023-05-30
Payer: MEDICARE

## 2023-05-30 ENCOUNTER — TELEPHONE (OUTPATIENT)
Dept: SPORTS MEDICINE | Facility: CLINIC | Age: 78
End: 2023-05-30
Payer: MEDICARE

## 2023-05-31 ENCOUNTER — OFFICE VISIT (OUTPATIENT)
Dept: SPORTS MEDICINE | Facility: CLINIC | Age: 78
End: 2023-05-31
Payer: MEDICARE

## 2023-05-31 VITALS
BODY MASS INDEX: 26.59 KG/M2 | SYSTOLIC BLOOD PRESSURE: 113 MMHG | DIASTOLIC BLOOD PRESSURE: 63 MMHG | HEART RATE: 83 BPM | WEIGHT: 188 LBS

## 2023-05-31 DIAGNOSIS — S83.241A ACUTE MEDIAL MENISCUS TEAR OF RIGHT KNEE, INITIAL ENCOUNTER: Primary | ICD-10-CM

## 2023-05-31 PROCEDURE — 99999 PR PBB SHADOW E&M-EST. PATIENT-LVL III: ICD-10-PCS | Mod: PBBFAC,,, | Performed by: ORTHOPAEDIC SURGERY

## 2023-05-31 PROCEDURE — 99999 PR PBB SHADOW E&M-EST. PATIENT-LVL III: CPT | Mod: PBBFAC,,, | Performed by: ORTHOPAEDIC SURGERY

## 2023-05-31 PROCEDURE — 99204 PR OFFICE/OUTPT VISIT, NEW, LEVL IV, 45-59 MIN: ICD-10-PCS | Mod: S$PBB,,, | Performed by: ORTHOPAEDIC SURGERY

## 2023-05-31 PROCEDURE — 99204 OFFICE O/P NEW MOD 45 MIN: CPT | Mod: S$PBB,,, | Performed by: ORTHOPAEDIC SURGERY

## 2023-05-31 PROCEDURE — 99213 OFFICE O/P EST LOW 20 MIN: CPT | Mod: PBBFAC | Performed by: ORTHOPAEDIC SURGERY

## 2023-05-31 RX ORDER — FERROUS SULFATE 325(65) MG
325 TABLET ORAL
COMMUNITY
End: 2023-07-18

## 2023-05-31 RX ORDER — MAGNESIUM 250 MG
250 TABLET ORAL DAILY
COMMUNITY
End: 2024-02-01

## 2023-05-31 RX ORDER — PREDNISONE 1 MG/1
1 TABLET ORAL DAILY
Status: ON HOLD | COMMUNITY
End: 2023-06-13

## 2023-06-01 ENCOUNTER — PATIENT MESSAGE (OUTPATIENT)
Dept: NEUROLOGY | Facility: CLINIC | Age: 78
End: 2023-06-01
Payer: MEDICARE

## 2023-06-01 ENCOUNTER — PATIENT MESSAGE (OUTPATIENT)
Dept: SPORTS MEDICINE | Facility: CLINIC | Age: 78
End: 2023-06-01
Payer: MEDICARE

## 2023-06-01 ENCOUNTER — PATIENT MESSAGE (OUTPATIENT)
Dept: PREADMISSION TESTING | Facility: HOSPITAL | Age: 78
End: 2023-06-01
Payer: MEDICARE

## 2023-06-01 ENCOUNTER — ANESTHESIA EVENT (OUTPATIENT)
Dept: SURGERY | Facility: HOSPITAL | Age: 78
End: 2023-06-01
Payer: MEDICARE

## 2023-06-01 DIAGNOSIS — S83.241A ACUTE TEAR MEDIAL MENISCUS, RIGHT, INITIAL ENCOUNTER: Primary | ICD-10-CM

## 2023-06-01 DIAGNOSIS — M94.261 CHONDROMALACIA, RIGHT KNEE: ICD-10-CM

## 2023-06-01 DIAGNOSIS — M67.51 PLICA SYNDROME, RIGHT KNEE: ICD-10-CM

## 2023-06-01 NOTE — PROGRESS NOTES
CC: Right knee pain    78 y.o. Male  reports knee pain refractory to conservative mgmt.    He reports that the pain is worse with deep squats.  It also bothers him at night.    Is affecting ADLs.     + mechanical symptoms, no instability    SANE 60    Review of Systems   Constitution: Negative. Negative for chills, fever and night sweats.   HENT: Negative for congestion and headaches.    Eyes: Negative for blurred vision, left vision loss and right vision loss.   Cardiovascular: Negative for chest pain and syncope.   Respiratory: Negative for cough and shortness of breath.    Endocrine: Negative for polydipsia, polyphagia and polyuria.   Hematologic/Lymphatic: Negative for bleeding problem. Does not bruise/bleed easily.   Skin: Negative for dry skin, itching and rash.   Musculoskeletal: Negative for falls and muscle weakness.   Gastrointestinal: Negative for abdominal pain and bowel incontinence.   Genitourinary: Negative for bladder incontinence and nocturia.   Neurological: Negative for disturbances in coordination, loss of balance and seizures.   Psychiatric/Behavioral: Negative for depression. The patient does not have insomnia.    Allergic/Immunologic: Negative for hives and persistent infections.     PAST MEDICAL HISTORY:   Past Medical History:   Diagnosis Date    Anticoagulant long-term use     Aortic valve regurgitation     Aortic valve stenosis, nonrheumatic     Carotid stenosis, bilateral     Chronic diastolic heart failure, NYHA class 2     Chronic total occlusion of coronary artery     Coronary artery disease     Elevated PSA     UNDER CARE    Hypertension     Hypertensive heart disease     Osteoarthritis of right knee 3/22/2017    Polymyalgia     Restless legs     SOB (shortness of breath)      PAST SURGICAL HISTORY:   Past Surgical History:   Procedure Laterality Date    BACK SURGERY      CAROTID STENT      HERNIA REPAIR      KNEE ARTHROPLASTY Right     PERCUTANEOUS TRANSCATHETER AORTIC VALVE  REPLACEMENT (TAVR) Right 12/14/2021    Procedure: REPLACEMENT, AORTIC VALVE, PERCUTANEOUS, TRANSCATHETER;  Surgeon: Johny Lockett MD;  Location: [facility] CATH;  Service: Cardiology;  Laterality: Right;  ops # 8    PERCUTANEOUS TRANSCATHETER AORTIC VALVE REPLACEMENT (TAVR) Right 12/14/2021    Procedure: REPLACEMENT, AORTIC VALVE, PERCUTANEOUS, TRANSCATHETER;  Surgeon: Sushma Piña MD;  Location: [facility] CATH;  Service: Cardiovascular;  Laterality: Right;     FAMILY HISTORY:   Family History   Problem Relation Age of Onset    Heart disease Mother     Hypertension Mother     Stroke Mother     Diabetes Mellitus Father     Heart disease Paternal Grandmother     Stroke Paternal Grandmother      SOCIAL HISTORY:   Social History     Socioeconomic History    Marital status:    Tobacco Use    Smoking status: Never    Smokeless tobacco: Never   Substance and Sexual Activity    Alcohol use: Never    Drug use: Never       MEDICATIONS:   Current Outpatient Medications:     alfuzosin (UROXATRAL) 10 mg Tb24, Take 10 mg by mouth daily with breakfast., Disp: , Rfl:     ascorbic acid, vitamin C, (VITAMIN C) 1000 MG tablet, Take 1,000 mg by mouth once daily., Disp: , Rfl:     aspirin (ECOTRIN) 81 MG EC tablet, Take 81 mg by mouth once daily., Disp: , Rfl:     chlorthalidone (HYGROTEN) 25 MG Tab, Take 12.5 mg by mouth., Disp: , Rfl:     clopidogreL (PLAVIX) 75 mg tablet, Take 75 mg by mouth once daily., Disp: , Rfl:     coenzyme Q10 100 mg capsule, Take 200 mg by mouth once daily., Disp: , Rfl:     docusate sodium (COLACE) 100 MG capsule, Take 100 mg by mouth 2 (two) times daily., Disp: , Rfl:     ezetimibe (ZETIA) 10 mg tablet, Take 10 mg by mouth., Disp: , Rfl:     ferrous sulfate (FEOSOL) 325 mg (65 mg iron) Tab tablet, Take 325 mg by mouth daily with breakfast., Disp: , Rfl:     gabapentin (NEURONTIN) 300 MG capsule, Take 300mg in the am, 300mg at noon and 600mg at night, Disp: 360 capsule, Rfl: 3    magnesium 30 mg Tab,  Take by mouth once., Disp: , Rfl:     multivitamin (THERAGRAN) per tablet, Take 1 tablet by mouth once daily., Disp: , Rfl:     olmesartan (BENICAR) 40 MG tablet, Take 20 mg by mouth once daily., Disp: , Rfl:     predniSONE (DELTASONE) 1 MG tablet, Take 1 mg by mouth once daily., Disp: , Rfl:     rOPINIRole (REQUIP) 0.25 MG tablet, TAKE 1 TABLET BY MOUTH ONCE NIGHTLY AS NEEDED, Disp: 90 tablet, Rfl: 1    rosuvastatin (CRESTOR) 20 MG tablet, Take 20 mg by mouth once daily., Disp: , Rfl:     zolpidem (AMBIEN) 10 mg Tab, Take 5 mg by mouth nightly as needed., Disp: , Rfl:     doxazosin (CARDURA) 2 MG tablet, Take 2 mg by mouth every evening., Disp: , Rfl:     mv-mn/iron/folic acid/herb 190 (VITAMIN D3 COMPLETE ORAL), Take 5,000 Units by mouth Daily., Disp: , Rfl:     nitroGLYCERIN (NITROSTAT) 0.4 MG SL tablet, Place 0.4 mg under the tongue every 5 (five) minutes as needed., Disp: , Rfl:     predniSONE (DELTASONE) 10 MG tablet, Take by mouth., Disp: , Rfl:   ALLERGIES:   Review of patient's allergies indicates:   Allergen Reactions    Dexamethasone sodium phosphate Other (See Comments)    Atorvastatin calcium Other (See Comments)     Leg cramps    Cortisone      Inj- pt reports hiccups and feels spasm     Duloxetine Other (See Comments)     DRY MONTH  NOT ABLE TO SLEEP  NOT HUNDRY  SWEATING A LOT  FEELING TIRED AND WEAK  DIZZINESS  WEIGHT LOSS ( 9 LB.)    Flomax [tamsulosin] Diarrhea    Pravastatin Other (See Comments)     Leg cramps       VITAL SIGNS: /63   Pulse 83   Wt 85.3 kg (188 lb)   BMI 26.59 kg/m²        PHYSICAL EXAMINATION    General:  The patient is alert and oriented x 3.  Mood is pleasant.  Observation of ears, eyes and nose reveal no gross abnormalities.  HEENT: NCAT, sclera nonicteric  Lungs: Respirations are equal and unlabored.      Right KNEE EXAMINATION     OBSERVATION / INSPECTION   Gait:   Nonantalgic   Alignment:  Neutral   Scars:   None   Muscle atrophy: Mild  Effusion:  None    Warmth:  None   Discoloration:   none     TENDERNESS / CREPITUS (T / C):          T / C      T / C   Patella   - / -   Lateral joint line   - / -      Peripatellar medial  -  Medial joint line    + / -   Peripatellar lateral -  Medial plica   - / -   Patellar tendon -   Popliteal fossa  - / -   Quad tendon   -   Gastrocnemius   -   Prepatellar Bursa - / -   Quadricep   -   Tibial tubercle  -  Thigh/hamstring  -   Pes anserine/HS -  Fibula    -   ITB   - / -  Tibia     -   Tib/fib joint  - / -  LCL    -     MFC   - / -   MCL: Proximal  -    LFC   - / -    Distal   -          ROM: (* = pain)  PASSIVE   ACTIVE    Left :   5 / 0 / 145   5 / 0 / 145     Right :    5 / 0 / 145   5 / 0 / 145    PATELLOFEMORAL EXAMINATION:  See above noted areas of tenderness.   Patella position    Subluxation / dislocation: Centered           Sup. / Inf;   Normal   Crepitus (PF):    Absent   Patellar Mobility:       Medial-lateral:   Normal    Superior-inferior:  Normal    Inferior tilt   Normal    Patellar tendon:  Normal   Lateral tilt:    Normal   J-sign:     None   Patellofemoral grind:   No pain       MENISCAL SIGNS:     Pain on terminal extension:  +  Pain on terminal flexion:  +  Heavens maneuver:  + for pain  Squat     + posterior joint pain    LIGAMENT EXAMINATION:  ACL / Lachman:  normal (-1 to 2mm)    PCL-Post.  drawer: normal 0 to 2mm  MCL- Valgus:  normal 0 to 2mm  LCL- Varus:  normal 0 to 2mm  Pivot shift: normal (Equal)   Dial Test: difference c/w other side   At 30° flexion: normal (< 5°)    At 90° flexion: normal (< 5°)   Reverse Pivot Shift:   normal (Equal)     STRENGTH: (* = with pain) PAINFUL SIDE   Quadricep   5/5   Hamstrin/5    EXTREMITY NEURO-VASCULAR EXAMINATION:   Sensation:  Grossly intact to light touch all dermatomal regions.   Motor Function:  Fully intact motor function at hip, knee, foot and ankle    DTRs;  quadriceps and  achilles 2+.  No clonus and downgoing Babinski.    Vascular status:   DP and PT pulses 2+, brisk capillary refill, symmetric.     Other Findings:          Xrays: ordered and reviewed personally by me (standing AP/flexion, lateral, sunrise) show: no evidence of arthritis or fracture or dislocation     MRI reviewed personally by me:  Shows Right knee medial meniscal tear, chondromalacia.     ASSESSMENT:    Right Knee Meniscus tear.      he would benefit from knee arthroscopy, possible plica excision, possible chondroplasty with possible meniscectomy given the above.     PLAN: We have discussed the surgery and recovery of arthroscopic knee surgery. he understands that there may be limited weightbearing up to several weeks after surgery depending on procedures that are performed at the time of surgery.    The spectrum of treatment options were discussed with the patient, including nonoperative and operative options.  After thorough discussion, the patient has elected to undergo surgical treatment to include:  right   a. Knee arthroscopic medial meniscectomy      b. Knee arthroscopic possible plica excision   c. Knee arthroscopic possible chondroplasty      The details of the surgical procedure were explained, including the location of probable incisions and a description of likely hardware and/or grafts to be used.  The patient understands the likely convalescence after surgery.  Also, we have thoroughly discussed the risks, benefits and alternatives to surgery, including, but not limited to, the risk of infection, joint stiffness, blood clot (including DVT and/or pulmonary embolus), neurologic and vascular injury.  It was explained that, if tissue has been repaired or reconstructed, there is a chance of failure, which may require further management.      Patient has chondral damage to knee.  Therefore, i can offer no guarantee whatsoever to the results of a knee arthroscopic surgery.  I believe that arthroscopic surgery will benefit the patient.  I explained this in detail today and  patient acknowledged understanding and wishes to proceed.

## 2023-06-01 NOTE — ANESTHESIA PAT ROS NOTE
06/01/2023  Ghanshyam Sanford Jr. is a 78 y.o., male.      Pre-op Assessment    I have reviewed the Patient Summary Reports.       I have reviewed the Medications.     Review of Systems  Anesthesia Hx:  No problems with previous Anesthesia   History of prior surgery of interest to airway management or planning: heart surgery. Previous anesthesia: General 12/14/2021  TAVR with general anesthesia.  Procedure performed at an [facility].      Airway issues documented on chart review include mask, easy, GETA, easy direct laryngoscopy , view on direct laryngoscopy Grade I      Denies Personal Hx of Anesthesia complications.                    Social:  Non-Smoker, No Alcohol Use       Hematology/Oncology:  Hematology Normal   Oncology Normal                                   EENT/Dental:  chronic allergic rhinitis           Cardiovascular:  Exercise tolerance: good   Denies Pacemaker. Hypertension, well controlled Valvular problems/Murmurs, AS  CAD  asymptomatic CABG/stent    Denies Angina. CHF    hyperlipidemia  Denies ROTHMAN. NYHA Classification II ECG has been reviewed. H/O LBBB, Atherosclerosis heart disease, Stents to RCA x3 and LAD X1, Carotid Artery Stenosis, S/P Carotid Stent, Aortic stenosis, S/P TAVR,  Chronic diastolic Heart Failure class 2, chronic total occlusion of coronary artery,  Trace mitral regurgitation. Mild (1+) aortic regurgitation. EF 50-55% per echo                         Pulmonary:    Denies COPD.  Denies Asthma.                    Renal/:   Denies Chronic Renal Disease.   Elevated PSA             Hepatic/GI:      Denies GERD. Denies Liver Disease.  Hernia repair          Musculoskeletal:  Arthritis   Acute tear medial meniscus, right,   Plica syndrome, right knee,  Chondromalacia, right knee, Osteoarthritis right knee, S/P Right Knee Arthroscopy, Meniscectomy, S/P [facility]ie tx,  Wedge  compression Fracture Lumbar Spine L1 and thoracic vertebra, S/P Back surgery, Right Rotator cuff Tendonitis, Osteopenia, Bilateral hip DJD         Spine Disorders: lumbar and thoracic Disc disease and Chronic Pain           Neurological:    Denies CVA. Neuromuscular Disease,   Denies Headaches. Denies Seizures.    Myalgia, Polymyalgia Rheumatica,  Restless Leg Syndrome,  Mild lumbar radiculopathy at L4, polyneuropathy of feet      Chronic Pain Syndrome   Peripheral Neuropathy                          Endocrine:  Endocrine Normal Denies Diabetes. Denies Hypothyroidism.          Psych:  Psychiatric Normal     Insomnia              Past Medical History:   Diagnosis Date    Anticoagulant long-term use     Aortic valve regurgitation     Aortic valve stenosis, nonrheumatic     Carotid stenosis, bilateral     Chronic diastolic heart failure, NYHA class 2     Chronic total occlusion of coronary artery     Coronary artery disease     Elevated PSA     UNDER CARE    Hypertension     Hypertensive heart disease     Osteoarthritis of right knee 3/22/2017    Polymyalgia     Restless legs     SOB (shortness of breath)      Past Surgical History:   Procedure Laterality Date    BACK SURGERY      CAROTID STENT      HERNIA REPAIR      KNEE ARTHROPLASTY Right     PERCUTANEOUS TRANSCATHETER AORTIC VALVE REPLACEMENT (TAVR) Right 12/14/2021    Procedure: REPLACEMENT, AORTIC VALVE, PERCUTANEOUS, TRANSCATHETER;  Surgeon: Johny Lockett MD;  Location: [facility] CATH;  Service: Cardiology;  Laterality: Right;  ops # 8    PERCUTANEOUS TRANSCATHETER AORTIC VALVE REPLACEMENT (TAVR) Right 12/14/2021    Procedure: REPLACEMENT, AORTIC VALVE, PERCUTANEOUS, TRANSCATHETER;  Surgeon: Sushma Piña MD;  Location: [facility] CATH;  Service: Cardiovascular;  Laterality: Right;       Anesthesia Assessment: Preoperative EQUATION    Planned Procedure: Procedure(s) (LRB):  ARTHROSCOPY, KNEE, WITH MENISCECTOMY (Right)  ARTHROSCOPY, KNEE, WITH CHONDROPLASTY  (Right)  EXCISION, PLICA, KNEE, ARTHROSCOPIC (Right)  Requested Anesthesia Type:General  Surgeon: Tari Gregory MD  Service: Orthopedics  Known or anticipated Date of Surgery:6/13/2023    Surgeon notes: reviewed    Electronic QUestionnaire Assessment completed via nurse interview with patient.        Triage considerations:     The patient has no apparent active cardiac condition (No unstable coronary Syndrome such as severe unstable angina or recent [<1 month] myocardial infarction, decompensated CHF, severe valvular   disease or significant arrhythmia)    Previous anesthesia records:GETA, Easy airway, Easy intubation, and No problems    Last PCP note: > 1 year ago , outside [facility]   Subspecialty notes: Cardiology: General, Pain Management, Rheumatology    Patient is cleared by Cardiologist and PCP for surgery. Cardiologist note reads he may hold aspirin and Plavix 5-7 days before surgery. Cardiologist note reads that patient is cleared for general anesthesia but if local block or spinal can be done that would be preferable over general anesthesia. May hold Plavix during DVT prophylaxis with Aspirin, then resume.     Other important co-morbidities: CHF, HLD, and HTN       EKG 12/14/2021:  Vent. Rate : 067 BPM     Atrial Rate : 067 BPM      P-R Int : 208 ms          QRS Dur : 140 ms       QT Int : 438 ms       P-R-T Axes : 047 -61 103 degrees      QTc Int : 462 ms   Normal sinus rhythm   Left axis deviation   Left bundle branch block   Abnormal ECG   When compared with ECG of 15-DEC-2021 06:50,   No significant change was found   Confirmed by Krista HERNANDEZ, Amanuel ([id number]) on 12/16/2021 12:26:01 PM       Echo 12/15/2021:  Final Impressions   1. The study quality is good.   2. Global left ventricular systolic function is normal. The left   ventricular ejection fraction is 50-55%.   3. Right ventricular systolic function is normal. Right ventricular   diastolic dimension is 3.6 cms.     4. TAVR appears to be functioning  normal with mild paravalvular aortic   regurgitation.  The prosthetic valve gradients are normal.   5. The left atrium is mildly enlarged. Left atrial diameter is 4.2   cms.     6. Trace mitral regurgitation. Mild (1+) aortic regurgitation.                Instructions given. (See in Nurse's note)      Ht: 5'10.5  Wt: 188 lb  BMI: 26.59  Vaccinated

## 2023-06-02 ENCOUNTER — TELEPHONE (OUTPATIENT)
Dept: SPORTS MEDICINE | Facility: CLINIC | Age: 78
End: 2023-06-02
Payer: MEDICARE

## 2023-06-02 NOTE — TELEPHONE ENCOUNTER
----- Message from Justino Arteaga MA sent at 6/1/2023  4:21 PM CDT -----  Regarding: FW: pt advice  Contact: [phone]    ----- Message -----  From: Lucita Mejía  Sent: 6/1/2023   3:54 PM CDT  To: Bri Marc Staff  Subject: pt advice                                        Lesley calling from [facility] in regards a cardiac clearance request that was sent over, needs to verify if there to hold blood thinners. Pls call. Pls call

## 2023-06-02 NOTE — PROGRESS NOTES
CC: Right knee pain     78 y.o. Male  reports knee pain refractory to conservative mgmt.     He reports that the pain is worse with deep squats.  It also bothers him at night.     Is affecting ADLs.      + mechanical symptoms, no instability     SANE 60     Review of Systems   Constitution: Negative. Negative for chills, fever and night sweats.   HENT: Negative for congestion and headaches.    Eyes: Negative for blurred vision, left vision loss and right vision loss.   Cardiovascular: Negative for chest pain and syncope.   Respiratory: Negative for cough and shortness of breath.    Endocrine: Negative for polydipsia, polyphagia and polyuria.   Hematologic/Lymphatic: Negative for bleeding problem. Does not bruise/bleed easily.   Skin: Negative for dry skin, itching and rash.   Musculoskeletal: Negative for falls and muscle weakness.   Gastrointestinal: Negative for abdominal pain and bowel incontinence.   Genitourinary: Negative for bladder incontinence and nocturia.   Neurological: Negative for disturbances in coordination, loss of balance and seizures.   Psychiatric/Behavioral: Negative for depression. The patient does not have insomnia.    Allergic/Immunologic: Negative for hives and persistent infections.      PAST MEDICAL HISTORY:        Past Medical History:   Diagnosis Date    Anticoagulant long-term use      Aortic valve regurgitation      Aortic valve stenosis, nonrheumatic      Carotid stenosis, bilateral      Chronic diastolic heart failure, NYHA class 2      Chronic total occlusion of coronary artery      Coronary artery disease      Elevated PSA       UNDER CARE    Hypertension      Hypertensive heart disease      Osteoarthritis of right knee 3/22/2017    Polymyalgia      Restless legs      SOB (shortness of breath)        PAST SURGICAL HISTORY:         Past Surgical History:   Procedure Laterality Date    BACK SURGERY        CAROTID STENT        HERNIA REPAIR        KNEE ARTHROPLASTY Right       PERCUTANEOUS TRANSCATHETER AORTIC VALVE REPLACEMENT (TAVR) Right 12/14/2021     Procedure: REPLACEMENT, AORTIC VALVE, PERCUTANEOUS, TRANSCATHETER;  Surgeon: Johny Lockett MD;  Location: [facility] CATH;  Service: Cardiology;  Laterality: Right;  ops # 8    PERCUTANEOUS TRANSCATHETER AORTIC VALVE REPLACEMENT (TAVR) Right 12/14/2021     Procedure: REPLACEMENT, AORTIC VALVE, PERCUTANEOUS, TRANSCATHETER;  Surgeon: Sushma Piña MD;  Location: [facility] CATH;  Service: Cardiovascular;  Laterality: Right;      FAMILY HISTORY:         Family History   Problem Relation Age of Onset    Heart disease Mother      Hypertension Mother      Stroke Mother      Diabetes Mellitus Father      Heart disease Paternal Grandmother      Stroke Paternal Grandmother        SOCIAL HISTORY:   Social History              Socioeconomic History    Marital status:    Tobacco Use    Smoking status: Never    Smokeless tobacco: Never   Substance and Sexual Activity    Alcohol use: Never    Drug use: Never            MEDICATIONS:   Current Outpatient Medications:     alfuzosin (UROXATRAL) 10 mg Tb24, Take 10 mg by mouth daily with breakfast., Disp: , Rfl:     ascorbic acid, vitamin C, (VITAMIN C) 1000 MG tablet, Take 1,000 mg by mouth once daily., Disp: , Rfl:     aspirin (ECOTRIN) 81 MG EC tablet, Take 81 mg by mouth once daily., Disp: , Rfl:     chlorthalidone (HYGROTEN) 25 MG Tab, Take 12.5 mg by mouth., Disp: , Rfl:     clopidogreL (PLAVIX) 75 mg tablet, Take 75 mg by mouth once daily., Disp: , Rfl:     coenzyme Q10 100 mg capsule, Take 200 mg by mouth once daily., Disp: , Rfl:     docusate sodium (COLACE) 100 MG capsule, Take 100 mg by mouth 2 (two) times daily., Disp: , Rfl:     ezetimibe (ZETIA) 10 mg tablet, Take 10 mg by mouth., Disp: , Rfl:     ferrous sulfate (FEOSOL) 325 mg (65 mg iron) Tab tablet, Take 325 mg by mouth daily with breakfast., Disp: , Rfl:     gabapentin (NEURONTIN) 300 MG capsule, Take 300mg in the am, 300mg at noon  and 600mg at night, Disp: 360 capsule, Rfl: 3    magnesium 30 mg Tab, Take by mouth once., Disp: , Rfl:     multivitamin (THERAGRAN) per tablet, Take 1 tablet by mouth once daily., Disp: , Rfl:     olmesartan (BENICAR) 40 MG tablet, Take 20 mg by mouth once daily., Disp: , Rfl:     predniSONE (DELTASONE) 1 MG tablet, Take 1 mg by mouth once daily., Disp: , Rfl:     rOPINIRole (REQUIP) 0.25 MG tablet, TAKE 1 TABLET BY MOUTH ONCE NIGHTLY AS NEEDED, Disp: 90 tablet, Rfl: 1    rosuvastatin (CRESTOR) 20 MG tablet, Take 20 mg by mouth once daily., Disp: , Rfl:     zolpidem (AMBIEN) 10 mg Tab, Take 5 mg by mouth nightly as needed., Disp: , Rfl:     doxazosin (CARDURA) 2 MG tablet, Take 2 mg by mouth every evening., Disp: , Rfl:     mv-mn/iron/folic acid/herb 190 (VITAMIN D3 COMPLETE ORAL), Take 5,000 Units by mouth Daily., Disp: , Rfl:     nitroGLYCERIN (NITROSTAT) 0.4 MG SL tablet, Place 0.4 mg under the tongue every 5 (five) minutes as needed., Disp: , Rfl:     predniSONE (DELTASONE) 10 MG tablet, Take by mouth., Disp: , Rfl:   ALLERGIES:         Review of patient's allergies indicates:   Allergen Reactions    Dexamethasone sodium phosphate Other (See Comments)    Atorvastatin calcium Other (See Comments)       Leg cramps    Cortisone         Inj- pt reports hiccups and feels spasm     Duloxetine Other (See Comments)       DRY MONTH  NOT ABLE TO SLEEP  NOT HUNDRY  SWEATING A LOT  FEELING TIRED AND WEAK  DIZZINESS  WEIGHT LOSS ( 9 LB.)    Flomax [tamsulosin] Diarrhea    Pravastatin Other (See Comments)       Leg cramps         VITAL SIGNS: /63   Pulse 83   Wt 85.3 kg (188 lb)   BMI 26.59 kg/m²          PHYSICAL EXAMINATION     General:  The patient is alert and oriented x 3.  Mood is pleasant.  Observation of ears, eyes and nose reveal no gross abnormalities.  HEENT: NCAT, sclera nonicteric  Lungs: Respirations are equal and unlabored.          Right KNEE EXAMINATION        OBSERVATION / INSPECTION   Gait:                              Nonantalgic   Alignment:                   Neutral   Scars:                          None   Muscle atrophy:          Mild  Effusion:                      None   Warmth:                      None   Discoloration:              none      TENDERNESS / CREPITUS (T / C):                                                      T / C                                                                T / C  Patella                         - / -                   Lateral joint line                       - / -                                 Peripatellar medial      -                       Medial joint line                       + / -  Peripatellar lateral       -                       Medial plica                             - / -  Patellar tendon            -                       Popliteal fossa             - / -  Quad tendon               -                       Gastrocnemius                        -  Prepatellar Bursa        - / -                   Quadricep                               -  Tibial tubercle              -                       Thigh/hamstring                      -  Pes anserine/HS         -                       Fibula                                      -  ITB                               - / -                   Tibia                                        -  Tib/fib joint                   - / -                   LCL                                          -                         MFC                            - / -                   MCL:    Proximal                      -                         LFC                             - / -                               Distal                           -                                                                ROM:   (* = pain)                   PASSIVE                                 ACTIVE   Left :                            5 / 0 / 145 5 / 0 / 145    Right :                          5 / 0 / 145                                 5 / 0 / 145     PATELLOFEMORAL EXAMINATION:  See above noted areas of tenderness.   Patella position   Subluxation / dislocation:        Centered                                                     Sup. / Inf;                                 Normal   Crepitus (PF):                                      Absent   Patellar Mobility:                                               Medial-lateral:                         Normal   Superior-inferior:                     Normal   Inferior tilt                                Normal   Patellar tendon:                       Normal   Lateral tilt:                                           Normal   J-sign:                                                  None   Patellofemoral grind:                           No pain         MENISCAL SIGNS:                              Pain on terminal extension:                +  Pain on terminal flexion:                     +  Heavens maneuver:                       + for pain  Squat                                                   + posterior joint pain     LIGAMENT EXAMINATION:  ACL / Lachman:         normal (-1 to 2mm)    PCL-Post.  drawer:    normal 0 to 2mm  MCL- Valgus:             normal 0 to 2mm  LCL- Varus:  normal 0 to 2mm  Pivot shift:     normal (Equal)   Dial Test: difference c/w other side  At 30° flexion:  normal (< 5°)   At 90° flexion:  normal (< 5°)   Reverse Pivot Shift:    normal (Equal)      STRENGTH:   (* = with pain)           PAINFUL SIDE  Quadricep                               5/5  Hamstrin/5     EXTREMITY NEURO-VASCULAR EXAMINATION:     Sensation:  Grossly intact to light touch all dermatomal regions.   Motor Function:  Fully intact motor function at hip, knee, foot and ankle    DTRs;  quadriceps and  achilles 2+.  No clonus and downgoing Babinski.    Vascular status:  DP and PT pulses 2+, brisk capillary refill, symmetric.      Other Findings:     Xrays:  ordered and reviewed personally by me (standing AP/flexion, lateral, sunrise) show: no evidence of arthritis or fracture or dislocation  MRI reviewed personally by me:  Shows Right knee medial meniscal tear, chondromalacia.  ASSESSMENT:    Right Knee Meniscus tear.       he would benefit from knee arthroscopy, possible plica excision, possible chondroplasty with possible meniscectomy given the above.      PLAN: We have discussed the surgery and recovery of arthroscopic knee surgery. he understands that there may be limited weightbearing up to several weeks after surgery depending on procedures that are performed at the time of surgery.     The spectrum of treatment options were discussed with the patient, including nonoperative and operative options.  After thorough discussion, the patient has elected to undergo surgical treatment to include:  right              a. Knee arthroscopic medial meniscectomy                 b. Knee arthroscopic possible plica excision              c. Knee arthroscopic possible chondroplasty        The details of the surgical procedure were explained, including the location of probable incisions and a description of likely hardware and/or grafts to be used.  The patient understands the likely convalescence after surgery.  Also, we have thoroughly discussed the risks, benefits and alternatives to surgery, including, but not limited to, the risk of infection, joint stiffness, blood clot (including DVT and/or pulmonary embolus), neurologic and vascular injury.  It was explained that, if tissue has been repaired or reconstructed, there is a chance of failure, which may require further management.       Patient has chondral damage to knee.  Therefore, i can offer no guarantee whatsoever to the results of a knee arthroscopic surgery.  I believe that arthroscopic surgery will benefit the patient.  I explained this in detail today and patient acknowledged understanding and wishes to proceed.

## 2023-06-02 NOTE — TELEPHONE ENCOUNTER
I called the office and informed that we prefer the patient be off of blood thinners a few days prior to surgery and then can restart the day after but that we always leave it up to the physicians discretion. She verbalized understanding. I also asked that they send his most recent Echo, EKG, etc per [facility] request ([place])

## 2023-06-02 NOTE — PRE-PROCEDURE INSTRUCTIONS
Spoke with Sports Medicine staff, states that patient will obtain clearances from his outside Cardiologist and PCP.

## 2023-06-06 ENCOUNTER — TELEPHONE (OUTPATIENT)
Dept: SPORTS MEDICINE | Facility: CLINIC | Age: 78
End: 2023-06-06
Payer: MEDICARE

## 2023-06-06 NOTE — TELEPHONE ENCOUNTER
----- Message from Justino Arteaga MA sent at 6/6/2023  2:46 PM CDT -----  Regarding: FW: insurance network  Contact: pt [phone]    ----- Message -----  From: Veronica Douglas MA  Sent: 6/6/2023   2:05 PM CDT  To: Sherri Todd, Justino Arteaga MA  Subject: FW: insurance network                            Patient is scheduled for Sx on 6/16  insurance in not in network, please assist.   ----- Message -----  From: Pura Rodriges  Sent: 6/6/2023   1:56 PM CDT  To: [facility] Staff  Subject: insurance network                                Pt would like to speak with provider/nurse in regards to insurance network pt stated he was told by his insurance that this provider is out of network pls call pt at [phone]

## 2023-06-06 NOTE — TELEPHONE ENCOUNTER
I called the patient and he notes that he received an email that if his provider is out of network he could be billed etc. He then called his insurance company and was told that Dr. Gregory/[facility] was out of network.     I reached out to the pre-service rep that was working his surgery and she is reaching out to her supervisor for assistance as she is surprised by [facility]/Dr. Gregory not being in network with Medicare.     I advised the patient call his insurance company and confirm and speak with a supervisor to confirm whether or not he is in or ot of network and let us know. I informed him that I will also let him know what I hear back from our pre-service department

## 2023-06-07 ENCOUNTER — OFFICE VISIT (OUTPATIENT)
Dept: SPORTS MEDICINE | Facility: CLINIC | Age: 78
End: 2023-06-07
Payer: MEDICARE

## 2023-06-07 VITALS
HEIGHT: 70 IN | HEART RATE: 72 BPM | BODY MASS INDEX: 27.35 KG/M2 | WEIGHT: 191 LBS | SYSTOLIC BLOOD PRESSURE: 138 MMHG | DIASTOLIC BLOOD PRESSURE: 67 MMHG

## 2023-06-07 DIAGNOSIS — S83.241A ACUTE TEAR MEDIAL MENISCUS, RIGHT, INITIAL ENCOUNTER: Primary | ICD-10-CM

## 2023-06-07 DIAGNOSIS — M67.51 PLICA SYNDROME, RIGHT KNEE: ICD-10-CM

## 2023-06-07 PROCEDURE — 99499 UNLISTED E&M SERVICE: CPT | Mod: S$PBB,,, | Performed by: PHYSICIAN ASSISTANT

## 2023-06-07 PROCEDURE — 99999 PR PBB SHADOW E&M-EST. PATIENT-LVL V: CPT | Mod: PBBFAC,,, | Performed by: PHYSICIAN ASSISTANT

## 2023-06-07 PROCEDURE — 99215 OFFICE O/P EST HI 40 MIN: CPT | Mod: PBBFAC | Performed by: PHYSICIAN ASSISTANT

## 2023-06-07 PROCEDURE — 99999 PR PBB SHADOW E&M-EST. PATIENT-LVL V: ICD-10-PCS | Mod: PBBFAC,,, | Performed by: PHYSICIAN ASSISTANT

## 2023-06-07 PROCEDURE — 99499 NO LOS: ICD-10-PCS | Mod: S$PBB,,, | Performed by: PHYSICIAN ASSISTANT

## 2023-06-08 ENCOUNTER — PATIENT MESSAGE (OUTPATIENT)
Dept: SPORTS MEDICINE | Facility: CLINIC | Age: 78
End: 2023-06-08
Payer: MEDICARE

## 2023-06-12 ENCOUNTER — TELEPHONE (OUTPATIENT)
Dept: SPORTS MEDICINE | Facility: CLINIC | Age: 78
End: 2023-06-12
Payer: MEDICARE

## 2023-06-12 NOTE — PRE-PROCEDURE INSTRUCTIONS
Spoke with patient by phone, reviewed Cardiologist's recommendations and confirmed that he has already stopped his Aspirin and Plavix. Verbalized understanding that he will be on DVT prophylaxis (most likely with Aspirin) after surgery and that Plavix should be resumed after that is completed. Verbalizes understanding. Wife was on the phone as well and states she understands instructions.

## 2023-06-12 NOTE — TELEPHONE ENCOUNTER
Spoke with patient. Patient was informed to arrive on tomorrow at 9:30 AM in [address] on the 1st Floor.

## 2023-06-13 ENCOUNTER — ANESTHESIA (OUTPATIENT)
Dept: SURGERY | Facility: HOSPITAL | Age: 78
End: 2023-06-13
Payer: MEDICARE

## 2023-06-13 ENCOUNTER — HOSPITAL ENCOUNTER (OUTPATIENT)
Facility: HOSPITAL | Age: 78
Discharge: HOME | End: 2023-06-13
Attending: ORTHOPAEDIC SURGERY | Admitting: ORTHOPAEDIC SURGERY
Payer: MEDICARE

## 2023-06-13 VITALS
SYSTOLIC BLOOD PRESSURE: 158 MMHG | BODY MASS INDEX: 26.46 KG/M2 | TEMPERATURE: 98 F | RESPIRATION RATE: 22 BRPM | HEART RATE: 56 BPM | WEIGHT: 189 LBS | DIASTOLIC BLOOD PRESSURE: 72 MMHG | OXYGEN SATURATION: 98 % | HEIGHT: 71 IN

## 2023-06-13 DIAGNOSIS — M67.51 PLICA SYNDROME, RIGHT KNEE: ICD-10-CM

## 2023-06-13 DIAGNOSIS — S83.241A ACUTE TEAR MEDIAL MENISCUS, RIGHT, INITIAL ENCOUNTER: ICD-10-CM

## 2023-06-13 PROCEDURE — 37000008 HC ANESTHESIA 1ST 15 MINUTES: Performed by: ORTHOPAEDIC SURGERY

## 2023-06-13 PROCEDURE — 36000710: Performed by: ORTHOPAEDIC SURGERY

## 2023-06-13 PROCEDURE — 29880 PR KNEE SCOPE MED/LAT MENISCECTOMY: ICD-10-PCS | Mod: AS,RT,, | Performed by: PHYSICIAN ASSISTANT

## 2023-06-13 PROCEDURE — 25000003 PHARM REV CODE 250: Performed by: NURSE ANESTHETIST, CERTIFIED REGISTERED

## 2023-06-13 PROCEDURE — 63600175 PHARM REV CODE 636 W HCPCS: Performed by: PHYSICIAN ASSISTANT

## 2023-06-13 PROCEDURE — D9220A PRA ANESTHESIA: ICD-10-PCS | Mod: ANES,,, | Performed by: STUDENT IN AN ORGANIZED HEALTH CARE EDUCATION/TRAINING PROGRAM

## 2023-06-13 PROCEDURE — 63600175 PHARM REV CODE 636 W HCPCS: Performed by: ORTHOPAEDIC SURGERY

## 2023-06-13 PROCEDURE — 29880 PR KNEE SCOPE MED/LAT MENISCECTOMY: ICD-10-PCS | Mod: RT,,, | Performed by: ORTHOPAEDIC SURGERY

## 2023-06-13 PROCEDURE — 29880 ARTHRS KNE SRG MNISECTMY M&L: CPT | Mod: AS,RT,, | Performed by: PHYSICIAN ASSISTANT

## 2023-06-13 PROCEDURE — D9220A PRA ANESTHESIA: ICD-10-PCS | Mod: CRNA,,, | Performed by: NURSE ANESTHETIST, CERTIFIED REGISTERED

## 2023-06-13 PROCEDURE — D9220A PRA ANESTHESIA: Mod: CRNA,,, | Performed by: NURSE ANESTHETIST, CERTIFIED REGISTERED

## 2023-06-13 PROCEDURE — D9220A PRA ANESTHESIA: Mod: ANES,,, | Performed by: STUDENT IN AN ORGANIZED HEALTH CARE EDUCATION/TRAINING PROGRAM

## 2023-06-13 PROCEDURE — 99900035 HC TECH TIME PER 15 MIN (STAT)

## 2023-06-13 PROCEDURE — 25000003 PHARM REV CODE 250: Performed by: PHYSICIAN ASSISTANT

## 2023-06-13 PROCEDURE — 71000015 HC POSTOP RECOV 1ST HR: Performed by: ORTHOPAEDIC SURGERY

## 2023-06-13 PROCEDURE — 29880 ARTHRS KNE SRG MNISECTMY M&L: CPT | Mod: RT,,, | Performed by: ORTHOPAEDIC SURGERY

## 2023-06-13 PROCEDURE — 27201423 OPTIME MED/SURG SUP & DEVICES STERILE SUPPLY: Performed by: ORTHOPAEDIC SURGERY

## 2023-06-13 PROCEDURE — 36000711: Performed by: ORTHOPAEDIC SURGERY

## 2023-06-13 PROCEDURE — 37000009 HC ANESTHESIA EA ADD 15 MINS: Performed by: ORTHOPAEDIC SURGERY

## 2023-06-13 PROCEDURE — 63600175 PHARM REV CODE 636 W HCPCS: Performed by: NURSE ANESTHETIST, CERTIFIED REGISTERED

## 2023-06-13 PROCEDURE — 94761 N-INVAS EAR/PLS OXIMETRY MLT: CPT

## 2023-06-13 PROCEDURE — 71000033 HC RECOVERY, INTIAL HOUR: Performed by: ORTHOPAEDIC SURGERY

## 2023-06-13 PROCEDURE — 25000003 PHARM REV CODE 250: Performed by: ORTHOPAEDIC SURGERY

## 2023-06-13 RX ORDER — KETAMINE HCL IN 0.9 % NACL 50 MG/5 ML
SYRINGE (ML) INTRAVENOUS
Status: DISCONTINUED | OUTPATIENT
Start: 2023-06-13 | End: 2023-06-13

## 2023-06-13 RX ORDER — DOCUSATE SODIUM 100 MG/1
100 CAPSULE, LIQUID FILLED ORAL 2 TIMES DAILY PRN
Qty: 14 CAPSULE | Refills: 0 | Status: SHIPPED | OUTPATIENT
Start: 2023-06-13

## 2023-06-13 RX ORDER — ONDANSETRON 2 MG/ML
4 INJECTION INTRAMUSCULAR; INTRAVENOUS EVERY 12 HOURS PRN
Status: DISCONTINUED | OUTPATIENT
Start: 2023-06-13 | End: 2023-06-13 | Stop reason: HOSPADM

## 2023-06-13 RX ORDER — HYDROCODONE BITARTRATE AND ACETAMINOPHEN 10; 325 MG/1; MG/1
TABLET ORAL
Qty: 8 TABLET | Refills: 0 | Status: SHIPPED | OUTPATIENT
Start: 2023-06-13 | End: 2023-06-26

## 2023-06-13 RX ORDER — TRAMADOL HYDROCHLORIDE 50 MG/1
50-100 TABLET ORAL EVERY 6 HOURS PRN
Qty: 15 TABLET | Refills: 0 | Status: SHIPPED | OUTPATIENT
Start: 2023-06-13 | End: 2023-06-26 | Stop reason: SDUPTHER

## 2023-06-13 RX ORDER — PROPOFOL 10 MG/ML
VIAL (ML) INTRAVENOUS
Status: DISCONTINUED | OUTPATIENT
Start: 2023-06-13 | End: 2023-06-13

## 2023-06-13 RX ORDER — MORPHINE SULFATE 2 MG/ML
2 INJECTION, SOLUTION INTRAMUSCULAR; INTRAVENOUS EVERY 10 MIN PRN
Status: DISCONTINUED | OUTPATIENT
Start: 2023-06-13 | End: 2023-06-13 | Stop reason: HOSPADM

## 2023-06-13 RX ORDER — SODIUM CHLORIDE 9 MG/ML
INJECTION, SOLUTION INTRAVENOUS CONTINUOUS
Status: CANCELLED | OUTPATIENT
Start: 2023-06-13

## 2023-06-13 RX ORDER — PROMETHAZINE HYDROCHLORIDE 25 MG/1
25 TABLET ORAL EVERY 6 HOURS PRN
Qty: 8 TABLET | Refills: 0 | Status: SHIPPED | OUTPATIENT
Start: 2023-06-13 | End: 2023-07-18

## 2023-06-13 RX ORDER — HALOPERIDOL 5 MG/ML
0.5 INJECTION INTRAMUSCULAR EVERY 10 MIN PRN
Status: DISCONTINUED | OUTPATIENT
Start: 2023-06-13 | End: 2023-06-13 | Stop reason: HOSPADM

## 2023-06-13 RX ORDER — FAMOTIDINE 10 MG/ML
INJECTION INTRAVENOUS
Status: DISCONTINUED | OUTPATIENT
Start: 2023-06-13 | End: 2023-06-13

## 2023-06-13 RX ORDER — KETOROLAC TROMETHAMINE 30 MG/ML
INJECTION, SOLUTION INTRAMUSCULAR; INTRAVENOUS
Status: DISCONTINUED | OUTPATIENT
Start: 2023-06-13 | End: 2023-06-13 | Stop reason: HOSPADM

## 2023-06-13 RX ORDER — ONDANSETRON 2 MG/ML
4 INJECTION INTRAMUSCULAR; INTRAVENOUS DAILY PRN
Status: DISCONTINUED | OUTPATIENT
Start: 2023-06-13 | End: 2023-06-13 | Stop reason: HOSPADM

## 2023-06-13 RX ORDER — OXYCODONE HYDROCHLORIDE 5 MG/1
10 TABLET ORAL EVERY 4 HOURS PRN
Status: DISCONTINUED | OUTPATIENT
Start: 2023-06-13 | End: 2023-06-13 | Stop reason: HOSPADM

## 2023-06-13 RX ORDER — PHENYLEPHRINE HYDROCHLORIDE 10 MG/ML
INJECTION INTRAVENOUS
Status: DISCONTINUED | OUTPATIENT
Start: 2023-06-13 | End: 2023-06-13

## 2023-06-13 RX ORDER — ACETAMINOPHEN 500 MG
1000 TABLET ORAL EVERY 6 HOURS PRN
COMMUNITY

## 2023-06-13 RX ORDER — SODIUM CHLORIDE 9 MG/ML
INJECTION, SOLUTION INTRAVENOUS CONTINUOUS
Status: DISCONTINUED | OUTPATIENT
Start: 2023-06-13 | End: 2023-06-13 | Stop reason: HOSPADM

## 2023-06-13 RX ORDER — MIDAZOLAM HYDROCHLORIDE 1 MG/ML
INJECTION INTRAMUSCULAR; INTRAVENOUS
Status: DISCONTINUED | OUTPATIENT
Start: 2023-06-13 | End: 2023-06-13

## 2023-06-13 RX ORDER — PROMETHAZINE HYDROCHLORIDE 25 MG/1
25 TABLET ORAL EVERY 6 HOURS PRN
Status: DISCONTINUED | OUTPATIENT
Start: 2023-06-13 | End: 2023-06-13 | Stop reason: HOSPADM

## 2023-06-13 RX ORDER — TRAMADOL HYDROCHLORIDE 50 MG/1
100 TABLET ORAL EVERY 6 HOURS PRN
Status: DISCONTINUED | OUTPATIENT
Start: 2023-06-13 | End: 2023-06-13 | Stop reason: HOSPADM

## 2023-06-13 RX ORDER — ROPIVACAINE HYDROCHLORIDE 5 MG/ML
INJECTION, SOLUTION EPIDURAL; INFILTRATION; PERINEURAL
Status: DISCONTINUED | OUTPATIENT
Start: 2023-06-13 | End: 2023-06-13 | Stop reason: HOSPADM

## 2023-06-13 RX ORDER — OXYCODONE HYDROCHLORIDE 5 MG/1
5 TABLET ORAL
Status: DISCONTINUED | OUTPATIENT
Start: 2023-06-13 | End: 2023-06-13 | Stop reason: HOSPADM

## 2023-06-13 RX ORDER — FENTANYL CITRATE 50 UG/ML
25 INJECTION, SOLUTION INTRAMUSCULAR; INTRAVENOUS EVERY 5 MIN PRN
Status: DISCONTINUED | OUTPATIENT
Start: 2023-06-13 | End: 2023-06-13 | Stop reason: HOSPADM

## 2023-06-13 RX ORDER — EPINEPHRINE 1 MG/ML
INJECTION, SOLUTION, CONCENTRATE INTRAVENOUS
Status: DISCONTINUED | OUTPATIENT
Start: 2023-06-13 | End: 2023-06-13 | Stop reason: HOSPADM

## 2023-06-13 RX ORDER — LIDOCAINE HYDROCHLORIDE 20 MG/ML
INJECTION INTRAVENOUS
Status: DISCONTINUED | OUTPATIENT
Start: 2023-06-13 | End: 2023-06-13

## 2023-06-13 RX ORDER — CEFAZOLIN SODIUM 2 G/50ML
2 SOLUTION INTRAVENOUS
Status: CANCELLED | OUTPATIENT
Start: 2023-06-13

## 2023-06-13 RX ORDER — FENTANYL CITRATE 50 UG/ML
INJECTION, SOLUTION INTRAMUSCULAR; INTRAVENOUS
Status: DISCONTINUED | OUTPATIENT
Start: 2023-06-13 | End: 2023-06-13

## 2023-06-13 RX ORDER — KETAMINE HYDROCHLORIDE 100 MG/ML
INJECTION, SOLUTION INTRAMUSCULAR; INTRAVENOUS
Status: DISCONTINUED | OUTPATIENT
Start: 2023-06-13 | End: 2023-06-13 | Stop reason: HOSPADM

## 2023-06-13 RX ORDER — ONDANSETRON 2 MG/ML
INJECTION INTRAMUSCULAR; INTRAVENOUS
Status: DISCONTINUED | OUTPATIENT
Start: 2023-06-13 | End: 2023-06-13

## 2023-06-13 RX ORDER — HYDROMORPHONE HYDROCHLORIDE 1 MG/ML
0.2 INJECTION, SOLUTION INTRAMUSCULAR; INTRAVENOUS; SUBCUTANEOUS EVERY 5 MIN PRN
Status: DISCONTINUED | OUTPATIENT
Start: 2023-06-13 | End: 2023-06-13 | Stop reason: HOSPADM

## 2023-06-13 RX ADMIN — PHENYLEPHRINE HYDROCHLORIDE 100 MCG: 10 INJECTION INTRAVENOUS at 12:06

## 2023-06-13 RX ADMIN — FAMOTIDINE 20 MG: 10 INJECTION, SOLUTION INTRAVENOUS at 12:06

## 2023-06-13 RX ADMIN — FENTANYL CITRATE 25 MCG: 50 INJECTION, SOLUTION INTRAMUSCULAR; INTRAVENOUS at 01:06

## 2023-06-13 RX ADMIN — Medication 20 MG: at 12:06

## 2023-06-13 RX ADMIN — ONDANSETRON 4 MG: 2 INJECTION, SOLUTION INTRAMUSCULAR; INTRAVENOUS at 12:06

## 2023-06-13 RX ADMIN — CEFAZOLIN 2 G: 2 INJECTION, POWDER, FOR SOLUTION INTRAMUSCULAR; INTRAVENOUS at 12:06

## 2023-06-13 RX ADMIN — MIDAZOLAM HYDROCHLORIDE 1 MG: 1 INJECTION, SOLUTION INTRAMUSCULAR; INTRAVENOUS at 12:06

## 2023-06-13 RX ADMIN — PROPOFOL 50 MG: 10 INJECTION, EMULSION INTRAVENOUS at 01:06

## 2023-06-13 RX ADMIN — SODIUM CHLORIDE, SODIUM GLUCONATE, SODIUM ACETATE, POTASSIUM CHLORIDE, MAGNESIUM CHLORIDE, SODIUM PHOSPHATE, DIBASIC, AND POTASSIUM PHOSPHATE: .53; .5; .37; .037; .03; .012; .00082 INJECTION, SOLUTION INTRAVENOUS at 01:06

## 2023-06-13 RX ADMIN — LIDOCAINE HYDROCHLORIDE 100 MG: 20 INJECTION INTRAVENOUS at 12:06

## 2023-06-13 RX ADMIN — FENTANYL CITRATE 50 MCG: 50 INJECTION, SOLUTION INTRAMUSCULAR; INTRAVENOUS at 12:06

## 2023-06-13 RX ADMIN — SODIUM CHLORIDE: 0.9 INJECTION, SOLUTION INTRAVENOUS at 11:06

## 2023-06-13 RX ADMIN — PROPOFOL 150 MG: 10 INJECTION, EMULSION INTRAVENOUS at 12:06

## 2023-06-13 NOTE — ANESTHESIA PREPROCEDURE EVALUATION
06/13/2023  Pre-operative evaluation for Procedure(s) (LRB):  ARTHROSCOPY, KNEE, WITH MENISCECTOMY (Right)  ARTHROSCOPY, KNEE, WITH CHONDROPLASTY (Right)  EXCISION, PLICA, KNEE, ARTHROSCOPIC (Right)    Ghanshyam Sanford Jr. is a 78 y.o. male w/ PHMx of aortic stenosis s/p TAVR in 2021, LBBB (remained stable sine the procedure), CAD s/p PCI (RCA and LAD 2020, on plavix),  polymyalgia on steroids, HTN      TTE 12/2022   -The study quality is average.     -Suboptimal parasternal views.    -The left ventricle is normal in size. Global left ventricular systolic function is normal. The left ventricular ejection fraction is 55%. Left ventricular diastolic function is indeterminate. Mild concentric left ventricular hypertrophy is present.  -The left atrial diameter is mildly increased.    -Mild mitral annular calcification is noted.    -The bio prosthetic valve in the aortic position appears to be functioning normally. Aortic valve area continuity equation is 2.1 cm². Trace perivalvular leak.    -The pulmonary artery systolic pressure is 24 mmHg.         Patient Active Problem List   Diagnosis    Hypertension    Insomnia    HLA B27 (HLA B27 positive)    PMR (polymyalgia rheumatica)    Knee pain, bilateral    S/P medial meniscectomy of right knee    Right ACL tear    Right knee pain    Osteoarthritis of right knee    Osteopenia    Compression fracture of first lumbar vertebra    Closed compression fracture of thoracic vertebra    Restless legs syndrome (RLS)    Neuropathy    Chronic diastolic heart failure    LBBB (left bundle branch block)       Review of patient's allergies indicates:   Allergen Reactions    Dexamethasone sodium phosphate Other (See Comments)    Atorvastatin calcium Other (See Comments)     Leg cramps    Cortisone      Inj- pt reports hiccups and feels spasm     Duloxetine Other  (See Comments)     DRY MONTH  NOT ABLE TO SLEEP  NOT HUNDRY  SWEATING A LOT  FEELING TIRED AND WEAK  DIZZINESS  WEIGHT LOSS ( 9 LB.)    Flomax [tamsulosin] Diarrhea    Pravastatin Other (See Comments)     Leg cramps       No current facility-administered medications on file prior to encounter.     Current Outpatient Medications on File Prior to Encounter   Medication Sig Dispense Refill    acetaminophen (TYLENOL) 500 MG tablet Take 500 mg by mouth every 6 (six) hours as needed for Pain.      ascorbic acid, vitamin C, (VITAMIN C) 1000 MG tablet Take 1,000 mg by mouth once daily.      aspirin (ECOTRIN) 81 MG EC tablet Take 81 mg by mouth once daily.      chlorthalidone (HYGROTEN) 25 MG Tab Take 12.5 mg by mouth.      coenzyme Q10 100 mg capsule Take 200 mg by mouth once daily.      docusate sodium (COLACE) 100 MG capsule Take 100 mg by mouth 2 (two) times daily.      doxazosin (CARDURA) 2 MG tablet Take 2 mg by mouth every evening.      ferrous sulfate (FEOSOL) 325 mg (65 mg iron) Tab tablet Take 325 mg by mouth daily with breakfast.      gabapentin (NEURONTIN) 300 MG capsule Take 300mg in the am, 300mg at noon and 600mg at night 360 capsule 3    multivitamin (THERAGRAN) per tablet Take 1 tablet by mouth once daily.      mv-mn/iron/folic acid/herb 190 (VITAMIN D3 COMPLETE ORAL) Take 5,000 Units by mouth Daily.      olmesartan (BENICAR) 40 MG tablet Take 20 mg by mouth once daily.      predniSONE (DELTASONE) 1 MG tablet Take 1 mg by mouth once daily.      predniSONE (DELTASONE) 10 MG tablet Take by mouth.      rosuvastatin (CRESTOR) 20 MG tablet Take 20 mg by mouth once daily.      zolpidem (AMBIEN) 10 mg Tab Take 5 mg by mouth nightly as needed.      alfuzosin (UROXATRAL) 10 mg Tb24 Take 10 mg by mouth daily with breakfast.      clopidogreL (PLAVIX) 75 mg tablet Take 75 mg by mouth once daily.      ezetimibe (ZETIA) 10 mg tablet Take 10 mg by mouth.      magnesium 30 mg Tab Take by mouth once.       nitroGLYCERIN (NITROSTAT) 0.4 MG SL tablet Place 0.4 mg under the tongue every 5 (five) minutes as needed.      rOPINIRole (REQUIP) 0.25 MG tablet TAKE 1 TABLET BY MOUTH ONCE NIGHTLY AS NEEDED 90 tablet 1       Past Surgical History:   Procedure Laterality Date    BACK SURGERY      CAROTID STENT      HERNIA REPAIR      KNEE ARTHROPLASTY Right     PERCUTANEOUS TRANSCATHETER AORTIC VALVE REPLACEMENT (TAVR) Right 2021    Procedure: REPLACEMENT, AORTIC VALVE, PERCUTANEOUS, TRANSCATHETER;  Surgeon: Johny Lockett MD;  Location: [facility] CATH;  Service: Cardiology;  Laterality: Right;  ops # 8    PERCUTANEOUS TRANSCATHETER AORTIC VALVE REPLACEMENT (TAVR) Right 2021    Procedure: REPLACEMENT, AORTIC VALVE, PERCUTANEOUS, TRANSCATHETER;  Surgeon: Sushma Piña MD;  Location: [facility] CATH;  Service: Cardiovascular;  Laterality: Right;       Social History     Socioeconomic History    Marital status:    Tobacco Use    Smoking status: Never    Smokeless tobacco: Never   Substance and Sexual Activity    Alcohol use: Never    Drug use: Never         CBC: No results for input(s): WBC, RBC, HGB, HCT, PLT, MCV, MCH, MCHC in the last 72 hours.    CMP: No results for input(s): NA, K, CL, CO2, BUN, CREATININE, GLU, MG, PHOS, CALCIUM, ALBUMIN, PROT, ALKPHOS, ALT, AST, BILITOT in the last 72 hours.    INR  No results for input(s): PT, INR, PROTIME, APTT in the last 72 hours.        Diagnostic Studies:      [facility] Echo:  No results found for this or any previous visit.      Pre-op Assessment    I have reviewed the Patient Summary Reports.     I have reviewed the Nursing Notes. I have reviewed the NPO Status.   I have reviewed the Medications.     Review of Systems  Cardiovascular:   Hypertension CAD  Dysrhythmias         Physical Exam  General: Well nourished and Cooperative    Airway:  Mallampati: II   Mouth Opening: Normal  TM Distance: Normal  Tongue: Normal  Neck ROM: Normal  ROM    Chest/Lungs:  Clear to auscultation, Normal Respiratory Rate    Heart:  Rate: Normal  Rhythm: Regular Rhythm  Sounds: Normal        Anesthesia Plan  Type of Anesthesia, risks & benefits discussed:    Anesthesia Type: Gen ETT, Gen Supraglottic Airway  Intra-op Monitoring Plan: Standard ASA Monitors  Post Op Pain Control Plan: multimodal analgesia and IV/PO Opioids PRN  Induction:  IV  Airway Plan: Direct and Video, Post-Induction  Informed Consent: Informed consent signed with the Patient and all parties understand the risks and agree with anesthesia plan.  All questions answered.   ASA Score: 3    Ready For Surgery From Anesthesia Perspective.     .

## 2023-06-13 NOTE — OPERATIVE NOTE ADDENDUM
Certification of Assistant at Surgery       Surgery Date: 6/13/2023     Participating Surgeons:  Surgeon(s) and Role:     * Tari Gregory MD - Primary    DEDE Burden PA-C - 1st Assistant     Procedures:  Procedure(s) (LRB):  ARTHROSCOPY, KNEE, WITH MENISCECTOMY (Right)  ARTHROSCOPY, KNEE, WITH CHONDROPLASTY (Right)  EXCISION, PLICA, KNEE, ARTHROSCOPIC (Right)    Assistant Surgeon's Certification of Necessity:  I understand that section 1842 (b) (6) (d) of the Social Security Act generally prohibits Medicare Part B reasonable charge payment for the services of assistants at surgery in teaching hospitals when qualified residents are available to furnish such services. I certify that the services for which payment is claimed were medically necessary, and that no qualified resident was available to perform the services. I further understand that these services are subject to post-payment review by the Medicare carrier.         Petar Burden PA-C    06/13/2023  1:45 PM

## 2023-06-13 NOTE — H&P (VIEW-ONLY)
Ghanshyam Sanford Jr.  is here for a completion of his perioperative paperwork. he  Is scheduled to undergo      right              a. Knee arthroscopic medial meniscectomy                 b. Knee arthroscopic possible plica excision              c. Knee arthroscopic possible chondroplasty on 6/13/23.      He is a healthy individual and does need clearance for this procedure which he has received from cardiology and PCP.     PAST MEDICAL HISTORY:   Past Medical History:   Diagnosis Date    Anticoagulant long-term use     Aortic valve regurgitation     Aortic valve stenosis, nonrheumatic     Carotid stenosis, bilateral     Chronic diastolic heart failure, NYHA class 2     Chronic total occlusion of coronary artery     Coronary artery disease     Elevated PSA     UNDER CARE    Hypertension     Hypertensive heart disease     Osteoarthritis of right knee 3/22/2017    Polymyalgia     Restless legs     SOB (shortness of breath)      PAST SURGICAL HISTORY:   Past Surgical History:   Procedure Laterality Date    BACK SURGERY      CAROTID STENT      HERNIA REPAIR      KNEE ARTHROPLASTY Right     PERCUTANEOUS TRANSCATHETER AORTIC VALVE REPLACEMENT (TAVR) Right 12/14/2021    Procedure: REPLACEMENT, AORTIC VALVE, PERCUTANEOUS, TRANSCATHETER;  Surgeon: Johny Lockett MD;  Location: [facility] CATH;  Service: Cardiology;  Laterality: Right;  ops # 8    PERCUTANEOUS TRANSCATHETER AORTIC VALVE REPLACEMENT (TAVR) Right 12/14/2021    Procedure: REPLACEMENT, AORTIC VALVE, PERCUTANEOUS, TRANSCATHETER;  Surgeon: Sushma Piña MD;  Location: [facility] CATH;  Service: Cardiovascular;  Laterality: Right;     FAMILY HISTORY:   Family History   Problem Relation Age of Onset    Heart disease Mother     Hypertension Mother     Stroke Mother     Diabetes Mellitus Father     Heart disease Paternal Grandmother     Stroke Paternal Grandmother      SOCIAL HISTORY:   Social History     Socioeconomic History    Marital status:    Tobacco Use     "Smoking status: Never    Smokeless tobacco: Never   Substance and Sexual Activity    Alcohol use: Never    Drug use: Never       MEDICATIONS: No current outpatient medications on file.  ALLERGIES:   Review of patient's allergies indicates:   Allergen Reactions    Dexamethasone sodium phosphate Other (See Comments)    Atorvastatin calcium Other (See Comments)     Leg cramps    Cortisone      Inj- pt reports hiccups and feels spasm     Duloxetine Other (See Comments)     DRY MONTH  NOT ABLE TO SLEEP  NOT HUNDRY  SWEATING A LOT  FEELING TIRED AND WEAK  DIZZINESS  WEIGHT LOSS ( 9 LB.)    Flomax [tamsulosin] Diarrhea    Pravastatin Other (See Comments)     Leg cramps       VITAL SIGNS: /67   Pulse 72   Ht 5' 10" (1.778 m)   Wt 86.6 kg (191 lb)   BMI 27.41 kg/m²      Risks, indications and benefits of the surgical procedure were discussed with the patient. All questions with regard to surgery, rehab, expected return to functional activities, activities of daily living and recreational endeavors were answered to his satisfaction.    It was explained to the patient that there may be an increase in surgical risks if the patient has certain co-morbidities such as but not limited to: Obesity, Cardiovascular issues (CHF, CAD, Arrhythmias), chronic pulmonary issues, previous or current neurovascular/neurological issues, previous strokes, diabetes mellitus, previous wound healing issues, previous wound or skin infections, PVD, clotting disorders, if the patient uses chronic steroids, if the patient takes or has immune compromising medications or diseases, or has previously or currently used tobacco products.     The patient verbalized that he/she does not have any additional clotting, bleeding, or blood disorders, other than what is list in her chart on today's review.     Then a brief history and physical exam were performed.    Review of Systems   Constitution: Negative. Negative for chills, fever and night sweats. "   HENT: Negative for congestion and headaches.    Eyes: Negative for blurred vision, left vision loss and right vision loss.   Cardiovascular: Negative for chest pain and syncope.   Respiratory: Negative for cough and shortness of breath.    Endocrine: Negative for polydipsia, polyphagia and polyuria.   Hematologic/Lymphatic: Negative for bleeding problem. Does not bruise/bleed easily.   Skin: Negative for dry skin, itching and rash.   Musculoskeletal: Negative for falls and muscle weakness.   Gastrointestinal: Negative for abdominal pain and bowel incontinence.   Genitourinary: Negative for bladder incontinence and nocturia.   Neurological: Negative for disturbances in coordination, loss of balance and seizures.   Psychiatric/Behavioral: Negative for depression. The patient does not have insomnia.    Allergic/Immunologic: Negative for hives and persistent infections.     PHYSICAL EXAM:  GEN: A&Ox3, WD WN NAD  HEENT: WNL  CHEST: CTAB, no W/R/R  HEART: RRR, no M/R/G  ABD: Soft, NT ND, BS x4 QUADS  MS; See Epic  NEURO: CN II-XII intact       The surgical consent was then reviewed with the patient, who agreed with all the contents of the consent form and it was signed. he was then given the surgery packet to bring with him to surgery center for the anesthesia portion of his perioperative paperwork (if needed)   For all physicians except for Dr. Lawson, we will email and possibly fax the consent forms and booking sheets to [facility].    The patient was given the opportunity to ask questions about the surgical plan and consent form, and once no other questions were asked, I proceeded with the pre-op appointment.    PHYSICAL THERAPY:  He was also instructed regarding physical therapy and will begin on  POD1. He was given a copy of the original prescription to schedule. Another copy of this prescription was also faxed to [facility].    POST OP CARE:instructions were reviewed including care of the wound  and dressing after surgery and when he can shower. Patient was told not sleep or lay on there surgical extremity following surgery as this could cause repair damage, tissue damage, or nerve injury.    An extensive amount of time was spent on discussion of the following information based on what type of surgery the patient was having. Patient expressed understanding of the material below:    Shoulder surgery or upper extremity surgery requiring post-op sling:  It was explained to the patient that they should remove their arm from the sling approximately 6 times per day to do full elbow ROM (flexion and extension) and full supination and pronation of the elbow for approximately 5 minutes at a time to help prevent elbow stiffness, nerve pain or problems, or nerve injury. They were told to contact us if they begin having numbness and tingling of there surgical extremity that persists longer then 1 day without relief.     Extremity surgery requiring a splint:   It was explained to patient on how to properly elevated position there extremity to prevent pressure ulcers from occurring. I made sure that the patient understood that that surgical site may be numb following surgery and prevent them from feeling pressure pain that they would normal feel if a pressure injury was occurring. Pressure ulcers and there causes were discussed with the patient today.     Post-operative splint:  It was explain to the patient that they can contact us at anytime if they feel that there is a problem with their splint or under their splint that needs evaluation. If there is concern, questions, or discomfort with the splint then they can present to either our clinic or the [facility] ED for removal, evaluation, and replacement of the splint.    CRUTCHES OR WALKER: It was explained to the patient that if they are having a lower extremity surgery that they will require either a walker or crutches to ambulate safely with after surgery.  It was explained that a cane or other assistive devices are not sufficient to safely ambulate with after surgery. I explained to the patient that I will place an order for them to receive either crutches or a walker after surgery to go home with. It was explained that if they have crutches or a walker at home already, that they are REQUIRED to bring them to the hospital on the day of surgery. It was explained that if they do not have them at the hospital on the day of surgery that they WILL be provided a new pair or crutches or a walker to go home with to ensure ambulation will be safe if the patient needs to stop somewhere on the way home.      PAIN MANAGEMENT: Ghanshyam GUTIERREZ Miraantonietademar . was also given a pain management regime, which includes the option of getting a TENS unit given to him by [facility] (if patient chooses) along with the education required for its use. He was also instructed regarding the Polar ice unit or gel ice packs (chosen by patient) that will be in place after surgery and his postoperative pain medications.     Patient understands that Polar Ice machine has to be bought today if they want it. It cannot be bought on day of surgery at surgery center.     PAIN MEDICATION:  Norco 10/325mg 1 po q 4-6 hours prn pain  Ultram 50 mg Take 1-2 p.o. q.6 hours p.r.n. breakthrough pain,   Phenergan 25 mg one p.o. q.6 hours p.r.n. nausea and vomiting.    DVT prophylaxis was discussed with the patient today including risk factors for developing DVTs and history of DVTs. The patient was asked if any specific recommendations were given from the doctor/s that did pre-operative surgical clearance. The patient was then given an education sheet about DVTs and PE with warning signs and symptoms of both and steps to take if they suspect either of these.    This along with the Modified Caprini risk assessment model for VTE in general surgical patients was used to determine the patient's DVT risk.     From: Han HOLLIDAY,  Saurabh DA, Myla SM, et al. Prevention of VTE in nonorthopedic surgical patients: antithrombotic therapy and prevention of thrombosis, 9th ed: American College of Chest Physicians evidence-based clinical practical guidelines. Chest 2012; 141:e227S. Copyright © 2012. Reproduced with permission from the American College of Chest Physicians.    The below listed DVT prophylaxis regimen along with bilateral AMBROSE compression stockings will be used post-op. Length of treatment has been determined to be 10-42 days post-op by the above noted Caprini assessment model.     He will restart his plavix and aspirin on the day after surgery.     Patient denies history of seizures.          The patient was told that narcotic pain medications may make them drowsy and instructions were given to not sign legal documents, drive or operate heavy machinery, cars, or equipment while under the influence of narcotic medications. The patient was told and understands that narcotic pain medications should only be used as needed to control pain and that other options of pain control include TENs unit and ice packs/unit.     As there were no other questions to be asked, he was given my business card along with Tari Gregory MD business card if he has any questions or concerns prior to surgery or in the postop period.

## 2023-06-13 NOTE — ANESTHESIA PROCEDURE NOTES
Intubation    Date/Time: 6/13/2023 12:37 PM  Performed by: Jessica Harvey CRNA  Authorized by: Mir López MD     Intubation:     Induction:  Intravenous    Intubated:  Postinduction    Mask Ventilation:  Easy mask    Attempts:  1    Attempted By:  CRNA    Difficult Airway Encountered?: No      Complications:  None    Airway Device:  Supraglottic airway/LMA    Airway Device Size:  4.5    Placement Verified By:  Capnometry    Complicating Factors:  None    Findings Post-Intubation:  BS equal bilateral     97

## 2023-06-13 NOTE — H&P
Ghanshyam Sanford Jr.  is here for a completion of his perioperative paperwork. he  Is scheduled to undergo      right              a. Knee arthroscopic medial meniscectomy                 b. Knee arthroscopic possible plica excision              c. Knee arthroscopic possible chondroplasty on 6/13/23.      He is a healthy individual and does need clearance for this procedure which he has received from cardiology and PCP.     PAST MEDICAL HISTORY:   Past Medical History:   Diagnosis Date    Anticoagulant long-term use     Aortic valve regurgitation     Aortic valve stenosis, nonrheumatic     Carotid stenosis, bilateral     Chronic diastolic heart failure, NYHA class 2     Chronic total occlusion of coronary artery     Coronary artery disease     Elevated PSA     UNDER CARE    Hypertension     Hypertensive heart disease     Osteoarthritis of right knee 3/22/2017    Polymyalgia     Restless legs     SOB (shortness of breath)      PAST SURGICAL HISTORY:   Past Surgical History:   Procedure Laterality Date    BACK SURGERY      CAROTID STENT      HERNIA REPAIR      KNEE ARTHROPLASTY Right     PERCUTANEOUS TRANSCATHETER AORTIC VALVE REPLACEMENT (TAVR) Right 12/14/2021    Procedure: REPLACEMENT, AORTIC VALVE, PERCUTANEOUS, TRANSCATHETER;  Surgeon: Johny Lockett MD;  Location: [facility] CATH;  Service: Cardiology;  Laterality: Right;  ops # 8    PERCUTANEOUS TRANSCATHETER AORTIC VALVE REPLACEMENT (TAVR) Right 12/14/2021    Procedure: REPLACEMENT, AORTIC VALVE, PERCUTANEOUS, TRANSCATHETER;  Surgeon: Sushma Piña MD;  Location: [facility] CATH;  Service: Cardiovascular;  Laterality: Right;     FAMILY HISTORY:   Family History   Problem Relation Age of Onset    Heart disease Mother     Hypertension Mother     Stroke Mother     Diabetes Mellitus Father     Heart disease Paternal Grandmother     Stroke Paternal Grandmother      SOCIAL HISTORY:   Social History     Socioeconomic History    Marital status:    Tobacco Use     "Smoking status: Never    Smokeless tobacco: Never   Substance and Sexual Activity    Alcohol use: Never    Drug use: Never       MEDICATIONS: No current outpatient medications on file.  ALLERGIES:   Review of patient's allergies indicates:   Allergen Reactions    Dexamethasone sodium phosphate Other (See Comments)    Atorvastatin calcium Other (See Comments)     Leg cramps    Cortisone      Inj- pt reports hiccups and feels spasm     Duloxetine Other (See Comments)     DRY MONTH  NOT ABLE TO SLEEP  NOT HUNDRY  SWEATING A LOT  FEELING TIRED AND WEAK  DIZZINESS  WEIGHT LOSS ( 9 LB.)    Flomax [tamsulosin] Diarrhea    Pravastatin Other (See Comments)     Leg cramps       VITAL SIGNS: /67   Pulse 72   Ht 5' 10" (1.778 m)   Wt 86.6 kg (191 lb)   BMI 27.41 kg/m²      Risks, indications and benefits of the surgical procedure were discussed with the patient. All questions with regard to surgery, rehab, expected return to functional activities, activities of daily living and recreational endeavors were answered to his satisfaction.    It was explained to the patient that there may be an increase in surgical risks if the patient has certain co-morbidities such as but not limited to: Obesity, Cardiovascular issues (CHF, CAD, Arrhythmias), chronic pulmonary issues, previous or current neurovascular/neurological issues, previous strokes, diabetes mellitus, previous wound healing issues, previous wound or skin infections, PVD, clotting disorders, if the patient uses chronic steroids, if the patient takes or has immune compromising medications or diseases, or has previously or currently used tobacco products.     The patient verbalized that he/she does not have any additional clotting, bleeding, or blood disorders, other than what is list in her chart on today's review.     Then a brief history and physical exam were performed.    Review of Systems   Constitution: Negative. Negative for chills, fever and night sweats. "   HENT: Negative for congestion and headaches.    Eyes: Negative for blurred vision, left vision loss and right vision loss.   Cardiovascular: Negative for chest pain and syncope.   Respiratory: Negative for cough and shortness of breath.    Endocrine: Negative for polydipsia, polyphagia and polyuria.   Hematologic/Lymphatic: Negative for bleeding problem. Does not bruise/bleed easily.   Skin: Negative for dry skin, itching and rash.   Musculoskeletal: Negative for falls and muscle weakness.   Gastrointestinal: Negative for abdominal pain and bowel incontinence.   Genitourinary: Negative for bladder incontinence and nocturia.   Neurological: Negative for disturbances in coordination, loss of balance and seizures.   Psychiatric/Behavioral: Negative for depression. The patient does not have insomnia.    Allergic/Immunologic: Negative for hives and persistent infections.     PHYSICAL EXAM:  GEN: A&Ox3, WD WN NAD  HEENT: WNL  CHEST: CTAB, no W/R/R  HEART: RRR, no M/R/G  ABD: Soft, NT ND, BS x4 QUADS  MS; See Epic  NEURO: CN II-XII intact       The surgical consent was then reviewed with the patient, who agreed with all the contents of the consent form and it was signed. he was then given the surgery packet to bring with him to surgery center for the anesthesia portion of his perioperative paperwork (if needed)   For all physicians except for Dr. Lawson, we will email and possibly fax the consent forms and booking sheets to [facility].    The patient was given the opportunity to ask questions about the surgical plan and consent form, and once no other questions were asked, I proceeded with the pre-op appointment.    PHYSICAL THERAPY:  He was also instructed regarding physical therapy and will begin on  POD1. He was given a copy of the original prescription to schedule. Another copy of this prescription was also faxed to [facility].    POST OP CARE:instructions were reviewed including care of the wound  and dressing after surgery and when he can shower. Patient was told not sleep or lay on there surgical extremity following surgery as this could cause repair damage, tissue damage, or nerve injury.    An extensive amount of time was spent on discussion of the following information based on what type of surgery the patient was having. Patient expressed understanding of the material below:    Shoulder surgery or upper extremity surgery requiring post-op sling:  It was explained to the patient that they should remove their arm from the sling approximately 6 times per day to do full elbow ROM (flexion and extension) and full supination and pronation of the elbow for approximately 5 minutes at a time to help prevent elbow stiffness, nerve pain or problems, or nerve injury. They were told to contact us if they begin having numbness and tingling of there surgical extremity that persists longer then 1 day without relief.     Extremity surgery requiring a splint:   It was explained to patient on how to properly elevated position there extremity to prevent pressure ulcers from occurring. I made sure that the patient understood that that surgical site may be numb following surgery and prevent them from feeling pressure pain that they would normal feel if a pressure injury was occurring. Pressure ulcers and there causes were discussed with the patient today.     Post-operative splint:  It was explain to the patient that they can contact us at anytime if they feel that there is a problem with their splint or under their splint that needs evaluation. If there is concern, questions, or discomfort with the splint then they can present to either our clinic or the [facility] ED for removal, evaluation, and replacement of the splint.    CRUTCHES OR WALKER: It was explained to the patient that if they are having a lower extremity surgery that they will require either a walker or crutches to ambulate safely with after surgery.  It was explained that a cane or other assistive devices are not sufficient to safely ambulate with after surgery. I explained to the patient that I will place an order for them to receive either crutches or a walker after surgery to go home with. It was explained that if they have crutches or a walker at home already, that they are REQUIRED to bring them to the hospital on the day of surgery. It was explained that if they do not have them at the hospital on the day of surgery that they WILL be provided a new pair or crutches or a walker to go home with to ensure ambulation will be safe if the patient needs to stop somewhere on the way home.      PAIN MANAGEMENT: Ghanshyam GUTIERREZ M[place]ietademar . was also given a pain management regime, which includes the option of getting a TENS unit given to him by [facility] (if patient chooses) along with the education required for its use. He was also instructed regarding the Polar ice unit or gel ice packs (chosen by patient) that will be in place after surgery and his postoperative pain medications.     Patient understands that Polar Ice machine has to be bought today if they want it. It cannot be bought on day of surgery at surgery center.     PAIN MEDICATION:  Norco 10/325mg 1 po q 4-6 hours prn pain  Ultram 50 mg Take 1-2 p.o. q.6 hours p.r.n. breakthrough pain,   Phenergan 25 mg one p.o. q.6 hours p.r.n. nausea and vomiting.    DVT prophylaxis was discussed with the patient today including risk factors for developing DVTs and history of DVTs. The patient was asked if any specific recommendations were given from the doctor/s that did pre-operative surgical clearance. The patient was then given an education sheet about DVTs and PE with warning signs and symptoms of both and steps to take if they suspect either of these.    This along with the Modified Caprini risk assessment model for VTE in general surgical patients was used to determine the patient's DVT risk.     From: Han HOLLIDAY,  Saurabh DA, Myla SM, et al. Prevention of VTE in nonorthopedic surgical patients: antithrombotic therapy and prevention of thrombosis, 9th ed: American College of Chest Physicians evidence-based clinical practical guidelines. Chest 2012; 141:e227S. Copyright © 2012. Reproduced with permission from the American College of Chest Physicians.    The below listed DVT prophylaxis regimen along with bilateral AMBROSE compression stockings will be used post-op. Length of treatment has been determined to be 10-42 days post-op by the above noted Caprini assessment model.     He will restart his plavix and aspirin on the day after surgery.     Patient denies history of seizures.          The patient was told that narcotic pain medications may make them drowsy and instructions were given to not sign legal documents, drive or operate heavy machinery, cars, or equipment while under the influence of narcotic medications. The patient was told and understands that narcotic pain medications should only be used as needed to control pain and that other options of pain control include TENs unit and ice packs/unit.     As there were no other questions to be asked, he was given my business card along with Tari Gregory MD business card if he has any questions or concerns prior to surgery or in the postop period.

## 2023-06-13 NOTE — PLAN OF CARE
VSS. Patient able to tolerate oral liquids. Patient reports tolerable pain level for discharge. Patient/family received home medication per bedside delivery. Dressing intact. Thigh TEDs intact. Patient instructed not to wear AMBROSE hose without wearing closed-back shoes or  socks due to increased risk of falls, verbalized understanding. Walker and crutches at home for home use. No distress noted. Patient states he is ready for discharge. Discharge instructions reviewed with patient and family, verbalized understanding. IV discontinued with catheter tip intact. Family at bedside to help patient dress. Patient wheeled to lobby via staff.

## 2023-06-13 NOTE — DISCHARGE SUMMARY
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    DISPOSITION: Home or Self Care    FINAL DIAGNOSIS:  right knee meniscus tear    FOLLOWUP: In clinic    DISCHARGE INSTRUCTIONS:    Discharge Procedure Orders   Diet general     Call MD for:  temperature >100.4     Call MD for:  persistent nausea and vomiting     Call MD for:  severe uncontrolled pain     Call MD for:  difficulty breathing, headache or visual disturbances     Call MD for:  redness, tenderness, or signs of infection (pain, swelling, redness, odor or green/yellow discharge around incision site)     Call MD for:  hives     Call MD for:  persistent dizziness or light-headedness     Ice to affected area   Order Comments: using barrier between ice and skin (specify duration&frequency)     No driving, operating heavy equipment or signing legal documents while taking pain medication     Remove dressing in 72 hours     Shower on day dressing removed (No bath)

## 2023-06-13 NOTE — OP NOTE
DATE OF PROCEDURE: 06/13/2023    SURGEON:  Tari Gregory M.D    ASSISTANT: DEDE Burden PA-C  The use of an assistant was medically necessary for positioning, skin retraction, and assistance with this procedure. The procedure could not be performed without the use of an assistant.   There was no qualified resident/fellow available for assistance with this procedure.    PREOPERATIVE DIAGNOSES:   right  1. knee medial and lateral meniscus tear   2. knee plica.   3. knee possible chondromalacia  4. knee synovitis  5. Knee adhesions    POSTOPERATIVE DIAGNOSES:   right  1. knee medial and lateral meniscus tear   2. knee plica.   3. knee chondromalacia  4. knee synovitis.   5. Knee adhesions  6. Knee loose body    PROCEDURE PERFORMED:   right  1. knee arthroscopic chondroplasty (CPT 92577)  2. knee arthroscopic medial and lateral (CPT 59510) meniscectomy   3. knee arthroscopic partial synovectomy/debridement (CPT 89749).   4. knee arthroscopic plica excision(CPT 61429).    5. Knee arthroscopic lysis of adhesions (CPT 12913)    ANESTHESIA: General with local 0.5% ropivicaine 30ml (5mg/ml), 60 mg ketamine, 60mg toradol (2ml toradol (30mg/ml))    BLOOD LOSS: Minimal.     DRAINS: None.     TOURNIQUET TIME: None.     COMPLICATIONS: None.     CONDITION ON TRANSFER: The patient was extubated and moved to the recovery room in stable condition, with compartments soft and capillary refill less than a   second in all digits.     BRIEF INDICATION OF MEDICAL NECESSITY: The patient is a 78 y.o. year-old male who has history and physical examination findings consistent with the above. Nonoperative versus operative options were discussed. The risks and benefits were discussed with the patient. The patient acknowledged understanding and wished to proceed with operative intervention. Informed consent was obtained prior to the procedure. Details of the surgical procedure were explained, including incisions and probable rehabilitation course.  The patient understands the likely length of convalescence after surgery; and we have explained the risks, benefits, and alternatives of surgery. Reasonable expectations and potential complications were discussed and acknowledged, including but not limited to infection, bleeding, blood clots, (DVT and/or PE), nerve injury, retear, instability, continued pain and stiffness. It was also explained that there was a chance of failure which may require further management. The patient agreed and understood and wished to proceed.     EXAMINATION UNDER ANESTHESIA of the OPERATIVE right KNEE: ROM 0-135 degrees, negative Lachman, negative pivot shift, stable to varus-valgus stress testing, negative effusion.     EXAMINATION UNDER ANESTHESIA of the NON-OPERATED left KNEE: ROM 0-135 degrees, negative Lachman, negative pivot shift, stable to varus-valgus stress testing, negative effusion.     PROCEDURE IN DETAIL: The correct operative site was marked by the operative surgeon.  The patient was then taken to the operating room and placed supine on the operating room table. General anesthesia was administered by the anesthesia team. All pressure points were carefully padded and checked. Preoperative antibiotics were administered. A well-padded tourniquet was placed high on the operative thigh. Examination was begun with the above findings. The non-operative leg was then placed a well-padded well-leg tellez, in a comfortable position. The operative leg was placed in an arthroscopic leg tellez at the level of the tourniquet. The operative leg was prepped and draped in the usual sterile fashion. After prepping and draping, the appropriate landmarks were noted on the skin.  2cc skin and sub-cutaneous tissue was infilttrated with local anesthetic mixture superolaterally at needle insufflation site. The knee was insufflated supero-laterally with saline. 9cc skin wheal and sub-cutaneous tissue and fat pad was infilttrated with local  anesthetic mixture at both portal sites; mid-lateral followed by infero-medial portals were created, and a systematic examination of the joint revealed the following:    There was no evidence of any suprapatellar pouch adhesions or compartmentalization.  There was no evidence of any loose bodies in the medial or lateral gutters.     In the patellofemoral compartment, there was chondral damage to:  Patella 10 x 15 mm grade 3  Chondroplasty was performed using arthroscopic shaver.    In the medial compartment there was no evidence of chondral damage.   In the medial compartment there was no evidence of meniscal instability.   Middle horn medial meniscus tear,  complex was debrided with arthroscopic shaver and biter.  25% was debrided over an area of 1 cm.  Root and hoop fibers remained intact.    Attention was then turned to the notch. The ACL and PCL were probed carefully and found to be stable.     There was a hypertrophic infrapatellar plica.  This was debrided using arthroscopic biter and shaver.    There was some scar about the ACL.  This was debrided in the standard fashion and lysis of adhesions was performed.    In the lateral compartment there was no evidence meniscal or chondral damage or meniscal instability.     In the lateral compartment there was no evidence of chondral damage.   In the lateral compartment there was no evidence of meniscal instability.   Posterior near root horn lateral meniscus tear,  parrot-beak was debrided with arthroscopic shaver and biter.  10% was debrided over an area of 1 cm.  Root and hoop fibers remained intact.    Synovitis was debrided in the knee as needed to the areas of concern in medial and lateral compartments.      The knee and incisions were then copiously irrigated and fluid was extravasated using suction.  The arthroscopic portal incisions were closed using inverted 4-0 Monocryl suture.  5cc skin and sub-cutaneous tissue and around portals were infilttrated with  local anesthetic mixture at both portal sites.  Steri-Strips were placed with Mastisol. Sterile TENS unit pads were placed which were medically necessary for pain relief. Wounds were dressed with Xeroform, 4x4s, and cast padding. AMBROSE hose was placed on the operative leg to match that of the AMBROSE hose placed preoperatively on the non-operative leg. Iceman was secured.  The patient was extubated and moved to the recovery room in stable condition with compartments soft and capillary refill less than a second in all digits.     POSTOPERATIVE PLAN: We will be following the arthroscopic partial meniscectomy guidelines with emphasis on patellar mobility.  This was discussed this with the patient's family after surgery.

## 2023-06-13 NOTE — PLAN OF CARE
Pre op complete. Patient resting comfortably. Call light in reach. Wife at bedside, belongings in locker. Waiting on preop orders (admit, abx, preop meds), anesthesia consent, site melissa, and H&P update. Patient has crutches & walker for discharge/home use.

## 2023-06-13 NOTE — TRANSFER OF CARE
"Anesthesia Transfer of Care Note    Patient: Ghanshyam Sanford Jr.    Procedure(s) Performed: Procedure(s) (LRB):  ARTHROSCOPY, KNEE, WITH MENISCECTOMY (Right)  ARTHROSCOPY, KNEE, WITH CHONDROPLASTY (Right)  EXCISION, PLICA, KNEE, ARTHROSCOPIC (Right)    Patient location: PACU    Anesthesia Type: general    Transport from OR: Transported from OR on 6-10 L/min O2 by face mask with adequate spontaneous ventilation    Post pain: adequate analgesia    Post assessment: no apparent anesthetic complications    Post vital signs: stable    Level of consciousness: sedated    Nausea/Vomiting: no nausea/vomiting    Complications: none    Transfer of care protocol was followed      Last vitals:   Visit Vitals  BP (!) 168/74 (BP Location: Right arm, Patient Position: Lying)   Pulse 60   Temp 36.4 °C (97.5 °F) (Tympanic)   Resp 16   Ht 5' 10.5" (1.791 m)   Wt 85.7 kg (189 lb)   SpO2 100%   BMI 26.74 kg/m²     "

## 2023-06-13 NOTE — ANESTHESIA POSTPROCEDURE EVALUATION
Anesthesia Post Evaluation    Patient: Ghanshyam Sanford JrNorm    Procedure(s) Performed: Procedure(s) (LRB):  ARTHROSCOPY, KNEE, WITH MENISCECTOMY (Right)  ARTHROSCOPY, KNEE, WITH CHONDROPLASTY (Right)  EXCISION, PLICA, KNEE, ARTHROSCOPIC (Right)    Final Anesthesia Type: general      Patient location during evaluation: PACU  Patient participation: Yes- Able to Participate  Level of consciousness: awake and alert  Post-procedure vital signs: reviewed and stable  Pain management: adequate  Airway patency: patent  ADDIE mitigation strategies: Multimodal analgesia  PONV status at discharge: No PONV  Anesthetic complications: no      Cardiovascular status: blood pressure returned to baseline and hemodynamically stable  Respiratory status: unassisted  Hydration status: euvolemic  Follow-up not needed.          Vitals Value Taken Time   /63 06/13/23 1347   Temp 37 06/13/23 1351   Pulse 64 06/13/23 1350   Resp 19 06/13/23 1350   SpO2 100 % 06/13/23 1350   Vitals shown include unvalidated device data.      No case tracking events are documented in the log.      Pain/Allegra Score: No data recorded

## 2023-06-26 ENCOUNTER — OFFICE VISIT (OUTPATIENT)
Dept: SPORTS MEDICINE | Facility: CLINIC | Age: 78
End: 2023-06-26
Payer: MEDICARE

## 2023-06-26 VITALS
WEIGHT: 189 LBS | HEART RATE: 87 BPM | DIASTOLIC BLOOD PRESSURE: 59 MMHG | BODY MASS INDEX: 27.06 KG/M2 | HEIGHT: 70 IN | SYSTOLIC BLOOD PRESSURE: 98 MMHG

## 2023-06-26 DIAGNOSIS — S83.241A ACUTE TEAR MEDIAL MENISCUS, RIGHT, INITIAL ENCOUNTER: ICD-10-CM

## 2023-06-26 DIAGNOSIS — Z98.890 S/P ARTHROSCOPIC SURGERY OF RIGHT KNEE: Primary | ICD-10-CM

## 2023-06-26 DIAGNOSIS — M67.51 PLICA SYNDROME, RIGHT KNEE: ICD-10-CM

## 2023-06-26 PROCEDURE — 99024 PR POST-OP FOLLOW-UP VISIT: ICD-10-PCS | Mod: POP,,, | Performed by: PHYSICIAN ASSISTANT

## 2023-06-26 PROCEDURE — 99024 POSTOP FOLLOW-UP VISIT: CPT | Mod: POP,,, | Performed by: PHYSICIAN ASSISTANT

## 2023-06-26 PROCEDURE — 99999 PR PBB SHADOW E&M-EST. PATIENT-LVL IV: CPT | Mod: PBBFAC,,, | Performed by: PHYSICIAN ASSISTANT

## 2023-06-26 PROCEDURE — 99999 PR PBB SHADOW E&M-EST. PATIENT-LVL IV: ICD-10-PCS | Mod: PBBFAC,,, | Performed by: PHYSICIAN ASSISTANT

## 2023-06-26 PROCEDURE — 99214 OFFICE O/P EST MOD 30 MIN: CPT | Mod: PBBFAC | Performed by: PHYSICIAN ASSISTANT

## 2023-06-26 RX ORDER — TRAMADOL HYDROCHLORIDE 50 MG/1
50-100 TABLET ORAL EVERY 6 HOURS PRN
Qty: 15 TABLET | Refills: 0 | Status: SHIPPED | OUTPATIENT
Start: 2023-06-26 | End: 2023-07-18

## 2023-06-26 NOTE — PROGRESS NOTES
CC: right knee pain    History of present illness: Pt is here today for post-operative followup of knee arthroscopy.  he is doing well.  We have reviewed his findings and discussed plan of care and future treatment options.       He is doing well. No issues reported.   Pain is 2/10.       DATE OF PROCEDURE: 06/13/2023  SURGEON:  Tari Gregory M.D  PROCEDURE PERFORMED:   right  1. knee arthroscopic chondroplasty (CPT 50340)  2. knee arthroscopic medial and lateral (CPT 25319) meniscectomy   3. knee arthroscopic partial synovectomy/debridement (CPT 21513).   4. knee arthroscopic plica excision(CPT 09366).    5. Knee arthroscopic lysis of adhesions (CPT 37742)    In the patellofemoral compartment, there was chondral damage to:  Patella 10 x 15 mm grade 3  Chondroplasty was performed using arthroscopic shaver.                                                                               PHYSICAL EXAMINATION:     Incision sites healed well  No evidence of any erythema, infection or induration  Range of motion 0-110 degrees  Minimal effusion  2+ DP pulse  No swelling, no calf tenderness  - Haresh's sign  Negative medial joint line tenderness  Moderate quad atrophy                                                                                 ASSESSMENT:                                                                                                                                               1. Status post above, doing well.                                                                                                                               PLAN:                                                                                                                                                     1. Continue with PT  2. Emphasized quad function.  3. I have discussed return to activity in detail.  4.Patient will see us back at 6 week post-op melissa.                                    5. Discussed case with PT.   All  questions were answered, surgical technique was reviewed and interpreted, and patient should contact us with any questions or concerns in the interim.

## 2023-07-18 ENCOUNTER — OFFICE VISIT (OUTPATIENT)
Dept: NEUROLOGY | Facility: CLINIC | Age: 78
End: 2023-07-18
Payer: MEDICARE

## 2023-07-18 VITALS
HEIGHT: 71 IN | BODY MASS INDEX: 26.48 KG/M2 | RESPIRATION RATE: 16 BRPM | HEART RATE: 74 BPM | SYSTOLIC BLOOD PRESSURE: 130 MMHG | DIASTOLIC BLOOD PRESSURE: 58 MMHG | WEIGHT: 189.13 LBS

## 2023-07-18 DIAGNOSIS — M54.16 LUMBAR RADICULOPATHY: ICD-10-CM

## 2023-07-18 DIAGNOSIS — G47.09 OTHER INSOMNIA: ICD-10-CM

## 2023-07-18 DIAGNOSIS — G62.9 POLYNEUROPATHY: ICD-10-CM

## 2023-07-18 DIAGNOSIS — M79.604 RIGHT LEG PAIN: Primary | ICD-10-CM

## 2023-07-18 PROCEDURE — 99999 PR STA SHADOW: CPT | Mod: PBBFAC,,, | Performed by: PSYCHIATRY & NEUROLOGY

## 2023-07-18 PROCEDURE — 99214 OFFICE O/P EST MOD 30 MIN: CPT | Mod: S$PBB | Performed by: PSYCHIATRY & NEUROLOGY

## 2023-07-18 PROCEDURE — 99999 PR PBB SHADOW E&M-EST. PATIENT-LVL IV: CPT | Mod: PBBFAC,,, | Performed by: PSYCHIATRY & NEUROLOGY

## 2023-07-18 PROCEDURE — 99999 PR PBB SHADOW E&M-EST. PATIENT-LVL IV: ICD-10-PCS | Mod: PBBFAC,,, | Performed by: PSYCHIATRY & NEUROLOGY

## 2023-07-18 PROCEDURE — 99214 OFFICE O/P EST MOD 30 MIN: CPT | Mod: PBBFAC | Performed by: PSYCHIATRY & NEUROLOGY

## 2023-07-18 RX ORDER — IBUPROFEN 100 MG/5ML
1000 SUSPENSION, ORAL (FINAL DOSE FORM) ORAL DAILY
COMMUNITY
End: 2023-09-25

## 2023-07-18 RX ORDER — TRAZODONE HYDROCHLORIDE 150 MG/1
150 TABLET ORAL NIGHTLY
COMMUNITY

## 2023-07-18 NOTE — PROGRESS NOTES
HPI: Ghanshyam Sanford Jr. is a 78 y.o. male with burning in right leg for at least 2 years      Since the last visit, he is s/p arthroscopy of the right knee with Dr Gregory    His posterior right knee pain resolved. He still have right medial knee pain.       Prior to that, ortho recommended he taper off Gabapentin. Wendie procedure did not help his pain prior to surgery. Had some numbness after the procedure. However, he has follow up with pain management.     RLS is active still and more due to pain in the knee/ he can comfortable at night    Iron did not help his pain and followed with PCP/ no longer on this      Numbness in the feet not noted     Lower back pain is minimal /not any worse    On trazodone per PCP for 2 weeks. Reports poor sleep. Naps during the day. Wakes at 5:30 pm and tries to sleep at 8:30pm          Review of Systems   Constitutional:  Negative for fever.   HENT:  Negative for nosebleeds.    Eyes:  Negative for double vision.   Respiratory:  Negative for hemoptysis.    Cardiovascular:  Negative for leg swelling.   Gastrointestinal:  Negative for blood in stool.   Genitourinary:  Negative for hematuria.   Musculoskeletal:  Negative for falls.   Skin:  Negative for rash.   Neurological:  Negative for focal weakness.   Endo/Heme/Allergies:  Does not bruise/bleed easily.   Psychiatric/Behavioral:  The patient has insomnia.        I have reviewed all of this patient's past medical and surgical histories as well as family and social histories and active allergies and medications as documented in the electronic medical record.        Exam:  Gen Appearance, well developed/nourished in no apparent distress  CV: 2+ distal pulses with no edema or swelling  Neuro:  MS: Awake, alert, oriented to place, person, time, situation. Sustains attention. Recent/remote memory intact, Language is full to spontaneous speech/repetition/naming/comprehension. Fund of Knowledge is full  CN: Optic discs are flat with  "normal vasculature, PERRL, Extraoccular movements and visual fields are full. Normal facial sensation and strength, Hearing symmetric, Tongue and Palate are midline and strong. Shoulder Shrug symmetric and strong.  Motor: Normal bulk, tone, no abnormal movements. 5/5 strength bilateral upper/lower extremities with 2+ reflexes and bilateral plantar response  Sensory: symmetric to light touch, pain, temp, and vibration, but increased sensitivity to pain in the right thigh medially and right calf.  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    Mild Lumbar Radiculopathy at L4 Bilaterally         Labs: CBC normal 2023 2023 Ferritin level 45    Normal B12,TSH, SPEP/MARIO 2023    Assessment/Plan: Ghanshyam Sanford Jr. is a 78 y.o. male with 2 years of pain in the right medial knee and posterior knee (burning and "pulling") radiating up to the lower medial thigh and down to the upper lower leg posteriorly   I recommend:     Has seen pain management and PT    Had temporary relief with nerve block at the knee AND Lumbar NELSON    3.   Pain could be multifactorial from the back and the knee and ?RLS (pain is most severe at night)    4.   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 We discussed prior his pain could be from multiple factors.   -Suprisingly, he had distal polyneuropathy by NCS. This may be incidentally found as symptoms are more proximal. His lab work up with " B12,TSH, SPEP/MARIO was normal. The patient's CMP and CBC have been unremarkable/ no history of fasting glucose abnormality and he does not report alcohol abuse. Etiology of neuropathy may be idiopathic   -now s/p right knee arthroscopy in 6/2023 and his posterior knee resolved/ still has some right medial knee with f/u up with Dr Gregory pending  -He is off of gabapentin looking for further response to knee surgery.He will notify me if he would like to go back on this longer term  -If lower back pain increases, he should see his pain management MD (Dr Richards) for consideration of intervention. MRI L spine 3/2023 showed mild disc bulging and mild NF narrowing      5.   His best relief was with Gabapentin per Rheumatology prior  -In addition, he has some RLS symptoms in the right leg for which he used Requip/ does not need this now  -Started on Iron in 2023 for Ferritin level less than 70 but this did not help his symptoms . He did follow up with PCP since as well  -He feels his RLS is due to pain  -Again gabapentin can be tried again if desired     6.  Note he seeing rheumatology with history of  PMR. No signs of active disease.    7. On trazodone per PCP for 2 weeks with poor sleep  - I think he needs to improve sleep hygiene. Stop napping and spend only 8 hours in bed at night. If he wants to wake up at 6:30, don't go to bed until 10:30. No electronics 1/2 hour prior to bedtime.     RTC 6 months

## 2023-07-19 ENCOUNTER — OFFICE VISIT (OUTPATIENT)
Dept: SPORTS MEDICINE | Facility: CLINIC | Age: 78
End: 2023-07-19
Payer: MEDICARE

## 2023-07-19 VITALS — HEIGHT: 71 IN | BODY MASS INDEX: 26.46 KG/M2 | WEIGHT: 189 LBS

## 2023-07-19 DIAGNOSIS — M17.11 PRIMARY OSTEOARTHRITIS OF RIGHT KNEE: Primary | ICD-10-CM

## 2023-07-19 DIAGNOSIS — Z98.890 S/P ARTHROSCOPIC SURGERY OF RIGHT KNEE: ICD-10-CM

## 2023-07-19 DIAGNOSIS — G89.29 CHRONIC PAIN OF RIGHT KNEE: ICD-10-CM

## 2023-07-19 DIAGNOSIS — M25.561 CHRONIC PAIN OF RIGHT KNEE: ICD-10-CM

## 2023-07-19 PROCEDURE — 99213 OFFICE O/P EST LOW 20 MIN: CPT | Mod: PBBFAC | Performed by: STUDENT IN AN ORGANIZED HEALTH CARE EDUCATION/TRAINING PROGRAM

## 2023-07-19 PROCEDURE — 99999 PR PBB SHADOW E&M-EST. PATIENT-LVL III: ICD-10-PCS | Mod: PBBFAC,,, | Performed by: STUDENT IN AN ORGANIZED HEALTH CARE EDUCATION/TRAINING PROGRAM

## 2023-07-19 PROCEDURE — 99999 PR PBB SHADOW E&M-EST. PATIENT-LVL III: CPT | Mod: PBBFAC,,, | Performed by: STUDENT IN AN ORGANIZED HEALTH CARE EDUCATION/TRAINING PROGRAM

## 2023-07-19 PROCEDURE — 99213 PR OFFICE/OUTPT VISIT, EST, LEVL III, 20-29 MIN: ICD-10-PCS | Mod: S$PBB,,, | Performed by: STUDENT IN AN ORGANIZED HEALTH CARE EDUCATION/TRAINING PROGRAM

## 2023-07-19 PROCEDURE — 99213 OFFICE O/P EST LOW 20 MIN: CPT | Mod: S$PBB,,, | Performed by: STUDENT IN AN ORGANIZED HEALTH CARE EDUCATION/TRAINING PROGRAM

## 2023-07-19 NOTE — PROGRESS NOTES
Patient ID: Ghanshyam Sanford Jr.  YOB: 1945  MRN: [id number]    Chief Complaint: Pain and Numbness of the Right Knee      History of Present Illness: Ghanshyam Sanford Jr. is a left-hand dominant 78 y.o. male who presents today for follow up of Iovera procedure to right knee.  Patient had Iovera procedure performed on 5/19/2023 and reports that he had continued issues following procedure.  He reports having ATS to right knee by Dr. Tari Gregory on 6/13/2023 and reports feeling much relief following the surgery.  He states that since the surgery he has been in physical therapy and states that he does have some pain, 2/10, but reports that he does feel that the surgery was successful.  His greatest concern today is the continued numbness that he is experiencing in his thigh region following the Iovera procedure and he is curious to how long he might expect this to continue.       4/25/2023 Interval History of Present Illness: Ghanshyam Sanford Jr. is a left-hand dominant 77 y.o. male who presents today with right knee pain.  Patient reports today for Iovera consultation due to prolonged chronic knee pain that has been present for several years.  He has had two ATS and has been told that he needed a TKA and one point.  He was recently seen in clinic by Dr. Song Briggs and referred to this clinic to discuss if he would be a candidate for Iovera.  Patient states that currently his knee pain is a 2/10 and that it starts in his lower thigh and extends into the medial knee and upper medial calf region.  He states that the pain is greatest in the evening time and that it is hard to get comfortable and sleep.  He describes the pain as a burning pain that increases in intensity as the evening progresses.  He received a CSI on 3/15/2023 which provided him with approximately 2 weeks of relief.  He has tried gel injections in the past without success.  He has recently performed physical therapy without relief.   He has recently completed an EMG, which results are in chart.  He is currently taking Tylenol and Gabapentin.  He is unable to take NSAIDs due to an artifical heart valve.      The patient is active in none.  Occupation: Retired      Past Medical History:   Past Medical History:   Diagnosis Date    Anticoagulant long-term use     Aortic valve regurgitation     Aortic valve stenosis, nonrheumatic     Carotid stenosis, bilateral     Chronic diastolic heart failure, NYHA class 2     Chronic total occlusion of coronary artery     Coronary artery disease     Elevated PSA     UNDER CARE    Hypertension     Hypertensive heart disease     Osteoarthritis of right knee 3/22/2017    Polymyalgia     Restless legs     SOB (shortness of breath)      Past Surgical History:   Procedure Laterality Date    ARTHROSCOPIC CHONDROPLASTY OF KNEE JOINT Right 6/13/2023    Procedure: ARTHROSCOPY, KNEE, WITH CHONDROPLASTY;  Surgeon: Tari Gregory MD;  Location: [facility] OR;  Service: Orthopedics;  Laterality: Right;    BACK SURGERY      CAROTID STENT      HERNIA REPAIR      KNEE ARTHROPLASTY Right     KNEE ARTHROSCOPY W/ MENISCECTOMY Right 6/13/2023    Procedure: ARTHROSCOPY, KNEE, WITH MEDIAL AND LATERAL MENISCECTOMY;  Surgeon: Tari Gregory MD;  Location: [facility] OR;  Service: Orthopedics;  Laterality: Right;    KNEE ARTHROSCOPY W/ PLICA EXCISION Right 6/13/2023    Procedure: EXCISION, PLICA, KNEE, ARTHROSCOPIC;  Surgeon: Tari Gregory MD;  Location: [facility] OR;  Service: Orthopedics;  Laterality: Right;    KNEE DEBRIDEMENT Right 6/13/2023    Procedure: DEBRIDEMENT, KNEE;  Surgeon: Tari Gregory MD;  Location: [facility] OR;  Service: Orthopedics;  Laterality: Right;    [id number]-[id number]-[id number] Right 6/13/2023    Procedure: [id number]-[id number];  Surgeon: Tari Gregory MD;  Location: [facility] OR;  Service: Orthopedics;  Laterality: Right;    PERCUTANEOUS TRANSCATHETER AORTIC VALVE REPLACEMENT (TAVR) Right 12/14/2021    Procedure: REPLACEMENT, AORTIC VALVE,  PERCUTANEOUS, TRANSCATHETER;  Surgeon: Johny Lockett MD;  Location: [facility]placed by [facility] CATH;  Service: Cardiology;  Laterality: Right;  ops # [id number]    PERCUTANEOUS TRANSCATHETER AORTIC VALVE REPLACEMENT (TAVR) Right 12/14/2021    Procedure: REPLACEMENT, AORTIC VALVE, PERCUTANEOUS, TRANSCATHETER;  Surgeon: Sushma Piña MD;  Location: Replaced by [facility] CATH;  Service: Cardiovascular;  Laterality: Right;    SYNOVECTOMY OF KNEE Right 6/13/2023    Procedure: PARTIAL SYNOVECTOMY, KNEE;  Surgeon: Tari Gregory MD;  Location: [facility] OR;  Service: Orthopedics;  Laterality: Right;     Family History   Problem Relation Age of Onset    Heart disease Mother     Hypertension Mother     Stroke Mother     Diabetes Mellitus Father     Heart disease Paternal Grandmother     Stroke Paternal Grandmother      Social History     Socioeconomic History    Marital status:    Tobacco Use    Smoking status: Never    Smokeless tobacco: Never   Substance and Sexual Activity    Alcohol use: Never    Drug use: Never     Medication List with Changes/Refills   Current Medications    ACETAMINOPHEN (TYLENOL) 500 MG TABLET    Take 500 mg by mouth every 6 (six) hours as needed for Pain.    ALFUZOSIN (UROXATRAL) 10 MG TB24    Take 10 mg by mouth daily with breakfast.    ASCORBIC ACID, VITAMIN C, (VITAMIN C) 1000 MG TABLET    Take 1,000 mg by mouth once daily.    ASPIRIN (ECOTRIN) 81 MG EC TABLET    Take 81 mg by mouth once daily.    CLOPIDOGREL (PLAVIX) 75 MG TABLET    Take 75 mg by mouth once daily.    COENZYME Q10 100 MG CAPSULE    Take 200 mg by mouth once daily.    DOCUSATE SODIUM (COLACE) 100 MG CAPSULE    Take 1 capsule (100 mg total) by mouth 2 (two) times daily as needed for Constipation.    DOXAZOSIN (CARDURA) 2 MG TABLET    Take 2 mg by mouth every evening.    MAGNESIUM 250 MG TAB    Take 250 tablets by mouth once daily.    MULTIVITAMIN (THERAGRAN) PER TABLET    Take 1 tablet by mouth once daily.    MV-MN/IRON/FOLIC ACID/HERB 190 (VITAMIN D3 COMPLETE ORAL)     Take 5,000 Units by mouth Daily.    OLMESARTAN (BENICAR) 40 MG TABLET    Take 20 mg by mouth once daily.    ROPINIROLE (REQUIP) 0.25 MG TABLET    TAKE 1 TABLET BY MOUTH ONCE NIGHTLY AS NEEDED    ROSUVASTATIN (CRESTOR) 20 MG TABLET    Take 20 mg by mouth once daily.    TRAZODONE (DESYREL) 50 MG TABLET    Take 50 mg by mouth every evening.    ZOLPIDEM (AMBIEN) 10 MG TAB    Take 10 mg by mouth nightly as needed.     Review of patient's allergies indicates:   Allergen Reactions    Dexamethasone sodium phosphate Other (See Comments)    Atorvastatin calcium Other (See Comments)     Leg cramps    Cortisone      Inj- pt reports hiccups and feels spasm     Duloxetine Other (See Comments)     DRY MONTH  NOT ABLE TO SLEEP  NOT HUNDRY  SWEATING A LOT  FEELING TIRED AND WEAK  DIZZINESS  WEIGHT LOSS ( 9 LB.)    Flomax [tamsulosin] Diarrhea    Pravastatin Other (See Comments)     Leg cramps       Physical Exam:   Body mass index is 26.74 kg/m².    GENERAL: Well appearing, in no acute distress.  HEAD: Normocephalic and atraumatic.  ENT: External ears and nose grossly normal.  EYES: EOMI bilaterally  PULMONARY: Respirations are grossly even and non-labored.  NEURO: Awake, alert, and oriented x 3.  SKIN: No obvious rashes appreciated.  PSYCH: Mood & affect are appropriate.    Detailed MSK exam:     Right knee exam:   -ROM: extension 0, flexion 130  -TTP: None  -effusion: none  -Patellar apprehension negative  -Heaven test negative  -stable to varus and valgus stress tests  -Lachman test negative, anterior drawer test negative, posterior drawer test negative  -mildly decreased sensation anterior thigh compared to left    Left knee exam:   -ROM: extension 0, flexion 130  -TTP: None  -effusion: none  -Patellar apprehension negative  -Heaven test negative  -stable to varus and valgus stress tests  -Lachman test negative, anterior drawer test negative, posterior drawer test negative      Imaging:  Sports Medicine US - Guidance for  Needle Placement  Moe Padron MD     5/19/2023  3:20 PM  Sports Medicine US - Guidance for Needle Placement    Date/Time: 5/19/2023 3:00 PM  Performed by: Moe Padron MD  Authorized by: Moe Padron MD   Preparation: Patient was prepped and draped in the usual sterile fashion.  Local anesthesia used: no    Anesthesia:  Local anesthesia used: no    Sedation:  Patient sedated: no    Patient tolerance: patient tolerated the procedure well with no immediate   complications  Comments: Ultrasound guidance was used for needle localization. Images   were saved and stored for documentation. The appropriate structures were   visualized. Dynamic visualization of the needle was continuous throughout   the procedures and maintained good position.         Relevant imaging results were reviewed and interpreted by me and per my read shows medial compartment joint space narrowing on radiographs.  This was discussed with the patient and / or family today.     Assessment:  Ghanshyam Sanford Jr. is a 78 y.o. male following up for chronic right knee pain. Pain significantly improved after recent arthroscopy. Still having numbness anterior thigh.   Plan: continue conservative management for pain. Trial ice/heat, voltaren gel, lidocaine patches.   Follow up as needed. All questions answered.     Primary osteoarthritis of right knee    Chronic pain of right knee    S/P arthroscopic surgery of right knee             Electronically signed:  Moe Padron MD, MPH  07/19/2023  1:37 PM

## 2023-07-19 NOTE — PATIENT INSTRUCTIONS
Assessment:  Ghanshyam Sanford Jr. is a 78 y.o. male   Chief Complaint   Patient presents with    Right Knee - Pain, Numbness       Encounter Diagnosis   Name Primary?    Chronic pain of right knee Yes        Plan:  Follow up as needed    Follow-up: as needed or sooner if there are any problems between now and then.    Thank you for choosing [facility] and Dr. Moe Padron for your orthopedic & sports medicine care. It is our goal to provide you with exceptional care that will help keep you healthy, active, and get you back in the game.    Please do not hesitate to reach out to us via email, phone, or MyChart with any questions, concerns, or feedback.    If you felt that you received exemplary care today, please consider leaving us feedback on [employer] ([employer]) at:  [email or web address]    If you are experiencing pain/discomfort ,or have questions after 5pm and would like to be connected to the [facility] on-call team, please call this number and specify which Sports Medicine provider is treating you: [phone]

## 2023-07-24 ENCOUNTER — OFFICE VISIT (OUTPATIENT)
Dept: SPORTS MEDICINE | Facility: CLINIC | Age: 78
End: 2023-07-24
Payer: MEDICARE

## 2023-07-24 VITALS
WEIGHT: 190.13 LBS | BODY MASS INDEX: 26.9 KG/M2 | DIASTOLIC BLOOD PRESSURE: 65 MMHG | SYSTOLIC BLOOD PRESSURE: 149 MMHG | HEART RATE: 59 BPM

## 2023-07-24 DIAGNOSIS — Z98.890 S/P ARTHROSCOPIC SURGERY OF RIGHT KNEE: Primary | ICD-10-CM

## 2023-07-24 PROCEDURE — 99999 PR PBB SHADOW E&M-EST. PATIENT-LVL III: CPT | Mod: PBBFAC,,, | Performed by: ORTHOPAEDIC SURGERY

## 2023-07-24 PROCEDURE — 99213 OFFICE O/P EST LOW 20 MIN: CPT | Mod: PBBFAC | Performed by: ORTHOPAEDIC SURGERY

## 2023-07-24 PROCEDURE — 99024 PR POST-OP FOLLOW-UP VISIT: ICD-10-PCS | Mod: POP,,, | Performed by: ORTHOPAEDIC SURGERY

## 2023-07-24 PROCEDURE — 99024 POSTOP FOLLOW-UP VISIT: CPT | Mod: POP,,, | Performed by: ORTHOPAEDIC SURGERY

## 2023-07-24 PROCEDURE — 99999 PR PBB SHADOW E&M-EST. PATIENT-LVL III: ICD-10-PCS | Mod: PBBFAC,,, | Performed by: ORTHOPAEDIC SURGERY

## 2023-07-24 NOTE — PROGRESS NOTES
CC: right knee pain    History of present illness: Pt is here today for post-operative followup of knee arthroscopy.  he is doing well.  We have reviewed his findings and discussed plan of care and future treatment options.       Patient has been attending physical therapy at the [facility].  He notes pain on the medial side of his knee that stays at a 3/10, he notes having trouble at night finding a comfortable position as well as using a sleeve     SANE post op: 80   SANE pre op: 60    DATE OF PROCEDURE: 06/13/2023  SURGEON:  Tari Gregory M.D  PROCEDURE PERFORMED:   right  1. knee arthroscopic chondroplasty (CPT 13702)  2. knee arthroscopic medial and lateral (CPT 02195) meniscectomy   3. knee arthroscopic partial synovectomy/debridement (CPT 49629).   4. knee arthroscopic plica excision(CPT 93343).    5. Knee arthroscopic lysis of adhesions (CPT 90744)    In the patellofemoral compartment, there was chondral damage to:  Patella 10 x 15 mm grade 3  Chondroplasty was performed using arthroscopic shaver.                                                                               PHYSICAL EXAMINATION:     Incision sites healed well  No evidence of any erythema, infection or induration  Range of motion 0-135 degrees  Trace effusion  2+ DP pulse  No swelling, no calf tenderness  - Haresh's sign  Negative medial joint line tenderness  Mild to Moderate quad atrophy    + tenderness distal hamstring tendons and pes bursa   + hamstring tightness                                                                                ASSESSMENT:                                                                                                                                               1. Status post above, doing well.                                                                                                                               PLAN:                                                                                                                                                      1. Continue with PT, case discussed with therapist. Discussed focusing on hamstring stretching   2. Emphasized quad function.  3. I have discussed return to activity in detail.  4.Patient will see us back as needed.                                    5. All questions were answered, surgical technique was reviewed and interpreted, and patient should contact us with any questions or concerns in the interim.

## 2023-08-31 ENCOUNTER — PATIENT MESSAGE (OUTPATIENT)
Dept: RHEUMATOLOGY | Facility: CLINIC | Age: 78
End: 2023-08-31
Payer: MEDICARE

## 2023-08-31 DIAGNOSIS — E61.1 IRON DEFICIENCY: ICD-10-CM

## 2023-08-31 DIAGNOSIS — M35.3 PMR (POLYMYALGIA RHEUMATICA): Primary | ICD-10-CM

## 2023-09-15 ENCOUNTER — PATIENT MESSAGE (OUTPATIENT)
Dept: RHEUMATOLOGY | Facility: CLINIC | Age: 78
End: 2023-09-15
Payer: MEDICARE

## 2023-09-18 ENCOUNTER — PATIENT MESSAGE (OUTPATIENT)
Dept: RHEUMATOLOGY | Facility: CLINIC | Age: 78
End: 2023-09-18
Payer: MEDICARE

## 2023-09-18 ENCOUNTER — TELEPHONE (OUTPATIENT)
Dept: RHEUMATOLOGY | Facility: CLINIC | Age: 78
End: 2023-09-18
Payer: MEDICARE

## 2023-09-18 ENCOUNTER — LAB VISIT (OUTPATIENT)
Dept: LAB | Facility: HOSPITAL | Age: 78
End: 2023-09-18
Attending: INTERNAL MEDICINE
Payer: MEDICARE

## 2023-09-18 DIAGNOSIS — E61.1 IRON DEFICIENCY: ICD-10-CM

## 2023-09-18 DIAGNOSIS — Z79.899 OTHER LONG TERM (CURRENT) DRUG THERAPY: ICD-10-CM

## 2023-09-18 DIAGNOSIS — M35.3 PMR (POLYMYALGIA RHEUMATICA): ICD-10-CM

## 2023-09-18 DIAGNOSIS — D64.9 ANEMIA, UNSPECIFIED TYPE: Primary | ICD-10-CM

## 2023-09-18 LAB
ALBUMIN SERPL BCP-MCNC: 3.9 G/DL (ref 3.5–5.2)
ALP SERPL-CCNC: 63 U/L (ref 55–135)
ALT SERPL W/O P-5'-P-CCNC: 16 U/L (ref 10–44)
ANION GAP SERPL CALC-SCNC: 8 MMOL/L (ref 8–16)
AST SERPL-CCNC: 18 U/L (ref 10–40)
BASOPHILS # BLD AUTO: 0.05 K/UL (ref 0–0.2)
BASOPHILS NFR BLD: 0.8 % (ref 0–1.9)
BILIRUB SERPL-MCNC: 0.7 MG/DL (ref 0.1–1)
BUN SERPL-MCNC: 15 MG/DL (ref 8–23)
CALCIUM SERPL-MCNC: 9.1 MG/DL (ref 8.7–10.5)
CHLORIDE SERPL-SCNC: 106 MMOL/L (ref 95–110)
CO2 SERPL-SCNC: 26 MMOL/L (ref 23–29)
CREAT SERPL-MCNC: 1 MG/DL (ref 0.5–1.4)
CRP SERPL-MCNC: 1.5 MG/L (ref 0–8.2)
DIFFERENTIAL METHOD: ABNORMAL
EOSINOPHIL # BLD AUTO: 0.2 K/UL (ref 0–0.5)
EOSINOPHIL NFR BLD: 2.4 % (ref 0–8)
ERYTHROCYTE [DISTWIDTH] IN BLOOD BY AUTOMATED COUNT: 13.2 % (ref 11.5–14.5)
ERYTHROCYTE [SEDIMENTATION RATE] IN BLOOD BY WESTERGREN METHOD: 5 MM/HR (ref 0–10)
EST. GFR  (NO RACE VARIABLE): >60 ML/MIN/1.73 M^2
FERRITIN SERPL-MCNC: 84 NG/ML (ref 20–300)
GLUCOSE SERPL-MCNC: 94 MG/DL (ref 70–110)
HCT VFR BLD AUTO: 42 % (ref 40–54)
HGB BLD-MCNC: 13.7 G/DL (ref 14–18)
IMM GRANULOCYTES # BLD AUTO: 0.01 K/UL (ref 0–0.04)
IMM GRANULOCYTES NFR BLD AUTO: 0.2 % (ref 0–0.5)
IRON SERPL-MCNC: 58 UG/DL (ref 45–160)
LYMPHOCYTES # BLD AUTO: 1.9 K/UL (ref 1–4.8)
LYMPHOCYTES NFR BLD: 29.8 % (ref 18–48)
MCH RBC QN AUTO: 28.4 PG (ref 27–31)
MCHC RBC AUTO-ENTMCNC: 32.6 G/DL (ref 32–36)
MCV RBC AUTO: 87 FL (ref 82–98)
MONOCYTES # BLD AUTO: 0.4 K/UL (ref 0.3–1)
MONOCYTES NFR BLD: 6 % (ref 4–15)
NEUTROPHILS # BLD AUTO: 3.8 K/UL (ref 1.8–7.7)
NEUTROPHILS NFR BLD: 60.8 % (ref 38–73)
NRBC BLD-RTO: 0 /100 WBC
PLATELET # BLD AUTO: 169 K/UL (ref 150–450)
PMV BLD AUTO: 10.1 FL (ref 9.2–12.9)
POTASSIUM SERPL-SCNC: 4.4 MMOL/L (ref 3.5–5.1)
PROT SERPL-MCNC: 6.8 G/DL (ref 6–8.4)
RBC # BLD AUTO: 4.82 M/UL (ref 4.6–6.2)
SATURATED IRON: 16 % (ref 20–50)
SODIUM SERPL-SCNC: 140 MMOL/L (ref 136–145)
TOTAL IRON BINDING CAPACITY: 373 UG/DL (ref 250–450)
TRANSFERRIN SERPL-MCNC: 252 MG/DL (ref 200–375)
WBC # BLD AUTO: 6.3 K/UL (ref 3.9–12.7)

## 2023-09-18 PROCEDURE — 84466 ASSAY OF TRANSFERRIN: CPT | Performed by: INTERNAL MEDICINE

## 2023-09-18 PROCEDURE — 80053 COMPREHEN METABOLIC PANEL: CPT | Performed by: INTERNAL MEDICINE

## 2023-09-18 PROCEDURE — 86140 C-REACTIVE PROTEIN: CPT | Performed by: INTERNAL MEDICINE

## 2023-09-18 PROCEDURE — 36415 COLL VENOUS BLD VENIPUNCTURE: CPT | Performed by: INTERNAL MEDICINE

## 2023-09-18 PROCEDURE — 83540 ASSAY OF IRON: CPT | Performed by: INTERNAL MEDICINE

## 2023-09-18 PROCEDURE — 85651 RBC SED RATE NONAUTOMATED: CPT | Performed by: INTERNAL MEDICINE

## 2023-09-18 PROCEDURE — 85025 COMPLETE CBC W/AUTO DIFF WBC: CPT | Performed by: INTERNAL MEDICINE

## 2023-09-18 PROCEDURE — 82728 ASSAY OF FERRITIN: CPT | Performed by: INTERNAL MEDICINE

## 2023-09-18 NOTE — PROGRESS NOTES
Your iron saturation is low. The ferritin is normal. The crp inflammation test also remains normal. Would f/u with Dr. Lombardi your PCP about the low iron saturation. Are you up to date on your colonoscopy? BRISEYDA

## 2023-09-19 ENCOUNTER — TELEPHONE (OUTPATIENT)
Dept: RHEUMATOLOGY | Facility: CLINIC | Age: 78
End: 2023-09-19
Payer: MEDICARE

## 2023-09-19 NOTE — TELEPHONE ENCOUNTER
Per Dr mendez I tried to call the patient and schedule some additional labs ordered before his appointment but the phone just rang. I scheduled them for the day he comes for his follow up

## 2023-09-25 ENCOUNTER — TELEPHONE (OUTPATIENT)
Dept: RHEUMATOLOGY | Facility: CLINIC | Age: 78
End: 2023-09-25
Payer: MEDICARE

## 2023-09-25 ENCOUNTER — LAB VISIT (OUTPATIENT)
Dept: LAB | Facility: HOSPITAL | Age: 78
End: 2023-09-25
Attending: INTERNAL MEDICINE
Payer: MEDICARE

## 2023-09-25 ENCOUNTER — OFFICE VISIT (OUTPATIENT)
Dept: RHEUMATOLOGY | Facility: CLINIC | Age: 78
End: 2023-09-25
Payer: MEDICARE

## 2023-09-25 ENCOUNTER — PATIENT MESSAGE (OUTPATIENT)
Dept: RHEUMATOLOGY | Facility: CLINIC | Age: 78
End: 2023-09-25

## 2023-09-25 VITALS
BODY MASS INDEX: 25.9 KG/M2 | WEIGHT: 185 LBS | HEIGHT: 71 IN | SYSTOLIC BLOOD PRESSURE: 148 MMHG | HEART RATE: 70 BPM | DIASTOLIC BLOOD PRESSURE: 62 MMHG

## 2023-09-25 DIAGNOSIS — D64.9 ANEMIA, UNSPECIFIED TYPE: ICD-10-CM

## 2023-09-25 DIAGNOSIS — M35.3 PMR (POLYMYALGIA RHEUMATICA): ICD-10-CM

## 2023-09-25 DIAGNOSIS — Z79.899 OTHER LONG TERM (CURRENT) DRUG THERAPY: ICD-10-CM

## 2023-09-25 DIAGNOSIS — M19.90 OSTEOARTHRITIS, UNSPECIFIED OSTEOARTHRITIS TYPE, UNSPECIFIED SITE: Primary | ICD-10-CM

## 2023-09-25 DIAGNOSIS — M81.0 OSTEOPOROSIS, UNSPECIFIED OSTEOPOROSIS TYPE, UNSPECIFIED PATHOLOGICAL FRACTURE PRESENCE: ICD-10-CM

## 2023-09-25 LAB
FERRITIN SERPL-MCNC: 63 NG/ML (ref 20–300)
FOLATE SERPL-MCNC: 17.5 NG/ML (ref 4–24)
IRON SERPL-MCNC: 68 UG/DL (ref 45–160)
SATURATED IRON: 17 % (ref 20–50)
TOTAL IRON BINDING CAPACITY: 410 UG/DL (ref 250–450)
TRANSFERRIN SERPL-MCNC: 277 MG/DL (ref 200–375)
VIT B12 SERPL-MCNC: 492 PG/ML (ref 210–950)

## 2023-09-25 PROCEDURE — 99999 PR PBB SHADOW E&M-EST. PATIENT-LVL IV: CPT | Mod: PBBFAC,,, | Performed by: INTERNAL MEDICINE

## 2023-09-25 PROCEDURE — 83540 ASSAY OF IRON: CPT | Performed by: INTERNAL MEDICINE

## 2023-09-25 PROCEDURE — 99999 PR PBB SHADOW E&M-EST. PATIENT-LVL IV: ICD-10-PCS | Mod: PBBFAC,,, | Performed by: INTERNAL MEDICINE

## 2023-09-25 PROCEDURE — 84466 ASSAY OF TRANSFERRIN: CPT | Performed by: INTERNAL MEDICINE

## 2023-09-25 PROCEDURE — 82607 VITAMIN B-12: CPT | Performed by: INTERNAL MEDICINE

## 2023-09-25 PROCEDURE — 99214 OFFICE O/P EST MOD 30 MIN: CPT | Mod: PBBFAC | Performed by: INTERNAL MEDICINE

## 2023-09-25 PROCEDURE — 82746 ASSAY OF FOLIC ACID SERUM: CPT | Performed by: INTERNAL MEDICINE

## 2023-09-25 PROCEDURE — 82728 ASSAY OF FERRITIN: CPT | Performed by: INTERNAL MEDICINE

## 2023-09-25 PROCEDURE — 99213 OFFICE O/P EST LOW 20 MIN: CPT | Mod: S$PBB,,, | Performed by: INTERNAL MEDICINE

## 2023-09-25 PROCEDURE — 99213 PR OFFICE/OUTPT VISIT, EST, LEVL III, 20-29 MIN: ICD-10-PCS | Mod: S$PBB,,, | Performed by: INTERNAL MEDICINE

## 2023-09-25 NOTE — PROGRESS NOTES
9/19/2023     5:04 PM   Rapid3 Question Responses and Scores   MDHAQ Score 0.7   Psychologic Score 1.1   Pain Score 3   When you awakened in the morning OVER THE LAST WEEK, did you feel stiff? Yes   If Yes, please indicate the number of hours until you are as limber as you will be for the day 1   Fatigue Score 5   Global Health Score 3   RAPID3 Score 2.78     Answers submitted by the patient for this visit:  Rheumatology Questionnaire (Submitted on 9/19/2023)  fever: No  eye redness: No  mouth sores: No  headaches: No  shortness of breath: No  chest pain: No  trouble swallowing: No  diarrhea: No  constipation: No  unexpected weight change: No  genital sore: No  During the last 3 days, have you had a skin rash?: No  Bruises or bleeds easily: No  cough: No

## 2023-09-25 NOTE — PROGRESS NOTES
Patient ID:  Ghanshyam Sanford Jr.    YOB: 1945     MRN:  [id number]     Subjective:     Chief Complaint:  Disease Management     History of Present Illness:  Patient is a 78 y.o. male with PMR who presents today for follow up. LCV was 2/2023. At that time the patient was worked up for bilateral rotator cuff tendinitis. He was referred to PT which he states was very helpful. He continues to perform the exercises her learned in therapy at home. He states that he has recently noticed swelling in all ten toes. This is not associated with pain or redness.     Today: Today, the patient is doing well. He states that his shoulder pain is under control and he is able to exercise in the gym often. His shoulders are stiff in the morning just after waking but loosen up after about 1 hour.  He has a history of R>L rotator cuff tendonitis for which he has attended PT. He complains of chronic bilateral knee pain for which he is following with orthopedics. He had meniscectomy done in June which he states significantly improved his pain but did not completely relieve it.     Review of Systems   Review of Systems   Constitutional:  Negative for fatigue.   Eyes:  Negative for pain and redness.   Respiratory:  Negative for cough, shortness of breath and wheezing.    Cardiovascular:  Negative for chest pain.   Gastrointestinal:  Negative for abdominal distention and abdominal pain.   Musculoskeletal:  Positive for arthralgias and joint swelling. Negative for back pain.   Skin:  Negative for rash.        Current Medications:    Current Outpatient Medications:     acetaminophen (TYLENOL) 500 MG tablet, Take 500 mg by mouth every 6 (six) hours as needed for Pain., Disp: , Rfl:     alfuzosin (UROXATRAL) 10 mg Tb24, Take 10 mg by mouth daily with breakfast., Disp: , Rfl:     aspirin (ECOTRIN) 81 MG EC tablet, Take 81 mg by mouth once daily., Disp: , Rfl:     clopidogreL (PLAVIX) 75 mg tablet, Take 75 mg by mouth once daily.,  Disp: , Rfl:     coenzyme Q10 100 mg capsule, Take 200 mg by mouth once daily., Disp: , Rfl:     docusate sodium (COLACE) 100 MG capsule, Take 1 capsule (100 mg total) by mouth 2 (two) times daily as needed for Constipation., Disp: 14 capsule, Rfl: 0    doxazosin (CARDURA) 2 MG tablet, Take 2 mg by mouth every evening., Disp: , Rfl:     magnesium 250 mg Tab, Take 250 tablets by mouth once daily., Disp: , Rfl:     multivitamin (THERAGRAN) per tablet, Take 1 tablet by mouth once daily., Disp: , Rfl:     mv-mn/iron/folic acid/herb 190 (VITAMIN D3 COMPLETE ORAL), Take 5,000 Units by mouth Daily., Disp: , Rfl:     olmesartan (BENICAR) 40 MG tablet, Take 20 mg by mouth once daily., Disp: , Rfl:     rOPINIRole (REQUIP) 0.25 MG tablet, TAKE 1 TABLET BY MOUTH ONCE NIGHTLY AS NEEDED, Disp: 90 tablet, Rfl: 1    rosuvastatin (CRESTOR) 20 MG tablet, Take 20 mg by mouth once daily., Disp: , Rfl:     traZODone (DESYREL) 50 MG tablet, Take 50 mg by mouth every evening., Disp: , Rfl:     zolpidem (AMBIEN) 10 mg Tab, Take 10 mg by mouth nightly as needed., Disp: , Rfl:      Objective:     Vitals:    09/25/23 1420   BP: (!) 148/62   Pulse: 70      Body mass index is 26.17 kg/m².     Physical Exam   Constitutional: He is oriented to person, place, and time. normal appearance. No distress.   HENT:   Head: Normocephalic and atraumatic.   Eyes: Right eye exhibits no discharge. Left eye exhibits no discharge. No scleral icterus.   Cardiovascular: Normal rate and regular rhythm. Exam reveals no gallop and no friction rub.   Murmur heard.  Holosystolic murmur present 2/2 to artificial aortic valve    Pulmonary/Chest: Effort normal and breath sounds normal.   Abdominal: Soft. He exhibits no distension. There is no abdominal tenderness.   Musculoskeletal:         General: Swelling and tenderness present. Normal range of motion.      Cervical back: No tenderness.      Comments: Right shoulder: positive Speed's test. Pain to passive internal and  external rotation    Neurological: He is alert and oriented to person, place, and time.   Reflex Scores:       Patellar reflexes are 2+ on the right side and 2+ on the left side.  Psychiatric: Mood normal.       Right Side Rheumatological Exam     Muscle Strength (0-5 scale):  Deltoid:  5  Biceps: 5/5   Triceps:  5  Iliopsoas: 5  Quadriceps:  5     Left Side Rheumatological Exam     Muscle Strength (0-5 scale):  Deltoid:  5  Biceps: 5/5   Triceps:  5  Iliopsoas: 5  Quadriceps:  5              9/25/2023   Tender (ROJAS-28) 0 / 28    Swollen (ROJAS-28) 0 / 28    Provider Global --   Patient Global 30 mm   ESR 5 mm/hr   CRP 1.5 mg/L   ROJAS-28 (ESR) 1.55 (Remission)   ROJAS-28 (CRP) 1.71 (Remission)   CDAI Score --              Assessment:     PMR no evidence of RA. Peripheral spondyloarthritis, CPPD  nl ESR and CRP currently asymptomatic  Right> left rotator cuff tendinitis improved  full AROM mild + empty can and Speed's right attended PT  Burning pain posterolateral right knee of of longstanding, remains major problem, occurs only at night. Has seen multiple orthopedists in past, and is s/p arthroscopic meniscectomy and multiple injections, burning preceded these. Most recent menisectomy right knee has helped signficantly  In past, S/p hand and shoulder pain resolved with brief course of prednisone 10mg daily for 2 wks  Lumbar spondylosis asymptomatic  OA hips asymptomatic  B27+ no evidence of peripheral or axial spondyloarthritis  TAVR implant 34mm Dr. Lockett 12/21/21    kyphoplasty vertebro plasty T 11 L1  8/20/17  S/p right 3,4 5 flexor tenosynovitis  Rx with CSI  1 month ago  Mild anemia  Osteoporosis DXA 7/9/21:  FRAX hip 0.5% MOF 2.4%  Swelling in toes, exam with OA  IP toes     Plan:      Fe/TIBC, ferritin, sTR, folate, B12 today  X-ray feet  DXA  Cont trazodone 100mg nightly   RTC 6 months     Moe Russell M.D.  PGY-1  [employer] [facility]

## 2023-09-25 NOTE — PROGRESS NOTES
I have personally taken the history and examined the patient and agree with the resident,s note as stated above     Latest Reference Range & Units 09/18/23 11:03   WBC 3.90 - 12.70 K/uL 6.30   RBC 4.60 - 6.20 M/uL 4.82   Hemoglobin 14.0 - 18.0 g/dL 13.7 (L)   Hematocrit 40.0 - 54.0 % 42.0   MCV 82 - 98 fL 87   MCH 27.0 - 31.0 pg 28.4   MCHC 32.0 - 36.0 g/dL 32.6   RDW 11.5 - 14.5 % 13.2   Platelets 150 - 450 K/uL 169   MPV 9.2 - 12.9 fL 10.1   Gran % 38.0 - 73.0 % 60.8   Lymph % 18.0 - 48.0 % 29.8   Mono % 4.0 - 15.0 % 6.0   Eosinophil % 0.0 - 8.0 % 2.4   Basophil % 0.0 - 1.9 % 0.8   Immature Granulocytes 0.0 - 0.5 % 0.2   Gran # (ANC) 1.8 - 7.7 K/uL 3.8   Lymph # 1.0 - 4.8 K/uL 1.9   Mono # 0.3 - 1.0 K/uL 0.4   Eos # 0.0 - 0.5 K/uL 0.2   Baso # 0.00 - 0.20 K/uL 0.05   Immature Grans (Abs) 0.00 - 0.04 K/uL 0.01   nRBC 0 /100 WBC 0   Differential Method  Automated   Iron 45 - 160 ug/dL 58   TIBC 250 - 450 ug/dL 373   Saturated Iron 20 - 50 % 16 (L)   Transferrin 200 - 375 mg/dL 252   Ferritin 20.0 - 300.0 ng/mL 84   Sed Rate 0 - 10 mm/Hr 5   Sodium 136 - 145 mmol/L 140   Potassium 3.5 - 5.1 mmol/L 4.4   Chloride 95 - 110 mmol/L 106   CO2 23 - 29 mmol/L 26   Anion Gap 8 - 16 mmol/L 8   BUN 8 - 23 mg/dL 15   Creatinine 0.5 - 1.4 mg/dL 1.0   eGFR >60 mL/min/1.73 m^2 >60   Glucose 70 - 110 mg/dL 94   Calcium 8.7 - 10.5 mg/dL 9.1   Alkaline Phosphatase 55 - 135 U/L 63   PROTEIN TOTAL 6.0 - 8.4 g/dL 6.8   Albumin 3.5 - 5.2 g/dL 3.9   BILIRUBIN TOTAL 0.1 - 1.0 mg/dL 0.7   AST 10 - 40 U/L 18   ALT 10 - 44 U/L 16   CRP 0.0 - 8.2 mg/L 1.5   (L): Data is abnormally low   Latest Reference Range & Units 04/06/23 09:20 04/24/23 17:23 09/18/23 11:03 09/25/23 13:19   Iron 45 - 160 ug/dL   58 68   TIBC 250 - 450 ug/dL   373 410   Saturated Iron 20 - 50 %   16 (L) 17 (L)   Transferrin 200 - 375 mg/dL   252 277   Ferritin 20.0 - 300.0 ng/mL 46  84    Vitamin B-12 210 - 950 pg/mL  455     (L): Data is abnormally low    PMR no evidence  of RA. Peripheral spondyloarthritis, CPPD  nl ESR and CRP currently asymptomatic  Right> left rotator cuff tendinitis improved  full AROM mild + empty can and Speed's right attended PT  Burning pain posterolateral right knee of of longstanding, remains major problem, occurs only at night. Has seen multiple orthopedists in past, and is s/p arthroscopic meniscectomy and multiple injections, burning preceded these. Most recent menisectomy right knee has helped signficantly  In past, S/p hand and shoulder pain resolved with brief course of prednisone 10mg daily for 2 wks  Lumbar spondylosis asymptomatic  OA hips asymptomatic  B27+ no evidence of peripheral or axial spondyloarthritis  TAVR implant 34mm Dr. Lockett 12/21/21    kyphoplasty vertebro plasty T 11 L1  8/20/17  S/p right 3,4 5 flexor tenosynovitis  Rx with CSI  1 month ago  Mild anemia  Osteoporosis DXA 7/9/21:  FRAX hip 0.5% MOF 2.4%  Swelling in toes, exam with OA  IP toes       Fe/TIBC, ferritin, sTR, folate, B12 today  X-ray feet  DXA  Cont trazodone 100mg nightly   RTC 6 months

## 2023-09-25 NOTE — PROGRESS NOTES
Your iron saturation is low, and the ferritin is low normal the B12 and folate are fine. Suggest referral to Gastro or alternatively Dr. Lombardi can order fecal immunochemical test(FIT) for blood in stool.  BRISEYDA

## 2023-09-26 ENCOUNTER — HOSPITAL ENCOUNTER (OUTPATIENT)
Dept: RADIOLOGY | Facility: HOSPITAL | Age: 78
Discharge: HOME | End: 2023-09-26
Payer: MEDICARE

## 2023-09-26 ENCOUNTER — HOSPITAL ENCOUNTER (OUTPATIENT)
Dept: RADIOLOGY | Facility: HOSPITAL | Age: 78
Discharge: HOME | End: 2023-09-26
Attending: INTERNAL MEDICINE
Payer: MEDICARE

## 2023-09-26 ENCOUNTER — TELEPHONE (OUTPATIENT)
Dept: RHEUMATOLOGY | Facility: CLINIC | Age: 78
End: 2023-09-26
Payer: MEDICARE

## 2023-09-26 DIAGNOSIS — G89.29 CHRONIC BILATERAL LOW BACK PAIN, UNSPECIFIED WHETHER SCIATICA PRESENT: ICD-10-CM

## 2023-09-26 DIAGNOSIS — M54.50 CHRONIC BILATERAL LOW BACK PAIN, UNSPECIFIED WHETHER SCIATICA PRESENT: ICD-10-CM

## 2023-09-26 DIAGNOSIS — G89.29 CHRONIC BILATERAL LOW BACK PAIN, UNSPECIFIED WHETHER SCIATICA PRESENT: Primary | ICD-10-CM

## 2023-09-26 DIAGNOSIS — M19.90 OSTEOARTHRITIS, UNSPECIFIED OSTEOARTHRITIS TYPE, UNSPECIFIED SITE: ICD-10-CM

## 2023-09-26 DIAGNOSIS — M54.50 CHRONIC BILATERAL LOW BACK PAIN, UNSPECIFIED WHETHER SCIATICA PRESENT: Primary | ICD-10-CM

## 2023-09-26 DIAGNOSIS — Z15.89 HLA-B27 POSITIVE: ICD-10-CM

## 2023-09-26 DIAGNOSIS — M25.551 RIGHT HIP PAIN: ICD-10-CM

## 2023-09-26 LAB — STFR SERPL-MCNC: 3.1 MG/L (ref 1.8–4.6)

## 2023-09-26 PROCEDURE — 73521 XR HIPS BILATERAL 2 VIEW INCL AP PELVIS: ICD-10-PCS | Mod: 26,,, | Performed by: RADIOLOGY

## 2023-09-26 PROCEDURE — 73630 XR FOOT COMPLETE 3 VIEW BILATERAL: ICD-10-PCS | Mod: 26,50,, | Performed by: RADIOLOGY

## 2023-09-26 PROCEDURE — 72100 XR LUMBAR SPINE AP AND LATERAL: ICD-10-PCS | Mod: 26,,, | Performed by: RADIOLOGY

## 2023-09-26 PROCEDURE — 72100 X-RAY EXAM L-S SPINE 2/3 VWS: CPT | Mod: 26,,, | Performed by: RADIOLOGY

## 2023-09-26 PROCEDURE — 73521 X-RAY EXAM HIPS BI 2 VIEWS: CPT | Mod: TC

## 2023-09-26 PROCEDURE — 73630 X-RAY EXAM OF FOOT: CPT | Mod: 26,50,, | Performed by: RADIOLOGY

## 2023-09-26 PROCEDURE — 72100 X-RAY EXAM L-S SPINE 2/3 VWS: CPT | Mod: TC

## 2023-09-26 PROCEDURE — 73521 X-RAY EXAM HIPS BI 2 VIEWS: CPT | Mod: 26,,, | Performed by: RADIOLOGY

## 2023-09-26 PROCEDURE — 73630 X-RAY EXAM OF FOOT: CPT | Mod: TC,50

## 2023-09-26 NOTE — PROGRESS NOTES
The soluble transferrin receptor is normal but iron saturation is low, and the ferritin low normal,  suggest referral to Gastro or alternatively Dr. Lombardi can order fecal immunochemical test(FIT) for blood in stool.  BRISEYDA

## 2023-09-27 ENCOUNTER — HOSPITAL ENCOUNTER (OUTPATIENT)
Dept: RADIOLOGY | Facility: HOSPITAL | Age: 78
Discharge: HOME | End: 2023-09-27
Attending: INTERNAL MEDICINE
Payer: MEDICARE

## 2023-09-27 DIAGNOSIS — M81.0 OSTEOPOROSIS, UNSPECIFIED OSTEOPOROSIS TYPE, UNSPECIFIED PATHOLOGICAL FRACTURE PRESENCE: ICD-10-CM

## 2023-09-27 PROCEDURE — 77080 DXA BONE DENSITY AXIAL SKELETON 1 OR MORE SITES: ICD-10-PCS | Mod: 26,,, | Performed by: RADIOLOGY

## 2023-09-27 PROCEDURE — 77080 DXA BONE DENSITY AXIAL: CPT | Mod: 26,,, | Performed by: RADIOLOGY

## 2023-09-27 PROCEDURE — 77080 DXA BONE DENSITY AXIAL: CPT | Mod: TC

## 2023-11-08 ENCOUNTER — OFFICE VISIT (OUTPATIENT)
Dept: SPORTS MEDICINE | Facility: CLINIC | Age: 78
End: 2023-11-08
Payer: MEDICARE

## 2023-11-08 VITALS
HEIGHT: 70 IN | WEIGHT: 190.13 LBS | DIASTOLIC BLOOD PRESSURE: 60 MMHG | HEART RATE: 62 BPM | SYSTOLIC BLOOD PRESSURE: 162 MMHG | BODY MASS INDEX: 27.22 KG/M2

## 2023-11-08 DIAGNOSIS — Z98.890 S/P ARTHROSCOPIC SURGERY OF RIGHT KNEE: Primary | ICD-10-CM

## 2023-11-08 PROCEDURE — 99999 PR PBB SHADOW E&M-EST. PATIENT-LVL III: ICD-10-PCS | Mod: PBBFAC,,, | Performed by: ORTHOPAEDIC SURGERY

## 2023-11-08 PROCEDURE — 99999 PR PBB SHADOW E&M-EST. PATIENT-LVL III: CPT | Mod: PBBFAC,,, | Performed by: ORTHOPAEDIC SURGERY

## 2023-11-08 PROCEDURE — 99214 PR OFFICE/OUTPT VISIT, EST, LEVL IV, 30-39 MIN: ICD-10-PCS | Mod: S$PBB,,, | Performed by: ORTHOPAEDIC SURGERY

## 2023-11-08 PROCEDURE — 99213 OFFICE O/P EST LOW 20 MIN: CPT | Mod: PBBFAC | Performed by: ORTHOPAEDIC SURGERY

## 2023-11-08 PROCEDURE — 99214 OFFICE O/P EST MOD 30 MIN: CPT | Mod: S$PBB,,, | Performed by: ORTHOPAEDIC SURGERY

## 2023-11-08 RX ORDER — GABAPENTIN 300 MG/1
300 CAPSULE ORAL 2 TIMES DAILY
COMMUNITY
End: 2024-02-01

## 2023-11-08 NOTE — PROGRESS NOTES
CC: right knee pain    History of present illness: Pt is here today for post-operative followup of knee arthroscopy.  he is doing well.  We have reviewed his findings and discussed plan of care and future treatment options.       Patient has been attending physical therapy at the [facility].  He notes pain on the medial side of his knee that stays at a 3/10, he notes having trouble at night finding a comfortable position as well as using a sleeve (at hamstring area)    SANE post op: 80   SANE pre op: 60    DATE OF PROCEDURE: 06/13/2023  SURGEON:  Tari Gregory M.D  PROCEDURE PERFORMED:   right  1. knee arthroscopic chondroplasty (CPT 92484)  2. knee arthroscopic medial and lateral (CPT 15138) meniscectomy   3. knee arthroscopic partial synovectomy/debridement (CPT 01450).   4. knee arthroscopic plica excision(CPT 72737).    5. Knee arthroscopic lysis of adhesions (CPT 57400)    In the patellofemoral compartment, there was chondral damage to:  Patella 10 x 15 mm grade 3  Chondroplasty was performed using arthroscopic shaver.                                                                               Review of Systems   Constitution: Negative. Negative for chills, fever and night sweats.   HENT: Negative for congestion and headaches.    Eyes: Negative for blurred vision, left vision loss and right vision loss.   Cardiovascular: Negative for chest pain and syncope.   Respiratory: Negative for cough and shortness of breath.    Endocrine: Negative for polydipsia, polyphagia and polyuria.   Hematologic/Lymphatic: Negative for bleeding problem. Does not bruise/bleed easily.   Skin: Negative for dry skin, itching and rash.   Musculoskeletal: Negative for falls and muscle weakness.   Gastrointestinal: Negative for abdominal pain and bowel incontinence.   Genitourinary: Negative for bladder incontinence and nocturia.   Neurological: Negative for disturbances in coordination, loss of balance and seizures.    Psychiatric/Behavioral: Negative for depression. The patient does not have insomnia.    Allergic/Immunologic: Negative for hives and persistent infections.     PAST MEDICAL HISTORY:   Past Medical History:   Diagnosis Date    Anticoagulant long-term use     Aortic valve regurgitation     Aortic valve stenosis, nonrheumatic     Carotid stenosis, bilateral     Chronic diastolic heart failure, NYHA class 2     Chronic total occlusion of coronary artery     Coronary artery disease     Elevated PSA     UNDER CARE    Hypertension     Hypertensive heart disease     Osteoarthritis of right knee 3/22/2017    Polymyalgia     Restless legs     SOB (shortness of breath)      PAST SURGICAL HISTORY:   Past Surgical History:   Procedure Laterality Date    ARTHROSCOPIC CHONDROPLASTY OF KNEE JOINT Right 6/13/2023    Procedure: ARTHROSCOPY, KNEE, WITH CHONDROPLASTY;  Surgeon: Tari Gregory MD;  Location: [facility] OR;  Service: Orthopedics;  Laterality: Right;    BACK SURGERY      CAROTID STENT      HERNIA REPAIR      KNEE ARTHROPLASTY Right     KNEE ARTHROSCOPY W/ MENISCECTOMY Right 6/13/2023    Procedure: ARTHROSCOPY, KNEE, WITH MEDIAL AND LATERAL MENISCECTOMY;  Surgeon: Tari Gregory MD;  Location: [facility] OR;  Service: Orthopedics;  Laterality: Right;    KNEE ARTHROSCOPY W/ PLICA EXCISION Right 6/13/2023    Procedure: EXCISION, PLICA, KNEE, ARTHROSCOPIC;  Surgeon: Tari Gregory MD;  Location: [facility] OR;  Service: Orthopedics;  Laterality: Right;    KNEE DEBRIDEMENT Right 6/13/2023    Procedure: DEBRIDEMENT, KNEE;  Surgeon: Tari Gregory MD;  Location: [facility] OR;  Service: Orthopedics;  Laterality: Right;    [id number] Right 6/13/2023    Procedure: [id number];  Surgeon: Tari Gregory MD;  Location: [facility] OR;  Service: Orthopedics;  Laterality: Right;    PERCUTANEOUS TRANSCATHETER AORTIC VALVE REPLACEMENT (TAVR) Right 12/14/2021    Procedure: REPLACEMENT, AORTIC VALVE, PERCUTANEOUS, TRANSCATHETER;  Surgeon: Johny  Vishnu Lockett MD;  [facility];  Service: Cardiology;  Laterality: Right;  ops # 8    PERCUTANEOUS TRANSCATHETER AORTIC VALVE REPLACEMENT (TAVR) Right 12/14/2021    Procedure: REPLACEMENT, AORTIC VALVE, PERCUTANEOUS, TRANSCATHETER;  Surgeon: Suhsma Piña MD;  [facility];  Service: Cardiovascular;  Laterality: Right;    SYNOVECTOMY OF KNEE Right 6/13/2023    Procedure: PARTIAL SYNOVECTOMY, KNEE;  Surgeon: Tari Gregory MD;  Location: [facility] OR;  Service: Orthopedics;  Laterality: Right;     FAMILY HISTORY:   Family History   Problem Relation Age of Onset    Heart disease Mother     Hypertension Mother     Stroke Mother     Diabetes Mellitus Father     Heart disease Paternal Grandmother     Stroke Paternal Grandmother      SOCIAL HISTORY:   Social History     Socioeconomic History    Marital status:    Tobacco Use    Smoking status: Never    Smokeless tobacco: Never   Substance and Sexual Activity    Alcohol use: Never    Drug use: Never       MEDICATIONS:   Current Outpatient Medications:     alfuzosin (UROXATRAL) 10 mg Tb24, Take 10 mg by mouth daily with breakfast., Disp: , Rfl:     clopidogreL (PLAVIX) 75 mg tablet, Take 75 mg by mouth once daily., Disp: , Rfl:     doxazosin (CARDURA) 4 MG tablet, Take 4 mg by mouth every evening., Disp: , Rfl:     gabapentin (NEURONTIN) 300 MG capsule, Take 300 mg by mouth 2 (two) times daily., Disp: , Rfl:     magnesium 250 mg Tab, Take 250 tablets by mouth once daily., Disp: , Rfl:     multivitamin (THERAGRAN) per tablet, Take 1 tablet by mouth once daily., Disp: , Rfl:     olmesartan (BENICAR) 40 MG tablet, Take 20 mg by mouth once daily., Disp: , Rfl:     rosuvastatin (CRESTOR) 20 MG tablet, Take 20 mg by mouth once daily., Disp: , Rfl:     traZODone (DESYREL) 100 MG tablet, Take 100 mg by mouth every evening., Disp: , Rfl:     acetaminophen (TYLENOL) 500 MG tablet, Take 500 mg by mouth every 6 (six) hours as needed for Pain., Disp: , Rfl:     aspirin  "(ECOTRIN) 81 MG EC tablet, Take 81 mg by mouth once daily., Disp: , Rfl:     coenzyme Q10 100 mg capsule, Take 200 mg by mouth once daily., Disp: , Rfl:     docusate sodium (COLACE) 100 MG capsule, Take 1 capsule (100 mg total) by mouth 2 (two) times daily as needed for Constipation. (Patient not taking: Reported on 11/8/2023), Disp: 14 capsule, Rfl: 0    mv-mn/iron/folic acid/herb 190 (VITAMIN D3 COMPLETE ORAL), Take 5,000 Units by mouth Daily., Disp: , Rfl:     rOPINIRole (REQUIP) 0.25 MG tablet, TAKE 1 TABLET BY MOUTH ONCE NIGHTLY AS NEEDED (Patient not taking: Reported on 11/8/2023), Disp: 90 tablet, Rfl: 1    zolpidem (AMBIEN) 10 mg Tab, Take 10 mg by mouth nightly as needed., Disp: , Rfl:   ALLERGIES:   Review of patient's allergies indicates:   Allergen Reactions    Dexamethasone sodium phosphate Other (See Comments)    Atorvastatin calcium Other (See Comments)     Leg cramps    Cortisone      Inj- pt reports hiccups and feels spasm     Duloxetine Other (See Comments)     DRY MONTH  NOT ABLE TO SLEEP  NOT HUNDRY  SWEATING A LOT  FEELING TIRED AND WEAK  DIZZINESS  WEIGHT LOSS ( 9 LB.)    Flomax [tamsulosin] Diarrhea    Pravastatin Other (See Comments)     Leg cramps       VITAL SIGNS: BP (!) 162/60   Pulse 62   Ht 5' 10" (1.778 m)   Wt 86.3 kg (190 lb 2.4 oz)   BMI 27.28 kg/m²        PHYSICAL EXAMINATION:     Incision sites healed well  No evidence of any erythema, infection or induration  Range of motion 0-135 degrees  Trace effusion  2+ DP pulse  No swelling, no calf tenderness  - Haresh's sign  Negative medial joint line tenderness  Mild to Moderate quad atrophy    + tenderness distal hamstring tendons and pes bursa   + hamstring tightness                                                                                ASSESSMENT:                                                                                                                                               1. Status post above, doing well.   "                                                                                                                             PLAN:                                                                                                                                                     1. Continue with PT, case discussed with therapist. Discussed focusing on hamstring stretching (dry needling)  2. Emphasized quad function.  3. I have discussed return to activity in detail.  4.Patient will see us back as needed.                                    5. All questions were answered, surgical technique was reviewed and interpreted, and patient should contact us with any questions or concerns in the interim.

## 2024-01-29 ENCOUNTER — OFFICE VISIT (OUTPATIENT)
Dept: SPORTS MEDICINE | Facility: CLINIC | Age: 79
End: 2024-01-29
Payer: MEDICARE

## 2024-01-29 VITALS
HEIGHT: 70 IN | HEART RATE: 65 BPM | WEIGHT: 193.31 LBS | SYSTOLIC BLOOD PRESSURE: 141 MMHG | DIASTOLIC BLOOD PRESSURE: 62 MMHG | BODY MASS INDEX: 27.67 KG/M2

## 2024-01-29 DIAGNOSIS — Z98.890 S/P ARTHROSCOPIC SURGERY OF RIGHT KNEE: Primary | ICD-10-CM

## 2024-01-29 DIAGNOSIS — M79.604 RIGHT LEG PAIN: ICD-10-CM

## 2024-01-29 PROCEDURE — 99999 PR PBB SHADOW E&M-EST. PATIENT-LVL IV: CPT | Mod: PBBFAC,,, | Performed by: ORTHOPAEDIC SURGERY

## 2024-01-29 PROCEDURE — 99214 OFFICE O/P EST MOD 30 MIN: CPT | Mod: S$PBB,,, | Performed by: ORTHOPAEDIC SURGERY

## 2024-01-29 PROCEDURE — 99214 OFFICE O/P EST MOD 30 MIN: CPT | Mod: PBBFAC | Performed by: ORTHOPAEDIC SURGERY

## 2024-01-29 NOTE — PROGRESS NOTES
CC: right knee pain    History of present illness: Pt is here today for post-operative followup of knee arthroscopy.  he is doing well.  We have reviewed his findings and discussed plan of care and future treatment options.       Patient has been attending physical therapy at the [facility].  He notes pain on the medial side of his knee that stays at a 3/10, he notes having trouble at night finding a comfortable position as well as using a sleeve (at hamstring area)    SANE post op: 80   SANE pre op: 60    Interval Hx 1/29/24: He comes in today for follow up. He has done PT with Manolo Terrazas in [place]. His pain is better during therapy but returns after therapy. The pain is worse at night and with walking. It usually resolves with a few minutes rest.     VAS 4.5   SANE 60      DATE OF PROCEDURE: 06/13/2023  SURGEON:  Tari Gregory M.D  PROCEDURE PERFORMED:   right  1. knee arthroscopic chondroplasty (CPT 05625)  2. knee arthroscopic medial and lateral (CPT 33311) meniscectomy   3. knee arthroscopic partial synovectomy/debridement (CPT 53874).   4. knee arthroscopic plica excision(CPT 79847).    5. Knee arthroscopic lysis of adhesions (CPT 06509)    In the patellofemoral compartment, there was chondral damage to:  Patella 10 x 15 mm grade 3  Chondroplasty was performed using arthroscopic shaver.                                                                               Review of Systems   Constitution: Negative. Negative for chills, fever and night sweats.   HENT: Negative for congestion and headaches.    Eyes: Negative for blurred vision, left vision loss and right vision loss.   Cardiovascular: Negative for chest pain and syncope.   Respiratory: Negative for cough and shortness of breath.    Endocrine: Negative for polydipsia, polyphagia and polyuria.   Hematologic/Lymphatic: Negative for bleeding problem. Does not bruise/bleed easily.   Skin: Negative for dry skin, itching and rash.   Musculoskeletal:  Negative for falls and muscle weakness.   Gastrointestinal: Negative for abdominal pain and bowel incontinence.   Genitourinary: Negative for bladder incontinence and nocturia.   Neurological: Negative for disturbances in coordination, loss of balance and seizures.   Psychiatric/Behavioral: Negative for depression. The patient does not have insomnia.    Allergic/Immunologic: Negative for hives and persistent infections.     PAST MEDICAL HISTORY:   Past Medical History:   Diagnosis Date    Anticoagulant long-term use     Aortic valve regurgitation     Aortic valve stenosis, nonrheumatic     Carotid stenosis, bilateral     Chronic diastolic heart failure, NYHA class 2     Chronic total occlusion of coronary artery     Coronary artery disease     Elevated PSA     UNDER CARE    Hypertension     Hypertensive heart disease     Osteoarthritis of right knee 3/22/2017    Polymyalgia     Restless legs     SOB (shortness of breath)      PAST SURGICAL HISTORY:   Past Surgical History:   Procedure Laterality Date    ARTHROSCOPIC CHONDROPLASTY OF KNEE JOINT Right 6/13/2023    Procedure: ARTHROSCOPY, KNEE, WITH CHONDROPLASTY;  Surgeon: Tari Gregory MD;  Location: [facility] OR;  Service: Orthopedics;  Laterality: Right;    BACK SURGERY      CAROTID STENT      HERNIA REPAIR      KNEE ARTHROPLASTY Right     KNEE ARTHROSCOPY W/ MENISCECTOMY Right 6/13/2023    Procedure: ARTHROSCOPY, KNEE, WITH MEDIAL AND LATERAL MENISCECTOMY;  Surgeon: Tari Gregory MD;  Location: [facility] OR;  Service: Orthopedics;  Laterality: Right;    KNEE ARTHROSCOPY W/ PLICA EXCISION Right 6/13/2023    Procedure: EXCISION, PLICA, KNEE, ARTHROSCOPIC;  Surgeon: Tari Gregory MD;  Location: [facility] OR;  Service: Orthopedics;  Laterality: Right;    KNEE DEBRIDEMENT Right 6/13/2023    Procedure: DEBRIDEMENT, KNEE;  Surgeon: Tari Gregory MD;  Location: [facility] OR;  Service: Orthopedics;  Laterality: Right;    [id number] Right 6/13/2023    Procedure:  [id number];  Surgeon: Tari Gregory MD;  Location: [facility] OR;  Service: Orthopedics;  Laterality: Right;    PERCUTANEOUS TRANSCATHETER AORTIC VALVE REPLACEMENT (TAVR) Right 12/14/2021    Procedure: REPLACEMENT, AORTIC VALVE, PERCUTANEOUS, TRANSCATHETER;  Surgeon: Johny Lockett MD;  Location: [facility] CATH;  Service: Cardiology;  Laterality: Right;  ops # 8    PERCUTANEOUS TRANSCATHETER AORTIC VALVE REPLACEMENT (TAVR) Right 12/14/2021    Procedure: REPLACEMENT, AORTIC VALVE, PERCUTANEOUS, TRANSCATHETER;  Surgeon: Sushma Piña MD;  Location: [facility] CATH;  Service: Cardiovascular;  Laterality: Right;    SYNOVECTOMY OF KNEE Right 6/13/2023    Procedure: PARTIAL SYNOVECTOMY, KNEE;  Surgeon: Tari Gregory MD;  Location: [facility] OR;  Service: Orthopedics;  Laterality: Right;     FAMILY HISTORY:   Family History   Problem Relation Age of Onset    Heart disease Mother     Hypertension Mother     Stroke Mother     Diabetes Mellitus Father     Heart disease Paternal Grandmother     Stroke Paternal Grandmother      SOCIAL HISTORY:   Social History     Socioeconomic History    Marital status:    Tobacco Use    Smoking status: Never    Smokeless tobacco: Never   Substance and Sexual Activity    Alcohol use: Never    Drug use: Never     Social Determinants of Health     Financial Resource Strain: Low Risk  (1/26/2024)    Overall Financial Resource Strain (CARDIA)     Difficulty of Paying Living Expenses: Not hard at all   Food Insecurity: No Food Insecurity (1/26/2024)    Hunger Vital Sign     Worried About Running Out of Food in the Last Year: Never true     Ran Out of Food in the Last Year: Never true   Transportation Needs: No Transportation Needs (1/26/2024)    PRAPARE - Transportation     Lack of Transportation (Medical): No     Lack of Transportation (Non-Medical): No   Physical Activity: Sufficiently Active (1/26/2024)    Exercise Vital Sign     Days of Exercise per Week: 5 days     Minutes of Exercise  per Session: 60 min   Stress: No Stress Concern Present (1/26/2024)    [employer] [place] [employer] - Occupational Stress Questionnaire     Feeling of Stress : Only a little   Social Connections: Unknown (1/26/2024)    Social Connection and Isolation Panel [NHANES]     Frequency of Communication with Friends and Family: Twice a week     Frequency of Social Gatherings with Friends and Family: More than three times a week     Active Member of Clubs or Organizations: Yes     Attends Club or Organization Meetings: 1 to 4 times per year     Marital Status:    Housing Stability: Low Risk  (1/26/2024)    Housing Stability Vital Sign     Unable to Pay for Housing in the Last Year: No     Number of Places Lived in the Last Year: 0     Unstable Housing in the Last Year: No       MEDICATIONS:   Current Outpatient Medications:     acetaminophen (TYLENOL) 500 MG tablet, Take 1,000 mg by mouth every 6 (six) hours as needed for Pain., Disp: , Rfl:     aspirin (ECOTRIN) 81 MG EC tablet, Take 81 mg by mouth once daily., Disp: , Rfl:     clopidogreL (PLAVIX) 75 mg tablet, Take 75 mg by mouth once daily., Disp: , Rfl:     gabapentin (NEURONTIN) 300 MG capsule, Take 300 mg by mouth 2 (two) times daily., Disp: , Rfl:     multivitamin (THERAGRAN) per tablet, Take 1 tablet by mouth once daily., Disp: , Rfl:     mv-mn/iron/folic acid/herb 190 (VITAMIN D3 COMPLETE ORAL), Take 5,000 Units by mouth Daily., Disp: , Rfl:     olmesartan (BENICAR) 40 MG tablet, Take 20 mg by mouth once daily., Disp: , Rfl:     traZODone (DESYREL) 100 MG tablet, Take 150 mg by mouth every evening., Disp: , Rfl:     zolpidem (AMBIEN) 10 mg Tab, Take 10 mg by mouth nightly as needed., Disp: , Rfl:     alfuzosin (UROXATRAL) 10 mg Tb24, Take 10 mg by mouth daily with breakfast., Disp: , Rfl:     coenzyme Q10 100 mg capsule, Take 200 mg by mouth once daily., Disp: , Rfl:     docusate sodium (COLACE) 100 MG capsule, Take 1 capsule (100 mg total) by  "mouth 2 (two) times daily as needed for Constipation. (Patient not taking: Reported on 11/8/2023), Disp: 14 capsule, Rfl: 0    doxazosin (CARDURA) 4 MG tablet, Take 4 mg by mouth every evening., Disp: , Rfl:     magnesium 250 mg Tab, Take 250 tablets by mouth once daily., Disp: , Rfl:     rOPINIRole (REQUIP) 0.25 MG tablet, TAKE 1 TABLET BY MOUTH ONCE NIGHTLY AS NEEDED (Patient not taking: Reported on 11/8/2023), Disp: 90 tablet, Rfl: 1    rosuvastatin (CRESTOR) 20 MG tablet, Take 20 mg by mouth once daily., Disp: , Rfl:   ALLERGIES:   Review of patient's allergies indicates:   Allergen Reactions    Dexamethasone sodium phosphate Other (See Comments)    Atorvastatin calcium Other (See Comments)     Leg cramps    Cortisone      Inj- pt reports hiccups and feels spasm     Duloxetine Other (See Comments)     DRY MONTH  NOT ABLE TO SLEEP  NOT HUNDRY  SWEATING A LOT  FEELING TIRED AND WEAK  DIZZINESS  WEIGHT LOSS ( 9 LB.)    Flomax [tamsulosin] Diarrhea    Pravastatin Other (See Comments)     Leg cramps       VITAL SIGNS: BP (!) 141/62   Pulse 65   Ht 5' 10" (1.778 m)   Wt 87.7 kg (193 lb 5.5 oz)   BMI 27.74 kg/m²        PHYSICAL EXAMINATION:     Incision sites healed well  No evidence of any erythema, infection or induration  Range of motion 0-135 degrees  Trace effusion  2+ DP pulse  No swelling, no calf tenderness  - Haresh's sign  Negative medial joint line tenderness  Mild to Moderate quad atrophy    + tenderness distal hamstring tendons and pes bursa   + hamstring tightness     Other Findings: He has lost most of the hair on his lower legs over the years                                                                               ASSESSMENT:                                                                                                                                               1. Status post above, doing well.                                                                                                   "                             PLAN:                                                                                                                                                  1. Continue home exercise program   2. Emphasized quad function. We discussed today that while he is at the gym if he is going to do exercise biking he should focus on low resistance exercises   3. We will refer him to Dr. Gallegos in vascular surgery for evaluation of right leg symptoms, including hair loss and night time cramping, in this patient with a hx of PAD.                                4. Follow up with us as need.     All questions were answered, surgical technique was reviewed and interpreted, and patient should contact us with any questions or concerns in the interim.

## 2024-02-01 ENCOUNTER — OFFICE VISIT (OUTPATIENT)
Dept: NEUROLOGY | Facility: CLINIC | Age: 79
End: 2024-02-01
Payer: MEDICARE

## 2024-02-01 ENCOUNTER — OFFICE VISIT (OUTPATIENT)
Dept: CARDIOLOGY | Facility: CLINIC | Age: 79
End: 2024-02-01
Payer: MEDICARE

## 2024-02-01 VITALS
RESPIRATION RATE: 18 BRPM | BODY MASS INDEX: 27.16 KG/M2 | DIASTOLIC BLOOD PRESSURE: 60 MMHG | WEIGHT: 194 LBS | HEIGHT: 71 IN | SYSTOLIC BLOOD PRESSURE: 144 MMHG | HEART RATE: 66 BPM

## 2024-02-01 VITALS
WEIGHT: 195.75 LBS | DIASTOLIC BLOOD PRESSURE: 52 MMHG | HEART RATE: 67 BPM | SYSTOLIC BLOOD PRESSURE: 149 MMHG | BODY MASS INDEX: 27.4 KG/M2 | HEIGHT: 71 IN

## 2024-02-01 DIAGNOSIS — M54.16 LUMBAR RADICULOPATHY: ICD-10-CM

## 2024-02-01 DIAGNOSIS — I73.9 PERIPHERAL VASCULAR DISEASE, UNSPECIFIED: ICD-10-CM

## 2024-02-01 DIAGNOSIS — M62.08 DIASTASIS RECTI: ICD-10-CM

## 2024-02-01 DIAGNOSIS — G25.81 RLS (RESTLESS LEGS SYNDROME): ICD-10-CM

## 2024-02-01 DIAGNOSIS — M54.40 CHRONIC LOW BACK PAIN WITH SCIATICA, SCIATICA LATERALITY UNSPECIFIED, UNSPECIFIED BACK PAIN LATERALITY: ICD-10-CM

## 2024-02-01 DIAGNOSIS — M79.604 RIGHT LEG PAIN: Primary | ICD-10-CM

## 2024-02-01 DIAGNOSIS — I50.32 CHRONIC DIASTOLIC (CONGESTIVE) HEART FAILURE: ICD-10-CM

## 2024-02-01 DIAGNOSIS — G89.29 CHRONIC LOW BACK PAIN WITH SCIATICA, SCIATICA LATERALITY UNSPECIFIED, UNSPECIFIED BACK PAIN LATERALITY: ICD-10-CM

## 2024-02-01 DIAGNOSIS — G62.9 POLYNEUROPATHY: ICD-10-CM

## 2024-02-01 DIAGNOSIS — M54.16 LUMBAR RADICULOPATHY, CHRONIC: ICD-10-CM

## 2024-02-01 PROCEDURE — 99213 OFFICE O/P EST LOW 20 MIN: CPT | Mod: PBBFAC | Performed by: PSYCHIATRY & NEUROLOGY

## 2024-02-01 PROCEDURE — 99999 PR PBB SHADOW E&M-EST. PATIENT-LVL III: CPT | Mod: PBBFAC,,, | Performed by: PSYCHIATRY & NEUROLOGY

## 2024-02-01 PROCEDURE — 99205 OFFICE O/P NEW HI 60 MIN: CPT | Mod: S$PBB,,, | Performed by: INTERNAL MEDICINE

## 2024-02-01 PROCEDURE — 99214 OFFICE O/P EST MOD 30 MIN: CPT | Mod: S$PBB | Performed by: PSYCHIATRY & NEUROLOGY

## 2024-02-01 PROCEDURE — 99999 PR PBB SHADOW E&M-EST. PATIENT-LVL V: CPT | Mod: PBBFAC,,, | Performed by: INTERNAL MEDICINE

## 2024-02-01 PROCEDURE — 99215 OFFICE O/P EST HI 40 MIN: CPT | Mod: PBBFAC,27,PO | Performed by: INTERNAL MEDICINE

## 2024-02-01 PROCEDURE — 99999 PR STA SHADOW: CPT | Mod: PBBFAC,,, | Performed by: PSYCHIATRY & NEUROLOGY

## 2024-02-01 RX ORDER — NIFEDIPINE 30 MG/1
30 TABLET, FILM COATED, EXTENDED RELEASE ORAL DAILY
COMMUNITY
End: 2024-05-20 | Stop reason: SDUPTHER

## 2024-02-01 RX ORDER — GABAPENTIN 600 MG/1
600 TABLET ORAL NIGHTLY
Qty: 30 TABLET | Refills: 11 | Status: SHIPPED | OUTPATIENT
Start: 2024-02-01 | End: 2024-04-09 | Stop reason: SDUPTHER

## 2024-02-01 NOTE — PROGRESS NOTES
"[facility]      Chief Complaint   Patient presents with    Right leg pain       Patient ID: Ghanshyam Sanford Jr. is a 78 y.o. male with aortic stenosis s/p TAVR, chronic diastolic heart failure, carotid artery disease, CAD s/p LAD and RCA stents, HTN, lumbar degenerative disc disease, who presents for initial appointment.  Pertinent history/events are as follows:     -Pt kindly referred by Dr. Khan for evaluation of Right leg pain.    HPI:  Mr. Sanford reports pain in the right leg starting 7 years ago when he was diagnosed with polymyalgia.  States his leg "has been scoped 3 times" with no improvement.  States pain is worse at night.      Past Medical History:   Diagnosis Date    Anticoagulant long-term use     Aortic valve regurgitation     Aortic valve stenosis, nonrheumatic     Carotid stenosis, bilateral     Chronic diastolic heart failure, NYHA class 2     Chronic total occlusion of coronary artery     Coronary artery disease     Elevated PSA     UNDER CARE    Hypertension     Hypertensive heart disease     Osteoarthritis of right knee 3/22/2017    Polymyalgia     Restless legs     SOB (shortness of breath)      Past Surgical History:   Procedure Laterality Date    ARTHROSCOPIC CHONDROPLASTY OF KNEE JOINT Right 6/13/2023    Procedure: ARTHROSCOPY, KNEE, WITH CHONDROPLASTY;  Surgeon: Tari Gregory MD;  Location: [facility] OR;  Service: Orthopedics;  Laterality: Right;    BACK SURGERY      CAROTID STENT      HERNIA REPAIR      KNEE ARTHROPLASTY Right     KNEE ARTHROSCOPY W/ MENISCECTOMY Right 6/13/2023    Procedure: ARTHROSCOPY, KNEE, WITH MEDIAL AND LATERAL MENISCECTOMY;  Surgeon: Tari Gregory MD;  Location: [facility] OR;  Service: Orthopedics;  Laterality: Right;    KNEE ARTHROSCOPY W/ PLICA EXCISION Right 6/13/2023    Procedure: EXCISION, PLICA, KNEE, ARTHROSCOPIC;  Surgeon: Tari Gregory MD;  Location: [facility] OR;  Service: Orthopedics;  Laterality: Right;    KNEE DEBRIDEMENT Right 6/13/2023    Procedure: " DEBRIDEMENT, KNEE;  Surgeon: Tari Gregory MD;  Location: [facility] OR;  Service: Orthopedics;  Laterality: Right;    [id number] Right 6/13/2023    Procedure: [id number];  Surgeon: Tari Gregory MD;  Location: [facility] OR;  Service: Orthopedics;  Laterality: Right;    PERCUTANEOUS TRANSCATHETER AORTIC VALVE REPLACEMENT (TAVR) Right 12/14/2021    Procedure: REPLACEMENT, AORTIC VALVE, PERCUTANEOUS, TRANSCATHETER;  Surgeon: Johny Lockett MD;  Location: [facility] beds at the [facility] CATH;  Service: Cardiology;  Laterality: Right;  ops # [id number]    PERCUTANEOUS TRANSCATHETER AORTIC VALVE REPLACEMENT (TAVR) Right 12/14/2021    Procedure: REPLACEMENT, AORTIC VALVE, PERCUTANEOUS, TRANSCATHETER;  Surgeon: Sushma Piña MD;  Location: [facility] 234 beds at the [facility] CATH;  Service: Cardiovascular;  Laterality: Right;    SYNOVECTOMY OF KNEE Right 6/13/2023    Procedure: PARTIAL SYNOVECTOMY, KNEE;  Surgeon: Tari Gregory MD;  Location: [facility] OR;  Service: Orthopedics;  Laterality: Right;     Social History     Socioeconomic History    Marital status:    Tobacco Use    Smoking status: Never    Smokeless tobacco: Never   Substance and Sexual Activity    Alcohol use: Never    Drug use: Never     Social Determinants of Health     Financial Resource Strain: Low Risk  (1/26/2024)    Overall Financial Resource Strain (CARDIA)     Difficulty of Paying Living Expenses: Not hard at all   Food Insecurity: No Food Insecurity (1/26/2024)    Hunger Vital Sign     Worried About Running Out of Food in the Last Year: Never true     Ran Out of Food in the Last Year: Never true   Transportation Needs: No Transportation Needs (1/26/2024)    PRAPARE - Transportation     Lack of Transportation (Medical): No     Lack of Transportation (Non-Medical): No   Physical Activity: Sufficiently Active (1/26/2024)    Exercise Vital Sign     Days of Exercise per Week: 5 days     Minutes of Exercise per Session: 60 min   Stress: No Stress Concern Present (1/26/2024)    [place]  [place] [employer] - Occupational Stress Questionnaire     Feeling of Stress : Only a little   Social Connections: Unknown (1/26/2024)    Social Connection and Isolation Panel [NHANES]     Frequency of Communication with Friends and Family: Twice a week     Frequency of Social Gatherings with Friends and Family: More than three times a week     Active Member of Clubs or Organizations: Yes     Attends Club or Organization Meetings: 1 to 4 times per year     Marital Status:    Housing Stability: Low Risk  (1/26/2024)    Housing Stability Vital Sign     Unable to Pay for Housing in the Last Year: No     Number of Places Lived in the Last Year: 0     Unstable Housing in the Last Year: No     Family History   Problem Relation Age of Onset    Heart disease Mother     Hypertension Mother     Stroke Mother     Diabetes Mellitus Father     Heart disease Paternal Grandmother     Stroke Paternal Grandmother        Review of patient's allergies indicates:   Allergen Reactions    Dexamethasone sodium phosphate Other (See Comments)    Atorvastatin calcium Other (See Comments)     Leg cramps    Cortisone      Inj- pt reports hiccups and feels spasm     Duloxetine Other (See Comments)     DRY MONTH  NOT ABLE TO SLEEP  NOT HUNDRY  SWEATING A LOT  FEELING TIRED AND WEAK  DIZZINESS  WEIGHT LOSS ( 9 LB.)    Flomax [tamsulosin] Diarrhea    Pravastatin Other (See Comments)     Leg cramps       Medication List with Changes/Refills   Current Medications    ACETAMINOPHEN (TYLENOL) 500 MG TABLET    Take 1,000 mg by mouth every 6 (six) hours as needed for Pain.    ALFUZOSIN (UROXATRAL) 10 MG TB24    Take 10 mg by mouth daily with breakfast.    ASPIRIN (ECOTRIN) 81 MG EC TABLET    Take 81 mg by mouth once daily.    CLOPIDOGREL (PLAVIX) 75 MG TABLET    Take 75 mg by mouth once daily.    COENZYME Q10 100 MG CAPSULE    Take 200 mg by mouth once daily.    DOCUSATE SODIUM (COLACE) 100 MG CAPSULE    Take 1 capsule (100 mg total)  "by mouth 2 (two) times daily as needed for Constipation.    DOXAZOSIN (CARDURA) 4 MG TABLET    Take 4 mg by mouth every evening.    GABAPENTIN (NEURONTIN) 600 MG TABLET    Take 1 tablet (600 mg total) by mouth every evening.    MULTIVITAMIN (THERAGRAN) PER TABLET    Take 1 tablet by mouth once daily.    MV-MN/IRON/FOLIC ACID/HERB 190 (VITAMIN D3 COMPLETE ORAL)    Take 5,000 Units by mouth Daily.    NIFEDIPINE (ADALAT CC) 30 MG TBSR    Take 30 mg by mouth once daily.    OLMESARTAN (BENICAR) 40 MG TABLET    Take 20 mg by mouth once daily.    ROSUVASTATIN (CRESTOR) 20 MG TABLET    Take 20 mg by mouth once daily.    TRAZODONE (DESYREL) 150 MG TABLET    Take 150 mg by mouth every evening.    ZOLPIDEM (AMBIEN) 10 MG TAB    Take 10 mg by mouth nightly as needed.       Review of Systems  Constitution: Denies chills, fever, and sweats.  HENT: Denies headaches or blurry vision.  Cardiovascular: Denies chest pain or irregular heart beat.  Respiratory: Denies cough or shortness of breath.  Gastrointestinal: Denies abdominal pain, nausea, or vomiting.  Musculoskeletal: Positive for right leg pain.  Neurological: Denies dizziness or focal weakness.  Psychiatric/Behavioral: Normal mental status.  Hematologic/Lymphatic: Denies bleeding problem or easy bruising/bleeding.  Skin: Denies rash or suspicious lesions    Physical Examination  BP (!) 149/52   Pulse 67   Ht 5' 10.5" (1.791 m)   Wt 88.8 kg (195 lb 12.3 oz)   BMI 27.69 kg/m²     Constitutional: No acute distress, conversant  HEENT: Sclera anicteric, Pupils equal, round and reactive to light, extraocular motions intact, Oropharynx clear  Neck: No JVD, no carotid bruits  Cardiovascular: regular rate and rhythm, no murmur, rubs or gallops, normal S1/S2  Pulmonary: Clear to auscultation bilaterally  Abdominal: Abdomen soft, nontender, diastasis rectic present, positive bowel sounds  Extremities: No lower extremity edema   Pulses:  Carotid pulses are 2+ on the right side, and " "2+ on the left side.  Radial pulses are 2+ on the right side, and 2+ on the left side.   Femoral pulses are 2+ on the right side, and 2+ on the left side.  Popliteal pulses are 2+ on the right side, and 2+ on the left side.   Dorsalis pedis pulses are 2+ on the right side, and 2+ on the left side.   Posterior tibial pulses are 2+ on the right side, and 2+ on the left side.    Skin: No ecchymosis, erythema, or ulcers  Psych: Alert and oriented x 3, appropriate affect  Neuro: CNII-XII intact, no focal deficits    Labs:  Most Recent Data  CBC:   Lab Results   Component Value Date    WBC 6.30 09/18/2023    HGB 13.7 (L) 09/18/2023    HCT 42.0 09/18/2023     09/18/2023    MCV 87 09/18/2023    RDW 13.2 09/18/2023     BMP:   Lab Results   Component Value Date     09/18/2023    K 4.4 09/18/2023     09/18/2023    CO2 26 09/18/2023    BUN 15 09/18/2023    CREATININE 1.0 09/18/2023    GLU 94 09/18/2023    CALCIUM 9.1 09/18/2023    MG 2.3 04/27/2017    PHOS 2.7 02/01/2023     LFTS;   Lab Results   Component Value Date    PROT 6.8 09/18/2023    ALBUMIN 3.9 09/18/2023    BILITOT 0.7 09/18/2023    AST 18 09/18/2023    ALKPHOS 63 09/18/2023    ALT 16 09/18/2023     COAGS:   Lab Results   Component Value Date    INR 1.0 12/13/2021     FLP: No results found for: "CHOL", "HDL", "LDLCALC", "TRIG", "CHOLHDL"  CARDIAC:   Lab Results   Component Value Date    TROPONINI <0.012 12/13/2021       Imaging:    Assessment/Plan:  Ghanshyam Sanford Jr. is a 78 y.o. male with aortic stenosis s/p TAVR, chronic diastolic heart failure, carotid artery disease, CAD s/p LAD and RCA stents, HTN, lumbar degenerative disc disease, who presents for initial appointment.    Right leg pain- Physical exam suggests right leg pain is due to lumbar degenerative disc disease.  Check MRI lumbar spine and refer to Neurosurgery for evaluation.  Check CTA abd/pelvis and exercise GOLDEN to rule out flow limiting PAD.      2. HTN- Continue current " medications.     3. CAD s/p LAD and RCA stents- Pt with no angina.  Continue ASA/statin/plavix.    4. Aortic stenosis s/p TAVR- Stable. Continue to monitor with outside cardiologist.     5. Overweight- Encourage diet, exercise, and weight loss.     6. Diastasis Recti- Given history of umbilical hernia repair, refer to General Surgery for evaluation.     Follow up in 3 months     Total duration of face to face visit time 30 minutes.  Total time spent counseling greater than fifty percent of total visit time.  Counseling included discussion regarding imaging findings, diagnosis, possibilities, treatment options, risks and benefits.  The patient had many questions regarding the options and long-term effects.    Luis Enrique Gallegos MD, PhD  Interventional Cardiology

## 2024-02-01 NOTE — PROGRESS NOTES
HPI: Ghanshyam Sanford Jr. is a 78 y.o. male with burning in right leg       Here for 6 months follow up      He continues to follow with Dr Gregory  who is referring him to vascular surgery for evaluation of right leg symptoms     Pain is with movement or rest      His posterior right knee pain had resolved but is recurring. Feels his right calf muscle is stiff      He has had no improvement in his right medial knee pain.       Better sleep with ambien results in less pain in the am in the past.       RLS is treated with gabapentin he restarted on his own. He takes 600mg nightly  (restarted 3 weeks ago)    Pain is worse at night      No numbness in the feet not active    Lumbar pain is noted at times in a radicular pattern on the right and now sometimes the left leg    No weakness in the legs, not foot drop    On trazodone per PCP who prefers he take this instead of PRN Ambient    He has ongoing follow up with rheumatology     Labs from PCP noted below    Review of Systems   Constitutional:  Negative for fever.   HENT:  Negative for nosebleeds.    Eyes:  Negative for double vision.   Respiratory:  Negative for hemoptysis.    Cardiovascular:  Negative for leg swelling.   Gastrointestinal:  Negative for blood in stool.   Genitourinary:  Negative for hematuria.   Musculoskeletal:  Negative for falls.   Skin:  Negative for rash.   Neurological:  Negative for focal weakness.   Endo/Heme/Allergies:  Does not bruise/bleed easily.   Psychiatric/Behavioral:  The patient has insomnia.          I have reviewed all of this patient's past medical and surgical histories as well as family and social histories and active allergies and medications as documented in the electronic medical record.        Exam:  Gen Appearance, well developed/nourished in no apparent distress  CV: 2+ distal pulses with no edema or swelling  Neuro:  MS: Awake, alert, oriented to place, person, time, situation. Sustains attention. Recent/remote memory  "intact, Language is full to spontaneous speech/repetition/naming/comprehension. Fund of Knowledge is full  CN: Optic discs are flat with normal vasculature, PERRL, Extraoccular movements and visual fields are full. Normal facial sensation and strength, Hearing symmetric, Tongue and Palate are midline and strong. Shoulder Shrug symmetric and strong.  Motor: Normal bulk, tone, no abnormal movements. 5/5 strength bilateral upper/lower extremities with 2+ reflexes and bilateral plantar response  Sensory: symmetric to light touch, pain, temp, and vibration, but increased sensitivity to pain in the right thigh medially and right calf and decreased vibration on the right knee  Romberg negative  Cerebellar: Finger-nose,, Rapid alternating movements intact  Gait: Normal stance, no ataxia      Imaging:    MRI L spine 3/2023:  mild disc bulging from L3-S1 with mild NF narrowing at L4-5 bilaterally and left NF narrowing at L3-4 and L5-S1    Remote kyphoplasty at T11 and L1    4/2023 EMG/NCS of the legs: Impression:  Abnormal Study secondary to the Presence of:    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    Mild Lumbar Radiculopathy at L4 Bilaterally         Labs: CBC normal 2023 2023 Ferritin level 45    Normal B12,TSH, SPEP/MARIO 2023 1/2023 Glucose noted    Assessment/Plan: Ghanshyam Sanford  is a 78 y.o. male with 2 years of pain in the right medial knee and posterior knee (burning and "pulling") radiating up to the lower medial thigh and down to the upper lower leg posteriorly   I recommend:     Has seen pain management and PT    Had temporary relief with nerve block at the knee AND Lumbar NELSON    3.   Pain could be multifactorial from the back and the knee and ?RLS (pain is most severe at night) and possibly neuropathy    4.   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 We discussed prior his pain could be from multiple factors.   -Suprisingly, he had distal polyneuropathy by NCS. This may be incidentally found as symptoms are more proximal. His lab work up with B12,TSH, SPEP/MARIO was normal. The patient's CMP and CBC have been unremarkable/ no  fasting glucose abnormality and he does not report alcohol abuse. Etiology of neuropathy may be idiopathic   -now s/p right knee arthroscopy in 6/2023 and his posterior knee resolved/ still has some right medial knee with f/u up with Dr Gregory ongoing. He is being referred to vascular surgery per Dr Gregory  -He is back on Gabapentin 600mg nightly with some relief. This suggests a neuropathic component to this pain. I suggest he take this consistency for 6 months to help with RLS, neuropathy and lumbar radicular pain  -If lower back pain increases, he should see his pain management MD (Dr Richards) for consideration of intervention. MRI L spine 3/2023 showed mild disc bulging and mild NF narrowing      5.   His best relief was with Gabapentin per Rheumatology prior  -In addition, he has some RLS symptoms in the right leg for which he used Requip/ does not need this now  -Started on Iron in 2023 for Ferritin level less than 70 but this did not help his symptoms . He did follow up with PCP since as well  -He feels his RLS is due to pain  -Again gabapentin as above    6.  Note he is seeing rheumatology with history of  PMR. No signs of active disease.    7. On trazodone per PCP for poor sleep. Sleep hygiene reviewed prior and again today/ improvement    RTC 6 months

## 2024-02-01 NOTE — PATIENT INSTRUCTIONS
Assessment/Plan:  Ghanshyam Sanford Jr. is a 78 y.o. male with aortic stenosis s/p TAVR, chronic diastolic heart failure, carotid artery disease, CAD s/p LAD and RCA stents, HTN, lumbar degenerative disc disease, who presents for initial appointment.    Right leg pain- Physical exam suggests right leg pain is due to lumbar degenerative disc disease.  Check MRI lumbar spine and refer to Neurosurgery for evaluation.  Check CTA abd/pelvis and exercise GOLDEN to rule out flow limiting PAD.      2. HTN- Continue current medications.     3. CAD s/p LAD and RCA stents- Pt with no angina.  Continue ASA/statin/plavix.    4. Aortic stenosis s/p TAVR- Stable. Continue to monitor with outside cardiologist.     5. Overweight- Encourage diet, exercise, and weight loss.     6. Diastasis Recti- Given history of umbilical hernia repair, refer to General Surgery for evaluation.     Follow up in 3 months

## 2024-02-07 ENCOUNTER — PATIENT MESSAGE (OUTPATIENT)
Dept: CARDIOLOGY | Facility: CLINIC | Age: 79
End: 2024-02-07
Payer: MEDICARE

## 2024-02-07 ENCOUNTER — TELEPHONE (OUTPATIENT)
Dept: CARDIOLOGY | Facility: CLINIC | Age: 79
End: 2024-02-07
Payer: MEDICARE

## 2024-02-07 NOTE — TELEPHONE ENCOUNTER
----- Message from Elayne Dey sent at 2/7/2024  9:02 AM CST -----  Regarding: CTA  Patient calling to speak with staff, he would like to take his CTA ABD PELV  at [facility] and not at [facility].  Please call back @ [phone]. Thank you Elayne

## 2024-02-09 ENCOUNTER — PATIENT MESSAGE (OUTPATIENT)
Dept: CARDIOLOGY | Facility: CLINIC | Age: 79
End: 2024-02-09
Payer: MEDICARE

## 2024-02-12 ENCOUNTER — LAB VISIT (OUTPATIENT)
Dept: LAB | Facility: HOSPITAL | Age: 79
End: 2024-02-12
Attending: INTERNAL MEDICINE
Payer: MEDICARE

## 2024-02-12 DIAGNOSIS — M79.604 RIGHT LEG PAIN: ICD-10-CM

## 2024-02-12 LAB
CREAT SERPL-MCNC: 1 MG/DL (ref 0.5–1.4)
EST. GFR  (NO RACE VARIABLE): >60 ML/MIN/1.73 M^2

## 2024-02-12 PROCEDURE — 82565 ASSAY OF CREATININE: CPT | Performed by: INTERNAL MEDICINE

## 2024-02-12 PROCEDURE — 36415 COLL VENOUS BLD VENIPUNCTURE: CPT | Performed by: INTERNAL MEDICINE

## 2024-02-14 ENCOUNTER — TELEPHONE (OUTPATIENT)
Dept: NEUROSURGERY | Facility: CLINIC | Age: 79
End: 2024-02-14
Payer: MEDICARE

## 2024-02-14 NOTE — TELEPHONE ENCOUNTER
----- Message from Ariana Corey MA sent at 2024  3:45 PM CST -----  Regardinnd request for appt  Pt is requesting an appointment. Dr. Gallegos put in the referral. Pt is expecting a call back.  Thanks,  Ariana

## 2024-02-19 ENCOUNTER — HOSPITAL ENCOUNTER (OUTPATIENT)
Dept: CARDIOLOGY | Facility: HOSPITAL | Age: 79
Discharge: HOME | End: 2024-02-19
Attending: INTERNAL MEDICINE
Payer: MEDICARE

## 2024-02-19 DIAGNOSIS — M79.604 RIGHT LEG PAIN: ICD-10-CM

## 2024-02-19 LAB
IMMEDIATE ARM BP: 135 MMHG
IMMEDIATE LEFT ABI: 1.29
IMMEDIATE LEFT TIBIAL: 174 MMHG
IMMEDIATE RIGHT ABI: 1.21
IMMEDIATE RIGHT TIBIAL: 163 MMHG
LEFT ABI: 1.28
LEFT ARM BP: 155 MMHG
LEFT DORSALIS PEDIS: 198 MMHG
LEFT POSTERIOR TIBIAL: 178 MMHG
LEFT TBI: 0.79
LEFT TOE PRESSURE: 122 MMHG
RIGHT ABI: 1.22
RIGHT ARM BP: 154 MMHG
RIGHT DORSALIS PEDIS: 189 MMHG
RIGHT POSTERIOR TIBIAL: 178 MMHG
RIGHT TBI: 0.95
RIGHT TOE PRESSURE: 148 MMHG
TREADMILL GRADE: 12 %
TREADMILL SPEED: 2 MPH
TREADMILL TIME: 5 MIN

## 2024-02-19 PROCEDURE — 93924 LWR XTR VASC STDY BILAT: CPT | Mod: PO

## 2024-02-19 PROCEDURE — 93924 LWR XTR VASC STDY BILAT: CPT | Mod: 26,,, | Performed by: INTERNAL MEDICINE

## 2024-02-23 ENCOUNTER — PATIENT MESSAGE (OUTPATIENT)
Dept: CARDIOLOGY | Facility: CLINIC | Age: 79
End: 2024-02-23
Payer: MEDICARE

## 2024-02-28 ENCOUNTER — HOSPITAL ENCOUNTER (OUTPATIENT)
Dept: RADIOLOGY | Facility: HOSPITAL | Age: 79
Discharge: HOME | End: 2024-02-28
Attending: INTERNAL MEDICINE
Payer: MEDICARE

## 2024-02-28 ENCOUNTER — OFFICE VISIT (OUTPATIENT)
Dept: NEUROSURGERY | Facility: CLINIC | Age: 79
End: 2024-02-28
Payer: MEDICARE

## 2024-02-28 DIAGNOSIS — G89.29 CHRONIC LOW BACK PAIN WITH SCIATICA, SCIATICA LATERALITY UNSPECIFIED, UNSPECIFIED BACK PAIN LATERALITY: ICD-10-CM

## 2024-02-28 DIAGNOSIS — M54.16 LUMBAR RADICULOPATHY, CHRONIC: ICD-10-CM

## 2024-02-28 DIAGNOSIS — M54.16 LUMBAR RADICULOPATHY, RIGHT: Primary | ICD-10-CM

## 2024-02-28 DIAGNOSIS — M54.40 CHRONIC LOW BACK PAIN WITH SCIATICA, SCIATICA LATERALITY UNSPECIFIED, UNSPECIFIED BACK PAIN LATERALITY: ICD-10-CM

## 2024-02-28 DIAGNOSIS — M79.604 RIGHT LEG PAIN: ICD-10-CM

## 2024-02-28 DIAGNOSIS — I73.9 PERIPHERAL VASCULAR DISEASE, UNSPECIFIED: ICD-10-CM

## 2024-02-28 PROCEDURE — 25500020 PHARM REV CODE 255: Performed by: INTERNAL MEDICINE

## 2024-02-28 PROCEDURE — 99999 PR PBB SHADOW E&M-EST. PATIENT-LVL III: CPT | Mod: PBBFAC,,, | Performed by: PHYSICIAN ASSISTANT

## 2024-02-28 PROCEDURE — 99213 OFFICE O/P EST LOW 20 MIN: CPT | Mod: PBBFAC,25 | Performed by: PHYSICIAN ASSISTANT

## 2024-02-28 PROCEDURE — 75635 CT ANGIO ABDOMINAL ARTERIES: CPT | Mod: TC

## 2024-02-28 PROCEDURE — 72148 MRI LUMBAR SPINE W/O DYE: CPT | Mod: TC

## 2024-02-28 PROCEDURE — 72148 MRI LUMBAR SPINE W/O DYE: CPT | Mod: 26,,, | Performed by: RADIOLOGY

## 2024-02-28 PROCEDURE — 99214 OFFICE O/P EST MOD 30 MIN: CPT | Mod: S$PBB,,, | Performed by: PHYSICIAN ASSISTANT

## 2024-02-28 PROCEDURE — 75635 CT ANGIO ABDOMINAL ARTERIES: CPT | Mod: 26,,, | Performed by: RADIOLOGY

## 2024-02-28 RX ORDER — NIFEDIPINE 30 MG/1
30 TABLET, EXTENDED RELEASE ORAL
COMMUNITY
Start: 2024-02-22

## 2024-02-28 RX ADMIN — IOHEXOL 100 ML: 350 INJECTION, SOLUTION INTRAVENOUS at 01:02

## 2024-02-28 NOTE — PROGRESS NOTES
Neurosurgery  History & Physical    SUBJECTIVE:     Chief Complaint: right leg pain    History of Present Illness:  78-year-old male with history of aortic stenosis status post steps TAVR, chronic diastolic heart failure, CAD status post LAD and RCA stents, hypertension, lumbar spondylosis who presents today as a referral from his cardiologist for evaluation of right leg pain.  Patient reports his pain started roughly 5 years ago and has been persistent.  The pain is described as burning that radiates down the anterior right leg moves medially past the knee to mid calf.  He denies pain in his foot, numbness, or weakness.  He denies left leg pain.  He has minimal back pain and states the leg pain limits him more.  He was tried several rounds of physical therapy in the past as well as medical management with gabapentin 600 mg and anti-inflammatories without relief.  The pain is constant without specific exacerbating or alleviating factors.    Review of patient's allergies indicates:   Allergen Reactions    Dexamethasone sodium phosphate Other (See Comments)    Atorvastatin calcium Other (See Comments)     Leg cramps    Cortisone      Inj- pt reports hiccups and feels spasm     Duloxetine Other (See Comments)     DRY MONTH  NOT ABLE TO SLEEP  NOT HUNDRY  SWEATING A LOT  FEELING TIRED AND WEAK  DIZZINESS  WEIGHT LOSS ( 9 LB.)    Flomax [tamsulosin] Diarrhea    Pravastatin Other (See Comments)     Leg cramps       Current Outpatient Medications   Medication Sig Dispense Refill    acetaminophen (TYLENOL) 500 MG tablet Take 1,000 mg by mouth every 6 (six) hours as needed for Pain.      alfuzosin (UROXATRAL) 10 mg Tb24 Take 10 mg by mouth daily with breakfast.      aspirin (ECOTRIN) 81 MG EC tablet Take 81 mg by mouth once daily.      clopidogreL (PLAVIX) 75 mg tablet Take 75 mg by mouth once daily.      coenzyme Q10 100 mg capsule Take 200 mg by mouth once daily.      docusate sodium (COLACE) 100 MG capsule Take 1 capsule  (100 mg total) by mouth 2 (two) times daily as needed for Constipation. 14 capsule 0    doxazosin (CARDURA) 4 MG tablet Take 4 mg by mouth every evening.      gabapentin (NEURONTIN) 600 MG tablet Take 1 tablet (600 mg total) by mouth every evening. 30 tablet 11    multivitamin (THERAGRAN) per tablet Take 1 tablet by mouth once daily.      NIFEdipine (PROCARDIA-XL) 30 MG (OSM) 24 hr tablet Take 30 mg by mouth.      olmesartan (BENICAR) 40 MG tablet Take 20 mg by mouth once daily.      rosuvastatin (CRESTOR) 20 MG tablet Take 20 mg by mouth once daily.      traZODone (DESYREL) 150 MG tablet Take 150 mg by mouth every evening.      zolpidem (AMBIEN) 10 mg Tab Take 10 mg by mouth nightly as needed.      mv-mn/iron/folic acid/herb 190 (VITAMIN D3 COMPLETE ORAL) Take 5,000 Units by mouth Daily.      NIFEdipine (ADALAT CC) 30 MG TbSR Take 30 mg by mouth once daily.       No current facility-administered medications for this visit.       Past Medical History:   Diagnosis Date    Anticoagulant long-term use     Aortic valve regurgitation     Aortic valve stenosis, nonrheumatic     Carotid stenosis, bilateral     Chronic diastolic heart failure, NYHA class 2     Chronic total occlusion of coronary artery     Coronary artery disease     Elevated PSA     UNDER CARE    Hypertension     Hypertensive heart disease     Osteoarthritis of right knee 3/22/2017    Polymyalgia     Restless legs     SOB (shortness of breath)      Past Surgical History:   Procedure Laterality Date    ARTHROSCOPIC CHONDROPLASTY OF KNEE JOINT Right 6/13/2023    Procedure: ARTHROSCOPY, KNEE, WITH CHONDROPLASTY;  Surgeon: Tari Gregory MD;  Location: [facility] OR;  Service: Orthopedics;  Laterality: Right;    BACK SURGERY      CAROTID STENT      HERNIA REPAIR      KNEE ARTHROPLASTY Right     KNEE ARTHROSCOPY W/ MENISCECTOMY Right 6/13/2023    Procedure: ARTHROSCOPY, KNEE, WITH MEDIAL AND LATERAL MENISCECTOMY;  Surgeon: Tari Gregory MD;  Location: [facility] OR;  Service:  Orthopedics;  Laterality: Right;    KNEE ARTHROSCOPY W/ PLICA EXCISION Right 6/13/2023    Procedure: EXCISION, PLICA, KNEE, ARTHROSCOPIC;  Surgeon: Tari Gregory MD;  Location: [facility] OR;  Service: Orthopedics;  Laterality: Right;    KNEE DEBRIDEMENT Right 6/13/2023    Procedure: DEBRIDEMENT, KNEE;  Surgeon: Tari Gregory MD;  Location: [facility] OR;  Service: Orthopedics;  Laterality: Right;    [id number] Right 6/13/2023    Procedure: [id number];  Surgeon: Tari Gregory MD;  Location: [facility] OR;  Service: Orthopedics;  Laterality: Right;    PERCUTANEOUS TRANSCATHETER AORTIC VALVE REPLACEMENT (TAVR) Right 12/14/2021    Procedure: REPLACEMENT, AORTIC VALVE, PERCUTANEOUS, TRANSCATHETER;  Surgeon: Johny Lockett MD;  Location: [facility] CATH;  Service: Cardiology;  Laterality: Right;  ops # 8    PERCUTANEOUS TRANSCATHETER AORTIC VALVE REPLACEMENT (TAVR) Right 12/14/2021    Procedure: REPLACEMENT, AORTIC VALVE, PERCUTANEOUS, TRANSCATHETER;  Surgeon: Sushma Piña MD;  Location: [facility] CATH;  Service: Cardiovascular;  Laterality: Right;    SYNOVECTOMY OF KNEE Right 6/13/2023    Procedure: PARTIAL SYNOVECTOMY, KNEE;  Surgeon: Tari Gregory MD;  Location: [facility] OR;  Service: Orthopedics;  Laterality: Right;     Family History       Problem Relation (Age of Onset)    Diabetes Mellitus Father    Heart disease Mother, Paternal Grandmother    Hypertension Mother    Stroke Mother, Paternal Grandmother          Social History     Socioeconomic History    Marital status:    Tobacco Use    Smoking status: Never    Smokeless tobacco: Never   Substance and Sexual Activity    Alcohol use: Never    Drug use: Never     Social Determinants of Health     Financial Resource Strain: Low Risk  (1/26/2024)    Overall Financial Resource Strain (CARDIA)     Difficulty of Paying Living Expenses: Not hard at all   Food Insecurity: No Food Insecurity (1/26/2024)    Hunger Vital Sign     Worried About Running Out of Food  in the Last Year: Never true     Ran Out of Food in the Last Year: Never true   Transportation Needs: No Transportation Needs (1/26/2024)    PRAPARE - Transportation     Lack of Transportation (Medical): No     Lack of Transportation (Non-Medical): No   Physical Activity: Sufficiently Active (1/26/2024)    Exercise Vital Sign     Days of Exercise per Week: 5 days     Minutes of Exercise per Session: 60 min   Stress: No Stress Concern Present (1/26/2024)    [employer] [place] [employer] - Occupational Stress Questionnaire     Feeling of Stress : Only a little   Social Connections: Unknown (1/26/2024)    Social Connection and Isolation Panel [NHANES]     Frequency of Communication with Friends and Family: Twice a week     Frequency of Social Gatherings with Friends and Family: More than three times a week     Active Member of Clubs or Organizations: Yes     Attends Club or Organization Meetings: 1 to 4 times per year     Marital Status:    Housing Stability: Low Risk  (1/26/2024)    Housing Stability Vital Sign     Unable to Pay for Housing in the Last Year: No     Number of Places Lived in the Last Year: 0     Unstable Housing in the Last Year: No       Review of Systems    OBJECTIVE:     Vital Signs  Pain Score:   5  There is no height or weight on file to calculate BMI.      Neurosurgery Physical Exam  General: well developed, well nourished, no distress.   Head: normocephalic, atraumatic  Neurologic: Alert and oriented. Thought content appropriate.  GCS: Motor: 6/Verbal: 5/Eyes: 4 GCS Total: 15  Mental Status: Awake, Alert, Oriented x3  Cranial nerves: face symmetric, tongue midline, CN II-XII grossly intact.   Eyes: pupils equal, round, reactive to light with accomodation, EOMI.   Sensory: intact to light touch throughout  Motor Strength: Moves all extremities spontaneously with good tone.  Full strength upper and lower extremities. No abnormal movements seen.     Strength  Deltoids Triceps  Biceps Wrist Extension Wrist Flexion Hand    Upper: R 5/5 5/5 5/5 5/5 5/5 5/5    L 5/5 5/5 5/5 5/5 5/5 5/5     Iliopsoas Quadriceps Knee  Flexion Tibialis  anterior Gastro- cnemius EHL   Lower: R 5/5 5/5 5/5 5/5 5/5 5/5    L 5/5 5/5 5/5 5/5 5/5 5/5     DTR's - 2 + and symmetric in UE and LE  Butler: absent  Clonus: absent  Pulses: 2+ and symmetric radial and dorsalis pedis. No lower extremity edema  Straight leg raise:  Positive on the right       Diagnostic Results:  MRI lumbar spine without contrast dated 02/28/2024 reviewed and shows mild lumbar spondylosis with multiple levels of facet arthropathy most prominent at L4-5 with small right-sided synovial cyst.  There is remote L1 compression fracture status post vertebroplasty.    ASSESSMENT/PLAN:     78-year-old male who is history of aortic stenosis status post TAVR, CAD status post LAD and RCA stenting, and hypertension who presents with complaint of right leg pain consistent with L4 radiculopathy.  There appears to be right-sided synovial cyst at L4-5 on MRI today.  I would like to obtain an updated EMG and refer him for transforaminal NELSON at L4-5 on the right.  I will see him back once this is complete to review the results at and reassess his symptoms.      Radha Deshpande PA-C  Neurosurgery              Note dictated with voice recognition software, please excuse any grammatical errors.

## 2024-02-29 ENCOUNTER — TELEPHONE (OUTPATIENT)
Dept: NEUROSURGERY | Facility: CLINIC | Age: 79
End: 2024-02-29
Payer: MEDICARE

## 2024-02-29 NOTE — TELEPHONE ENCOUNTER
Spoke to patient and wife and confirmed times, dates, and locations for EMG and follow up appointment with Radha. They know to be on lookout for a call from IR to get injections scheduled

## 2024-03-10 ENCOUNTER — PATIENT MESSAGE (OUTPATIENT)
Dept: RHEUMATOLOGY | Facility: CLINIC | Age: 79
End: 2024-03-10
Payer: MEDICARE

## 2024-03-11 DIAGNOSIS — M35.3 PMR (POLYMYALGIA RHEUMATICA): Primary | ICD-10-CM

## 2024-03-13 ENCOUNTER — LAB VISIT (OUTPATIENT)
Dept: LAB | Facility: HOSPITAL | Age: 79
End: 2024-03-13
Attending: INTERNAL MEDICINE
Payer: MEDICARE

## 2024-03-13 DIAGNOSIS — M35.3 PMR (POLYMYALGIA RHEUMATICA): ICD-10-CM

## 2024-03-13 LAB
CRP SERPL-MCNC: 3.3 MG/L (ref 0–8.2)
ERYTHROCYTE [SEDIMENTATION RATE] IN BLOOD BY WESTERGREN METHOD: 2 MM/HR (ref 0–10)

## 2024-03-13 PROCEDURE — 36415 COLL VENOUS BLD VENIPUNCTURE: CPT | Performed by: INTERNAL MEDICINE

## 2024-03-13 PROCEDURE — 86140 C-REACTIVE PROTEIN: CPT | Performed by: INTERNAL MEDICINE

## 2024-03-13 PROCEDURE — 85651 RBC SED RATE NONAUTOMATED: CPT | Performed by: INTERNAL MEDICINE

## 2024-03-18 ENCOUNTER — OFFICE VISIT (OUTPATIENT)
Dept: RHEUMATOLOGY | Facility: CLINIC | Age: 79
End: 2024-03-18
Payer: MEDICARE

## 2024-03-18 VITALS
HEART RATE: 68 BPM | SYSTOLIC BLOOD PRESSURE: 153 MMHG | BODY MASS INDEX: 27.65 KG/M2 | WEIGHT: 193.13 LBS | HEIGHT: 70 IN | DIASTOLIC BLOOD PRESSURE: 71 MMHG

## 2024-03-18 DIAGNOSIS — R91.8 PULMONARY NODULES/LESIONS, MULTIPLE: Primary | ICD-10-CM

## 2024-03-18 DIAGNOSIS — N28.1 RENAL CYST: ICD-10-CM

## 2024-03-18 DIAGNOSIS — M35.3 PMR (POLYMYALGIA RHEUMATICA): ICD-10-CM

## 2024-03-18 DIAGNOSIS — K86.89 PANCREATIC MASS: ICD-10-CM

## 2024-03-18 PROCEDURE — 99999 PR PBB SHADOW E&M-EST. PATIENT-LVL V: CPT | Mod: PBBFAC,,, | Performed by: INTERNAL MEDICINE

## 2024-03-18 PROCEDURE — 99213 OFFICE O/P EST LOW 20 MIN: CPT | Mod: S$PBB,,, | Performed by: INTERNAL MEDICINE

## 2024-03-18 PROCEDURE — 99215 OFFICE O/P EST HI 40 MIN: CPT | Mod: PBBFAC | Performed by: INTERNAL MEDICINE

## 2024-03-18 NOTE — PROGRESS NOTES
3/11/2024     4:10 PM   Rapid3 Question Responses and Scores   MDHAQ Score 0.4   Psychologic Score 1.1   Pain Score 5   When you awakened in the morning OVER THE LAST WEEK, did you feel stiff? Yes   Fatigue Score 5   Global Health Score 4   RAPID3 Score 3.44       Answers submitted by the patient for this visit:  Rheumatology Questionnaire (Submitted on 3/11/2024)  fever: No  eye redness: No  mouth sores: No  headaches: No  shortness of breath: No  chest pain: No  trouble swallowing: No  diarrhea: No  constipation: No  unexpected weight change: No  genital sore: No  During the last 3 days, have you had a skin rash?: No  Bruises or bleeds easily: No  cough: No

## 2024-03-18 NOTE — PROGRESS NOTES
Patient ID:  Ghanshyam Sanford Jr.    YOB: 1945     MRN:  [id number]     Subjective:     Chief Complaint:  Disease Management     History of Present Illness:  Patient is a 78 y.o. male with PMR who presents today for follow up. LCV was 9/2023. At that time the patient was experiencing mild morning stiffness of BL shoulders.    Today: Today, the patient reports his pain has progressively worsened. He states the pain is in his right low back that radiates that goes down the back of the leg to the calf. He endorses the pain is worse in the leg than the back. He has suspected L4 radiculopathy. He has EMG  upcoming in April. He reports his shoulder are minimally sore with pain worse in the morning. But denies the pain is bothersome in his shoulders. Denies weakness of shoulders or legs. Denies vision changes, jaw pain, headaches, rashes. Has been taking gabapentin 600 mg once a day.     Vaccines: UTD on PNA and shingles.   Diet: He endorses a healthy diet full  of fruits and veggies.   Exercise: Goes to the gym 5x/week, does bicycle, treadmill and strength training. Also does HEP from PT.     Review of Systems   Review of Systems   Constitutional:  Negative for fatigue, fever and unexpected weight change.   HENT:  Negative for mouth sores and trouble swallowing.    Eyes:  Negative for pain and redness.   Respiratory:  Negative for cough, shortness of breath and wheezing.    Cardiovascular:  Negative for chest pain.   Gastrointestinal:  Negative for abdominal distention, abdominal pain, constipation and diarrhea.   Genitourinary:  Negative for genital sores.   Musculoskeletal:  Positive for back pain. Negative for arthralgias, joint swelling and neck pain.   Skin:  Negative for rash.   Neurological:  Negative for weakness and headaches.   Hematological:  Does not bruise/bleed easily.   Psychiatric/Behavioral:  Positive for sleep disturbance (associated with pain).         Current Medications:    Current  Outpatient Medications:     acetaminophen (TYLENOL) 500 MG tablet, Take 1,000 mg by mouth every 6 (six) hours as needed for Pain., Disp: , Rfl:     alfuzosin (UROXATRAL) 10 mg Tb24, Take 10 mg by mouth daily with breakfast., Disp: , Rfl:     aspirin (ECOTRIN) 81 MG EC tablet, Take 81 mg by mouth once daily., Disp: , Rfl:     clopidogreL (PLAVIX) 75 mg tablet, Take 75 mg by mouth once daily., Disp: , Rfl:     coenzyme Q10 100 mg capsule, Take 200 mg by mouth once daily., Disp: , Rfl:     docusate sodium (COLACE) 100 MG capsule, Take 1 capsule (100 mg total) by mouth 2 (two) times daily as needed for Constipation., Disp: 14 capsule, Rfl: 0    doxazosin (CARDURA) 4 MG tablet, Take 4 mg by mouth every evening., Disp: , Rfl:     gabapentin (NEURONTIN) 600 MG tablet, Take 1 tablet (600 mg total) by mouth every evening., Disp: 30 tablet, Rfl: 11    multivitamin (THERAGRAN) per tablet, Take 1 tablet by mouth once daily., Disp: , Rfl:     mv-mn/iron/folic acid/herb 190 (VITAMIN D3 COMPLETE ORAL), Take 5,000 Units by mouth Daily., Disp: , Rfl:     NIFEdipine (ADALAT CC) 30 MG TbSR, Take 30 mg by mouth once daily., Disp: , Rfl:     olmesartan (BENICAR) 40 MG tablet, Take 20 mg by mouth once daily., Disp: , Rfl:     rosuvastatin (CRESTOR) 20 MG tablet, Take 20 mg by mouth once daily., Disp: , Rfl:     traZODone (DESYREL) 150 MG tablet, Take 150 mg by mouth every evening., Disp: , Rfl:     zolpidem (AMBIEN) 10 mg Tab, Take 10 mg by mouth nightly as needed., Disp: , Rfl:     NIFEdipine (PROCARDIA-XL) 30 MG (OSM) 24 hr tablet, Take 30 mg by mouth., Disp: , Rfl:      Objective:     Vitals:    03/18/24 1350   BP: (!) 153/71   Pulse: 68        Body mass index is 27.71 kg/m².     Physical Exam   Constitutional: He is oriented to person, place, and time. normal appearance. No distress.   HENT:   Head: Normocephalic and atraumatic.   Eyes: Right eye exhibits no discharge. Left eye exhibits no discharge. No scleral  icterus.   Cardiovascular: Normal rate and regular rhythm. Exam reveals no gallop and no friction rub.   Murmur heard.  Holosystolic murmur present 2/2 to artificial aortic valve    Pulmonary/Chest: Effort normal and breath sounds normal.   Abdominal: Soft. He exhibits no distension. There is no abdominal tenderness.   Musculoskeletal:         General: Normal range of motion.      Cervical back: No tenderness.      Comments: +SLR on Left  Muscle weakness of left hip flexion  Left patella reflex 1+, Right patella reflex 2+  Sensation intact throughout BL LE   Neurological: He is alert and oriented to person, place, and time.   Reflex Scores:       Patellar reflexes are 2+ on the right side and 2+ on the left side.  Psychiatric: Mood normal.       Right Side Rheumatological Exam     Muscle Strength (0-5 scale):  Deltoid:  5  Biceps: 5/5   Triceps:  5  Iliopsoas: 5  Quadriceps:  5     Left Side Rheumatological Exam     Muscle Strength (0-5 scale):  Deltoid:  5  Biceps: 5/5   Triceps:  5  Iliopsoas: 4  Quadriceps:  5            9/25/2023   Tender (ROJAS-28) 0 / 28    Swollen (ROJAS-28) 0 / 28    Provider Global --   Patient Global 30 mm   ESR 5 mm/hr   CRP 1.5 mg/L   ROJAS-28 (ESR) 1.55 (Remission)   ROJAS-28 (CRP) 1.71 (Remission)   CDAI Score --        There is currently no information documented on the homunculus. Go to the Rheumatology activity and complete the homunculus joint exam.      Assessment:     PMR no evidence of RA. Peripheral spondyloarthritis, CPPD  nl ESR and CRP currently asymptomatic  Right> left rotator cuff tendinitis improved  full AROM mild + empty can and Speed's right attended PT  Burning pain posterolateral right knee of of longstanding, remains major problem, occurs only at night. Has seen multiple orthopedists in past, and is s/p arthroscopic meniscectomy and multiple injections, burning preceded these. Most recent menisectomy right knee has helped signficantly  In past, S/p hand and shoulder pain  resolved with brief course of prednisone 10mg daily for 2 wks  Lumbar spondylosis asymptomatic  OA hips asymptomatic  B27+ no evidence of peripheral or axial spondyloarthritis  TAVR implant 34mm Dr. Lockett 12/21/21    kyphoplasty vertebro plasty T 11 L1  8/20/17  S/p right 3,4 5 flexor tenosynovitis  Rx with [facility]  1 month ago  Mild anemia  Osteoporosis DXA 7/9/21:  FRAX hip 0.5% MOF 2.4%  Swelling in toes, exam with OA  IP toes     Plan:      Cont trazodone 100mg nightly   Continue gabapentin nightly  Follow up with neurosurgery in April   RTC 6 months with standing labs     Elva Vazquez, DO  PGY-1  [employer] [facility]

## 2024-03-18 NOTE — PROGRESS NOTES
Subjective:      Patient ID: Ghanshyam Sanford Jr. is a 78 y.o. male.    Chief Complaint: h/o PMR    HPI only c/o right lumbar radiculopathy per below. No symptoms of PMR. Still right knee pain  Review of Systems   Constitutional:  Negative for appetite change, chills, fatigue, fever and unexpected weight change.   HENT:  Negative for mouth sores and trouble swallowing.    Eyes:  Negative for pain, redness and visual disturbance.   Respiratory:  Negative for cough, shortness of breath and wheezing.    Cardiovascular:  Negative for chest pain, palpitations and leg swelling.   Gastrointestinal:  Negative for abdominal pain, blood in stool, constipation, diarrhea and nausea.   Genitourinary:  Negative for difficulty urinating, dysuria, genital sores and hematuria.   Musculoskeletal:  Negative for arthralgias, back pain, gait problem, joint swelling, myalgias, neck pain and neck stiffness.   Skin:  Negative for color change, pallor and rash.   Neurological:  Negative for weakness and headaches.   Hematological:  Does not bruise/bleed easily.   Psychiatric/Behavioral:  Negative for dysphoric mood and sleep disturbance.       Comprehensive Initial Assessment  Pain level: see AQ  Functional status: see [facility]  Patient has:     Previous history malignancy: No  Weight loss: No  Previous longstanding steroid use or immunosuppression: NO   Recent significant infection: No  Fracture or suspected fracture: No  Bowel or bladder incontinence: No    Prior treatment and response: none  Employment status: retired  Are radicular symptoms present?  Yes right LE to post calf    Physical examination  Straight leg raise test b/l : negative  Ankle and knee reflexes: decreased right ankle reflex  Quadriceps; ankle and great toe dorsiflexion and plantar flexion strength: 4.2/5 right psoas. All other LE joints 5/5/  Treatment Plan  Advised against bed rest: Yes  Advised normal activity as tolerated: Yes   Objective:   There were no vitals taken  for this visit.  Physical Exam     9/25/2023   Tender (ROJAS-28) 0 / 28    Swollen (ROJAS-28) 0 / 28    Provider Global --   Patient Global 30 mm   ESR 5 mm/hr   CRP 1.5 mg/L   ROJAS-28 (ESR) 1.55 (Remission)   ROJAS-28 (CRP) 1.71 (Remission)   CDAI Score --      Latest Reference Range & Units 09/25/23 13:19   Iron 45 - 160 ug/dL 68   TIBC 250 - 450 ug/dL 410   Saturated Iron 20 - 50 % 17 (L)   Transferrin 200 - 375 mg/dL 277   Sol. Transferrin Receptor 1.8 - 4.6 mg/L 3.1   Ferritin 20.0 - 300.0 ng/mL 63   Folate 4.0 - 24.0 ng/mL 17.5   Vitamin B12 210 - 950 pg/mL 492        Latest Reference Range & Units 03/13/24 10:04   Sed Rate 0 - 10 mm/Hr 2   CRP 0.0 - 8.2 mg/L 3.3       L): Data is abnormally low   EXAMINATION:  XR FOOT COMPLETE 3 VIEW BILATERAL     CLINICAL HISTORY:  Osteoarthritis osteoarthritis     TECHNIQUE:  AP, LATERAL, AND OBLIQUE PROJECTIONS     COMPARISON:  No previous comparison study is made available.     FINDINGS:  RIGHT FOOT: Normal osseous mineralization without evidence of an acute fracture deformity, bone destructive changes, nor pathologic lesion.  Articular cartilage spaces are well maintained as are the Lisfranc articulations.  No significant malalignment changes.  Benign bipartite medial sesamoid is noted on the AP inspection.  Prominent plantar calcaneal heel spur.  No evidence of calcification distribution of plantar aponeurosis.     LEFT FOOT: Normal osseous mineralization without evidence of an acute fracture deformity, bone destructive changes, nor pathologic lesion.  Articular cartilage spaces are well maintained as are the Lisfranc articulations.  No significant malalignment changes.  Benign bipartite medial sesamoid bone is noted.  Prominent vascular calcifications.  Prominent plantar calcaneal heel spur.  No evidence of calcification along distribution of plantar aponeurosis.     Impression:     1. No evidence of acute radiographic findings.  2. Prominent bilateral plantar calcaneal heel  spur without evidence of manifestations of longstanding plantar fasciitis.        Electronically signed by: Luis Enrique Francis MD  Date:                                            09/26/2023  Time:                                           09:25      EXAMINATION:  CTA RUNOFF ABD PEL BILAT LOWER EXT     CLINICAL HISTORY:  Claudication or leg ischemia;  Peripheral vascular disease, unspecified     TECHNIQUE:  CTA of the abdominal aorta with bilateral lower extremity runoff.  100 mL of IV contrast was administered.  Sagittal, reformatted images in delayed images below the knee were obtained.     COMPARISON:  None     FINDINGS:  Vascular: The abdominal aorta is normal in caliber.  The celiac, superior mesenteric, renal arteries and inferior mesenteric arteries are patent.  There is an accessory renal artery on the left.     The bilateral common iliac, internal iliac and external iliac artery are patent without significant stenosis.     Right lower extremity: The right common femoral, deep femoral and superficial femoral arteries are patent.  Mild atherosclerosis of the superficial femoral artery.  The popliteal, tibioperoneal trunk, anterior tibial, posterior tibial and peroneal arteries are patent to the right foot.     Left lower extremity: The left common femoral, deep femoral, superficial femoral arteries are patent with mild atherosclerosis of the superficial femoral artery.  The popliteal arteries patent.  There is atherosclerosis of the anterior tibial artery and tibial peroneal trunk which appear patent.  There is a three-vessel runoff to the left foot.     There are two adjacent 5 mm gurpreet fissural nodules along the superior aspect of the right middle lobe.  There is a percutaneous aortic valve.  There are multi-vessel coronary artery calcifications.  Heart is not enlarged.  No significant pericardial fluid.  There is an additional area of clustered micro nodules within the right middle lobe largest measuring up to  7 mm.  No significant pleural fluid.     There are few calcifications in the liver adjacent to the gallbladder fossa possibly granulomas.  Otherwise, liver shows no focal abnormalities.  Gallbladder shows no inflammatory changes.  No biliary ductal dilatation.     Stomach, spleen, and adrenal glands show no significant abnormalities.  There is a 1.3 cm hypodensity within the pancreatic head.  No pancreatic ductal dilatation.     There are renal hypodensities the largest a likely cyst.  The others are too small to characterize.  No hydronephrosis.  Ureters and urinary bladder show no significant abnormalities.  Prostate gland is mildly enlarged.     The small and large bowel show no acute inflammation or obstruction.  No free air or ascites.  Appendix is normal.  There is colonic diverticulosis.     No abnormal lymph node enlargement.     Osseous structures show degenerative changes.  There is a left-sided L5 pars defect.  No there are postoperative changes from prior T11 and L1 kyphoplasty.  Bones appear demineralized.  No acute osseous abnormalities.     Incidental note is made of a lipoma within the muscles of the left calf.     Impression:     CTA with bilateral lower extremity runoff shows mild atherosclerotic change without significant stenosis or occlusion.     Tia fissural and clustered micro nodules in the right lower lobe measuring up to 7 mm.  Findings are favored to represent a mild infectious or inflammatory process.  Recommend 3 month follow-up CT to ensure resolution or further characterize.     Small (1.3 cm) hypodensity near the pancreatic head possibly a side branch IPMN.  Follow-up recommend with either CT pancreas protocol or MRI MRCP in 1 year.        Electronically signed by: Ben Gongora MD  Date:                                            02/29/2024  EXAMINATION:  XR LUMBAR SPINE AP AND LATERAL     CLINICAL HISTORY:  .  Low back pain, unspecified     TECHNIQUE:  Three views of the lumbar  spine were obtained dated .     COMPARISON:  No prior study for comparison.     FINDINGS:  Five non-rib-bearing lumbar elements are manifested assuming 12 thoracic vertebral bodies arising from above, minimal levoconvex alignment of lumbar spine based on the AP inspection, mild osteophyte formation arising from the apical edge of the L3 and L4 vertebral body.  Upwards field-of-view reveals evidence of prior kyphoplasty at the T11 and L1 vertebral bodies with least 10% functional loss expected vertical height at the latter most levels.  There is evidence of a central catheter without component identified of the T11 vertebral body as identified in the uppermost field-of-view.  Bridging osteophyte spans the T11/T12 inter disc level.  Bilateral SI joints are well maintained.  Bilateral facet arthrosis at the L5-S1 level.     Impression:     1. No evidence of acute lumbar spine findings.  2. Prior kyphoplasty established at the T11 and L1 vertebral body levels with at least 10% functional loss expected vertical height at L1.  No evidence of significant change upon comparison.  .        Electronically signed by: Luis Enrique Francis MD  Date:                                            09/26/2023  Time:                                           09:21  EXAMINATION:  XR HIPS BILATERAL 2 VIEW INCL AP PELVIS     CLINICAL HISTORY:  Low back pain, unspecified     TECHNIQUE:  AP view of the pelvis and frogleg lateral views of both hips were performed.     COMPARISON:  08/16/2021     FINDINGS:  There is no radiographic evidence of acute osseous, articular, or soft tissue abnormality.  Bilateral hip joint spaces are preserved.Mild symmetrical superior articular cartilage space tearing that maintains equal distribution upon comparison.  Extensive vascular calcifications of the lower extremity vessels noted.  Prominent rim osteophytes at the origin point of the anterior superior iliac spines.     Impression:     1. Mild degenerative  narrowing of the bilateral hip articulations.  2. No evidence of adverse change upon comparison        Electronically signed by: Luis Enrique Francis MD  Date:                                            09/26/2023  Time:                                           09:23  EXAMINATION:  MRI LUMBAR SPINE WITHOUT CONTRAST     CLINICAL HISTORY:  Lumbar radiculopathy, symptoms persist with conservative treatment; Radiculopathy, lumbar region     TECHNIQUE:  Multiplanar, multisequence MR images were acquired from the thoracolumbar junction to the sacrum without the administration of contrast.     COMPARISON:  Radiograph 09/26/2023, MRI 03/08/2023     FINDINGS:  Alignment: Grade 1 anterolisthesis of L5 on S1.     Vertebrae: Stable L1 compression fracture with residual change of kyphoplasty. Left L5 spondylolysis.     Discs: Disc height loss and desiccation most pronounced at L5-S1.     Cord: Normal.  Conus terminates at L1.     Soft tissue structures are unremarkable.     Degenerative findings:     T12-L1: No spinal canal stenosis or neural foraminal narrowing.     L1-L2: No spinal canal stenosis or neural foraminal narrowing.     L2-L3: No spinal canal stenosis or neural foraminal narrowing.     L3-L4: There is asymmetric disc bulging to the left with mild facet arthropathy, contributing to mild spinal canal stenosis and moderate left-sided neural foraminal narrowing     L4-L5: Mild disc bulging and prominent facet joint arthropathy on the right side, noting joint hypertrophy with extensive subchondral marrow edema and surrounding inflammatory change (series 6, images 6-7).  There is a small right-sided synovial cyst measuring 10 mm (series 9, image 36) abutting and possibly impinging upon the descending right L5 nerve root.  There is overall mild spinal canal stenosis and moderate right and mild left-sided neural foraminal narrowing.  Slight anterolisthesis noted.     L5-S1: Mild disc bulge with posterior annular fissure.  There  is moderate disc height loss on the left side.  These findings contribute to mild/moderate left-sided neural foraminal narrowing.  No spinal canal stenosis .     Impression:     Remote L1 compression fracture post vertebral plasty, stable.  No acute fractures.     Lumbar spondylosis, contributing to mild spinal canal stenosis L3-4 and L4-5 and moderate neural foraminal narrowing L3-4 through L5-S1, as above.     Prominent right-sided facet joint arthropathy at L4-5 with small anterior synovial cyst impinging upon the spinal canal, as above.     Electronically signed by resident: Sophia Vela  Date:                                            02/28/2024  Time:                                           13:07     Electronically signed by: Rishi Chase MD  Date:                                            02/28/2024  Time:                                           13:55        Assessment:   Lumbar spondylosis  with mild spinal canal stenosis L3-4 L4-5 and moderate neural foraminal narrowing L3-4 to L5-S1. Right sided facet OA right L4-5 with small anterior synovial cyst impinging on spinal canal. Following with Radha Deshpande in Neurosurgery   PMR no evidence of RA. Peripheral spondyloarthritis, CPPD  nl ESR and CRP currently asymptomatic  Right> left rotator cuff tendinitis improved  full AROM mild + empty can and Speed's right attended PT  Burning pain posterolateral right knee of of longstanding, remains major problem, occurs only at night. Has seen multiple orthopedists in past, and is s/p arthroscopic meniscectomy and multiple injections, burning preceded these. Most recent menisectomy right knee has helped significantly. follows with Dr. Gregory in Sports Medicine   In past, S/p hand and shoulder pain resolved with brief course of prednisone 10mg daily for 2 wks  OA hips asymptomatic  B27+ no evidence of peripheral or axial spondyloarthritis  TAVR implant 34mm Dr. Lockett 12/21/21   S/p right 3,4 5 flexor tenosynovitis   Rx with [facility]  August 3023  Mild anemia  DXA 9/27/23: normal   FRAX hip 1.4 % MOF 5.8 % kyphoplasty vertebro plasty T 11 L1  8/20/17  Swelling in toes, exam with OA  IP toes  RLL pulmonary micronodules  1.3  cm  hypodensity head of pancrease  Renal cyst  Plan:   Ref to Pulmonary for assessment incidentally noted  RLL pulmonary micronodules noted on CTA  Ref to Pancreato-Biliary Service of  Gastro for 1.3 cm hypodensity head of pancreas incidentally noted on CTA  Ref to Urology for renal cyst incidentally noted on CTA  F/u Radha Deshpande in Neurosurgery with EMG/NCV and try and move up appt for LESI synovial cyst asp/injection from current 5/29/24  HEP ben quads per Dr. Gregory in Sports Medicine  Cont trazodone 100mg nightly   RTC 6 months

## 2024-03-19 ENCOUNTER — PATIENT MESSAGE (OUTPATIENT)
Dept: RHEUMATOLOGY | Facility: CLINIC | Age: 79
End: 2024-03-19
Payer: MEDICARE

## 2024-03-19 ENCOUNTER — TELEPHONE (OUTPATIENT)
Dept: GASTROENTEROLOGY | Facility: CLINIC | Age: 79
End: 2024-03-19
Payer: MEDICARE

## 2024-03-19 NOTE — TELEPHONE ENCOUNTER
----- Message from Kitty De La Fuente sent at 3/19/2024 10:42 AM CDT -----  Regarding: Appointment  Contact: [phone]  Calling to schedule an appointment per referral for K86.89 (ICD-10-CM) - Pancreatic mass as soon as possible. Please call patient to schedule today.

## 2024-03-20 ENCOUNTER — TELEPHONE (OUTPATIENT)
Dept: GASTROENTEROLOGY | Facility: HOSPITAL | Age: 79
End: 2024-03-20
Payer: MEDICARE

## 2024-03-20 ENCOUNTER — OFFICE VISIT (OUTPATIENT)
Dept: PULMONOLOGY | Facility: CLINIC | Age: 79
End: 2024-03-20
Payer: MEDICARE

## 2024-03-20 VITALS
WEIGHT: 191.56 LBS | BODY MASS INDEX: 27.42 KG/M2 | DIASTOLIC BLOOD PRESSURE: 76 MMHG | HEART RATE: 60 BPM | HEIGHT: 70 IN | RESPIRATION RATE: 21 BRPM | SYSTOLIC BLOOD PRESSURE: 138 MMHG | OXYGEN SATURATION: 97 %

## 2024-03-20 DIAGNOSIS — R91.1 SOLITARY PULMONARY NODULE: Primary | ICD-10-CM

## 2024-03-20 DIAGNOSIS — R91.8 PULMONARY NODULES/LESIONS, MULTIPLE: ICD-10-CM

## 2024-03-20 PROCEDURE — 99214 OFFICE O/P EST MOD 30 MIN: CPT | Mod: PBBFAC | Performed by: INTERNAL MEDICINE

## 2024-03-20 PROCEDURE — 99204 OFFICE O/P NEW MOD 45 MIN: CPT | Mod: S$PBB,,, | Performed by: INTERNAL MEDICINE

## 2024-03-20 PROCEDURE — 99999 PR PBB SHADOW E&M-EST. PATIENT-LVL IV: CPT | Mod: PBBFAC,,, | Performed by: INTERNAL MEDICINE

## 2024-03-20 NOTE — PROGRESS NOTES
Subjective:      Patient ID: Ghanshyam Sanford Jr. is a 78 y.o. male.    Chief Complaint: Nodule    HPI    78-year-old male with a history of coronary disease, hypercholesterolemia and recently began having some exertional leg pain.  He had a CTA aortogram with runoff done which showed some nodules in the fissure between the right upper and middle lobes.  He is here today for that reason.  He has no pulmonary complaints.  He is a never smoker.  He is here today with his wife.    Past Medical History:   Diagnosis Date    Anticoagulant long-term use     Aortic valve regurgitation     Aortic valve stenosis, nonrheumatic     Carotid stenosis, bilateral     Chronic diastolic heart failure, NYHA class 2     Chronic total occlusion of coronary artery     Coronary artery disease     Elevated PSA     UNDER CARE    Hypertension     Hypertensive heart disease     Osteoarthritis of right knee 3/22/2017    Polymyalgia     Restless legs     SOB (shortness of breath)      Past Surgical History:   Procedure Laterality Date    ARTHROSCOPIC CHONDROPLASTY OF KNEE JOINT Right 6/13/2023    Procedure: ARTHROSCOPY, KNEE, WITH CHONDROPLASTY;  Surgeon: Tari Gregory MD;  Location: [facility] OR;  Service: Orthopedics;  Laterality: Right;    BACK SURGERY      CAROTID STENT      HERNIA REPAIR      KNEE ARTHROPLASTY Right     KNEE ARTHROSCOPY W/ MENISCECTOMY Right 6/13/2023    Procedure: ARTHROSCOPY, KNEE, WITH MEDIAL AND LATERAL MENISCECTOMY;  Surgeon: Tari Gregory MD;  Location: [facility] OR;  Service: Orthopedics;  Laterality: Right;    KNEE ARTHROSCOPY W/ PLICA EXCISION Right 6/13/2023    Procedure: EXCISION, PLICA, KNEE, ARTHROSCOPIC;  Surgeon: Tari Gregory MD;  Location: [facility] OR;  Service: Orthopedics;  Laterality: Right;    KNEE DEBRIDEMENT Right 6/13/2023    Procedure: DEBRIDEMENT, KNEE;  Surgeon: Tari Gregory MD;  Location: [facility] OR;  Service: Orthopedics;  Laterality: Right;    [id number] Right 6/13/2023    Procedure:  "[id number];  Surgeon: Tari Gregory MD;  Location: [facility] OR;  Service: Orthopedics;  Laterality: Right;    PERCUTANEOUS TRANSCATHETER AORTIC VALVE REPLACEMENT (TAVR) Right 12/14/2021    Procedure: REPLACEMENT, AORTIC VALVE, PERCUTANEOUS, TRANSCATHETER;  Surgeon: Johny Lockett MD;  Location: [facility] CATH;  Service: Cardiology;  Laterality: Right;  ops # [id number]    PERCUTANEOUS TRANSCATHETER AORTIC VALVE REPLACEMENT (TAVR) Right 12/14/2021    Procedure: REPLACEMENT, AORTIC VALVE, PERCUTANEOUS, TRANSCATHETER;  Surgeon: Sushma Piña MD;  Location: [facility] CATH;  Service: Cardiovascular;  Laterality: Right;    SYNOVECTOMY OF KNEE Right 6/13/2023    Procedure: PARTIAL SYNOVECTOMY, KNEE;  Surgeon: Tari Gregory MD;  Location: [facility] OR;  Service: Orthopedics;  Laterality: Right;     Social History     Tobacco Use    Smoking status: Never    Smokeless tobacco: Never   Substance Use Topics    Alcohol use: Never    Drug use: Never     Family History   Problem Relation Age of Onset    Heart disease Mother     Hypertension Mother     Stroke Mother     Diabetes Mellitus Father     Heart disease Paternal Grandmother     Stroke Paternal Grandmother        Review of Systems as per HPI otherwise negative    Objective:     Physical Exam   Constitutional: He is oriented to person, place, and time. He appears well-developed. No distress.   HENT:   Head: Normocephalic.   Cardiovascular: Normal rate and regular rhythm.   Pulmonary/Chest: Normal expansion, symmetric chest wall expansion and effort normal.   Musculoskeletal:      Cervical back: Neck supple.   Neurological: He is alert and oriented to person, place, and time.   Psychiatric: He has a normal mood and affect.   Nursing note and vitals reviewed.          3/20/2024    10:59 AM 3/18/2024     1:50 PM 2/1/2024     1:00 PM 2/1/2024     8:36 AM 1/29/2024     1:39 PM 11/8/2023     2:18 PM 9/25/2023     2:20 PM   Pulmonary Function Tests   SpO2 97 %         Height 5' 10" " "(1.778 m) 5' 10" (1.778 m) 5' 10.5" (1.791 m) 5' 10.5" (1.791 m) 5' 10" (1.778 m) 5' 10" (1.778 m) 5' 10.5" (1.791 m)   Weight 86.9 kg (191 lb 9.3 oz) 87.6 kg (193 lb 2 oz) 88.8 kg (195 lb 12.3 oz) 88 kg (194 lb 0.1 oz) 87.7 kg (193 lb 5.5 oz) 86.3 kg (190 lb 2.4 oz) 83.9 kg (185 lb)   BMI (Calculated) 27.5 27.7 27.7 27.4 27.7 27.3 26.2        Assessment:     1. Solitary pulmonary nodule    2. Pulmonary nodules/lesions, multiple         Orders Placed This Encounter   Procedures    CT Chest Without Contrast     Standing Status:   Future     Standing Expiration Date:   3/20/2025     Order Specific Question:   May the Radiologist modify the order per protocol to meet the clinical needs of the patient?     Answer:   Yes     I personally reviewed CT images.  My interpretation is as per history of present illness.  Additionally at least 1 of the these pulmonary nodules is calcified implying a benign etiology    Plan:     Cluster of subcentimeter nodules that her fissural based on the right.  Low risk given never smoking status and also the location and calcification of at least 1 of the nodules also implies a benign diagnosis.  I reassured him of this fact and recommended repeat imaging in 1 year to ensure stability.  I placed that order.  I will notify him of CT results in 1 year once this is done.  Otherwise he can return to see me p.r.n.     "

## 2024-03-20 NOTE — TELEPHONE ENCOUNTER
Called patient due to concern regarding recent finding of 1.2cm cystic lesion of the pancreatic head. This lesion may represent a tiny IPMN which is unlikely to progress to malignancy in his lifetime and would require a highly complicated surgery to remove (Whipple resection).   Recommend:   Review case with Dr. España's team for recommendations on repeat CT scan vs EUS  Expect to monitor for 1-2 years then may stop monitoring if stable over time.     Castillo Santa MD

## 2024-03-25 ENCOUNTER — TELEPHONE (OUTPATIENT)
Dept: GASTROENTEROLOGY | Facility: CLINIC | Age: 79
End: 2024-03-25
Payer: MEDICARE

## 2024-03-25 NOTE — TELEPHONE ENCOUNTER
----- Message from Castillo Santa MD sent at 3/25/2024  8:32 AM CDT -----  Please schedule for clinic with me next available. And copy past the information below:       ----- Message -----  From: Sean España MD  Sent: 3/21/2024   2:53 PM CDT  To: Castillo Santa MD    We've been following international consensus guidelines ([place] guidelines) in our pancreas cyst program. Per these, we usually do initial imaging surveillance at 6 months (just to make sure this is not a rapidly growing lesion), and then start stretching out intervals. I think CT would be fine in 6 months, but if it ever grows to >15mm on interval imaging -> then EUS may play a role so that FNA sampling could be done.      ----- Message -----  From: Castillo Santa MD  Sent: 3/20/2024   2:29 PM CDT  To: MD Louie Burt,   This patient has a 1.2cm cystic lesion in the pancreatic head without associated ductal dilation and without a history of pancreatitis. It was found incidentally on CT.   Should be plan an EUS or should we just check a repeat CT scan with pancreatic protocol in 1 year?   Jagdeep

## 2024-03-25 NOTE — TELEPHONE ENCOUNTER
Clinic appt scheduled with pt on Wednesday, April 3, 2024 at 10am from referral by Dr. Smith.  Pt given clinic address and repeated all information given correctly.

## 2024-04-02 ENCOUNTER — PROCEDURE VISIT (OUTPATIENT)
Dept: NEUROLOGY | Facility: CLINIC | Age: 79
End: 2024-04-02
Payer: MEDICARE

## 2024-04-02 DIAGNOSIS — M54.16 LUMBAR RADICULOPATHY, RIGHT: ICD-10-CM

## 2024-04-02 DIAGNOSIS — M79.604 RIGHT LEG PAIN: ICD-10-CM

## 2024-04-02 PROCEDURE — 95909 NRV CNDJ TST 5-6 STUDIES: CPT | Mod: 26,S$PBB,, | Performed by: PSYCHIATRY & NEUROLOGY

## 2024-04-02 PROCEDURE — 95909 NRV CNDJ TST 5-6 STUDIES: CPT | Mod: PBBFAC | Performed by: PSYCHIATRY & NEUROLOGY

## 2024-04-02 PROCEDURE — 95886 MUSC TEST DONE W/N TEST COMP: CPT | Mod: 26,S$PBB,, | Performed by: PSYCHIATRY & NEUROLOGY

## 2024-04-02 PROCEDURE — 99214 OFFICE O/P EST MOD 30 MIN: CPT | Mod: 25,S$PBB,, | Performed by: PSYCHIATRY & NEUROLOGY

## 2024-04-02 PROCEDURE — 95886 MUSC TEST DONE W/N TEST COMP: CPT | Mod: PBBFAC | Performed by: PSYCHIATRY & NEUROLOGY

## 2024-04-02 NOTE — PROCEDURES
EMG W/ ULTRASOUND AND NERVE CONDUCTION TEST 1 Extremity    Date/Time: 4/2/2024 11:00 AM    Performed by: Moe Earl MD  Authorized by: Radha Deshpande, SHASHI                                                                [facility] [address] Neurology    Subjective:       Patient ID: Ghanshyam Sanford Jr. is a 78 y.o. male here for a EMG focused evaluation for back and right leg pain. Previous visits and diagnostic evaluation has been reviewed.  Patient describes progressive burning pain involving the medial aspect of his right leg.  He also describes some back pain which radiates down the right leg.  Symptoms have been progressively worsening and severe at times.   HPI  Review of patient's allergies indicates:   Allergen Reactions    Dexamethasone sodium phosphate Other (See Comments)    Atorvastatin calcium Other (See Comments)     Leg cramps    Cortisone      Inj- pt reports hiccups and feels spasm     Duloxetine Other (See Comments)     DRY MONTH  NOT ABLE TO SLEEP  NOT HUNDRY  SWEATING A LOT  FEELING TIRED AND WEAK  DIZZINESS  WEIGHT LOSS ( 9 LB.)    Flomax [tamsulosin] Diarrhea    Pravastatin Other (See Comments)     Leg cramps      There were no vitals filed for this visit.   Chief Complaint: No chief complaint on file.    Past Medical History:   Diagnosis Date    Anticoagulant long-term use     Aortic valve regurgitation     Aortic valve stenosis, nonrheumatic     Carotid stenosis, bilateral     Chronic diastolic heart failure, NYHA class 2     Chronic total occlusion of coronary artery     Coronary artery disease     Elevated PSA     UNDER CARE    Hypertension     Hypertensive heart disease     Osteoarthritis of right knee 3/22/2017    Polymyalgia     Restless legs     SOB (shortness of breath)       Social History     Socioeconomic History    Marital status:    Tobacco Use    Smoking status: Never    Smokeless tobacco: Never   Substance and Sexual Activity    Alcohol use: Never     Drug use: Never     Social Determinants of Health     Financial Resource Strain: Low Risk  (1/26/2024)    Overall Financial Resource Strain (CARDIA)     Difficulty of Paying Living Expenses: Not hard at all   Food Insecurity: No Food Insecurity (1/26/2024)    Hunger Vital Sign     Worried About Running Out of Food in the Last Year: Never true     Ran Out of Food in the Last Year: Never true   Transportation Needs: No Transportation Needs (1/26/2024)    PRAPARE - Transportation     Lack of Transportation (Medical): No     Lack of Transportation (Non-Medical): No   Physical Activity: Sufficiently Active (1/26/2024)    Exercise Vital Sign     Days of Exercise per Week: 5 days     Minutes of Exercise per Session: 60 min   Stress: No Stress Concern Present (1/26/2024)    [employer] - Occupational Stress Questionnaire     Feeling of Stress : Only a little   Social Connections: Unknown (1/26/2024)    Social Connection and Isolation Panel [NHANES]     Frequency of Communication with Friends and Family: Twice a week     Frequency of Social Gatherings with Friends and Family: More than three times a week     Active Member of Clubs or Organizations: Yes     Attends Club or Organization Meetings: 1 to 4 times per year     Marital Status:    Housing Stability: Low Risk  (1/26/2024)    Housing Stability Vital Sign     Unable to Pay for Housing in the Last Year: No     Number of Places Lived in the Last Year: 0     Unstable Housing in the Last Year: No      Review of Systems:   No Fever  No SOB  No vomiting  No visual disturbance      Objective:      Physical Exam    Constitutional: Patient appears well-nourished.   Head: Normocephalic and atraumatic.   Mouth/Throat: Oropharynx is clear and moist.   Pulmonary/Chest: Effort normal.   Abdominal: Soft.   Skin: Skin is warm and dry.      General:  Patient is alert and cooperative.  Affect:  Patient is appropriate to surroundings and  environment.  Language:  Speech is fluent.  HEENT:  There are no outward signs of trauma to head or face.  Cranial Nerves:  Pupils are equal round and reactive to light. Extra-ocular movements are intact. Face, tongue, and palate are  symmetrical.  Motor:  Patient exhibits normal strength testing in bilateral proximal and distal lower extremities.  Reflexes:  Symmetrical in bilateral lower extremities.  Gait:  Ambulation is independent without use of cane or walker without signs of ataxia or circumduction.  Cerebellar:  Normal finger to nose testing without dysmetria.  Sensory:  Intact to sensory modalities tested.  Musculoskeletal:  There is no severe tenderness to palpation and manipulation of lumbar spine region.   Assessment:       We reviewed and discussed at length results of EMG of the right lower extremity performed today revealing chronic right L4 and L5 radiculopathies. These findings are available via media section of chart review.   1. Right leg pain    2. Lumbar radiculopathy, right              Plan:       We discussed treatment options at length. Recommend patient keep appointment with referring provider.         I spent a total of 35 minutes on the day of the visit. This includes face to face time and non-face to face time preparing to see the patient (eg, review of tests), obtaining and/or reviewing separately obtained history, documenting clinical information in the electronic or other health record, independently interpreting results and communicating results to the patient/family/caregiver, or care coordinator.    Moe Earl MD, FAAN   04/02/2024   11:11 AM       A dictation device was used to produce this document. Use of such devices sometimes results in grammatical errors or replacement of words that sound similarly.

## 2024-04-03 ENCOUNTER — OFFICE VISIT (OUTPATIENT)
Dept: GASTROENTEROLOGY | Facility: CLINIC | Age: 79
End: 2024-04-03
Payer: MEDICARE

## 2024-04-03 VITALS
DIASTOLIC BLOOD PRESSURE: 61 MMHG | SYSTOLIC BLOOD PRESSURE: 160 MMHG | HEART RATE: 57 BPM | BODY MASS INDEX: 27.01 KG/M2 | WEIGHT: 188.69 LBS | HEIGHT: 70 IN

## 2024-04-03 DIAGNOSIS — K86.89 PANCREATIC MASS: ICD-10-CM

## 2024-04-03 PROCEDURE — 99205 OFFICE O/P NEW HI 60 MIN: CPT | Mod: S$PBB,,, | Performed by: INTERNAL MEDICINE

## 2024-04-03 PROCEDURE — 99214 OFFICE O/P EST MOD 30 MIN: CPT | Mod: PBBFAC,PO | Performed by: INTERNAL MEDICINE

## 2024-04-03 PROCEDURE — 99999 PR PBB SHADOW E&M-EST. PATIENT-LVL IV: CPT | Mod: PBBFAC,,, | Performed by: INTERNAL MEDICINE

## 2024-04-03 NOTE — PROGRESS NOTES
"[facility]    CC: incidental pancreatic cyst on imaging    HPI 78 y.o. male with PMHx HFpEF, AS s/p TAVR, CAD s/p DIANA presents to the clinic for evaluation after finding an incidental 1.3cm pancreatic head lesion on CT imaging. He was being evaluated for his leg pain and possible PAD when the incidental lesion was found. He c/o right lower back pain and was recently found to have 2 pinched nerves in L4-5 on MRI. Denies smoking history. Reports drinking 1-2 beers each weekend, but denies prior heavy EtOH use. Denies FHx CA. Denies abd pain, nausea, vomiting, diarrhea, constipation, bloody stools, unexplained weight loss, jaundice, tea-colored urine.       Past Medical History  AS s/p TAVR  HFpEF  CAD s/p DIANA x2  HTN  HLD  Polymyalgia Rheumatica    Physical Examination  BP (!) 160/61 (BP Location: Left arm, Patient Position: Sitting, BP Method: Medium (Automatic))   Pulse (!) 57   Ht 5' 10" (1.778 m)   Wt 85.6 kg (188 lb 11.4 oz)   BMI 27.08 kg/m²   General appearance: alert, cooperative, no distress  Lungs: clear to auscultation bilaterally, no dullness to percussion bilaterally  Heart: regular rate and rhythm without rub; no displacement of the PMI   Abdomen: soft, non-tender; bowel sounds normoactive; no organomegaly    Imaging:  CT Abd Pelvis (02/2024): small 1.3cm hypodensity near pancreatic head with no pancreatic ductal dilatation on my review     Assessment:   78 y.o. male with PMHx HFpEF, AS s/p TAVR, CAD s/p DIANA presents to the clinic for evaluation after finding an incidental 1.3cm pancreatic head lesion on CT imaging. Pt is currently asymptomatic and imaging does not show any mass effect from the lesion. Per [facility] guidelines, initial imaging surveillance is at 6 months and then pending results, can decide further frequency in surveillance. Per previous radiology report, lesion possibly consistent with IPMN, so small malignant potential possible. Case discussed with [facility] Dr. España.     Plan:  - " repeat CT imaging 6 months from previous (08/2024) to continue surveillance per Dr. España. If lesion grows to >15mm on interval imaging, will require EUS with FNA    Total encounter time >60 min including review of all records, review with Dr. España and review of images     Castillo Santa MD   [address] [place] [address], [address]  [place] [address] [phone]

## 2024-04-09 ENCOUNTER — OFFICE VISIT (OUTPATIENT)
Dept: UROLOGY | Facility: CLINIC | Age: 79
End: 2024-04-09
Payer: MEDICARE

## 2024-04-09 ENCOUNTER — OFFICE VISIT (OUTPATIENT)
Dept: NEUROSURGERY | Facility: CLINIC | Age: 79
End: 2024-04-09
Payer: MEDICARE

## 2024-04-09 VITALS
WEIGHT: 187.25 LBS | DIASTOLIC BLOOD PRESSURE: 57 MMHG | HEART RATE: 55 BPM | HEIGHT: 70 IN | BODY MASS INDEX: 26.81 KG/M2 | SYSTOLIC BLOOD PRESSURE: 164 MMHG

## 2024-04-09 DIAGNOSIS — N40.1 BPH WITH URINARY OBSTRUCTION: Primary | ICD-10-CM

## 2024-04-09 DIAGNOSIS — R39.9 LOWER URINARY TRACT SYMPTOMS (LUTS): ICD-10-CM

## 2024-04-09 DIAGNOSIS — N13.8 BPH WITH URINARY OBSTRUCTION: Primary | ICD-10-CM

## 2024-04-09 DIAGNOSIS — N28.1 RENAL CYST: ICD-10-CM

## 2024-04-09 DIAGNOSIS — M54.16 LUMBAR RADICULOPATHY, RIGHT: Primary | ICD-10-CM

## 2024-04-09 DIAGNOSIS — N52.9 ED (ERECTILE DYSFUNCTION) OF ORGANIC ORIGIN: ICD-10-CM

## 2024-04-09 LAB
BILIRUB SERPL-MCNC: NORMAL MG/DL
BLOOD URINE, POC: NORMAL
CLARITY, POC UA: CLEAR
COLOR, POC UA: YELLOW
GLUCOSE UR QL STRIP: NORMAL
KETONES UR QL STRIP: NORMAL
LEUKOCYTE ESTERASE URINE, POC: NORMAL
NITRITE, POC UA: NORMAL
PH, POC UA: 6
PROTEIN, POC: NORMAL
SPECIFIC GRAVITY, POC UA: 1.01
UROBILINOGEN, POC UA: NORMAL

## 2024-04-09 PROCEDURE — 99213 OFFICE O/P EST LOW 20 MIN: CPT | Mod: PBBFAC,27 | Performed by: PHYSICIAN ASSISTANT

## 2024-04-09 PROCEDURE — 99999 PR PBB SHADOW E&M-EST. PATIENT-LVL IV: CPT | Mod: PBBFAC,,, | Performed by: NURSE PRACTITIONER

## 2024-04-09 PROCEDURE — 99214 OFFICE O/P EST MOD 30 MIN: CPT | Mod: S$PBB,,, | Performed by: PHYSICIAN ASSISTANT

## 2024-04-09 PROCEDURE — 99204 OFFICE O/P NEW MOD 45 MIN: CPT | Mod: S$PBB,,, | Performed by: NURSE PRACTITIONER

## 2024-04-09 PROCEDURE — 99999 PR PBB SHADOW E&M-EST. PATIENT-LVL III: CPT | Mod: PBBFAC,,, | Performed by: PHYSICIAN ASSISTANT

## 2024-04-09 PROCEDURE — 81002 URINALYSIS NONAUTO W/O SCOPE: CPT | Mod: PBBFAC | Performed by: NURSE PRACTITIONER

## 2024-04-09 PROCEDURE — 99214 OFFICE O/P EST MOD 30 MIN: CPT | Mod: PBBFAC,25 | Performed by: NURSE PRACTITIONER

## 2024-04-09 PROCEDURE — 99999PBSHW POCT URINE DIPSTICK WITHOUT MICROSCOPE: Mod: PBBFAC,,,

## 2024-04-09 RX ORDER — GABAPENTIN 600 MG/1
600 TABLET ORAL 2 TIMES DAILY
Qty: 60 TABLET | Refills: 3 | Status: SHIPPED | OUTPATIENT
Start: 2024-04-09 | End: 2025-04-09

## 2024-04-09 NOTE — PROGRESS NOTES
CHIEF COMPLAINT:    Ghanshyam Sanford Jr. is a 78 y.o. male presents today for BPH & Renal Cyst    HISTORY OF PRESENTING ILLINESS:    Ghanshyam Sanford Jr. is a 78 y.o. male new to our Urology Clinic. This is a new patient to for me. I personally reviewed their recent medical records as well as their outside medical, surgical, family, & social history.     He is here today due to Renal Cysts and enlarged prostate that was found on a CT scan done for PVD workup.   02/28/2024 CTA Runoff Abd & pelvis:  - There are renal hypodensities the largest a likely cyst.   - The others are too small to characterize.   - No hydronephrosis.   - Ureters and urinary bladder show no significant abnormalities.   - Prostate gland is mildly enlarged.     He denies any urinary complaints.   He does take Uroxatral daily for LUTS.   FOS is good. No dysuria/hematuria/urinary leakage.  He does have nocturia but not bothersome. He does not sleep well; but the nocturia is not what keeps him up at night.     He denies any family history of Prostate Cancer.   His last PSA was 0.9 done by his local urologist, Dr. Jeannie Boggs in [place].   Was seen 02/02/2024 with clear UA and PVR of 52    He reports issues with ED > 1 year.  His ok with how things are for now.         REVIEW OF SYSTEMS:  Review of Systems   Constitutional: Negative.  Negative for chills and fever.   Eyes:  Negative for double vision.   Respiratory:  Negative for cough and shortness of breath.    Cardiovascular:  Negative for chest pain and palpitations.   Gastrointestinal:  Negative for abdominal pain, constipation, diarrhea, nausea and vomiting.   Genitourinary:         See HPI   Musculoskeletal:  Negative for falls.   Neurological:  Negative for dizziness and seizures.   Endo/Heme/Allergies:  Negative for polydipsia.         PATIENT HISTORY:    Past Medical History:   Diagnosis Date    Anticoagulant long-term use     Aortic valve regurgitation     Aortic valve stenosis,  nonrheumatic     Carotid stenosis, bilateral     Chronic diastolic heart failure, NYHA class 2     Chronic total occlusion of coronary artery     Coronary artery disease     Elevated PSA     UNDER CARE    Hypertension     Hypertensive heart disease     Osteoarthritis of right knee 3/22/2017    Polymyalgia     Restless legs     SOB (shortness of breath)        Past Surgical History:   Procedure Laterality Date    ARTHROSCOPIC CHONDROPLASTY OF KNEE JOINT Right 6/13/2023    Procedure: ARTHROSCOPY, KNEE, WITH CHONDROPLASTY;  Surgeon: Tari Gregory MD;  Location: [facility] OR;  Service: Orthopedics;  Laterality: Right;    BACK SURGERY      CAROTID STENT      HERNIA REPAIR      KNEE ARTHROPLASTY Right     KNEE ARTHROSCOPY W/ MENISCECTOMY Right 6/13/2023    Procedure: ARTHROSCOPY, KNEE, WITH MEDIAL AND LATERAL MENISCECTOMY;  Surgeon: Tari Gregory MD;  Location: [facility] OR;  Service: Orthopedics;  Laterality: Right;    KNEE ARTHROSCOPY W/ PLICA EXCISION Right 6/13/2023    Procedure: EXCISION, PLICA, KNEE, ARTHROSCOPIC;  Surgeon: Tari Gregory MD;  Location: [facility] OR;  Service: Orthopedics;  Laterality: Right;    KNEE DEBRIDEMENT Right 6/13/2023    Procedure: DEBRIDEMENT, KNEE;  Surgeon: Tari Gregory MD;  Location: [facility] OR;  Service: Orthopedics;  Laterality: Right;    [id number] Right 6/13/2023    Procedure: [id number];  Surgeon: Tari Gregory MD;  Location: [facility] OR;  Service: Orthopedics;  Laterality: Right;    PERCUTANEOUS TRANSCATHETER AORTIC VALVE REPLACEMENT (TAVR) Right 12/14/2021    Procedure: REPLACEMENT, AORTIC VALVE, PERCUTANEOUS, TRANSCATHETER;  Surgeon: Johny Lockett MD;  Location: [facility];  Service: Cardiology;  Laterality: Right;  ops # [id number]    PERCUTANEOUS TRANSCATHETER AORTIC VALVE REPLACEMENT (TAVR) Right 12/14/2021    Procedure: REPLACEMENT, AORTIC VALVE, PERCUTANEOUS, TRANSCATHETER;  Surgeon: Sushma Piña MD;  Location: [facility] CATH;  Service: Cardiovascular;  Laterality: Right;     SYNOVECTOMY OF KNEE Right 6/13/2023    Procedure: PARTIAL SYNOVECTOMY, KNEE;  Surgeon: Tari Gregory MD;  Location: [facility];  Service: Orthopedics;  Laterality: Right;       Family History   Problem Relation Age of Onset    Heart disease Mother     Hypertension Mother     Stroke Mother     Diabetes Mellitus Father     Heart disease Paternal Grandmother     Stroke Paternal Grandmother        Social History     Socioeconomic History    Marital status:    Tobacco Use    Smoking status: Never    Smokeless tobacco: Never   Substance and Sexual Activity    Alcohol use: Never    Drug use: Never     Social Determinants of Health     Financial Resource Strain: Low Risk  (1/26/2024)    Overall Financial Resource Strain (CARDIA)     Difficulty of Paying Living Expenses: Not hard at all   Food Insecurity: No Food Insecurity (1/26/2024)    Hunger Vital Sign     Worried About Running Out of Food in the Last Year: Never true     Ran Out of Food in the Last Year: Never true   Transportation Needs: No Transportation Needs (1/26/2024)    PRAPARE - Transportation     Lack of Transportation (Medical): No     Lack of Transportation (Non-Medical): No   Physical Activity: Sufficiently Active (1/26/2024)    Exercise Vital Sign     Days of Exercise per Week: 5 days     Minutes of Exercise per Session: 60 min   Stress: No Stress Concern Present (1/26/2024)    [employer] [employer] - Occupational Stress Questionnaire     Feeling of Stress : Only a little   Social Connections: Unknown (1/26/2024)    Social Connection and Isolation Panel [NHANES]     Frequency of Communication with Friends and Family: Twice a week     Frequency of Social Gatherings with Friends and Family: More than three times a week     Active Member of Clubs or Organizations: Yes     Attends Club or Organization Meetings: 1 to 4 times per year     Marital Status:    Housing Stability: Low Risk  (1/26/2024)    Housing Stability Vital Sign      Unable to Pay for Housing in the Last Year: No     Number of Places Lived in the Last Year: 0     Unstable Housing in the Last Year: No       Allergies:  Dexamethasone sodium phosphate, Atorvastatin calcium, Cortisone, Duloxetine, Flomax [tamsulosin], and Pravastatin    Medications:    Current Outpatient Medications:     acetaminophen (TYLENOL) 500 MG tablet, Take 1,000 mg by mouth every 6 (six) hours as needed for Pain., Disp: , Rfl:     alfuzosin (UROXATRAL) 10 mg Tb24, Take 10 mg by mouth daily with breakfast., Disp: , Rfl:     aspirin (ECOTRIN) 81 MG EC tablet, Take 81 mg by mouth once daily., Disp: , Rfl:     clopidogreL (PLAVIX) 75 mg tablet, Take 75 mg by mouth once daily., Disp: , Rfl:     coenzyme Q10 100 mg capsule, Take 200 mg by mouth once daily., Disp: , Rfl:     docusate sodium (COLACE) 100 MG capsule, Take 1 capsule (100 mg total) by mouth 2 (two) times daily as needed for Constipation., Disp: 14 capsule, Rfl: 0    doxazosin (CARDURA) 4 MG tablet, Take 4 mg by mouth every evening., Disp: , Rfl:     gabapentin (NEURONTIN) 600 MG tablet, Take 1 tablet (600 mg total) by mouth every evening., Disp: 30 tablet, Rfl: 11    multivitamin (THERAGRAN) per tablet, Take 1 tablet by mouth once daily., Disp: , Rfl:     mv-mn/iron/folic acid/herb 190 (VITAMIN D3 COMPLETE ORAL), Take 5,000 Units by mouth Daily., Disp: , Rfl:     NIFEdipine (ADALAT CC) 30 MG TbSR, Take 30 mg by mouth once daily., Disp: , Rfl:     NIFEdipine (PROCARDIA-XL) 30 MG (OSM) 24 hr tablet, Take 30 mg by mouth., Disp: , Rfl:     olmesartan (BENICAR) 40 MG tablet, Take 20 mg by mouth once daily., Disp: , Rfl:     rosuvastatin (CRESTOR) 20 MG tablet, Take 20 mg by mouth once daily., Disp: , Rfl:     traZODone (DESYREL) 150 MG tablet, Take 150 mg by mouth every evening., Disp: , Rfl:     zolpidem (AMBIEN) 10 mg Tab, Take 10 mg by mouth nightly as needed., Disp: , Rfl:     PHYSICAL EXAMINATION:  Physical Exam  Vitals and nursing note reviewed.  "  Constitutional:       General: He is awake.      Appearance: Normal appearance.   HENT:      Head: Normocephalic.      Right Ear: External ear normal.      Left Ear: External ear normal.      Nose: Nose normal.   Cardiovascular:      Rate and Rhythm: Normal rate.   Pulmonary:      Effort: Pulmonary effort is normal. No respiratory distress.   Abdominal:      Tenderness: There is no abdominal tenderness. There is no right CVA tenderness or left CVA tenderness.   Genitourinary:     Penis: Normal and circumcised. No hypospadias or erythema.       Testes: Normal.         Right: Mass, tenderness or swelling not present.         Left: Mass, tenderness or swelling not present.      Prostate: Enlarged (~35gms; smooth). Not tender and no nodules present.   Musculoskeletal:         General: Normal range of motion.      Cervical back: Normal range of motion.   Skin:     General: Skin is warm and dry.   Neurological:      General: No focal deficit present.      Mental Status: He is alert and oriented to person, place, and time.   Psychiatric:         Mood and Affect: Mood normal.         Behavior: Behavior is cooperative.           LABS:      In office UA today was clear of active infection and blood.       No results found for: "PSA", "PSADIAG", "PSATOTAL", "PHIND"    Lab Results   Component Value Date    CREATININE 1.0 02/12/2024    EGFRNORACEVR >60 02/12/2024               IMPRESSION:    Encounter Diagnoses   Name Primary?    BPH with urinary obstruction Yes    Renal cyst     Lower urinary tract symptoms (LUTS)     ED (erectile dysfunction) of organic origin          Assessment:       1. BPH with urinary obstruction    2. Renal cyst    3. Lower urinary tract symptoms (LUTS)    4. ED (erectile dysfunction) of organic origin        Plan:         I spent 45 minutes with the patient of which more than half was spent in direct consultation with the patient in regards to our treatment and plan.  We addressed the office findings " and recent labs; any need to go ER today.   Education and recommendations of today's plan of care including home remedies and needed follow up with PCP.   We discussed the chief complaints; reviewed the LUTS and the possible contributory factors.   Reassurance no infection or visible blood seen in today's urine sample  We discussed his renal cysts. Reassurance given.   Discussed the commonality of renal cyst.   We discussed his BPH and LUTS   We discussed his ED and the contributory factors.  Follow up with PCP for underlying medical conditions causing ED.   We discussed the physiological as well as his psychological aspects that contribute to ED.  Reviewed the 1st, 2nd, & 3rd line therapies for ED.  The benefits, expectations risks, se of the different therapies.  He is ok for now.   Reviewed management; Recommended lifestyle modifications with a proper, healthy diet, good hydration but during the day. Reducing bladder irritants.   Benefits of regular exercise and weight loss.   Continue Uroxatral and f/u with local urologist.   We will get an US in 6 months but no need to come in. Will discuss via of portal.

## 2024-04-10 ENCOUNTER — TELEPHONE (OUTPATIENT)
Dept: NEUROSURGERY | Facility: CLINIC | Age: 79
End: 2024-04-10
Payer: MEDICARE

## 2024-04-10 NOTE — PROGRESS NOTES
Neurosurgery  Established patient    SUBJECTIVE:     History of Present Illness 02/28/2024:  78-year-old male with history of aortic stenosis status post steps TAVR, chronic diastolic heart failure, CAD status post LAD and RCA stents, hypertension, lumbar spondylosis who presents today as a referral from his cardiologist for evaluation of right leg pain.  Patient reports his pain started roughly 5 years ago and has been persistent.  The pain is described as burning that radiates down the anterior right leg moves medially past the knee to mid calf.  He denies pain in his foot, numbness, or weakness.  He denies left leg pain.  He has minimal back pain and states the leg pain limits him more.  He was tried several rounds of physical therapy in the past as well as medical management with gabapentin 600 mg and anti-inflammatories without relief.  The pain is constant without specific exacerbating or alleviating factors.    Interval history:  Patient presents today for follow-up following EMG.  He was referred last visit for lumbar injections however reports this was never scheduled.  EMG is positive for chronic right L4 and L5 radiculopathies.  Patient continues to report severe right leg pain in the same distribution.  He denies left leg pain.  He has been taking gabapentin 600 mg once daily.    Review of patient's allergies indicates:   Allergen Reactions    Dexamethasone sodium phosphate Other (See Comments)    Atorvastatin calcium Other (See Comments)     Leg cramps    Cortisone      Inj- pt reports hiccups and feels spasm     Duloxetine Other (See Comments)     DRY MONTH  NOT ABLE TO SLEEP  NOT HUNDRY  SWEATING A LOT  FEELING TIRED AND WEAK  DIZZINESS  WEIGHT LOSS ( 9 LB.)    Flomax [tamsulosin] Diarrhea    Pravastatin Other (See Comments)     Leg cramps       Current Outpatient Medications   Medication Sig Dispense Refill    acetaminophen (TYLENOL) 500 MG tablet Take 1,000 mg by mouth every 6 (six) hours as needed  for Pain.      alfuzosin (UROXATRAL) 10 mg Tb24 Take 10 mg by mouth daily with breakfast.      aspirin (ECOTRIN) 81 MG EC tablet Take 81 mg by mouth once daily.      clopidogreL (PLAVIX) 75 mg tablet Take 75 mg by mouth once daily.      doxazosin (CARDURA) 4 MG tablet Take 4 mg by mouth every evening.      multivitamin (THERAGRAN) per tablet Take 1 tablet by mouth once daily.      mv-mn/iron/folic acid/herb 190 (VITAMIN D3 COMPLETE ORAL) Take 5,000 Units by mouth Daily.      NIFEdipine (PROCARDIA-XL) 30 MG (OSM) 24 hr tablet Take 30 mg by mouth.      olmesartan (BENICAR) 40 MG tablet Take 20 mg by mouth once daily.      rosuvastatin (CRESTOR) 20 MG tablet Take 20 mg by mouth once daily.      traZODone (DESYREL) 150 MG tablet Take 150 mg by mouth every evening.      zolpidem (AMBIEN) 10 mg Tab Take 10 mg by mouth nightly as needed.      coenzyme Q10 100 mg capsule Take 200 mg by mouth once daily. (Patient not taking: Reported on 4/9/2024)      docusate sodium (COLACE) 100 MG capsule Take 1 capsule (100 mg total) by mouth 2 (two) times daily as needed for Constipation. (Patient not taking: Reported on 4/9/2024) 14 capsule 0    gabapentin (NEURONTIN) 600 MG tablet Take 1 tablet (600 mg total) by mouth 2 (two) times daily. 60 tablet 3    NIFEdipine (ADALAT CC) 30 MG TbSR Take 30 mg by mouth once daily. (Patient not taking: Reported on 4/9/2024)       No current facility-administered medications for this visit.       Past Medical History:   Diagnosis Date    Anticoagulant long-term use     Aortic valve regurgitation     Aortic valve stenosis, nonrheumatic     Carotid stenosis, bilateral     Chronic diastolic heart failure, NYHA class 2     Chronic total occlusion of coronary artery     Coronary artery disease     Elevated PSA     UNDER CARE    Hypertension     Hypertensive heart disease     Osteoarthritis of right knee 3/22/2017    Polymyalgia     Restless legs     SOB (shortness of breath)      Past Surgical History:    Procedure Laterality Date    ARTHROSCOPIC CHONDROPLASTY OF KNEE JOINT Right 6/13/2023    Procedure: ARTHROSCOPY, KNEE, WITH CHONDROPLASTY;  Surgeon: Tari Gregory MD;  Location: [facility] OR;  Service: Orthopedics;  Laterality: Right;    BACK SURGERY      CAROTID STENT      HERNIA REPAIR      KNEE ARTHROPLASTY Right     KNEE ARTHROSCOPY W/ MENISCECTOMY Right 6/13/2023    Procedure: ARTHROSCOPY, KNEE, WITH MEDIAL AND LATERAL MENISCECTOMY;  Surgeon: Tari Gregory MD;  Location: [facility] OR;  Service: Orthopedics;  Laterality: Right;    KNEE ARTHROSCOPY W/ PLICA EXCISION Right 6/13/2023    Procedure: EXCISION, PLICA, KNEE, ARTHROSCOPIC;  Surgeon: Tari Gregory MD;  Location: [facility] OR;  Service: Orthopedics;  Laterality: Right;    KNEE DEBRIDEMENT Right 6/13/2023    Procedure: DEBRIDEMENT, KNEE;  Surgeon: Tari Gregory MD;  Location: [facility] OR;  Service: Orthopedics;  Laterality: Right;    [id number] Right 6/13/2023    Procedure: [id number];  Surgeon: Tari Gregory MD;  Location: [facility] OR;  Service: Orthopedics;  Laterality: Right;    PERCUTANEOUS TRANSCATHETER AORTIC VALVE REPLACEMENT (TAVR) Right 12/14/2021    Procedure: REPLACEMENT, AORTIC VALVE, PERCUTANEOUS, TRANSCATHETER;  Surgeon: Johny Lockett MD;  Location: [facility] CATH;  Service: Cardiology;  Laterality: Right;  ops # 8    PERCUTANEOUS TRANSCATHETER AORTIC VALVE REPLACEMENT (TAVR) Right 12/14/2021    Procedure: REPLACEMENT, AORTIC VALVE, PERCUTANEOUS, TRANSCATHETER;  Surgeon: Sushma Piña MD;  Location: [facility] CATH;  Service: Cardiovascular;  Laterality: Right;    SYNOVECTOMY OF KNEE Right 6/13/2023    Procedure: PARTIAL SYNOVECTOMY, KNEE;  Surgeon: Tari Gregory MD;  Location: [facility] OR;  Service: Orthopedics;  Laterality: Right;     Family History       Problem Relation (Age of Onset)    Diabetes Mellitus Father    Heart disease Mother, Paternal Grandmother    Hypertension Mother    Stroke Mother, Paternal Grandmother          Social History      Socioeconomic History    Marital status:    Tobacco Use    Smoking status: Never    Smokeless tobacco: Never   Substance and Sexual Activity    Alcohol use: Never    Drug use: Never     Social Determinants of Health     Financial Resource Strain: Low Risk  (1/26/2024)    Overall Financial Resource Strain (CARDIA)     Difficulty of Paying Living Expenses: Not hard at all   Food Insecurity: No Food Insecurity (1/26/2024)    Hunger Vital Sign     Worried About Running Out of Food in the Last Year: Never true     Ran Out of Food in the Last Year: Never true   Transportation Needs: No Transportation Needs (1/26/2024)    PRAPARE - Transportation     Lack of Transportation (Medical): No     Lack of Transportation (Non-Medical): No   Physical Activity: Sufficiently Active (1/26/2024)    Exercise Vital Sign     Days of Exercise per Week: 5 days     Minutes of Exercise per Session: 60 min   Stress: No Stress Concern Present (1/26/2024)    [employer] - Occupational Stress Questionnaire     Feeling of Stress : Only a little   Social Connections: Unknown (1/26/2024)    Social Connection and Isolation Panel [NHANES]     Frequency of Communication with Friends and Family: Twice a week     Frequency of Social Gatherings with Friends and Family: More than three times a week     Active Member of Clubs or Organizations: Yes     Attends Club or Organization Meetings: 1 to 4 times per year     Marital Status:    Housing Stability: Low Risk  (1/26/2024)    Housing Stability Vital Sign     Unable to Pay for Housing in the Last Year: No     Number of Places Lived in the Last Year: 0     Unstable Housing in the Last Year: No       Review of Systems    OBJECTIVE:     Vital Signs  Pain Score:   8  There is no height or weight on file to calculate BMI.      Neurosurgery Physical Exam  General: well developed, well nourished, no distress.   Head: normocephalic, atraumatic  Neurologic: Alert and  oriented. Thought content appropriate.  GCS: Motor: 6/Verbal: 5/Eyes: 4 GCS Total: 15  Mental Status: Awake, Alert, Oriented x3  Cranial nerves: face symmetric, tongue midline, CN II-XII grossly intact.   Eyes: pupils equal, round, reactive to light with accomodation, EOMI.   Sensory: intact to light touch throughout  Motor Strength: Moves all extremities spontaneously with good tone.  Full strength upper and lower extremities. No abnormal movements seen.     Strength  Deltoids Triceps Biceps Wrist Extension Wrist Flexion Hand    Upper: R 5/5 5/5 5/5 5/5 5/5 5/5    L 5/5 5/5 5/5 5/5 5/5 5/5     Iliopsoas Quadriceps Knee  Flexion Tibialis  anterior Gastro- cnemius EHL   Lower: R 5/5 5/5 5/5 5/5 5/5 5/5    L 5/5 5/5 5/5 5/5 5/5 5/5     DTR's - 2 + and symmetric in UE and LE  Pulses: 2+ and symmetric radial and dorsalis pedis. No lower extremity edema  Straight leg raise:  Positive on the right       Diagnostic Results:  MRI lumbar spine without contrast dated 02/28/2024 reviewed and shows mild lumbar spondylosis with multiple levels of facet arthropathy most prominent at L4-5 with small right-sided synovial cyst.  There is remote L1 compression fracture status post vertebroplasty.    ASSESSMENT/PLAN:     78-year-old male who is history of aortic stenosis status post TAVR, CAD status post LAD and RCA stenting, and hypertension who presents with complaint of right leg pain consistent with L4 radiculopathy.  There appears to be right-sided synovial cyst at L4-5 on MRI.  EMG was positive for right L4 and L5 radiculopathies.  Steroid pack was considered however he has dexamethasone listed as an allergy. I will again refer him for transforaminal NELSON at L4-5 on the right.  I will see him back once this is complete to reassess his symptoms.      Radha Deshpande PA-C  Neurosurgery              Note dictated with voice recognition software, please excuse any grammatical errors.

## 2024-04-10 NOTE — TELEPHONE ENCOUNTER
Spoke to pt and his spouse. We confirmed time and date for f/u appt w/ Radha. I told I messaged IR again today, so he knows to call back if they don't have injection scheduled in the few days

## 2024-04-11 ENCOUNTER — TELEPHONE (OUTPATIENT)
Dept: PAIN MEDICINE | Facility: CLINIC | Age: 79
End: 2024-04-11
Payer: MEDICARE

## 2024-04-11 ENCOUNTER — TELEPHONE (OUTPATIENT)
Dept: NEUROSURGERY | Facility: CLINIC | Age: 79
End: 2024-04-11
Payer: MEDICARE

## 2024-04-11 ENCOUNTER — PATIENT MESSAGE (OUTPATIENT)
Dept: NEUROSURGERY | Facility: CLINIC | Age: 79
End: 2024-04-11
Payer: MEDICARE

## 2024-04-11 DIAGNOSIS — M54.16 LUMBAR RADICULOPATHY: Primary | ICD-10-CM

## 2024-04-11 NOTE — TELEPHONE ENCOUNTER
----- Message from Pancho Wilson MD sent at 4/11/2024  9:11 AM CDT -----  Pain Provider: Steve  Patient Name: Ghanshyam Sanford Jr.  MRN: [id number]  Case: LUMBAR TFESI  Level: L4/5  Laterality: right  Anticoagulation: Clopidogrel (Plavix)  Length to hold:7 days  Rx Provider:  Luis Enrique Gallegos MD PhD     Patient can follow up with Neurosurgery 3 weeks after procedure

## 2024-04-11 NOTE — TELEPHONE ENCOUNTER
Cardiac Clearance has been sent to Harsha Gallegos MD waiting on response before scheduling.    .Phoenix Fofana CCM

## 2024-04-11 NOTE — TELEPHONE ENCOUNTER
----- Message from Sophia Collins sent at 4/11/2024 11:49 AM CDT -----  Regarding: Appt Advise  Contact: [phone]  ELISABETH MCCOLLUM calling regarding Patient Advice (message) for # pt is calling to speak with someone in the office regarding the missed appt today please call to advise      I added Dr. Deshpande office because the pt wanted to let the office know as well

## 2024-04-11 NOTE — TELEPHONE ENCOUNTER
Spoke to patient and spouse.   I explained that we accidentally put in a second injection order with the facility as [facility] , so the order automatically fell into their workqueue. They v/u.    I told them I will message [facility] IR dept asking them to have injection scheduled here                ----- Message from Kitty De La Fuente sent at 4/11/2024  1:52 PM CDT -----  Regarding: Plan of Care  Contact: [phone]  Calling to speak with provider/nurse regarding plan of care, questions about epidural. Patient states no one seems to know what he is talking about when this was discussed in last visit.  Please call and discuss with patient as soon as possible.

## 2024-04-11 NOTE — TELEPHONE ENCOUNTER
Spoke with patient regarding his scheduled procedure.  Informed patient that we are waiting on the cardiac clearance from Dr. Gallegos's office.  Once in office we can schedule his procedure.

## 2024-04-12 ENCOUNTER — PATIENT MESSAGE (OUTPATIENT)
Dept: NEUROSURGERY | Facility: CLINIC | Age: 79
End: 2024-04-12
Payer: MEDICARE

## 2024-04-12 ENCOUNTER — TELEPHONE (OUTPATIENT)
Dept: PAIN MEDICINE | Facility: CLINIC | Age: 79
End: 2024-04-12
Payer: MEDICARE

## 2024-04-12 NOTE — TELEPHONE ENCOUNTER
Cardiac Clearance received, informed pt, pt stated he did not wish to proceed with havinf injection in [place].    .Phoenix Fofana [facility]

## 2024-04-12 NOTE — TELEPHONE ENCOUNTER
----- Message from Luis Enrique Gallegos MD PhD sent at 4/11/2024 11:09 AM CDT -----  Regarding: RE: Cardiac Clearance  OK to hold plavix for 7 days prior to the procedure.  Restart after the procedure when safe from a bleeding perspective.    Thank you,  Dr. Gallegos  ----- Message -----  From: Phoenix Fofana MA  Sent: 4/11/2024   9:30 AM CDT  To: Luis Enrique Gallegos MD PhD  Subject: Cardiac Clearance                                :    Ghanshyam MATT Sanford Jr. was seen in office with complaints of severe low back pain. Dr. Wilson would like to perform lumbar epidural, and Ghanshyam Sanford Jr. would like to proceed. Dr. Wilson is requesting for clearance to hold clopidogrel (Plavix) 7 days prior to procedure. Patient Ghanshyam MATT Sanford Jr. can resume medication following the procedure. Please let us know if it is ok to proceed with procedure.    Phoenix Fofana [facility]  [facility] Surgery Scheduler

## 2024-04-15 ENCOUNTER — TELEPHONE (OUTPATIENT)
Dept: INTERVENTIONAL RADIOLOGY/VASCULAR | Facility: CLINIC | Age: 79
End: 2024-04-15
Payer: MEDICARE

## 2024-04-19 ENCOUNTER — HOSPITAL ENCOUNTER (OUTPATIENT)
Dept: INTERVENTIONAL RADIOLOGY/VASCULAR | Facility: HOSPITAL | Age: 79
Discharge: HOME | End: 2024-04-19
Attending: ANESTHESIOLOGY
Payer: MEDICARE

## 2024-04-19 VITALS
SYSTOLIC BLOOD PRESSURE: 170 MMHG | DIASTOLIC BLOOD PRESSURE: 69 MMHG | OXYGEN SATURATION: 100 % | RESPIRATION RATE: 20 BRPM | HEART RATE: 80 BPM

## 2024-04-19 DIAGNOSIS — M54.16 LUMBAR RADICULOPATHY: ICD-10-CM

## 2024-04-19 PROCEDURE — 63600175 PHARM REV CODE 636 W HCPCS: Performed by: RADIOLOGY

## 2024-04-19 PROCEDURE — 64483 NJX AA&/STRD TFRM EPI L/S 1: CPT | Mod: RT | Performed by: RADIOLOGY

## 2024-04-19 PROCEDURE — 25500020 PHARM REV CODE 255: Performed by: RADIOLOGY

## 2024-04-19 PROCEDURE — 25000003 PHARM REV CODE 250: Performed by: RADIOLOGY

## 2024-04-19 PROCEDURE — A4215 STERILE NEEDLE: HCPCS

## 2024-04-19 RX ORDER — DIPHENHYDRAMINE HCL 25 MG
CAPSULE ORAL
Status: COMPLETED | OUTPATIENT
Start: 2024-04-19 | End: 2024-04-19

## 2024-04-19 RX ORDER — LIDOCAINE HYDROCHLORIDE 10 MG/ML
INJECTION INFILTRATION; PERINEURAL
Status: COMPLETED | OUTPATIENT
Start: 2024-04-19 | End: 2024-04-19

## 2024-04-19 RX ORDER — METHYLPREDNISOLONE ACETATE 40 MG/ML
INJECTION, SUSPENSION INTRA-ARTICULAR; INTRALESIONAL; INTRAMUSCULAR; SOFT TISSUE
Status: COMPLETED | OUTPATIENT
Start: 2024-04-19 | End: 2024-04-19

## 2024-04-19 RX ORDER — BUPIVACAINE HYDROCHLORIDE 2.5 MG/ML
INJECTION, SOLUTION EPIDURAL; INFILTRATION; INTRACAUDAL
Status: COMPLETED | OUTPATIENT
Start: 2024-04-19 | End: 2024-04-19

## 2024-04-19 RX ADMIN — IOHEXOL 5 ML: 180 INJECTION INTRAVENOUS at 09:04

## 2024-04-19 RX ADMIN — METHYLPREDNISOLONE ACETATE 40 MG: 40 INJECTION, SUSPENSION INTRA-ARTICULAR; INTRALESIONAL; INTRAMUSCULAR; SOFT TISSUE at 09:04

## 2024-04-19 RX ADMIN — BUPIVACAINE HYDROCHLORIDE 10 ML: 2.5 INJECTION, SOLUTION EPIDURAL; INFILTRATION; INTRACAUDAL; PERINEURAL at 09:04

## 2024-04-19 RX ADMIN — DIPHENHYDRAMINE HYDROCHLORIDE 25 MG: 25 CAPSULE ORAL at 09:04

## 2024-04-19 RX ADMIN — LIDOCAINE HYDROCHLORIDE 3 ML: 10 INJECTION, SOLUTION INFILTRATION; PERINEURAL at 09:04

## 2024-04-19 NOTE — PROCEDURES
Radiology Post-Procedure Note    Pre Op Diagnosis: LBP    Post Op Diagnosis: Same    Procedure: Lumbar Transforaminal NELSON    Procedure performed by: Ben Gongora MD    Written Informed Consent Obtained: Yes    Specimen Removed: NO    Estimated Blood Loss: Minimal    Findings:     Level injected: Right L4-L5  Needle used: 22 gauge  Dose:  40 mg Depo-methylprednisolone   2 mL  Bupivicaine 0.25% MPF    Patient tolerated procedure well.    Ben Gongora MD  Department of Radiology

## 2024-04-19 NOTE — PLAN OF CARE
NELSON procedure completed. Pt tolerated procedure well.Patient AAOx3, no distress noted, respirations even and unlabored, Discharge instructions reviewed and acknowledged. Pt discharged via wheelchair and private vehicle.

## 2024-04-19 NOTE — H&P
Radiology Minor Procedure Note    Procedure Requested: Transforaminal NELSON    History of Present Illness:  Ghanshyam Sanford Jr. is a 78 y.o. male with history of lumbar radiculopathy.  Past Medical History:   Diagnosis Date    Anticoagulant long-term use     Aortic valve regurgitation     Aortic valve stenosis, nonrheumatic     Carotid stenosis, bilateral     Chronic diastolic heart failure, NYHA class 2     Chronic total occlusion of coronary artery     Coronary artery disease     Elevated PSA     UNDER CARE    Hypertension     Hypertensive heart disease     Osteoarthritis of right knee 3/22/2017    Polymyalgia     Restless legs     SOB (shortness of breath)      Past Surgical History:   Procedure Laterality Date    ARTHROSCOPIC CHONDROPLASTY OF KNEE JOINT Right 6/13/2023    Procedure: ARTHROSCOPY, KNEE, WITH CHONDROPLASTY;  Surgeon: Tari Gregory MD;  Location: [facility] OR;  Service: Orthopedics;  Laterality: Right;    BACK SURGERY      CAROTID STENT      HERNIA REPAIR      KNEE ARTHROPLASTY Right     KNEE ARTHROSCOPY W/ MENISCECTOMY Right 6/13/2023    Procedure: ARTHROSCOPY, KNEE, WITH MEDIAL AND LATERAL MENISCECTOMY;  Surgeon: Tari Gregory MD;  Location: [facility] OR;  Service: Orthopedics;  Laterality: Right;    KNEE ARTHROSCOPY W/ PLICA EXCISION Right 6/13/2023    Procedure: EXCISION, PLICA, KNEE, ARTHROSCOPIC;  Surgeon: Tari Gregory MD;  Location: [facility] OR;  Service: Orthopedics;  Laterality: Right;    KNEE DEBRIDEMENT Right 6/13/2023    Procedure: DEBRIDEMENT, KNEE;  Surgeon: Tari Gregory MD;  Location: [facility] OR;  Service: Orthopedics;  Laterality: Right;    [id number] Right 6/13/2023    Procedure: [id number];  Surgeon: Tari Gregory MD;  Location: [facility] OR;  Service: Orthopedics;  Laterality: Right;    PERCUTANEOUS TRANSCATHETER AORTIC VALVE REPLACEMENT (TAVR) Right 12/14/2021    Procedure: REPLACEMENT, AORTIC VALVE, PERCUTANEOUS, TRANSCATHETER;  Surgeon: Johny Lockett MD;  Location: [facility]  CATH;  Service: Cardiology;  Laterality: Right;  ops # 8    PERCUTANEOUS TRANSCATHETER AORTIC VALVE REPLACEMENT (TAVR) Right 12/14/2021    Procedure: REPLACEMENT, AORTIC VALVE, PERCUTANEOUS, TRANSCATHETER;  Surgeon: Sushma Piña MD;  Location: [facility] CATH;  Service: Cardiovascular;  Laterality: Right;    SYNOVECTOMY OF KNEE Right 6/13/2023    Procedure: PARTIAL SYNOVECTOMY, KNEE;  Surgeon: Tari Gregory MD;  Location: [facility] OR;  Service: Orthopedics;  Laterality: Right;       Allergies:   Review of patient's allergies indicates:   Allergen Reactions    Dexamethasone sodium phosphate Other (See Comments)    Atorvastatin calcium Other (See Comments)     Leg cramps    Cortisone      Inj- pt reports hiccups and feels spasm     Duloxetine Other (See Comments)     DRY MONTH  NOT ABLE TO SLEEP  NOT HUNDRY  SWEATING A LOT  FEELING TIRED AND WEAK  DIZZINESS  WEIGHT LOSS ( 9 LB.)    Flomax [tamsulosin] Diarrhea    Pravastatin Other (See Comments)     Leg cramps       Current Inpatient Meds:   Home Meds:   Prior to Admission medications    Medication Sig Start Date End Date Taking? Authorizing Provider   acetaminophen (TYLENOL) 500 MG tablet Take 1,000 mg by mouth every 6 (six) hours as needed for Pain.    Provider, Historical   alfuzosin (UROXATRAL) 10 mg Tb24 Take 10 mg by mouth daily with breakfast.    Provider, Historical   aspirin (ECOTRIN) 81 MG EC tablet Take 81 mg by mouth once daily.    Provider, Historical   clopidogreL (PLAVIX) 75 mg tablet Take 75 mg by mouth once daily.    Provider, Historical   coenzyme Q10 100 mg capsule Take 200 mg by mouth once daily.  Patient not taking: Reported on 4/9/2024    Provider, Historical   docusate sodium (COLACE) 100 MG capsule Take 1 capsule (100 mg total) by mouth 2 (two) times daily as needed for Constipation.  Patient not taking: Reported on 4/9/2024 6/13/23   Petar Burden III, PA-C   doxazosin (CARDURA) 4 MG tablet Take 4 mg by mouth every evening.    Provider,  Historical   gabapentin (NEURONTIN) 600 MG tablet Take 1 tablet (600 mg total) by mouth 2 (two) times daily. 4/9/24 4/9/25  Radha Deshpande, SHASHI   multivitamin (THERAGRAN) per tablet Take 1 tablet by mouth once daily.    Provider, Historical   mv-mn/iron/folic acid/herb 190 (VITAMIN D3 COMPLETE ORAL) Take 5,000 Units by mouth Daily.    Provider, Historical   NIFEdipine (ADALAT CC) 30 MG TbSR Take 30 mg by mouth once daily.  Patient not taking: Reported on 4/9/2024    Provider, Historical   NIFEdipine (PROCARDIA-XL) 30 MG (OSM) 24 hr tablet Take 30 mg by mouth. 2/22/24   Provider, Historical   olmesartan (BENICAR) 40 MG tablet Take 20 mg by mouth once daily.    Provider, Historical   rosuvastatin (CRESTOR) 20 MG tablet Take 20 mg by mouth once daily.    Provider, Historical   traZODone (DESYREL) 150 MG tablet Take 150 mg by mouth every evening.    Provider, Historical   zolpidem (AMBIEN) 10 mg Tab Take 10 mg by mouth nightly as needed.    Provider, Historical      Anticoagulants/Antiplatelets:  aspirin and plavix have been held    Labs:  Lab Results   Component Value Date    INR 1.0 12/13/2021       Lab Results   Component Value Date    WBC 6.30 09/18/2023    HGB 13.7 (L) 09/18/2023    HCT 42.0 09/18/2023    MCV 87 09/18/2023     09/18/2023      Lab Results   Component Value Date    GLU 94 09/18/2023     09/18/2023    K 4.4 09/18/2023     09/18/2023    CO2 26 09/18/2023    BUN 15 09/18/2023    CREATININE 1.0 02/12/2024    CALCIUM 9.1 09/18/2023    MG 2.3 04/27/2017    ALT 16 09/18/2023    AST 18 09/18/2023    ALBUMIN 3.9 09/18/2023       Objective:  Vitals: Pulse: 87 (04/19/24 0908)  Resp: 19 (04/19/24 0908)  BP: (!) 179/81 (04/19/24 0908)  SpO2: 100 % (04/19/24 0908)   PE:  Skin: no redness, tenderness, drainage    Plan: Right L4-5 Transforaminal NELSON. Patient with history of questionable allergy to steroids. No rash or throat swelling. Will premedicate with benadryl and proceed with injection.  Informed consent obtained.    Ben Gongora MD  Department of Radiology

## 2024-04-19 NOTE — DISCHARGE INSTRUCTIONS
Please call with any questions or concerns.      Monday thru Friday 8:00 am - 4:30 pm    Interventional Radiology   [phone]    After Hours    Ask for the Radiology Intern on call  [phone]

## 2024-04-19 NOTE — PLAN OF CARE
Pt arrived to IR room 4 for NELSON. Pt oriented to unit and staff. Plan of care reviewed with patient, patient verbalizes understanding. Comfort measures utilized. Pt safely transferred from stretcher to procedural table. Fall risk reviewed with patient, fall risk interventions maintained. Safety strap applied, positioner pillows utilized to minimize pressure points. Blankets applied. Pt prepped and draped utilizing standard sterile technique. Patient placed on continuous monitoring, as required by sedation policy. Timeouts completed utilizing standard universal time-out, per department and facility policy. RN to remain at bedside, continuous monitoring maintained. Pt resting comfortably. Denies pain/discomfort. Will continue to monitor. See flow sheets for monitoring, medication administration, and updates.

## 2024-05-20 ENCOUNTER — OFFICE VISIT (OUTPATIENT)
Dept: CARDIOLOGY | Facility: CLINIC | Age: 79
End: 2024-05-20
Payer: MEDICARE

## 2024-05-20 VITALS
HEIGHT: 71 IN | BODY MASS INDEX: 26.33 KG/M2 | HEART RATE: 61 BPM | WEIGHT: 188.06 LBS | DIASTOLIC BLOOD PRESSURE: 55 MMHG | SYSTOLIC BLOOD PRESSURE: 158 MMHG

## 2024-05-20 DIAGNOSIS — S22.000S CLOSED COMPRESSION FRACTURE OF THORACIC VERTEBRA, SEQUELA: Primary | ICD-10-CM

## 2024-05-20 DIAGNOSIS — M79.604 RIGHT LEG PAIN: ICD-10-CM

## 2024-05-20 DIAGNOSIS — I50.32 CHRONIC DIASTOLIC HEART FAILURE: ICD-10-CM

## 2024-05-20 DIAGNOSIS — G62.9 NEUROPATHY: ICD-10-CM

## 2024-05-20 DIAGNOSIS — M51.16 LUMBAR DISC DISEASE WITH RADICULOPATHY: ICD-10-CM

## 2024-05-20 DIAGNOSIS — I10 PRIMARY HYPERTENSION: ICD-10-CM

## 2024-05-20 DIAGNOSIS — I44.7 LBBB (LEFT BUNDLE BRANCH BLOCK): ICD-10-CM

## 2024-05-20 DIAGNOSIS — M62.08 DIASTASIS RECTI: ICD-10-CM

## 2024-05-20 PROCEDURE — 99214 OFFICE O/P EST MOD 30 MIN: CPT | Mod: PBBFAC,PO | Performed by: INTERNAL MEDICINE

## 2024-05-20 PROCEDURE — 99215 OFFICE O/P EST HI 40 MIN: CPT | Mod: S$PBB,,, | Performed by: INTERNAL MEDICINE

## 2024-05-20 PROCEDURE — 99999 PR PBB SHADOW E&M-EST. PATIENT-LVL IV: CPT | Mod: PBBFAC,,, | Performed by: INTERNAL MEDICINE

## 2024-05-20 NOTE — PROGRESS NOTES
"[facility]      Chief Complaint   Patient presents with    Right leg pain       Patient ID: Ghanshyam Sanford Jr. is a 78 y.o. male with aortic stenosis s/p TAVR, chronic diastolic heart failure, carotid artery disease, CAD s/p LAD and RCA stents, HTN, lumbar degenerative disc disease, who presents for a follow up appointment.  Pertinent history/events are as follows:     -Pt kindly referred by Dr. Khan for evaluation of Right leg pain.    -At our initial clinic visit on 2/1/2024, Mr. Sanford reported pain in the right leg starting 7 years ago when he was diagnosed with polymyalgia.  States his leg "has been scoped 3 times" with no improvement.  States pain is worse at night.    Plan:   Right leg pain- Physical exam suggests right leg pain is due to lumbar degenerative disc disease.  Check MRI lumbar spine and refer to Neurosurgery for evaluation.  Check CTA abd/pelvis and exercise GOLDEN to rule out flow limiting PAD.    HTN- Continue current medications.   CAD s/p LAD and RCA stents- Pt with no angina.  Continue ASA/statin/plavix.  Aortic stenosis s/p TAVR- Stable. Continue to monitor with outside cardiologist.   Overweight- Encourage diet, exercise, and weight loss.   Diastasis Recti- Given history of umbilical hernia repair, refer to General Surgery for evaluation.     HPI:  Mr. Sanford reports continued right leg pain.  He is now followed by Neurosurgery for lumbar degenertive disc disease, which is the source of his pain.  CTA Abd/Pelvis with Iliofemoral Runoff on 2/28/2024 revealed mild atherosclerotic change without significant stenosis or occlusion.  MRI Lumbar Spine on 2/28/2024 showed remote L1 compression fracture post vertebral plasty, stable.  Lumbar spondylosis, contributing to mild spinal canal stenosis L3-4 and L4-5 and moderate neural foraminal narrowing L3-4 through L5-S1. Prominent right-sided facet joint arthropathy at L4-5 with small anterior synovial cyst impinging upon the spinal " canal.    Past Medical History:   Diagnosis Date    Anticoagulant long-term use     Aortic valve regurgitation     Aortic valve stenosis, nonrheumatic     Carotid stenosis, bilateral     Chronic diastolic heart failure, NYHA class 2     Chronic total occlusion of coronary artery     Coronary artery disease     Elevated PSA     UNDER CARE    Hypertension     Hypertensive heart disease     Osteoarthritis of right knee 3/22/2017    Polymyalgia     Restless legs     SOB (shortness of breath)      Past Surgical History:   Procedure Laterality Date    ARTHROSCOPIC CHONDROPLASTY OF KNEE JOINT Right 6/13/2023    Procedure: ARTHROSCOPY, KNEE, WITH CHONDROPLASTY;  Surgeon: Tari Gregory MD;  Location: [facility] OR;  Service: Orthopedics;  Laterality: Right;    BACK SURGERY      CAROTID STENT      HERNIA REPAIR      KNEE ARTHROPLASTY Right     KNEE ARTHROSCOPY W/ MENISCECTOMY Right 6/13/2023    Procedure: ARTHROSCOPY, KNEE, WITH MEDIAL AND LATERAL MENISCECTOMY;  Surgeon: Tari Gregory MD;  Location: [facility] OR;  Service: Orthopedics;  Laterality: Right;    KNEE ARTHROSCOPY W/ PLICA EXCISION Right 6/13/2023    Procedure: EXCISION, PLICA, KNEE, ARTHROSCOPIC;  Surgeon: Tari Gregory MD;  Location: [facility] OR;  Service: Orthopedics;  Laterality: Right;    KNEE DEBRIDEMENT Right 6/13/2023    Procedure: DEBRIDEMENT, KNEE;  Surgeon: Tari Gregory MD;  Location: [facility] OR;  Service: Orthopedics;  Laterality: Right;    [id number] Right 6/13/2023    Procedure: [id number];  Surgeon: Tari Gregory MD;  Location: [facility] OR;  Service: Orthopedics;  Laterality: Right;    PERCUTANEOUS TRANSCATHETER AORTIC VALVE REPLACEMENT (TAVR) Right 12/14/2021    Procedure: REPLACEMENT, AORTIC VALVE, PERCUTANEOUS, TRANSCATHETER;  Surgeon: Johny Lockett MD;  Location: [facility] CATH;  Service: Cardiology;  Laterality: Right;  ops # [id number]    PERCUTANEOUS TRANSCATHETER AORTIC VALVE REPLACEMENT (TAVR) Right 12/14/2021    Procedure: REPLACEMENT, AORTIC  VALVE, PERCUTANEOUS, TRANSCATHETER;  Surgeon: Sushma Piña MD;  Location: [facility] CATH;  Service: Cardiovascular;  Laterality: Right;    SYNOVECTOMY OF KNEE Right 6/13/2023    Procedure: PARTIAL SYNOVECTOMY, KNEE;  Surgeon: Tari Gregory MD;  Location: [facility] OR;  Service: Orthopedics;  Laterality: Right;     Social History     Socioeconomic History    Marital status:    Tobacco Use    Smoking status: Never    Smokeless tobacco: Never   Substance and Sexual Activity    Alcohol use: Never    Drug use: Never     Social Determinants of Health     Financial Resource Strain: Low Risk  (1/26/2024)    Overall Financial Resource Strain (CARDIA)     Difficulty of Paying Living Expenses: Not hard at all   Food Insecurity: No Food Insecurity (1/26/2024)    Hunger Vital Sign     Worried About Running Out of Food in the Last Year: Never true     Ran Out of Food in the Last Year: Never true   Transportation Needs: No Transportation Needs (1/26/2024)    PRAPARE - Transportation     Lack of Transportation (Medical): No     Lack of Transportation (Non-Medical): No   Physical Activity: Sufficiently Active (1/26/2024)    Exercise Vital Sign     Days of Exercise per Week: 5 days     Minutes of Exercise per Session: 60 min   Stress: No Stress Concern Present (1/26/2024)    [employer] - Occupational Stress Questionnaire     Feeling of Stress : Only a little   Housing Stability: Low Risk  (1/26/2024)    Housing Stability Vital Sign     Unable to Pay for Housing in the Last Year: No     Number of Places Lived in the Last Year: 0     Unstable Housing in the Last Year: No     Family History   Problem Relation Name Age of Onset    Heart disease Mother      Hypertension Mother      Stroke Mother      Diabetes Mellitus Father      Heart disease Paternal Grandmother      Stroke Paternal Grandmother         Review of patient's allergies indicates:   Allergen Reactions    Dexamethasone sodium phosphate Other (See  Comments)    Atorvastatin calcium Other (See Comments)     Leg cramps    Cortisone      Inj- pt reports hiccups and feels spasm     Duloxetine Other (See Comments)     DRY MONTH  NOT ABLE TO SLEEP  NOT HUNDRY  SWEATING A LOT  FEELING TIRED AND WEAK  DIZZINESS  WEIGHT LOSS ( 9 LB.)    Flomax [tamsulosin] Diarrhea    Pravastatin Other (See Comments)     Leg cramps       Medication List with Changes/Refills   Current Medications    ACETAMINOPHEN (TYLENOL) 500 MG TABLET    Take 1,000 mg by mouth every 6 (six) hours as needed for Pain.    ALFUZOSIN (UROXATRAL) 10 MG TB24    Take 10 mg by mouth daily with breakfast.    ASPIRIN (ECOTRIN) 81 MG EC TABLET    Take 81 mg by mouth once daily.    CLOPIDOGREL (PLAVIX) 75 MG TABLET    Take 75 mg by mouth once daily.    COENZYME Q10 100 MG CAPSULE    Take 200 mg by mouth once daily.    DOXAZOSIN (CARDURA) 4 MG TABLET    Take 4 mg by mouth every evening.    GABAPENTIN (NEURONTIN) 600 MG TABLET    Take 1 tablet (600 mg total) by mouth 2 (two) times daily.    MULTIVITAMIN (THERAGRAN) PER TABLET    Take 1 tablet by mouth once daily.    MV-MN/IRON/FOLIC ACID/HERB 190 (VITAMIN D3 COMPLETE ORAL)    Take 5,000 Units by mouth Daily.    NIFEDIPINE (PROCARDIA-XL) 30 MG (OSM) 24 HR TABLET    Take 30 mg by mouth.    OLMESARTAN (BENICAR) 40 MG TABLET    Take 20 mg by mouth once daily.    ROSUVASTATIN (CRESTOR) 20 MG TABLET    Take 20 mg by mouth once daily.    TRAZODONE (DESYREL) 150 MG TABLET    Take 150 mg by mouth every evening.    ZOLPIDEM (AMBIEN) 10 MG TAB    Take 10 mg by mouth nightly as needed.   Discontinued Medications    DOCUSATE SODIUM (COLACE) 100 MG CAPSULE    Take 1 capsule (100 mg total) by mouth 2 (two) times daily as needed for Constipation.    NIFEDIPINE (ADALAT CC) 30 MG TBSR    Take 30 mg by mouth once daily.       Review of Systems  Constitution: Denies chills, fever, and sweats.  HENT: Denies headaches or blurry vision.  Cardiovascular: Denies chest pain or irregular  "heart beat.  Respiratory: Denies cough or shortness of breath.  Gastrointestinal: Denies abdominal pain, nausea, or vomiting.  Musculoskeletal: Positive for right leg pain.  Neurological: Denies dizziness or focal weakness.  Psychiatric/Behavioral: Normal mental status.  Hematologic/Lymphatic: Denies bleeding problem or easy bruising/bleeding.  Skin: Denies rash or suspicious lesions    Physical Examination  BP (!) 158/55   Pulse 61   Ht 5' 10.5" (1.791 m)   Wt 85.3 kg (188 lb 0.8 oz)   BMI 26.60 kg/m²     Constitutional: No acute distress, conversant  HEENT: Sclera anicteric, Pupils equal, round and reactive to light, extraocular motions intact, Oropharynx clear  Neck: No JVD, no carotid bruits  Cardiovascular: regular rate and rhythm, no murmur, rubs or gallops, normal S1/S2  Pulmonary: Clear to auscultation bilaterally  Abdominal: Abdomen soft, nontender, diastasis rectic present, positive bowel sounds  Extremities: No lower extremity edema   Pulses:  Carotid pulses are 2+ on the right side, and 2+ on the left side.  Radial pulses are 2+ on the right side, and 2+ on the left side.   Femoral pulses are 2+ on the right side, and 2+ on the left side.  Popliteal pulses are 2+ on the right side, and 2+ on the left side.   Dorsalis pedis pulses are 2+ on the right side, and 2+ on the left side.   Posterior tibial pulses are 2+ on the right side, and 2+ on the left side.    Skin: No ecchymosis, erythema, or ulcers  Psych: Alert and oriented x 3, appropriate affect  Neuro: CNII-XII intact, no focal deficits    Labs:  Most Recent Data  CBC:   Lab Results   Component Value Date    WBC 6.30 09/18/2023    HGB 13.7 (L) 09/18/2023    HCT 42.0 09/18/2023     09/18/2023    MCV 87 09/18/2023    RDW 13.2 09/18/2023     BMP:   Lab Results   Component Value Date     09/18/2023    K 4.4 09/18/2023     09/18/2023    CO2 26 09/18/2023    BUN 15 09/18/2023    CREATININE 1.0 02/12/2024    GLU 94 09/18/2023    " "CALCIUM 9.1 09/18/2023    MG 2.3 04/27/2017    PHOS 2.7 02/01/2023     LFTS;   Lab Results   Component Value Date    PROT 6.8 09/18/2023    ALBUMIN 3.9 09/18/2023    BILITOT 0.7 09/18/2023    AST 18 09/18/2023    ALKPHOS 63 09/18/2023    ALT 16 09/18/2023     COAGS:   Lab Results   Component Value Date    INR 1.0 12/13/2021     FLP: No results found for: "CHOL", "HDL", "LDLCALC", "TRIG", "CHOLHDL"  CARDIAC:   Lab Results   Component Value Date    TROPONINI <0.012 12/13/2021       Imaging:    CTA Abd/Pelvis with Iliofemoral Runoff 2/28/2024:  CTA with bilateral lower extremity runoff shows mild atherosclerotic change without significant stenosis or occlusion.   Tia fissural and clustered micro nodules in the right lower lobe measuring up to 7 mm.  Findings are favored to represent a mild infectious or inflammatory process.  Recommend 3 month follow-up CT to ensure resolution or further characterize.   Small (1.3 cm) hypodensity near the pancreatic head possibly a side branch IPMN.  Follow-up recommend with either CT pancreas protocol or MRI MRCP in 1 year.    MRI Lumbar Spine 2/28/2024:  Remote L1 compression fracture post vertebral plasty, stable.  No acute fractures.   Lumbar spondylosis, contributing to mild spinal canal stenosis L3-4 and L4-5 and moderate neural foraminal narrowing L3-4 through L5-S1.   Prominent right-sided facet joint arthropathy at L4-5 with small anterior synovial cyst impinging upon the spinal canal.    Assessment/Plan:  Ghanshyam Sanford Jr. is a 78 y.o. male with aortic stenosis s/p TAVR, chronic diastolic heart failure, carotid artery disease, CAD s/p LAD and RCA stents, HTN, lumbar degenerative disc disease, who presents for initial appointment.    Right leg pain- Physical exam suggests and MRI lumbar spine confirms right leg pain is due to lumbar degenerative disc disease.  CTA Abd/Pelvis with Iliofemoral Runoff on 2/28/2024 revealed mild atherosclerotic change without significant " stenosis or occlusion.  MRI Lumbar Spine on 2/28/2024 showed remote L1 compression fracture post vertebral plasty, stable.  Lumbar spondylosis, contributing to mild spinal canal stenosis L3-4 and L4-5 and moderate neural foraminal narrowing L3-4 through L5-S1. Prominent right-sided facet joint arthropathy at L4-5 with small anterior synovial cyst impinging upon the spinal canal.Check MRI lumbar spine and refer to Neurosurgery for evaluation.  Check CTA abd/pelvis and exercise GOLDEN to rule out flow limiting PAD.  Continue management per Neurosurgery.     2. HTN- Continue current medications.     3. CAD s/p LAD and RCA stents- Pt with no angina.  Continue ASA/statin/plavix.    4. Aortic stenosis s/p TAVR- Stable. Continue to monitor with outside cardiologist.     5. Overweight- Encourage diet, exercise, and weight loss.     6. Diastasis Recti- Given history of umbilical hernia repair, refer to General Surgery for evaluation.     Follow up as needed     Total duration of face to face visit time 30 minutes.  Total time spent counseling greater than fifty percent of total visit time.  Counseling included discussion regarding imaging findings, diagnosis, possibilities, treatment options, risks and benefits.  The patient had many questions regarding the options and long-term effects.    Luis Enrique Gallegos MD, PhD  Interventional Cardiology

## 2024-05-20 NOTE — PATIENT INSTRUCTIONS
Assessment/Plan:  Ghanshyam Sanford Jr. is a 78 y.o. male with aortic stenosis s/p TAVR, chronic diastolic heart failure, carotid artery disease, CAD s/p LAD and RCA stents, HTN, lumbar degenerative disc disease, who presents for initial appointment.    Right leg pain- Physical exam suggests and MRI lumbar spine confirms right leg pain is due to lumbar degenerative disc disease.  CTA Abd/Pelvis with Iliofemoral Runoff on 2/28/2024 revealed mild atherosclerotic change without significant stenosis or occlusion.  MRI Lumbar Spine on 2/28/2024 showed remote L1 compression fracture post vertebral plasty, stable.  Lumbar spondylosis, contributing to mild spinal canal stenosis L3-4 and L4-5 and moderate neural foraminal narrowing L3-4 through L5-S1. Prominent right-sided facet joint arthropathy at L4-5 with small anterior synovial cyst impinging upon the spinal canal.Check MRI lumbar spine and refer to Neurosurgery for evaluation.  Check CTA abd/pelvis and exercise GOLDEN to rule out flow limiting PAD.  Continue management per Neurosurgery.     2. HTN- Continue current medications.     3. CAD s/p LAD and RCA stents- Pt with no angina.  Continue ASA/statin/plavix.    4. Aortic stenosis s/p TAVR- Stable. Continue to monitor with outside cardiologist.     5. Overweight- Encourage diet, exercise, and weight loss.     6. Diastasis Recti- Given history of umbilical hernia repair, refer to General Surgery for evaluation.     Follow up as needed

## 2024-05-21 ENCOUNTER — OFFICE VISIT (OUTPATIENT)
Dept: NEUROSURGERY | Facility: CLINIC | Age: 79
End: 2024-05-21
Payer: MEDICARE

## 2024-05-21 DIAGNOSIS — M51.16 LUMBAR DISC DISEASE WITH RADICULOPATHY: Primary | ICD-10-CM

## 2024-05-21 DIAGNOSIS — M71.38 SYNOVIAL CYST OF LUMBAR SPINE: ICD-10-CM

## 2024-05-21 DIAGNOSIS — M54.16 LUMBAR RADICULOPATHY, RIGHT: ICD-10-CM

## 2024-05-21 PROCEDURE — 99213 OFFICE O/P EST LOW 20 MIN: CPT | Mod: S$PBB,,, | Performed by: PHYSICIAN ASSISTANT

## 2024-05-21 PROCEDURE — 99999 PR PBB SHADOW E&M-EST. PATIENT-LVL II: CPT | Mod: PBBFAC,,, | Performed by: PHYSICIAN ASSISTANT

## 2024-05-21 PROCEDURE — 99212 OFFICE O/P EST SF 10 MIN: CPT | Mod: PBBFAC | Performed by: PHYSICIAN ASSISTANT

## 2024-05-22 NOTE — PROGRESS NOTES
Neurosurgery  Established patient    SUBJECTIVE:     History of Present Illness 02/28/2024:  78-year-old male with history of aortic stenosis status post steps TAVR, chronic diastolic heart failure, CAD status post LAD and RCA stents, hypertension, lumbar spondylosis who presents today as a referral from his cardiologist for evaluation of right leg pain.  Patient reports his pain started roughly 5 years ago and has been persistent.  The pain is described as burning that radiates down the anterior right leg moves medially past the knee to mid calf.  He denies pain in his foot, numbness, or weakness.  He denies left leg pain.  He has minimal back pain and states the leg pain limits him more.  He was tried several rounds of physical therapy in the past as well as medical management with gabapentin 600 mg and anti-inflammatories without relief.  The pain is constant without specific exacerbating or alleviating factors.    Interval history 4/10/2024:  Patient presents today for follow-up following EMG.  He was referred last visit for lumbar injections however reports this was never scheduled.  EMG is positive for chronic right L4 and L5 radiculopathies.  Patient continues to report severe right leg pain in the same distribution.  He denies left leg pain.  He has been taking gabapentin 600 mg once daily.     Interval history:  Pt represents for continued evaluation following recent right L4-5 NELSON performed on 4/19/24.  Patient reports 80% relief for roughly 3 weeks following NELSON.  He states his right radicular leg pain has significantly improved in the calf.  He continues with some pain around his right knee.  He denies new left lower extremity pain or weakness.  States his back pain is a 4/10 today without significant change.  He has been evaluated by Orthopedics for his knee pain and has been diagnosed osteoarthritis of the right knee.  He has been unable to take NSAIDs due to his cardiac issues however he plans to  rediscuss this with his cardiologist.    Review of patient's allergies indicates:   Allergen Reactions    Dexamethasone sodium phosphate Other (See Comments)    Atorvastatin calcium Other (See Comments)     Leg cramps    Cortisone      Inj- pt reports hiccups and feels spasm     Duloxetine Other (See Comments)     DRY MONTH  NOT ABLE TO SLEEP  NOT HUNDRY  SWEATING A LOT  FEELING TIRED AND WEAK  DIZZINESS  WEIGHT LOSS ( 9 LB.)    Flomax [tamsulosin] Diarrhea    Pravastatin Other (See Comments)     Leg cramps       Current Outpatient Medications   Medication Sig Dispense Refill    acetaminophen (TYLENOL) 500 MG tablet Take 1,000 mg by mouth every 6 (six) hours as needed for Pain.      alfuzosin (UROXATRAL) 10 mg Tb24 Take 10 mg by mouth daily with breakfast.      aspirin (ECOTRIN) 81 MG EC tablet Take 81 mg by mouth once daily.      clopidogreL (PLAVIX) 75 mg tablet Take 75 mg by mouth once daily.      coenzyme Q10 100 mg capsule Take 200 mg by mouth once daily.      doxazosin (CARDURA) 4 MG tablet Take 4 mg by mouth every evening.      gabapentin (NEURONTIN) 600 MG tablet Take 1 tablet (600 mg total) by mouth 2 (two) times daily. 60 tablet 3    multivitamin (THERAGRAN) per tablet Take 1 tablet by mouth once daily.      mv-mn/iron/folic acid/herb 190 (VITAMIN D3 COMPLETE ORAL) Take 5,000 Units by mouth Daily.      NIFEdipine (PROCARDIA-XL) 30 MG (OSM) 24 hr tablet Take 30 mg by mouth.      olmesartan (BENICAR) 40 MG tablet Take 20 mg by mouth once daily.      rosuvastatin (CRESTOR) 20 MG tablet Take 20 mg by mouth once daily.      traZODone (DESYREL) 150 MG tablet Take 150 mg by mouth every evening.      zolpidem (AMBIEN) 10 mg Tab Take 10 mg by mouth nightly as needed.       No current facility-administered medications for this visit.       Past Medical History:   Diagnosis Date    Anticoagulant long-term use     Aortic valve regurgitation     Aortic valve stenosis, nonrheumatic     Carotid stenosis, bilateral      Chronic diastolic heart failure, NYHA class 2     Chronic total occlusion of coronary artery     Coronary artery disease     Elevated PSA     UNDER CARE    Hypertension     Hypertensive heart disease     Osteoarthritis of right knee 3/22/2017    Polymyalgia     Restless legs     SOB (shortness of breath)      Past Surgical History:   Procedure Laterality Date    ARTHROSCOPIC CHONDROPLASTY OF KNEE JOINT Right 6/13/2023    Procedure: ARTHROSCOPY, KNEE, WITH CHONDROPLASTY;  Surgeon: Tari Gregory MD;  Location: [facility] OR;  Service: Orthopedics;  Laterality: Right;    BACK SURGERY      CAROTID STENT      HERNIA REPAIR      KNEE ARTHROPLASTY Right     KNEE ARTHROSCOPY W/ MENISCECTOMY Right 6/13/2023    Procedure: ARTHROSCOPY, KNEE, WITH MEDIAL AND LATERAL MENISCECTOMY;  Surgeon: Tari Gregory MD;  Location: [facility] OR;  Service: Orthopedics;  Laterality: Right;    KNEE ARTHROSCOPY W/ PLICA EXCISION Right 6/13/2023    Procedure: EXCISION, PLICA, KNEE, ARTHROSCOPIC;  Surgeon: Tari Gregory MD;  Location: [facility] OR;  Service: Orthopedics;  Laterality: Right;    KNEE DEBRIDEMENT Right 6/13/2023    Procedure: DEBRIDEMENT, KNEE;  Surgeon: Tari Gregory MD;  Location: [facility] OR;  Service: Orthopedics;  Laterality: Right;    [id number] Right 6/13/2023    Procedure: [id number];  Surgeon: Tari Gregory MD;  Location: [facility] OR;  Service: Orthopedics;  Laterality: Right;    PERCUTANEOUS TRANSCATHETER AORTIC VALVE REPLACEMENT (TAVR) Right 12/14/2021    Procedure: REPLACEMENT, AORTIC VALVE, PERCUTANEOUS, TRANSCATHETER;  Surgeon: Johny Lockett MD;  Location: [facility] CATH;  Service: Cardiology;  Laterality: Right;  ops # [id number]    PERCUTANEOUS TRANSCATHETER AORTIC VALVE REPLACEMENT (TAVR) Right 12/14/2021    Procedure: REPLACEMENT, AORTIC VALVE, PERCUTANEOUS, TRANSCATHETER;  Surgeon: Sushma Piña MD;  Location: [facility] CATH;  Service: Cardiovascular;  Laterality: Right;    SYNOVECTOMY OF KNEE Right 6/13/2023    Procedure:  PARTIAL SYNOVECTOMY, KNEE;  Surgeon: Tari Gregory MD;  Location: [facility];  Service: Orthopedics;  Laterality: Right;     Family History       Problem Relation (Age of Onset)    Diabetes Mellitus Father    Heart disease Mother, Paternal Grandmother    Hypertension Mother    Stroke Mother, Paternal Grandmother          Social History     Socioeconomic History    Marital status:    Tobacco Use    Smoking status: Never    Smokeless tobacco: Never   Substance and Sexual Activity    Alcohol use: Never    Drug use: Never     Social Determinants of Health     Financial Resource Strain: Low Risk  (1/26/2024)    Overall Financial Resource Strain (CARDIA)     Difficulty of Paying Living Expenses: Not hard at all   Food Insecurity: No Food Insecurity (1/26/2024)    Hunger Vital Sign     Worried About Running Out of Food in the Last Year: Never true     Ran Out of Food in the Last Year: Never true   Transportation Needs: No Transportation Needs (1/26/2024)    PRAPARE - Transportation     Lack of Transportation (Medical): No     Lack of Transportation (Non-Medical): No   Physical Activity: Sufficiently Active (1/26/2024)    Exercise Vital Sign     Days of Exercise per Week: 5 days     Minutes of Exercise per Session: 60 min   Stress: No Stress Concern Present (1/26/2024)    [place] [employer] - Occupational Stress Questionnaire     Feeling of Stress : Only a little   Housing Stability: Low Risk  (1/26/2024)    Housing Stability Vital Sign     Unable to Pay for Housing in the Last Year: No     Number of Places Lived in the Last Year: 0     Unstable Housing in the Last Year: No       Review of Systems    OBJECTIVE:     Vital Signs  Pain Score:   5  There is no height or weight on file to calculate BMI.      Neurosurgery Physical Exam  General: well developed, well nourished, no distress.   Head: normocephalic, atraumatic  Neurologic: Alert and oriented. Thought content appropriate.  GCS: Motor: 6/Verbal:  5/Eyes: 4 GCS Total: 15  Mental Status: Awake, Alert, Oriented x3  Cranial nerves: face symmetric, tongue midline, CN II-XII grossly intact.   Eyes: pupils equal, round, reactive to light with accomodation, EOMI.   Sensory: intact to light touch throughout  Motor Strength: Moves all extremities spontaneously with good tone.  Full strength upper and lower extremities. No abnormal movements seen.     Strength  Deltoids Triceps Biceps Wrist Extension Wrist Flexion Hand    Upper: R 5/5 5/5 5/5 5/5 5/5 5/5    L 5/5 5/5 5/5 5/5 5/5 5/5     Iliopsoas Quadriceps Knee  Flexion Tibialis  anterior Gastro- cnemius EHL   Lower: R 5/5 5/5 5/5 5/5 5/5 5/5    L 5/5 5/5 5/5 5/5 5/5 5/5     DTR's - 2 + and symmetric in UE and LE  Pulses: 2+ and symmetric radial and dorsalis pedis. No lower extremity edema  Straight leg raise:  Positive on the right       Diagnostic Results:  MRI lumbar spine without contrast dated 02/28/2024 shows mild lumbar spondylosis with multiple levels of facet arthropathy most prominent at L4-5 with small right-sided synovial cyst.  There is remote L1 compression fracture status post vertebroplasty.    ASSESSMENT/PLAN:     78-year-old male who is history of aortic stenosis status post TAVR, CAD status post LAD and RCA stenting, and hypertension who presents with complaint of right leg pain consistent with L4 radiculopathy. There appears to be right-sided synovial cyst at L4-5 on MRI.  EMG was positive for right L4 and L5 radiculopathies. He did get relief following L4-5 TF NELSON.  The patient continues to be uninterested in surgery at this time given his pain relief.  He continues to do his home exercises at the gym is doing well with this.  We will see him back in 3-6 months to reassess his symptoms or sooner with any new or worsening complaints.       aRdha Deshpande PA-C  Neurosurgery        Note dictated with voice recognition software, please excuse any grammatical errors.

## 2024-08-01 ENCOUNTER — OFFICE VISIT (OUTPATIENT)
Dept: NEUROLOGY | Facility: CLINIC | Age: 79
End: 2024-08-01
Payer: MEDICARE

## 2024-08-01 VITALS
HEIGHT: 71 IN | RESPIRATION RATE: 14 BRPM | BODY MASS INDEX: 25.5 KG/M2 | HEART RATE: 56 BPM | WEIGHT: 182.13 LBS | DIASTOLIC BLOOD PRESSURE: 76 MMHG | SYSTOLIC BLOOD PRESSURE: 164 MMHG

## 2024-08-01 DIAGNOSIS — M79.604 RIGHT LEG PAIN: Primary | ICD-10-CM

## 2024-08-01 DIAGNOSIS — G25.81 RLS (RESTLESS LEGS SYNDROME): ICD-10-CM

## 2024-08-01 DIAGNOSIS — M54.16 LUMBAR RADICULOPATHY, RIGHT: ICD-10-CM

## 2024-08-01 DIAGNOSIS — G62.9 POLYNEUROPATHY: ICD-10-CM

## 2024-08-01 PROCEDURE — 99214 OFFICE O/P EST MOD 30 MIN: CPT | Mod: S$PBB | Performed by: PSYCHIATRY & NEUROLOGY

## 2024-08-01 PROCEDURE — 99999 PR STA SHADOW: CPT | Mod: PBBFAC,,, | Performed by: PSYCHIATRY & NEUROLOGY

## 2024-08-01 PROCEDURE — 99999 PR PBB SHADOW E&M-EST. PATIENT-LVL IV: CPT | Mod: PBBFAC,,, | Performed by: PSYCHIATRY & NEUROLOGY

## 2024-08-01 PROCEDURE — 99214 OFFICE O/P EST MOD 30 MIN: CPT | Mod: PBBFAC | Performed by: PSYCHIATRY & NEUROLOGY

## 2024-08-01 RX ORDER — ASCORBIC ACID 250 MG
250 TABLET ORAL DAILY
COMMUNITY

## 2024-08-01 RX ORDER — MAGNESIUM 250 MG
250 TABLET ORAL DAILY
COMMUNITY

## 2024-08-01 NOTE — PROGRESS NOTES
HPI: Ghanshyam Sanford Jr. is a 79 y.o. male with burning in right leg       Here for 6 months follow up    Since the last visit, the patient had updated MRI L spine as noted below as ordered by cardiology    He was referred to [facility] and had updated EMG with Dr Earl but was not interested in surgery    Pain is with movement or rest    Still has right medial knee pain as prior    He feels pain is tolerable    Lumbar pain is not very active    RLS is helped with  gabapentin      No numbness in the feet     Patient is inquiring about THC      No weakness in the legs, not foot drop      He has ongoing follow up with rheumatology     Labs from PCP noted below    Review of Systems   Constitutional:  Negative for fever.   HENT:  Negative for nosebleeds.    Eyes:  Negative for double vision.   Respiratory:  Negative for hemoptysis.    Cardiovascular:  Negative for leg swelling.   Gastrointestinal:  Negative for blood in stool.   Genitourinary:  Negative for hematuria.   Musculoskeletal:  Negative for falls.   Skin:  Negative for rash.   Neurological:  Negative for focal weakness.   Endo/Heme/Allergies:  Does not bruise/bleed easily.   Psychiatric/Behavioral:  The patient has insomnia.          I have reviewed all of this patient's past medical and surgical histories as well as family and social histories and active allergies and medications as documented in the electronic medical record.        Exam:  Gen Appearance, well developed/nourished in no apparent distress  CV: 2+ distal pulses with no edema or swelling  Neuro:  MS: Awake, alert, . Sustains attention. Recent/remote memory intact, Language is full to spontaneous speech/comprehension. Fund of Knowledge is full  CN: Optic discs are flat with normal vasculature, PERRL, Extraoccular movements and visual fields are full. Normal facial sensation and strength, Hearing symmetric, Tongue and Palate are midline and strong. Shoulder Shrug symmetric and strong.  Motor:  "Normal bulk, tone, no abnormal movements. 5/5 strength bilateral upper/lower extremities with 2+ reflexes and no clonus  Sensory: symmetric to light touch, pain, temp, and vibration, but increased sensitivity to pain in the right thigh medially and right calf and decreased temp in that area  Romberg negative  Cerebellar: Finger-nose, Rapid alternating movements intact  Gait: Normal stance, no ataxia      Imagin MRI L spine: Remote L1 compression fracture post vertebral plasty, stable.  No acute fractures.     Lumbar spondylosis, contributing to mild spinal canal stenosis L3-4 and L4-5 and moderate neural foraminal narrowing L3-4 through L5-S1, as above.     Prominent right-sided facet joint arthropathy at L4-5 with small anterior synovial cyst impinging upon the spinal canal, as above.     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    Mild Lumbar Radiculopathy at L4 Bilaterally     2024 EMG of the legs: Chronic right L4 and L5 radiculopathy    Labs: CBC normal 2023 Ferritin level 45    Normal B12,TSH, SPEP/MARIO 2023 Glucose noted    Assessment/Plan: Ghanshyam Sanford Jr. is a 79 y.o. male with  pain in the right medial knee and posterior knee (burning and "pulling") radiating up to the lower medial thigh and down to the upper lower leg posteriorly   I recommend:     Has seen pain management and PT    Had temporary relief with nerve block at the knee AND Lumbar NELSON prior    3.   Pain could be multifactorial from the back and the knee and ?RLS (pain is most severe at night) and possibly neuropathy    4.   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 We discussed prior his pain could be from multiple factors.   -Suprisingly, he had distal polyneuropathy by NCS. This may be incidentally found as symptoms are more proximal. His lab work up with B12,TSH, SPEP/MARIO was normal. The patient's CMP and CBC have been unremarkable/ no  fasting glucose abnormality and he does not report alcohol abuse. Etiology of neuropathy may be idiopathic   -now s/p right knee arthroscopy in 6/2023 and his posterior knee  pain resolved/ still has some right medial knee with f/u up MRI L spine 2024  showing disc changes and small synovial cyst impinging on the spinal canal  -Referred to [facility] but he is not interested in surgery / has follow up with [facility]. Has some relief with Epidural  -He is back on Gabapentin 600mg BID with some relief. This suggests a neuropathic component to this pain.  -He inquires about medical THC. I would be concerned about risks/benefits. Try a topical agent instead. Suggested Blue Emu with Hemp  Update 4/2024 EMG of the right leg: Chronic right L4 and L5 radiculopathy. Only vague possible neuropathy findings on that study    5.   His best relief was with Gabapentin per Rheumatology prior  -In addition, he has some RLS symptoms in the right leg for which he used Requip/ does not need this now  -Started on Iron in 2023 for Ferritin level less than 70 but this did not help his symptoms . He did follow up with PCP since as well  -He feels his RLS is due to pain  -Again gabapentin as above    6.  Note he is seeing rheumatology with history of  PMR. No signs of active disease.    7. On trazodone per PCP for poor sleep. Sleep hygiene reviewed prior    RTC 1 year

## 2024-08-19 ENCOUNTER — PATIENT MESSAGE (OUTPATIENT)
Dept: NEUROSURGERY | Facility: CLINIC | Age: 79
End: 2024-08-19
Payer: MEDICARE

## 2024-08-23 ENCOUNTER — OFFICE VISIT (OUTPATIENT)
Dept: PAIN MEDICINE | Facility: CLINIC | Age: 79
End: 2024-08-23
Payer: MEDICARE

## 2024-08-23 ENCOUNTER — OFFICE VISIT (OUTPATIENT)
Dept: NEUROSURGERY | Facility: CLINIC | Age: 79
End: 2024-08-23
Payer: MEDICARE

## 2024-08-23 VITALS
DIASTOLIC BLOOD PRESSURE: 71 MMHG | BODY MASS INDEX: 25.76 KG/M2 | TEMPERATURE: 98 F | SYSTOLIC BLOOD PRESSURE: 189 MMHG | WEIGHT: 182.13 LBS

## 2024-08-23 VITALS
WEIGHT: 181.88 LBS | HEART RATE: 62 BPM | BODY MASS INDEX: 25.46 KG/M2 | HEIGHT: 71 IN | DIASTOLIC BLOOD PRESSURE: 68 MMHG | SYSTOLIC BLOOD PRESSURE: 149 MMHG

## 2024-08-23 DIAGNOSIS — M71.38 SYNOVIAL CYST OF LUMBAR SPINE: Primary | ICD-10-CM

## 2024-08-23 DIAGNOSIS — M47.816 LUMBAR SPONDYLOSIS: ICD-10-CM

## 2024-08-23 DIAGNOSIS — M79.604 RIGHT LEG PAIN: ICD-10-CM

## 2024-08-23 DIAGNOSIS — M51.16 LUMBAR DISC DISEASE WITH RADICULOPATHY: ICD-10-CM

## 2024-08-23 DIAGNOSIS — M71.38 SYNOVIAL CYST OF LUMBAR FACET JOINT: Primary | ICD-10-CM

## 2024-08-23 DIAGNOSIS — M71.38 SYNOVIAL CYST OF LUMBAR SPINE: ICD-10-CM

## 2024-08-23 PROCEDURE — 99999 PR PBB SHADOW E&M-EST. PATIENT-LVL IV: CPT | Mod: PBBFAC,,, | Performed by: NURSE PRACTITIONER

## 2024-08-23 PROCEDURE — 99214 OFFICE O/P EST MOD 30 MIN: CPT | Mod: PBBFAC | Performed by: NURSE PRACTITIONER

## 2024-08-23 PROCEDURE — 99999 PR PBB SHADOW E&M-EST. PATIENT-LVL III: CPT | Mod: PBBFAC,,, | Performed by: EMERGENCY MEDICINE

## 2024-08-23 PROCEDURE — 99213 OFFICE O/P EST LOW 20 MIN: CPT | Mod: PBBFAC,27,PO | Performed by: EMERGENCY MEDICINE

## 2024-08-23 NOTE — H&P (VIEW-ONLY)
[facility] - New Patient Evaluation    Referred by: Claire Chandler   Reason for referral: Synovial cyst of lumbar spine  Lumbar disc disease with radiculopathy     CC:   Chief Complaint   Patient presents with    cyst on spine    Low-back Pain    Leg Pain         8/23/2024     2:36 PM   Last 3 PDI Scores   Pain Disability Index (PDI) 21       Subjective 08/23/2024:   Ghanshyam Sanford Jr. is a 79 y.o. male who presents complaining of pain in lower back to right calf    Initial Pain Assessment:  Location: right leg and back  Onset: 7 years   Current Pain Score: 4/10  Daily Pain of Range: 5-6/10  Quality: Aching, Burning, Throbbing, Grabbing, and Tight  Radiation: behind right leg  Worsened by: lying down and sitting  Improved by: nothing     Patient denies night fever/night sweats, urinary incontinence, bowel incontinence, significant weight loss, significant motor weakness, and loss of sensations.      Previous Interventions:  04/11/24 L4-5 Right TFESI     Previous Therapies:  PT/OT: yes   Chiropractor:   HEP: Participating  Relevant Surgery: yes Kyphoplasty    Previous Medications:   - Tylenol or NSAIDS: Tylenol, Aspirin, Plavix  - Muscle Relaxants:    - TCAs:   - SNRIs:   - Topicals:   - Anticonvulsants: Gabapentin    - Opioids:   - Adjuvants:     Current Pain Medications:  Tylenol  Aspirin  Plavix   Gabapentin 600mg    Review of Systems:    GENERAL:  No weight loss, malaise or fevers.  NEURO:   No history of headaches, syncope, paralysis, seizures or tremors.  All other reviewed and negative other than HPI.    History:  Current medications, allergies, medical history, surgical history,   family history, and social history were reviewed in the chart as marked.    Full Medication List:    Current Outpatient Medications:     acetaminophen (TYLENOL) 500 MG tablet, Take 1,000 mg by mouth every 6 (six) hours as needed for Pain., Disp: , Rfl:     ascorbic acid, vitamin C, (VITAMIN C) 250 MG  tablet, Take 250 mg by mouth once daily., Disp: , Rfl:     aspirin (ECOTRIN) 81 MG EC tablet, Take 81 mg by mouth once daily., Disp: , Rfl:     clopidogreL (PLAVIX) 75 mg tablet, Take 75 mg by mouth once daily., Disp: , Rfl:     coenzyme Q10 100 mg capsule, Take 200 mg by mouth once daily., Disp: , Rfl:     gabapentin (NEURONTIN) 600 MG tablet, Take 1 tablet (600 mg total) by mouth 2 (two) times daily., Disp: 60 tablet, Rfl: 3    magnesium 250 mg Tab, Take 250 mg by mouth once daily., Disp: , Rfl:     multivitamin (THERAGRAN) per tablet, Take 1 tablet by mouth once daily., Disp: , Rfl:     mv-mn/iron/folic acid/herb 190 (VITAMIN D3 COMPLETE ORAL), Take 5,000 Units by mouth Daily., Disp: , Rfl:     NIFEdipine (PROCARDIA-XL) 30 MG (OSM) 24 hr tablet, Take 30 mg by mouth once daily., Disp: , Rfl:     olmesartan (BENICAR) 40 MG tablet, Take 40 mg by mouth once daily., Disp: , Rfl:     rosuvastatin (CRESTOR) 20 MG tablet, Take 20 mg by mouth once daily., Disp: , Rfl:     traZODone (DESYREL) 150 MG tablet, Take 150 mg by mouth every evening., Disp: , Rfl:     zolpidem (AMBIEN) 10 mg Tab, Take 10 mg by mouth nightly as needed., Disp: , Rfl:      Allergies:  Dexamethasone sodium phosphate, Atorvastatin calcium, Cortisone, Duloxetine, Flomax [tamsulosin], and Pravastatin     Medical History:   has a past medical history of Anticoagulant long-term use, Aortic valve regurgitation, Aortic valve stenosis, nonrheumatic, Carotid stenosis, bilateral, Chronic diastolic heart failure, NYHA class 2, Chronic total occlusion of coronary artery, Coronary artery disease, Elevated PSA, Hypertension, Hypertensive heart disease, Osteoarthritis of right knee (3/22/2017), Polymyalgia, Restless legs, and SOB (shortness of breath).    Surgical History:   has a past surgical history that includes Hernia repair; Knee Arthroplasty (Right); Carotid stent; Back surgery; Percutaneous transcatheter aortic valve replacement (TAVR) (Right, 12/14/2021);  "Percutaneous transcatheter aortic valve replacement (TAVR) (Right, 12/14/2021); Knee arthroscopy w/ meniscectomy (Right, 6/13/2023); Arthroscopic chondroplasty of knee joint (Right, 6/13/2023); Knee arthroscopy w/ plica excision (Right, 6/13/2023); Synovectomy of knee (Right, 6/13/2023); Knee debridement (Right, 6/13/2023); and [id number] (Right, 6/13/2023).    Family History:  family history includes Diabetes Mellitus in his father; Heart disease in his mother and paternal grandmother; Hypertension in his mother; Stroke in his mother and paternal grandmother.    Social History:   reports that he has never smoked. He has never used smokeless tobacco. He reports that he does not drink alcohol and does not use drugs.    Physical Exam:  Vitals:    08/23/24 1441   BP: (!) 149/68   Pulse: 62   Weight: 82.5 kg (181 lb 14.1 oz)   Height: 5' 10.5" (1.791 m)   PainSc:   4   PainLoc: Back       PHYSICAL EXAM:   GENERAL: Well appearing, in no acute distress, alert and oriented x3.  PSYCH:  Mood and affect appropriate.  SKIN: Skin color, texture, turgor normal, no rashes or lesions.  HEENT:  Normocephalic, atraumatic. Cranial nerves grossly intact.  NECK: No pain to palpation over the cervical paraspinous muscles. No pain to palpation over facets. No pain with neck flexion, extension, or lateral flexion.   PULM: No evidence of respiratory difficulty, symmetric chest rise.  GI:  Non-distended  BACK: Normal range of motion. No pain to palpation over the spinous processes. No pain to palpation over facet joints.  Pain with axial loading bilaterally.  There is no pain with palpation over the sacroiliac joints bilaterally.   EXTREMITIES: No deformities, edema, or skin discoloration.   MUSCULOSKELETAL: Shoulder, hip, and knee provocative maneuvers are negative. No atrophy is noted.  NEURO: Sensation is equal and appropriate bilaterally. Bilateral upper and lower extremity strength is normal and symmetric. Bilateral " upper and lower extremity coordination and muscle stretch reflexes are physiologic and symmetric. Plantar response are downgoing. Straight leg raising in the supine position is positive to radicular pain on right.   GAIT: normal.      Imaging 02/01/24:  MRI Lumbar Spine Without Contrast  Order: [id number]  Status: Final result       Visible to patient: Yes (seen)       Next appt: 08/28/2024 at 02:15 PM in Sports Medicine (Tari Gregory MD)       Dx: Lumbar radiculopathy, chronic    0 Result Notes  Details    Reading Physician Reading Date Result Priority   Rishi Chase MD  [phone] 2/28/2024 Routine   Sophia Vela MD  [phone] 2/28/2024      Narrative & Impression  EXAMINATION:  MRI LUMBAR SPINE WITHOUT CONTRAST     CLINICAL HISTORY:  Lumbar radiculopathy, symptoms persist with conservative treatment; Radiculopathy, lumbar region     TECHNIQUE:  Multiplanar, multisequence MR images were acquired from the thoracolumbar junction to the sacrum without the administration of contrast.     COMPARISON:  Radiograph 09/26/2023, MRI 03/08/2023     FINDINGS:  Alignment: Grade 1 anterolisthesis of L5 on S1.     Vertebrae: Stable L1 compression fracture with residual change of kyphoplasty. Left L5 spondylolysis.     Discs: Disc height loss and desiccation most pronounced at L5-S1.     Cord: Normal.  Conus terminates at L1.     Soft tissue structures are unremarkable.     Degenerative findings:     T12-L1: No spinal canal stenosis or neural foraminal narrowing.     L1-L2: No spinal canal stenosis or neural foraminal narrowing.     L2-L3: No spinal canal stenosis or neural foraminal narrowing.     L3-L4: There is asymmetric disc bulging to the left with mild facet arthropathy, contributing to mild spinal canal stenosis and moderate left-sided neural foraminal narrowing     L4-L5: Mild disc bulging and prominent facet joint arthropathy on the right side, noting joint hypertrophy with extensive subchondral marrow edema  and surrounding inflammatory change (series 6, images 6-7).  There is a small right-sided synovial cyst measuring 10 mm (series 9, image 36) abutting and possibly impinging upon the descending right L5 nerve root.  There is overall mild spinal canal stenosis and moderate right and mild left-sided neural foraminal narrowing.  Slight anterolisthesis noted.     L5-S1: Mild disc bulge with posterior annular fissure.  There is moderate disc height loss on the left side.  These findings contribute to mild/moderate left-sided neural foraminal narrowing.  No spinal canal stenosis .     Impression:     Remote L1 compression fracture post vertebral plasty, stable.  No acute fractures.     Lumbar spondylosis, contributing to mild spinal canal stenosis L3-4 and L4-5 and moderate neural foraminal narrowing L3-4 through L5-S1, as above.     Prominent right-sided facet joint arthropathy at L4-5 with small anterior synovial cyst impinging upon the spinal canal, as above.     Electronically signed by resident: Sophia Vela  Date:                                            02/28/2024  Time:                                           13:07     Electronically signed by:Rishi Chase MD  Date:                                            02/28/2024  Time:                                           13:55           Exam Ended: 02/28/24 12:43 CST Last Resulted: 02/28/24 13:55 CST           Imaging 09/26/23:  X-Ray Lumbar Spine AP And Lateral  Order: [id number]  Status: Final result       Visible to patient: Yes (seen)       Next appt: 08/28/2024 at 02:15 PM in Sports Medicine (Tari Gregory MD)       Dx: Right hip pain; HLA-B27 positive; Chr...    1 Result Note       1 Patient Communication  Details    Reading Physician Reading Date Result Priority   Luis Enrique Francis Jr., MD  [phone] 9/26/2023 Routine     Narrative & Impression  EXAMINATION:  XR LUMBAR SPINE AP AND LATERAL     CLINICAL HISTORY:  .  Low back pain, unspecified      TECHNIQUE:  Three views of the lumbar spine were obtained dated .     COMPARISON:  No prior study for comparison.     FINDINGS:  Five non-rib-bearing lumbar elements are manifested assuming 12 thoracic vertebral bodies arising from above, minimal levoconvex alignment of lumbar spine based on the AP inspection, mild osteophyte formation arising from the apical edge of the L3 and L4 vertebral body.  Upwards field-of-view reveals evidence of prior kyphoplasty at the T11 and L1 vertebral bodies with least 10% functional loss expected vertical height at the latter most levels.  There is evidence of a central catheter without component identified of the T11 vertebral body as identified in the uppermost field-of-view.  Bridging osteophyte spans the T11/T12 inter disc level.  Bilateral SI joints are well maintained.  Bilateral facet arthrosis at the L5-S1 level.     Impression:     1. No evidence of acute lumbar spine findings.  2. Prior kyphoplasty established at the T11 and L1 vertebral body levels with at least 10% functional loss expected vertical height at L1.  No evidence of significant change upon comparison.  .        Electronically signed by:Luis Enrique Francis MD  Date:                                            09/26/2023  Time:                                           09:21           Exam Ended: 09/26/23 09:16 CDT Last Resulted: 09/26/23 09:21 CDT             Labs:  BMP  Lab Results   Component Value Date     09/18/2023    K 4.4 09/18/2023     09/18/2023    CO2 26 09/18/2023    BUN 15 09/18/2023    CREATININE 1.0 02/12/2024    CALCIUM 9.1 09/18/2023    ANIONGAP 8 09/18/2023    EGFRNORACEVR >60 02/12/2024     Assessment:  Problem List Items Addressed This Visit          Neuro    Lumbar disc disease with radiculopathy     Other Visit Diagnoses       Synovial cyst of lumbar spine                08/23/2024 - Ghanshyam Sanford Jr. is a 79 y.o. male who  has a past medical history of Anticoagulant long-term  use, Aortic valve regurgitation, Aortic valve stenosis, nonrheumatic, Carotid stenosis, bilateral, Chronic diastolic heart failure, NYHA class 2, Chronic total occlusion of coronary artery, Coronary artery disease, Elevated PSA, Hypertension, Hypertensive heart disease, Osteoarthritis of right knee (3/22/2017), Polymyalgia, Restless legs, and SOB (shortness of breath).  By history and examination this patient has chronic radiculopathy.  The underlying cause cause is facet arthritis and degenerative disc disease.  Pathology is confirmed by imaging.  We discussed the underlying diagnoses and multiple treatment options including non-opioid medications and interventional procedures.  The risks and benefits of each treatment option were discussed and all questions were answered.      Treatment Plan:   Procedures:  Right L4/5 facet injection with cyst aspiration or rupture  PT/OT/HEP: I have stressed the importance of physical activity and a home exercise plan to help with pain and improve health.  Order placed to have procedure performed with Dr. Black in [place]  Follow Up: RTC 3-4 weeks with Dr. Black after procedure    Isael Garner MD  Interventional Pain Medicine  Disclaimer: This note was partly generated using dictation software which may occasionally result in transcription errors.

## 2024-08-23 NOTE — PROGRESS NOTES
Neurosurgery  Established Patient    SUBJECTIVE:     History of Present Illness 02/28/2024:  78-year-old male with history of aortic stenosis status post steps TAVR, chronic diastolic heart failure, CAD status post LAD and RCA stents, hypertension, lumbar spondylosis who presents today as a referral from his cardiologist for evaluation of right leg pain.  Patient reports his pain started roughly 5 years ago and has been persistent.  The pain is described as burning that radiates down the anterior right leg moves medially past the knee to mid calf.  He denies pain in his foot, numbness, or weakness.  He denies left leg pain.  He has minimal back pain and states the leg pain limits him more.  He was tried several rounds of physical therapy in the past as well as medical management with gabapentin 600 mg and anti-inflammatories without relief.  The pain is constant without specific exacerbating or alleviating factors.     Interval history 4/10/2024:  Patient presents today for follow-up following EMG.  He was referred last visit for lumbar injections however reports this was never scheduled.  EMG is positive for chronic right L4 and L5 radiculopathies.  Patient continues to report severe right leg pain in the same distribution.  He denies left leg pain.  He has been taking gabapentin 600 mg once daily.      Interval history 5/21/24:  Pt represents for continued evaluation following recent right L4-5 NELSON performed on 4/19/24.  Patient reports 80% relief for roughly 3 weeks following TESI.  He states his right radicular leg pain has significantly improved in the calf.  He continues with some pain around his right knee.  He denies new left lower extremity pain or weakness.  States his back pain is a 4/10 today without significant change.  He has been evaluated by Orthopedics for his knee pain and has been diagnosed osteoarthritis of the right knee.  He has been unable to take NSAIDs due to his cardiac issues however he  plans to rediscuss this with his cardiologist.    Interval Hx 8/23/24: After the last appointment with Radha Deshpande PA-C the patient was doing ok since the injection. Today, he is being seen to discuss worsening of his right-sided low back and leg pain. Rates his pain today as a 3/10. Reports continued right knee pain with trying to fully extend the leg. He is s/p arthroscopic partial meniscectomy with Dr. Gregory on 6/2023 and will follow-up with him. Denies left-sided pain. He remains active with his HEP but is interested in additional PT.     Review of patient's allergies indicates:   Allergen Reactions    Dexamethasone sodium phosphate Other (See Comments)    Atorvastatin calcium Other (See Comments)     Leg cramps    Cortisone      Inj- pt reports hiccups and feels spasm     Duloxetine Other (See Comments)     DRY MONTH  NOT ABLE TO SLEEP  NOT HUNDRY  SWEATING A LOT  FEELING TIRED AND WEAK  DIZZINESS  WEIGHT LOSS ( 9 LB.)    Flomax [tamsulosin] Diarrhea    Pravastatin Other (See Comments)     Leg cramps       Current Outpatient Medications   Medication Sig Dispense Refill    acetaminophen (TYLENOL) 500 MG tablet Take 1,000 mg by mouth every 6 (six) hours as needed for Pain.      ascorbic acid, vitamin C, (VITAMIN C) 250 MG tablet Take 250 mg by mouth once daily.      aspirin (ECOTRIN) 81 MG EC tablet Take 81 mg by mouth once daily.      clopidogreL (PLAVIX) 75 mg tablet Take 75 mg by mouth once daily.      coenzyme Q10 100 mg capsule Take 200 mg by mouth once daily.      gabapentin (NEURONTIN) 600 MG tablet Take 1 tablet (600 mg total) by mouth 2 (two) times daily. 60 tablet 3    magnesium 250 mg Tab Take 250 mg by mouth once daily.      multivitamin (THERAGRAN) per tablet Take 1 tablet by mouth once daily.      mv-mn/iron/folic acid/herb 190 (VITAMIN D3 COMPLETE ORAL) Take 5,000 Units by mouth Daily.      NIFEdipine (PROCARDIA-XL) 30 MG (OSM) 24 hr tablet Take 30 mg by mouth once daily.      olmesartan  (BENICAR) 40 MG tablet Take 40 mg by mouth once daily.      rosuvastatin (CRESTOR) 20 MG tablet Take 20 mg by mouth once daily.      traZODone (DESYREL) 150 MG tablet Take 150 mg by mouth every evening.      zolpidem (AMBIEN) 10 mg Tab Take 10 mg by mouth nightly as needed.       No current facility-administered medications for this visit.       Past Medical History:   Diagnosis Date    Anticoagulant long-term use     Aortic valve regurgitation     Aortic valve stenosis, nonrheumatic     Carotid stenosis, bilateral     Chronic diastolic heart failure, NYHA class 2     Chronic total occlusion of coronary artery     Coronary artery disease     Elevated PSA     UNDER CARE    Hypertension     Hypertensive heart disease     Osteoarthritis of right knee 3/22/2017    Polymyalgia     Restless legs     SOB (shortness of breath)      Past Surgical History:   Procedure Laterality Date    ARTHROSCOPIC CHONDROPLASTY OF KNEE JOINT Right 6/13/2023    Procedure: ARTHROSCOPY, KNEE, WITH CHONDROPLASTY;  Surgeon: Tari Gregory MD;  Location: [facility] OR;  Service: Orthopedics;  Laterality: Right;    BACK SURGERY      CAROTID STENT      HERNIA REPAIR      KNEE ARTHROPLASTY Right     KNEE ARTHROSCOPY W/ MENISCECTOMY Right 6/13/2023    Procedure: ARTHROSCOPY, KNEE, WITH MEDIAL AND LATERAL MENISCECTOMY;  Surgeon: Tari Gregory MD;  Location: [facility] OR;  Service: Orthopedics;  Laterality: Right;    KNEE ARTHROSCOPY W/ PLICA EXCISION Right 6/13/2023    Procedure: EXCISION, PLICA, KNEE, ARTHROSCOPIC;  Surgeon: Tari Gregory MD;  Location: [facility] OR;  Service: Orthopedics;  Laterality: Right;    KNEE DEBRIDEMENT Right 6/13/2023    Procedure: DEBRIDEMENT, KNEE;  Surgeon: Tari Gregory MD;  Location: [facility] OR;  Service: Orthopedics;  Laterality: Right;    [id number] Right 6/13/2023    Procedure: [id number];  Surgeon: Tari Gregory MD;  Location: [facility] OR;  Service: Orthopedics;  Laterality: Right;    PERCUTANEOUS TRANSCATHETER  AORTIC VALVE REPLACEMENT (TAVR) Right 12/14/2021    Procedure: REPLACEMENT, AORTIC VALVE, PERCUTANEOUS, TRANSCATHETER;  Surgeon: Johny Lockett MD;  Location: [facility] CATH;  Service: Cardiology;  Laterality: Right;  ops # [id number]    PERCUTANEOUS TRANSCATHETER AORTIC VALVE REPLACEMENT (TAVR) Right 12/14/2021    Procedure: REPLACEMENT, AORTIC VALVE, PERCUTANEOUS, TRANSCATHETER;  Surgeon: Sushma Piña MD;  Location: [facility] CATH;  Service: Cardiovascular;  Laterality: Right;    SYNOVECTOMY OF KNEE Right 6/13/2023    Procedure: PARTIAL SYNOVECTOMY, KNEE;  Surgeon: Tari Gregory MD;  Location: [facility] OR;  Service: Orthopedics;  Laterality: Right;     Family History       Problem Relation (Age of Onset)    Diabetes Mellitus Father    Heart disease Mother, Paternal Grandmother    Hypertension Mother    Stroke Mother, Paternal Grandmother          Social History     Socioeconomic History    Marital status:    Tobacco Use    Smoking status: Never    Smokeless tobacco: Never   Substance and Sexual Activity    Alcohol use: Never    Drug use: Never     Social Determinants of Health     Financial Resource Strain: Low Risk  (1/26/2024)    Overall Financial Resource Strain (CARDIA)     Difficulty of Paying Living Expenses: Not hard at all   Food Insecurity: No Food Insecurity (1/26/2024)    Hunger Vital Sign     Worried About Running Out of Food in the Last Year: Never true     Ran Out of Food in the Last Year: Never true   Transportation Needs: No Transportation Needs (1/26/2024)    PRAPARE - Transportation     Lack of Transportation (Medical): No     Lack of Transportation (Non-Medical): No   Physical Activity: Sufficiently Active (1/26/2024)    Exercise Vital Sign     Days of Exercise per Week: 5 days     Minutes of Exercise per Session: 60 min   Stress: No Stress Concern Present (1/26/2024)    [employer] - Occupational Stress Questionnaire     Feeling of Stress : Only a little   Housing  Stability: Low Risk  (1/26/2024)    Housing Stability Vital Sign     Unable to Pay for Housing in the Last Year: No     Number of Places Lived in the Last Year: 0     Unstable Housing in the Last Year: No       Review of Systems    OBJECTIVE:     Vital Signs  Temp: 98.2 °F (36.8 °C)  Pulse: (!) (P) 47  BP: (!) 189/71  Pain Score:   3  Weight: 82.6 kg (182 lb 1.6 oz)  Body mass index is 25.76 kg/m².    Neurosurgery Physical Exam  General: well developed, well nourished, no distress.   Head: normocephalic, atraumatic  Neurologic: Alert and oriented. Thought content appropriate.  GCS: Motor: 6/Verbal: 5/Eyes: 4 GCS Total: 15  Mental Status: Awake, Alert, Oriented x 4  Language: No aphasia  Speech: No dysarthria  Cranial nerves: face symmetric, tongue midline, CN II-XII grossly intact.   Eyes: pupils equal, round, reactive to light with accomodation, EOMI.   Pulmonary: normal respirations, no signs of respiratory distress  Abdomen: soft, non-distended  Skin: Skin is warm, dry and intact.  Sensory: intact to light touch throughout  Motor Strength:Moves all extremities spontaneously with good tone.  Full strength upper and lower extremities. No abnormal movements seen.     Diagnostic Results:  There is no new imaging to review for this encounter.      ASSESSMENT/PLAN:     Ghanshyam Sanford Jr. is a 79 y.o. male seen to discuss worsening of his right-sided low back and leg pain. We discussed that an additional injection in the facet to attempt to fenestrate the cyst at L4-5 on the right. He was agreeable. PT was also ordered. I would like the patient to follow-up in clinic as needed. I have encouraged him to contact the clinic with any questions, concerns, or adverse clinical changes. He verbalized understanding.      CECELIA Schmid  Neurosurgery  [facility].      Note dictated with voice recognition software, please excuse any grammatical errors.

## 2024-08-23 NOTE — PROGRESS NOTES
[facility] - New Patient Evaluation    Referred by: Claire Chandler   Reason for referral: Synovial cyst of lumbar spine  Lumbar disc disease with radiculopathy     CC:   Chief Complaint   Patient presents with    cyst on spine    Low-back Pain    Leg Pain         8/23/2024     2:36 PM   Last 3 PDI Scores   Pain Disability Index (PDI) 21       Subjective 08/23/2024:   Ghanshyam Sanford Jr. is a 79 y.o. male who presents complaining of pain in lower back to right calf    Initial Pain Assessment:  Location: right leg and back  Onset: 7 years   Current Pain Score: 4/10  Daily Pain of Range: 5-6/10  Quality: Aching, Burning, Throbbing, Grabbing, and Tight  Radiation: behind right leg  Worsened by: lying down and sitting  Improved by: nothing     Patient denies night fever/night sweats, urinary incontinence, bowel incontinence, significant weight loss, significant motor weakness, and loss of sensations.      Previous Interventions:  04/11/24 L4-5 Right TFESI     Previous Therapies:  PT/OT: yes   Chiropractor:   HEP: Participating  Relevant Surgery: yes Kyphoplasty    Previous Medications:   - Tylenol or NSAIDS: Tylenol, Aspirin, Plavix  - Muscle Relaxants:    - TCAs:   - SNRIs:   - Topicals:   - Anticonvulsants: Gabapentin    - Opioids:   - Adjuvants:     Current Pain Medications:  Tylenol  Aspirin  Plavix   Gabapentin 600mg    Review of Systems:    GENERAL:  No weight loss, malaise or fevers.  NEURO:   No history of headaches, syncope, paralysis, seizures or tremors.  All other reviewed and negative other than HPI.    History:  Current medications, allergies, medical history, surgical history,   family history, and social history were reviewed in the chart as marked.    Full Medication List:    Current Outpatient Medications:     acetaminophen (TYLENOL) 500 MG tablet, Take 1,000 mg by mouth every 6 (six) hours as needed for Pain., Disp: , Rfl:     ascorbic acid, vitamin C, (VITAMIN C) 250 MG  tablet, Take 250 mg by mouth once daily., Disp: , Rfl:     aspirin (ECOTRIN) 81 MG EC tablet, Take 81 mg by mouth once daily., Disp: , Rfl:     clopidogreL (PLAVIX) 75 mg tablet, Take 75 mg by mouth once daily., Disp: , Rfl:     coenzyme Q10 100 mg capsule, Take 200 mg by mouth once daily., Disp: , Rfl:     gabapentin (NEURONTIN) 600 MG tablet, Take 1 tablet (600 mg total) by mouth 2 (two) times daily., Disp: 60 tablet, Rfl: 3    magnesium 250 mg Tab, Take 250 mg by mouth once daily., Disp: , Rfl:     multivitamin (THERAGRAN) per tablet, Take 1 tablet by mouth once daily., Disp: , Rfl:     mv-mn/iron/folic acid/herb 190 (VITAMIN D3 COMPLETE ORAL), Take 5,000 Units by mouth Daily., Disp: , Rfl:     NIFEdipine (PROCARDIA-XL) 30 MG (OSM) 24 hr tablet, Take 30 mg by mouth once daily., Disp: , Rfl:     olmesartan (BENICAR) 40 MG tablet, Take 40 mg by mouth once daily., Disp: , Rfl:     rosuvastatin (CRESTOR) 20 MG tablet, Take 20 mg by mouth once daily., Disp: , Rfl:     traZODone (DESYREL) 150 MG tablet, Take 150 mg by mouth every evening., Disp: , Rfl:     zolpidem (AMBIEN) 10 mg Tab, Take 10 mg by mouth nightly as needed., Disp: , Rfl:      Allergies:  Dexamethasone sodium phosphate, Atorvastatin calcium, Cortisone, Duloxetine, Flomax [tamsulosin], and Pravastatin     Medical History:   has a past medical history of Anticoagulant long-term use, Aortic valve regurgitation, Aortic valve stenosis, nonrheumatic, Carotid stenosis, bilateral, Chronic diastolic heart failure, NYHA class 2, Chronic total occlusion of coronary artery, Coronary artery disease, Elevated PSA, Hypertension, Hypertensive heart disease, Osteoarthritis of right knee (3/22/2017), Polymyalgia, Restless legs, and SOB (shortness of breath).    Surgical History:   has a past surgical history that includes Hernia repair; Knee Arthroplasty (Right); Carotid stent; Back surgery; Percutaneous transcatheter aortic valve replacement (TAVR) (Right, 12/14/2021);  "Percutaneous transcatheter aortic valve replacement (TAVR) (Right, 12/14/2021); Knee arthroscopy w/ meniscectomy (Right, 6/13/2023); Arthroscopic chondroplasty of knee joint (Right, 6/13/2023); Knee arthroscopy w/ plica excision (Right, 6/13/2023); Synovectomy of knee (Right, 6/13/2023); Knee debridement (Right, 6/13/2023); and brzbx-jysxvuyw-heubliiboxrf (Right, 6/13/2023).    Family History:  family history includes Diabetes Mellitus in his father; Heart disease in his mother and paternal grandmother; Hypertension in his mother; Stroke in his mother and paternal grandmother.    Social History:   reports that he has never smoked. He has never used smokeless tobacco. He reports that he does not drink alcohol and does not use drugs.    Physical Exam:  Vitals:    08/23/24 1441   BP: (!) 149/68   Pulse: 62   Weight: 82.5 kg (181 lb 14.1 oz)   Height: 5' 10.5" (1.791 m)   PainSc:   4   PainLoc: Back       PHYSICAL EXAM:   GENERAL: Well appearing, in no acute distress, alert and oriented x3.  PSYCH:  Mood and affect appropriate.  SKIN: Skin color, texture, turgor normal, no rashes or lesions.  HEENT:  Normocephalic, atraumatic. Cranial nerves grossly intact.  NECK: No pain to palpation over the cervical paraspinous muscles. No pain to palpation over facets. No pain with neck flexion, extension, or lateral flexion.   PULM: No evidence of respiratory difficulty, symmetric chest rise.  GI:  Non-distended  BACK: Normal range of motion. No pain to palpation over the spinous processes. No pain to palpation over facet joints.  Pain with axial loading bilaterally.  There is no pain with palpation over the sacroiliac joints bilaterally.   EXTREMITIES: No deformities, edema, or skin discoloration.   MUSCULOSKELETAL: Shoulder, hip, and knee provocative maneuvers are negative. No atrophy is noted.  NEURO: Sensation is equal and appropriate bilaterally. Bilateral upper and lower extremity strength is normal and symmetric. Bilateral " upper and lower extremity coordination and muscle stretch reflexes are physiologic and symmetric. Plantar response are downgoing. Straight leg raising in the supine position is positive to radicular pain on right.   GAIT: normal.      Imaging 02/01/24:  MRI Lumbar Spine Without Contrast  Order: [id number]  Status: Final result       Visible to patient: Yes (seen)       Next appt: 08/28/2024 at 02:15 PM in Sports Medicine (Tari Gregory MD)       Dx: Lumbar radiculopathy, chronic    0 Result Notes  Details    Reading Physician Reading Date Result Priority   Rishi Chase MD  [phone] 2/28/2024 Routine   Sophia Vela MD  [phone] 2/28/2024      Narrative & Impression  EXAMINATION:  MRI LUMBAR SPINE WITHOUT CONTRAST     CLINICAL HISTORY:  Lumbar radiculopathy, symptoms persist with conservative treatment; Radiculopathy, lumbar region     TECHNIQUE:  Multiplanar, multisequence MR images were acquired from the thoracolumbar junction to the sacrum without the administration of contrast.     COMPARISON:  Radiograph 09/26/2023, MRI 03/08/2023     FINDINGS:  Alignment: Grade 1 anterolisthesis of L5 on S1.     Vertebrae: Stable L1 compression fracture with residual change of kyphoplasty. Left L5 spondylolysis.     Discs: Disc height loss and desiccation most pronounced at L5-S1.     Cord: Normal.  Conus terminates at L1.     Soft tissue structures are unremarkable.     Degenerative findings:     T12-L1: No spinal canal stenosis or neural foraminal narrowing.     L1-L2: No spinal canal stenosis or neural foraminal narrowing.     L2-L3: No spinal canal stenosis or neural foraminal narrowing.     L3-L4: There is asymmetric disc bulging to the left with mild facet arthropathy, contributing to mild spinal canal stenosis and moderate left-sided neural foraminal narrowing     L4-L5: Mild disc bulging and prominent facet joint arthropathy on the right side, noting joint hypertrophy with extensive subchondral marrow edema  and surrounding inflammatory change (series 6, images 6-7).  There is a small right-sided synovial cyst measuring 10 mm (series 9, image 36) abutting and possibly impinging upon the descending right L5 nerve root.  There is overall mild spinal canal stenosis and moderate right and mild left-sided neural foraminal narrowing.  Slight anterolisthesis noted.     L5-S1: Mild disc bulge with posterior annular fissure.  There is moderate disc height loss on the left side.  These findings contribute to mild/moderate left-sided neural foraminal narrowing.  No spinal canal stenosis .     Impression:     Remote L1 compression fracture post vertebral plasty, stable.  No acute fractures.     Lumbar spondylosis, contributing to mild spinal canal stenosis L3-4 and L4-5 and moderate neural foraminal narrowing L3-4 through L5-S1, as above.     Prominent right-sided facet joint arthropathy at L4-5 with small anterior synovial cyst impinging upon the spinal canal, as above.     Electronically signed by resident: Sophia Vela  Date:                                            02/28/2024  Time:                                           13:07     Electronically signed by:Rishi Chase MD  Date:                                            02/28/2024  Time:                                           13:55           Exam Ended: 02/28/24 12:43 CST Last Resulted: 02/28/24 13:55 CST           Imaging 09/26/23:  X-Ray Lumbar Spine AP And Lateral  Order: [id number]  Status: Final result       Visible to patient: Yes (seen)       Next appt: 08/28/2024 at 02:15 PM in Sports Medicine (Tari Gregory MD)       Dx: Right hip pain; HLA-B27 positive; Chr...    1 Result Note       1 Patient Communication  Details    Reading Physician Reading Date Result Priority   Luis Enrique Francis Jr., MD  [phone] 9/26/2023 Routine     Narrative & Impression  EXAMINATION:  XR LUMBAR SPINE AP AND LATERAL     CLINICAL HISTORY:  .  Low back pain, unspecified      TECHNIQUE:  Three views of the lumbar spine were obtained dated .     COMPARISON:  No prior study for comparison.     FINDINGS:  Five non-rib-bearing lumbar elements are manifested assuming 12 thoracic vertebral bodies arising from above, minimal levoconvex alignment of lumbar spine based on the AP inspection, mild osteophyte formation arising from the apical edge of the L3 and L4 vertebral body.  Upwards field-of-view reveals evidence of prior kyphoplasty at the T11 and L1 vertebral bodies with least 10% functional loss expected vertical height at the latter most levels.  There is evidence of a central catheter without component identified of the T11 vertebral body as identified in the uppermost field-of-view.  Bridging osteophyte spans the T11/T12 inter disc level.  Bilateral SI joints are well maintained.  Bilateral facet arthrosis at the L5-S1 level.     Impression:     1. No evidence of acute lumbar spine findings.  2. Prior kyphoplasty established at the T11 and L1 vertebral body levels with at least 10% functional loss expected vertical height at L1.  No evidence of significant change upon comparison.  .        Electronically signed by:Luis Enrique Francis MD  Date:                                            09/26/2023  Time:                                           09:21           Exam Ended: 09/26/23 09:16 CDT Last Resulted: 09/26/23 09:21 CDT             Labs:  BMP  Lab Results   Component Value Date     09/18/2023    K 4.4 09/18/2023     09/18/2023    CO2 26 09/18/2023    BUN 15 09/18/2023    CREATININE 1.0 02/12/2024    CALCIUM 9.1 09/18/2023    ANIONGAP 8 09/18/2023    EGFRNORACEVR >60 02/12/2024     Assessment:  Problem List Items Addressed This Visit          Neuro    Lumbar disc disease with radiculopathy     Other Visit Diagnoses       Synovial cyst of lumbar spine                08/23/2024 - Ghanshyam Sanford Jr. is a 79 y.o. male who  has a past medical history of Anticoagulant long-term  use, Aortic valve regurgitation, Aortic valve stenosis, nonrheumatic, Carotid stenosis, bilateral, Chronic diastolic heart failure, NYHA class 2, Chronic total occlusion of coronary artery, Coronary artery disease, Elevated PSA, Hypertension, Hypertensive heart disease, Osteoarthritis of right knee (3/22/2017), Polymyalgia, Restless legs, and SOB (shortness of breath).  By history and examination this patient has chronic radiculopathy.  The underlying cause cause is facet arthritis and degenerative disc disease.  Pathology is confirmed by imaging.  We discussed the underlying diagnoses and multiple treatment options including non-opioid medications and interventional procedures.  The risks and benefits of each treatment option were discussed and all questions were answered.      Treatment Plan:   Procedures:  Right L4/5 facet injection with cyst aspiration or rupture  PT/OT/HEP: I have stressed the importance of physical activity and a home exercise plan to help with pain and improve health.  Order placed to have procedure performed with Dr. Black in [place]  Follow Up: RTC 3-4 weeks with Dr. Black after procedure    Isael Garner MD  Interventional Pain Medicine  Disclaimer: This note was partly generated using dictation software which may occasionally result in transcription errors.

## 2024-08-26 ENCOUNTER — TELEPHONE (OUTPATIENT)
Dept: PAIN MEDICINE | Facility: CLINIC | Age: 79
End: 2024-08-26
Payer: MEDICARE

## 2024-08-26 DIAGNOSIS — M71.38 SYNOVIAL CYST OF LUMBAR FACET JOINT: ICD-10-CM

## 2024-08-26 DIAGNOSIS — M47.816 LUMBAR SPONDYLOSIS: ICD-10-CM

## 2024-08-26 DIAGNOSIS — M54.16 LUMBAR RADICULOPATHY: ICD-10-CM

## 2024-08-26 DIAGNOSIS — M47.816 LUMBAR SPONDYLOSIS: Primary | ICD-10-CM

## 2024-08-26 DIAGNOSIS — M71.38 SYNOVIAL CYST OF LUMBAR SPINE: Primary | ICD-10-CM

## 2024-08-26 NOTE — TELEPHONE ENCOUNTER
----- Message from Leandra Mccann LPN sent at 2024  4:37 PM CDT -----  Regarding: RE: Order for ELISABETH MCCOLLUM JRNorm  I am speaking to Dr. Black for approval first.  ----- Message -----  From: Kaylene Roy MA  Sent: 2024   4:27 PM CDT  To: Leandra Mccann LPN  Subject: FW: Order for ELISABETH MCCOLLUM JR.                Patient just called to try to schedule this  ----- Message -----  From: Isael Garner MD  Sent: 2024   3:05 PM CDT  To: [facility] Pain Management Schedulers  Subject: Order for ELISABETH MCCOLLUM JR.                      Patient Name: ELISABETH MCCOLLUM JR.([id number])  Sex: Male  : 1945      PCP: KENN JACKSON    Center: [facility]     Types of orders made on 2024: Outpatient Referral, Procedure Request    Order Date:2024  Ordering User:ISAEL GARNER [[id number]]  Encounter Provider:Isael Garner MD   [[id number]  Authorizing Provider: Isael Garner MD [[id number]]  Department:[facility] PAIN MANAGEMENT[[id number]]    Common Order Information  Procedure -> Facet Injection (Specify level and laterality) Cmt: Right L4/5             with cyst aspiration or rupture    Order Specific Information  Order: Procedure Order to Pain Management [Custom: [id number]]  Order #:          [id number]: 1 FUTURE    Priority: Routine  Cl  ass: Clinic Performed    Future Order Information      Expires on:2025            Expected by:2024                   Comment:IV sedation    Associated Diagnoses      M71.38 Synovial cyst of lumbar facet joint      M47.816 Lumbar spondylosis      Physician -> Sofia         Facility Name: -> [place]           Priority: Routine  Class: Clinic Performed    Future Order Information      E  xpires on:2025            Expected by:2024                   Comment:IV sedation    Associated Diagnoses      M71.38 Synovial cyst of lumbar facet joint      M47.816 Lumbar spondylosis      Procedure -> Facet Injection (Specify level and  laterality) Cmt: Right L4/5                 with cyst aspiration or rupture        Physician -> Sofia         Facility Name: -> [place]

## 2024-08-27 ENCOUNTER — TELEPHONE (OUTPATIENT)
Dept: PAIN MEDICINE | Facility: CLINIC | Age: 79
End: 2024-08-27
Payer: MEDICARE

## 2024-08-27 DIAGNOSIS — M54.16 LUMBAR RADICULOPATHY: Primary | ICD-10-CM

## 2024-08-27 NOTE — TELEPHONE ENCOUNTER
Patient scheduled for Cmt: Right L4/5    with cyst aspiration or rupture facet on 9/6/2024. Clearance faxed to [facility] for plavix and ASA.

## 2024-09-06 ENCOUNTER — HOSPITAL ENCOUNTER (OUTPATIENT)
Dept: RADIOLOGY | Facility: HOSPITAL | Age: 79
Discharge: HOME | End: 2024-09-06
Attending: ANESTHESIOLOGY | Admitting: ANESTHESIOLOGY
Payer: MEDICARE

## 2024-09-06 ENCOUNTER — HOSPITAL ENCOUNTER (OUTPATIENT)
Facility: HOSPITAL | Age: 79
Discharge: HOME | End: 2024-09-06
Attending: ANESTHESIOLOGY | Admitting: ANESTHESIOLOGY
Payer: MEDICARE

## 2024-09-06 VITALS
DIASTOLIC BLOOD PRESSURE: 74 MMHG | OXYGEN SATURATION: 95 % | SYSTOLIC BLOOD PRESSURE: 167 MMHG | TEMPERATURE: 98 F | RESPIRATION RATE: 16 BRPM | HEART RATE: 73 BPM

## 2024-09-06 DIAGNOSIS — M54.16 LUMBAR RADICULOPATHY: ICD-10-CM

## 2024-09-06 DIAGNOSIS — M47.816 LUMBAR FACET ARTHROPATHY: ICD-10-CM

## 2024-09-06 DIAGNOSIS — G89.29 CHRONIC PAIN: ICD-10-CM

## 2024-09-06 DIAGNOSIS — M51.16 LUMBAR DISC DISEASE WITH RADICULOPATHY: ICD-10-CM

## 2024-09-06 PROCEDURE — 76000 FLUOROSCOPY <1 HR PHYS/QHP: CPT | Mod: TC

## 2024-09-06 PROCEDURE — 63600175 PHARM REV CODE 636 W HCPCS: Mod: JZ,TB | Performed by: ANESTHESIOLOGY

## 2024-09-06 PROCEDURE — 25000003 PHARM REV CODE 250: Performed by: ANESTHESIOLOGY

## 2024-09-06 PROCEDURE — 64999 UNLISTED PX NERVOUS SYSTEM: CPT | Performed by: ANESTHESIOLOGY

## 2024-09-06 PROCEDURE — 64999 UNLISTED PX NERVOUS SYSTEM: CPT | Mod: ,,, | Performed by: ANESTHESIOLOGY

## 2024-09-06 PROCEDURE — 25500020 PHARM REV CODE 255: Performed by: ANESTHESIOLOGY

## 2024-09-06 RX ORDER — METHYLPREDNISOLONE ACETATE 40 MG/ML
INJECTION, SUSPENSION INTRA-ARTICULAR; INTRALESIONAL; INTRAMUSCULAR; SOFT TISSUE
Status: DISCONTINUED | OUTPATIENT
Start: 2024-09-06 | End: 2024-09-06 | Stop reason: HOSPADM

## 2024-09-06 RX ORDER — LIDOCAINE HYDROCHLORIDE 20 MG/ML
INJECTION, SOLUTION INFILTRATION; PERINEURAL
Status: DISCONTINUED | OUTPATIENT
Start: 2024-09-06 | End: 2024-09-06 | Stop reason: HOSPADM

## 2024-09-06 RX ORDER — BUPIVACAINE HYDROCHLORIDE 2.5 MG/ML
INJECTION, SOLUTION EPIDURAL; INFILTRATION; INTRACAUDAL
Status: DISCONTINUED | OUTPATIENT
Start: 2024-09-06 | End: 2024-09-06 | Stop reason: HOSPADM

## 2024-09-06 NOTE — DISCHARGE INSTRUCTIONS
DIET: You may resume your normal diet today.    BATHING: You may resume your normal bathing.          You may shower, no hot water directly on site for 24 hours.    DRESSING: You may remove your bandage today.    ACTIVITY LEVEL: You may resume your normal activities 24 hours after your  procedure.    If you have received sedation or an anesthetic, you may feel sleepy                                                                          for several hours. Rest until you are more awake. Gradually resume your normal activities tomorrow.    If you have received sedation or an anesthetic, do not drive or operate heavy machinery for at least 24 hours.    MEDICATION: You may resume your normal medications today.    You will receive instructions for any pain prescriptions. Pain medications should be taken only as directed.    SPECIAL INSTRUCTIONS: No heat to the injection site for 24 hours including: bath or shower, heating pad, moist heat, hot tubs.    Use ice pack to injection site for any pain or discomfort. Apply ice pack to 20 minutes then remove for 20 minutes before re-applying to site.    WHEN TO CALL DOCTOR: Redness or swelling around injection site    Fever of 101F    Drainage (pus) from the injection site    For any continuous bleeding (some dried blood over the incision is normal).    FOLLOW UP: Follow up phone call will be made by office.        Follow up: 2-4 weeks        Please call my office or pager at [phone] if experienced any weakness or loss of sensation, fever > 101.5, pain uncontrolled with oral medications, persistent nausea/vomiting/or diarrhea, redness or drainage from the incisions, or any other worrisome concerns. If physician on call was not reached or could not communicate with our office for any reason please go to the nearest emergency department.      Angel Black

## 2024-09-06 NOTE — OP NOTE
Therapeutic Lumbar Facet Joint Injection under Fluoroscopy     The procedure, risks, benefits, and options were discussed with the patient. There are no contraindications to the procedure. The patent expressed understanding and agreed to the procedure. Informed written consent was obtained prior to the start of the procedure and can be found in the patient's chart.    PATIENT NAME: Ghanshyam Sanford Jr.   MRN: [id number]     DATE OF PROCEDURE: 09/06/2024    PROCEDURE: Right L4/5 Lumbar Facet Joint Injection under Fluoroscopy      PRE-OP DIAGNOSIS: Lumbar radiculopathy [M54.16] Lumbar spondylosis [M47.816]    POST-OP DIAGNOSIS: Same    PHYSICIAN: Angel Black MD      MEDICATIONS INJECTED: Preservative-free Decadron 10mg with 4cc of Bupivacaine 0.25%    LOCAL ANESTHETIC INJECTED: Xylocaine 2%     SEDATION: None    ESTIMATED BLOOD LOSS: None    COMPLICATIONS: None    TECHNIQUE: Time-out was performed to identify the patient and procedure to be performed. With the patient laying in a prone position, the surgical area was prepped and draped in the usual sterile fashion using ChloraPrep and a fenestrated drape. The level was determined under fluoroscopy guidance. Skin anesthesia was achieved by injecting Lidocaine 2% over the injection sites. A 22 gauge, 3.5 inch needle was introduced to each level using AP, lateral and/or contralateral oblique fluoroscopic imaging. Contrast dye  Omnipaque (300mg/mL) was given until dye spread was in the distribution of the facet joint without any intravascular spread.  After negative aspiration for blood or CSF was confirmed, no cyst fluid came out, 4 mL of the medication mixture listed above was injected slowly into each joint with displacement of the contrast confirming that the medication went into the distribution of the facet joints. The needles were removed and bleeding was nil. A sterile dressing was applied. No specimens collected. The patient tolerated the procedure well.    The  patient was monitored after the procedure in the recovery area. They were given post-procedure and discharge instructions to follow at home. The patient was discharged in a stable condition.      Angel Black MD

## 2024-09-06 NOTE — DISCHARGE SUMMARY
Discharge Note  Short Stay    Admit Date: 9/6/2024    Attending Physician: Angel Black    Discharge Physician: Angel Black    Discharge Date: 9/6/2024 10:36 AM    Procedure(s) (LRB):  INJECTION, FACET JOINT WITH CYST ASPIRATION OR RUPTURE (L4/5) (Right)    Final Diagnosis: Lumbar radiculopathy [M54.16]    Disposition: Home or self care    Patient Instructions:   Current Discharge Medication List        CONTINUE these medications which have NOT CHANGED    Details   acetaminophen (TYLENOL) 500 MG tablet Take 1,000 mg by mouth every 6 (six) hours as needed for Pain.      ascorbic acid, vitamin C, (VITAMIN C) 250 MG tablet Take 250 mg by mouth once daily.      coenzyme Q10 100 mg capsule Take 200 mg by mouth once daily.      gabapentin (NEURONTIN) 600 MG tablet Take 1 tablet (600 mg total) by mouth 2 (two) times daily.  Qty: 60 tablet, Refills: 3      magnesium 250 mg Tab Take 250 mg by mouth once daily.      multivitamin (THERAGRAN) per tablet Take 1 tablet by mouth once daily.      mv-mn/iron/folic acid/herb 190 (VITAMIN D3 COMPLETE ORAL) Take 5,000 Units by mouth Daily.      NIFEdipine (PROCARDIA-XL) 30 MG (OSM) 24 hr tablet Take 30 mg by mouth once daily.      olmesartan (BENICAR) 40 MG tablet Take 40 mg by mouth once daily.      rosuvastatin (CRESTOR) 20 MG tablet Take 20 mg by mouth once daily.      traZODone (DESYREL) 150 MG tablet Take 150 mg by mouth every evening.      aspirin (ECOTRIN) 81 MG EC tablet Take 81 mg by mouth once daily.      clopidogreL (PLAVIX) 75 mg tablet Take 75 mg by mouth once daily.      zolpidem (AMBIEN) 10 mg Tab Take 10 mg by mouth nightly as needed.             Discharge Diagnosis: Lumbar radiculopathy [M54.16]  Condition on Discharge: Stable with no complications to procedure   Diet on Discharge: Same as before.  Activity: as per instruction sheet.  Discharge to: Home with a responsible adult.  Follow up: 2-4 weeks       Please call my office or pager at [phone] if  experienced any weakness or loss of sensation, fever > 101.5, pain uncontrolled with oral medications, persistent nausea/vomiting/or diarrhea, redness or drainage from the incisions, or any other worrisome concerns. If physician on call was not reached or could not communicate with our office for any reason please go to the nearest emergency department.     Angel Black  09/06/2024

## 2024-09-30 ENCOUNTER — OFFICE VISIT (OUTPATIENT)
Dept: PAIN MEDICINE | Facility: CLINIC | Age: 79
End: 2024-09-30
Payer: MEDICARE

## 2024-09-30 ENCOUNTER — HOSPITAL ENCOUNTER (OUTPATIENT)
Dept: RADIOLOGY | Facility: HOSPITAL | Age: 79
Discharge: HOME | End: 2024-09-30
Attending: ANESTHESIOLOGY
Payer: MEDICARE

## 2024-09-30 DIAGNOSIS — G89.29 CHRONIC PAIN OF BOTH KNEES: ICD-10-CM

## 2024-09-30 DIAGNOSIS — M47.816 LUMBAR SPONDYLOSIS: ICD-10-CM

## 2024-09-30 DIAGNOSIS — M54.16 LUMBAR RADICULITIS: Primary | ICD-10-CM

## 2024-09-30 DIAGNOSIS — M25.561 CHRONIC PAIN OF BOTH KNEES: ICD-10-CM

## 2024-09-30 DIAGNOSIS — M71.38 SYNOVIAL CYST OF LUMBAR FACET JOINT: ICD-10-CM

## 2024-09-30 DIAGNOSIS — M25.562 CHRONIC PAIN OF BOTH KNEES: ICD-10-CM

## 2024-09-30 PROCEDURE — 99999 PR PBB SHADOW E&M-EST. PATIENT-LVL III: CPT | Mod: PBBFAC,,, | Performed by: ANESTHESIOLOGY

## 2024-09-30 PROCEDURE — 99213 OFFICE O/P EST LOW 20 MIN: CPT | Mod: PBBFAC | Performed by: ANESTHESIOLOGY

## 2024-09-30 PROCEDURE — 99999 PR STA SHADOW: CPT | Mod: PBBFAC,,, | Performed by: ANESTHESIOLOGY

## 2024-09-30 PROCEDURE — 99214 OFFICE O/P EST MOD 30 MIN: CPT | Mod: S$PBB | Performed by: ANESTHESIOLOGY

## 2024-09-30 PROCEDURE — 73564 X-RAY EXAM KNEE 4 OR MORE: CPT | Mod: TC,50

## 2024-09-30 PROCEDURE — 73564 X-RAY EXAM KNEE 4 OR MORE: CPT | Mod: 26,50,, | Performed by: RADIOLOGY

## 2024-09-30 NOTE — PROGRESS NOTES
EST Patient Evaluation  [facility]    Ghanshyam Sanford Jr.  : 1945  Date: 2024     CHIEF COMPLAINT:  Low-back Pain    Referring Physician: No ref. provider found  Primary Care Physician: Kevin Lombardi MD    HPI:  This is a 79 y.o. male with a chief complaint of Low-back Pain  . The patient has Past medical history/Past surgical history of  compression fracture of L1 status post kyphoplasty, compression fracture of thoracic vertebrae, restless  legs syndrome, hypertension, chronic diastolic heart failure, left bundle branch block, polymyalgia rheumatica, HLAb27, TAVR, long-term anticoagulation    Patient was evaluated and referred by Dr Isael Garner.  He was referred from Neurosurgery team based on cardiology referral for right leg pain  Interval History 2024:  Ghanshyam Sanford Jr. is here for follow up visit  to establish care with me. Ghanshyam Sanford Jr. Had  right L4-L5 lumbar facet joint injection,  patient reported 50 % improvement in pain and functionality for 2 weeks.  Current pain score: 7/10    Diabetic: No    Anticoagulation medications: 81 mg Aspirin  and Plavix     Allergy To Iodine: No    Currently on Antibiotic: No    Current Description of Pain Symptoms:    History of Recent Fall or Trauma: No   Onset: Chronic, started several years  Pain Location: right low back pain  associated with tenderness  Radiates/associated symptoms:  bilateral knee pain,  left worse on kneeling  in the [place],  has a right lower extremity radiation worse with straight leg raising test  Pain is Getting worse over the last 2 months    The pain is described as aching and sharp.   Exacerbating factors: Sitting, Bending, and Getting out of bed/chair.   Mitigating factors None.   Symptoms interfere with daily activity, sleeping.   The patient feels like symptoms have been worsening.   Patient denies night fever/night sweats, urinary incontinence, bowel incontinence,  significant weight loss, significant motor weakness, and loss of sensations.    Pain score:   Best: 7/10  Worst: 10/10    Current pain medications:      gabapentin (NEURONTIN) 600 MG tablet, QHS-  can not take morning dose    Current Narcotics/Opioid /benzo Medications:  Opioids- None  Benzodiazepines: No    UDS:  NA    PDMP:  Reviewed and consistent with medication use as prescribed.     Previous Chronic Pain Treatment History:  Physical Therapy/HEP/Physician Lead Exercise Program:  Over the past 12 months, Patient has done 0 sessions.  PT response: Mildly Helpful.   Dates of the PT sessions: 2023.  Is patient participating in home exercise program (HEP)/ physician led exercise program: Yes.    Non-interventional Pain Therapy:  []Chiropractor.   []Acupuncture/Dry needle.  []TENS unit.  [x]Heat/ICE.  []Back Brace.    Medications previously tried:  NSAIDs: None  Topical Agent: Yes  TCA/SSRI/SNRI: None  Anti-convulsants: Gabapentin   Muscle Relaxants: None  Opioids- None.    Interventional Pain Procedures:  04/11/2024:  L4-L5 right transforaminal with IR-  80% relief for 3 weeks  September 6, 2024, right L4-L5 facet injection- 50% relief. For 2 weeks with Dexamethasone   Kyphoplasty    Previous spine/Relevant joint surgery:   Right knee arthroplasty  Surgical History:   has a past surgical history that includes Hernia repair; Knee Arthroplasty (Right); Carotid stent; Back surgery; Percutaneous transcatheter aortic valve replacement (TAVR) (Right, 12/14/2021); Percutaneous transcatheter aortic valve replacement (TAVR) (Right, 12/14/2021); Knee arthroscopy w/ meniscectomy (Right, 6/13/2023); Arthroscopic chondroplasty of knee joint (Right, 6/13/2023); Knee arthroscopy w/ plica excision (Right, 6/13/2023); Synovectomy of knee (Right, 6/13/2023); Knee debridement (Right, 6/13/2023); oldog-nhkmowvt-dwesylebyabj (Right, 6/13/2023); and Injection of facet joint (Right, 9/6/2024).  Medical History:   has a past medical  "history of Anticoagulant long-term use, Aortic valve regurgitation, Aortic valve stenosis, nonrheumatic, Carotid stenosis, bilateral, Chronic diastolic heart failure, NYHA class 2, Chronic total occlusion of coronary artery, Coronary artery disease, Elevated PSA, Hypertension, Hypertensive heart disease, Osteoarthritis of right knee (3/22/2017), Polymyalgia, Restless legs, and SOB (shortness of breath).  Family History:  family history includes Diabetes Mellitus in his father; Heart disease in his mother and paternal grandmother; Hypertension in his mother; Stroke in his mother and paternal grandmother.  Allergies:  Dexamethasone sodium phosphate, Atorvastatin calcium, Cortisone, Duloxetine, Flomax [tamsulosin], and Pravastatin   Social History/SUBSTANCE ABUSE HISTORY:  Personal history of substance abuse: No   reports that he has never smoked. He has never used smokeless tobacco. He reports that he does not drink alcohol and does not use drugs.  LABS:  CBC  Lab Results   Component Value Date    WBC 6.30 09/18/2023    HGB 13.7 (L) 09/18/2023    HCT 42.0 09/18/2023     Coagulation Profile   Lab Results   Component Value Date     09/18/2023       Lab Results   Component Value Date    INR 1.0 12/13/2021     CMP:  BMP  Lab Results   Component Value Date     09/18/2023    K 4.4 09/18/2023     09/18/2023    CO2 26 09/18/2023    BUN 15 09/18/2023    CREATININE 1.0 02/12/2024    CALCIUM 9.1 09/18/2023    ANIONGAP 8 09/18/2023    EGFRNORACEVR >60 02/12/2024     Lab Results   Component Value Date    ALT 16 09/18/2023    AST 18 09/18/2023    ALKPHOS 63 09/18/2023    BILITOT 0.7 09/18/2023     HGBA1C:  No results found for: "LABA1C", "HGBA1C"    ROS:    Review of Systems   GENERAL:  No weight loss, malaise or fevers.  HEENT:   No recent changes in vision or hearing  NECK:  Negative for lumps, no difficulty with swallowing.  RESPIRATORY:  Negative for cough, wheezing or shortness of breath, patient denies any " recent URI.  CARDIOVASCULAR:  Negative for chest pain or palpitations.  GI:  Negative for abdominal discomfort, blood in stools or black stools or change in bowel habits.  MUSCULOSKELETAL:  See HPI.  SKIN:  Negative for lesions, rash, and itching.  PSYCH:  No mood disorder or recent psychosocial stressors.   HEMATOLOGY/LYMPHOLOGY:  See the blood thinner sectioned in HPI.  NEURO:  See HPI  All other reviewed and negative other than HPI.    PHYSICAL EXAM:  VITALS: There were no vitals taken for this visit.  There is no height or weight on file to calculate BMI.  GENERAL: Well appearing, in no acute distress, alert and oriented x3, answers questions appropriately.   PSYCH: Flat affect.  SKIN: Skin color, texture, turgor normal, no rashes or lesions.  HEAD/FACE:  Normocephalic, atraumatic. Cranial nerves grossly intact.  CV: Regular rate  PULM: No evidence of respiratory difficulty, symmetric chest rise.  GI:  Soft and non-Distended.  BACK/SIJ/HIP:  Lumbar Spine Exam:       Inspection: No erythema, bruising.       Palpation: (+) TTP of lumbar paraspinals Right sided       ROM:  Limited in flexion, extension, lateral bending.       (-) Facet loading bilateral      (+) Straight Leg Raise, Right      (-) MICHAEL, Tenderness over the PSIS, Yeoman test, bilateral  Hip Exam:      Inspection: No gross deformity or apparent leg length discrepancy      Palpation:  No TTP to bilateral greater trochanteric bursas.       ROM:  No limitation Due to pain in internal rotation, external rotation b/l  Neurologic Exam:     Alert. Speech is fluent and appropriate.     Strength: 5/5 in bilateral hip flexion and knee extension     Sensation:  Grossly intact to light touch in bilateral lower extremities     Tone: No abnormality appreciated in bilateral lower extremities    GAIT:  normal gait    DIAGNOSTIC STUDIES AND MEDICAL RECORDS REVIEW:  I have personally reviewed and interpreted relevant radiology reports and reviewed relevant records  from other services in the EMR.      EMG/nerve conduction study showed right L4 and L5 radiculopathy   MRI lumbar spine   T12-L1: No spinal canal stenosis or neural foraminal narrowing.     L1-L2: No spinal canal stenosis or neural foraminal narrowing.     L2-L3: No spinal canal stenosis or neural foraminal narrowing.     L3-L4: There is asymmetric disc bulging to the left with mild facet arthropathy, contributing to mild spinal canal stenosis and moderate left-sided neural foraminal narrowing     L4-L5: Mild disc bulging and prominent facet joint arthropathy on the right side, noting joint hypertrophy with extensive subchondral marrow edema and surrounding inflammatory change (series 6, images 6-7).  There is a small right-sided synovial cyst measuring 10 mm (series 9, image 36) abutting and possibly impinging upon the descending right L5 nerve root.  There is overall mild spinal canal stenosis and moderate right and mild left-sided neural foraminal narrowing.  Slight anterolisthesis noted.     L5-S1: Mild disc bulge with posterior annular fissure.  There is moderate disc height loss on the left side.  These findings contribute to mild/moderate left-sided neural foraminal narrowing.  No spinal canal stenosis .     Impression:     Remote L1 compression fracture post vertebral plasty, stable.  No acute fractures.     Lumbar spondylosis, contributing to mild spinal canal stenosis L3-4 and L4-5 and moderate neural foraminal narrowing L3-4 through L5-S1, as above.     Prominent right-sided facet joint arthropathy at L4-5 with small anterior synovial cyst impinging upon the spinal canal, as above.    Clinical Impression:  This is a pleasant 79 y.o. male patient with PMH/PSH of compression fracture of L1 status post kyphoplasty, compression fracture of thoracic vertebrae, restless  legs syndrome, hypertension, chronic diastolic heart failure, left bundle branch block, polymyalgia rheumatica, HLAb27, TAVR, long-term  anticoagulation, coronary artery disease status post LAD and RCA stent, presenting with Rt sided LBP, Bl knee pain and  right lower extremity radiation,  patient has multiple sources of back and leg pain most probably unrelated,  he has no significant low back pain,  he has mild tenderness over the right-sided lumbar paraspinal muscle about iliac crest  which would get better with physical therapy and possible trigger point injection,  left knee pain worse on kneeling, no pain on prolonged standing and walking,  has a remote x-ray knee showed mild knee osteoarthritis in ,  he had right knee surgery     Encounter Diagnosis:  Ghanshyam Sanford Jr. is a 79 y.o. male with the following diagnoses based on history, exam, and imagin. Lumbar radiculitis  - Ambulatory referral/consult to Physical/Occupational Therapy; Future    2. Chronic pain of both knees  - X-ray Knee Ortho Bilateral with Flexion; Future     Treatment Plan:    Diagnostics/Referrals: X-ray bilateral knee    Medications:    NSAIDs: None  Topical Agent: No  TCA/SSRI/SNRI: None  Anti-convulsants: Gabapentin   Muscle Relaxants: None  Opioids: None    Interventional Therapy: may consider Left Knee Injection or RT lumbar TPI.  Sedation: NA.  Anticoagulation medications: 81 mg Aspirin  and Plavix  -Clearance to stop Blood thinner: NA    Regarding the above interventions, the patient has been educated regarding the risks (including bleeding, infection, increased pain, nerve damage, or allergic reaction), benefits, and alternatives. The patient states he understands and is eager to proceed.    START Physical Rehabilitation: Referral to Physical therapy for Lumbar stabilization, core strengthening, and a home exercise program    Patient Education: Counseled patient regarding the importance of activity modification, I have stressed the importance of physical activity and a home exercise plan to help with pain and improve health.    Follow-up: RTC 6  weeks.    May consider: Right L4 and L5 TFESI,  lumbar medial branch block at  right L4-L5 and L5-S1    Angel Black MD  Anesthesiologist  Interventional Pain Medicine  09/30/2024    Disclaimer:  This note was prepared using voice recognition system and is likely to have sound alike errors that may have been overlooked even after proof reading.  Please call me with any questions.

## 2024-09-30 NOTE — PROGRESS NOTES
EST Patient Evaluation  [facility]    Ghanshyam Sanford Jr.  : 1945  Date: 2024     CHIEF COMPLAINT:  No chief complaint on file.    Referring Physician: No ref. provider found  Primary Care Physician: Kevin Lombardi MD    HPI:  This is a 79 y.o. male with a chief complaint of No chief complaint on file.  . The patient has Past medical history/Past surgical history of  compression fracture of L1 status post kyphoplasty, compression fracture of thoracic vertebrae, restless  legs syndrome, hypertension, chronic diastolic heart failure, left bundle branch block, polymyalgia rheumatica, HLAb27, TAVR, long-term anticoagulation    Patient was evaluated and referred by Dr Isael Garner.  He was referred from Neurosurgery team based on cardiology referral for right leg pain  Interval History 2024:  Ghanshyam Sanford JrNorm is here for follow up visit  to establish care with me. Ghanshyam GUTIERREZ Claribel Norm Had  right L4-L5 lumbar facet joint injection,  patient reported *** % improvement in pain and functionality.    Current pain score: ***/10      Diabetic: {GAYes/No/NA:20219}    {Anticoagulation medications:25712}    Allergy To Iodine: {GAYes/No/NA:18737}    Currently on Antibiotic: {GAYes/No/NA:84126}    Current Description of Pain Symptoms:    History of Recent Fall or Trauma: {GAYes/No/NA:86878}   Onset: Chronic, started ***  Pain Location: ***  Radiates/associated symptoms: ***.   Pain is Getting worse over the last *** months    The pain is described as {Desc; pain character:83745}.   Exacerbating factors: {Causes; Pain:50560}.   Mitigating factors ***.   Symptoms interfere with daily activity, sleeping, and ***.   The patient feels like symptoms have been {IUW:11373}.   Patient {Denies / Reports:55325} {RED FLAGS:96308}.    Pain score:   Current: {PAIN 0-10:77069}/10  Best: {PAIN 0-10:14174}/10  Worst: {PAIN 0-10:71184}/10    Current pain medications:      gabapentin  (NEURONTIN) 600 MG tablet,       Current Narcotics/Opioid /benzo Medications:  Opioids- None  Benzodiazepines: No    UDS:  NA    PDMP:  Reviewed and consistent with medication use as prescribed.     Previous Chronic Pain Treatment History:  Physical Therapy/HEP/Physician Lead Exercise Program:  Over the past 12 months, Patient has done  *** sessions.  PT response: {PT response:13840} Helpful.   Dates of the PT sessions: ***, ***.  Is patient participating in home exercise program (HEP)/ physician led exercise program: {GAYes/No/NA:21136}.    Non-interventional Pain Therapy:  []Chiropractor.   []Acupuncture/Dry needle.  []TENS unit.  []Heat/ICE.  []Back Brace.    Medications previously tried:  NSAIDs: {GANSIAD:43271}  Topical Agent: {GAYes/No/NA:71219}  TCA/SSRI/SNRI: {GATCA/SSRI/SNRI:47070}  Anti-convulsants: {GAAnticonvulsants:57147}  Muscle Relaxants: {GAmuscle Relaxant:23618}  Opioids- {GAopioid:82162}.    Interventional Pain Procedures:  04/11/2024:  L4-L5 right transforaminal with IR-  80% relief for 3 weeks  Kyphoplasty    Previous spine/Relevant joint surgery:   Right knee arthroplasty  Surgical History:   has a past surgical history that includes Hernia repair; Knee Arthroplasty (Right); Carotid stent; Back surgery; Percutaneous transcatheter aortic valve replacement (TAVR) (Right, 12/14/2021); Percutaneous transcatheter aortic valve replacement (TAVR) (Right, 12/14/2021); Knee arthroscopy w/ meniscectomy (Right, 6/13/2023); Arthroscopic chondroplasty of knee joint (Right, 6/13/2023); Knee arthroscopy w/ plica excision (Right, 6/13/2023); Synovectomy of knee (Right, 6/13/2023); Knee debridement (Right, 6/13/2023); [id number] (Right, 6/13/2023); and Injection of facet joint (Right, 9/6/2024).  Medical History:   has a past medical history of Anticoagulant long-term use, Aortic valve regurgitation, Aortic valve stenosis, nonrheumatic, Carotid stenosis, bilateral, Chronic diastolic heart  "failure, NYHA class 2, Chronic total occlusion of coronary artery, Coronary artery disease, Elevated PSA, Hypertension, Hypertensive heart disease, Osteoarthritis of right knee (3/22/2017), Polymyalgia, Restless legs, and SOB (shortness of breath).  Family History:  family history includes Diabetes Mellitus in his father; Heart disease in his mother and paternal grandmother; Hypertension in his mother; Stroke in his mother and paternal grandmother.  Allergies:  Dexamethasone sodium phosphate, Atorvastatin calcium, Cortisone, Duloxetine, Flomax [tamsulosin], and Pravastatin   Social History/SUBSTANCE ABUSE HISTORY:  Personal history of substance abuse: No   reports that he has never smoked. He has never used smokeless tobacco. He reports that he does not drink alcohol and does not use drugs.  LABS:  CBC  Lab Results   Component Value Date    WBC 6.30 09/18/2023    HGB 13.7 (L) 09/18/2023    HCT 42.0 09/18/2023     Coagulation Profile   Lab Results   Component Value Date     09/18/2023       Lab Results   Component Value Date    INR 1.0 12/13/2021     CMP:  BMP  Lab Results   Component Value Date     09/18/2023    K 4.4 09/18/2023     09/18/2023    CO2 26 09/18/2023    BUN 15 09/18/2023    CREATININE 1.0 02/12/2024    CALCIUM 9.1 09/18/2023    ANIONGAP 8 09/18/2023    EGFRNORACEVR >60 02/12/2024     Lab Results   Component Value Date    ALT 16 09/18/2023    AST 18 09/18/2023    ALKPHOS 63 09/18/2023    BILITOT 0.7 09/18/2023     HGBA1C:  No results found for: "LABA1C", "HGBA1C"    ROS:    Review of Systems   GENERAL:  No weight loss, malaise or fevers.  HEENT:   No recent changes in vision or hearing  NECK:  Negative for lumps, no difficulty with swallowing.  RESPIRATORY:  Negative for cough, wheezing or shortness of breath, patient denies any recent URI.  CARDIOVASCULAR:  Negative for chest pain or palpitations.  GI:  Negative for abdominal discomfort, blood in stools or black stools or change in " bowel habits.  MUSCULOSKELETAL:  See HPI.  SKIN:  Negative for lesions, rash, and itching.  PSYCH:  No mood disorder or recent psychosocial stressors.   HEMATOLOGY/LYMPHOLOGY:  See the blood thinner sectioned in HPI.  NEURO:  See HPI  All other reviewed and negative other than HPI.    PHYSICAL EXAM:  VITALS: There were no vitals taken for this visit.  There is no height or weight on file to calculate BMI.  GENERAL: Well appearing, in no acute distress, alert and oriented x3, answers questions appropriately.   PSYCH: Flat affect.  SKIN: Skin color, texture, turgor normal, no rashes or lesions.  HEAD/FACE:  Normocephalic, atraumatic. Cranial nerves grossly intact.  CV: Regular rate  PULM: No evidence of respiratory difficulty, symmetric chest rise.  GI:  Soft and non-Distended.  NECK: (***) pain to palpation over the cervical paraspinous muscles. Spurling: ***. (***) pain with neck flexion, extension, or lateral flexion, Muscle strength in RT UE ***/5 and Left UE ***/5, Hand  5/5 bilaterally   BACK/SIJ/HIP:  Lumbar Spine Exam:       Inspection: No erythema, bruising.       Palpation: (***) TTP of lumbar paraspinals bilaterally      ROM:  Limited in flexion, extension, lateral bending.       (***) Facet loading {GAHip:31166}      (***) Straight Leg Raise, {GAHip:95728}      (***) MICHAEL, Tenderness over the PSIS, Yeoman test, {GAHip:22464}  Hip Exam:      Inspection: No gross deformity or apparent leg length discrepancy      Palpation:  No TTP to bilateral greater trochanteric bursas.       ROM:  *** limitation Due to pain in internal rotation, external rotation b/l  Neurologic Exam:     Alert. Speech is fluent and appropriate.     Strength: ***/5 in {GAHip:59868} hip flexion and knee extension     Sensation:  Grossly intact to light touch in bilateral lower extremities     Tone: No abnormality appreciated in bilateral lower extremities  Knee exam:  No gross deformity or apparent leg length discrepancy, positive  tenderness over the anteromedial aspect of the knee cap, positive limitation due to pain in flexion and extension, sensation  grossly intact to light touch in bilateral lower extremity, no atrophy or tone abnormalities    GAIT: {GAgait:20116}    DIAGNOSTIC STUDIES AND MEDICAL RECORDS REVIEW:  I have personally reviewed and interpreted relevant radiology reports and reviewed relevant records from other services in the EMR.      EMG/nerve conduction study showed right L4 and L5 radiculopathy   MRI lumbar spine   T12-L1: No spinal canal stenosis or neural foraminal narrowing.     L1-L2: No spinal canal stenosis or neural foraminal narrowing.     L2-L3: No spinal canal stenosis or neural foraminal narrowing.     L3-L4: There is asymmetric disc bulging to the left with mild facet arthropathy, contributing to mild spinal canal stenosis and moderate left-sided neural foraminal narrowing     L4-L5: Mild disc bulging and prominent facet joint arthropathy on the right side, noting joint hypertrophy with extensive subchondral marrow edema and surrounding inflammatory change (series 6, images 6-7).  There is a small right-sided synovial cyst measuring 10 mm (series 9, image 36) abutting and possibly impinging upon the descending right L5 nerve root.  There is overall mild spinal canal stenosis and moderate right and mild left-sided neural foraminal narrowing.  Slight anterolisthesis noted.     L5-S1: Mild disc bulge with posterior annular fissure.  There is moderate disc height loss on the left side.  These findings contribute to mild/moderate left-sided neural foraminal narrowing.  No spinal canal stenosis .     Impression:     Remote L1 compression fracture post vertebral plasty, stable.  No acute fractures.     Lumbar spondylosis, contributing to mild spinal canal stenosis L3-4 and L4-5 and moderate neural foraminal narrowing L3-4 through L5-S1, as above.     Prominent right-sided facet joint arthropathy at L4-5 with small  "anterior synovial cyst impinging upon the spinal canal, as above.    Clinical Impression:  This is a pleasant 79 y.o. male patient with PMH/PSH of compression fracture of L1 status post kyphoplasty, compression fracture of thoracic vertebrae, restless  legs syndrome, hypertension, chronic diastolic heart failure, left bundle branch block, polymyalgia rheumatica, HLAb27, TAVR, long-term anticoagulation, coronary artery disease status post LAD and RCA stent, presenting with***.     We discussed the underlying diagnoses and multiple treatment options including non-opioid medications, interventional procedures, physical therapy, home exercise, core muscle enhancement, and weight loss.  The risks and benefits of each treatment option were discussed and all questions were answered.      Encounter Diagnosis:  Ghanshyam Sanford Jr. is a 79 y.o. male with the following diagnoses based on history, exam, and imaging:  There are no diagnoses linked to this encounter.     Treatment Plan:    Diagnostics/Referrals: {gaimage:98515}    Medications:    NSAIDs: {GANSIAD:13342}  Topical Agent: {GAYes/No/NA:29257}  TCA/SSRI/SNRI: {GATCA/SSRI/SNRI:13108}  Anti-convulsants: {GAAnticonvulsants:14651}  Muscle Relaxants: {GAmuscle Relaxant:50454}  Opioids: {GAopioid:04891::"None"}  Patient was educated about the risk and benefit of chronic opioid therapy including dependency, addiction, diversion, and opioid hyperalgesia.  Interventional Therapy: {GAProcedure:07747}.  Sedation: {GAsedation:62460}.  {Anticoagulation medications:86662} -Clearance to stop Blood thinner: {GAYes/No/NA:86682}    Regarding the above interventions, the patient has been educated regarding the risks (including bleeding, infection, increased pain, nerve damage, or allergic reaction), benefits, and alternatives. The patient states he understands and is eager to proceed.    Physical Rehabilitation: {GAPT:32835}    Patient Education: Counseled patient regarding the " importance of {:86823}, I have stressed the importance of physical activity and a home exercise plan to help with pain and improve health.    Follow-up: RTC ***.    May consider:       Angel Black MD  Anesthesiologist  Interventional Pain Medicine  09/30/2024    Disclaimer:  This note was prepared using voice recognition system and is likely to have sound alike errors that may have been overlooked even after proof reading.  Please call me with any questions.

## 2024-10-03 ENCOUNTER — HOSPITAL ENCOUNTER (OUTPATIENT)
Dept: RADIOLOGY | Facility: HOSPITAL | Age: 79
Discharge: HOME | End: 2024-10-03
Attending: INTERNAL MEDICINE
Payer: MEDICARE

## 2024-10-03 DIAGNOSIS — K86.89 PANCREATIC MASS: ICD-10-CM

## 2024-10-03 PROCEDURE — 25500020 PHARM REV CODE 255: Performed by: INTERNAL MEDICINE

## 2024-10-03 PROCEDURE — 74177 CT ABD & PELVIS W/CONTRAST: CPT | Mod: 26,,, | Performed by: RADIOLOGY

## 2024-10-03 PROCEDURE — 74177 CT ABD & PELVIS W/CONTRAST: CPT | Mod: TC

## 2024-10-03 RX ADMIN — IOHEXOL 75 ML: 350 INJECTION, SOLUTION INTRAVENOUS at 10:10

## 2024-10-04 ENCOUNTER — HOSPITAL ENCOUNTER (OUTPATIENT)
Dept: RADIOLOGY | Facility: HOSPITAL | Age: 79
Discharge: HOME | End: 2024-10-04
Attending: NURSE PRACTITIONER
Payer: MEDICARE

## 2024-10-04 DIAGNOSIS — N40.1 BPH WITH URINARY OBSTRUCTION: ICD-10-CM

## 2024-10-04 DIAGNOSIS — N13.8 BPH WITH URINARY OBSTRUCTION: ICD-10-CM

## 2024-10-04 DIAGNOSIS — N28.1 RENAL CYST: ICD-10-CM

## 2024-10-04 PROCEDURE — 76770 US EXAM ABDO BACK WALL COMP: CPT | Mod: TC

## 2024-10-04 PROCEDURE — 76770 US EXAM ABDO BACK WALL COMP: CPT | Mod: 26,,, | Performed by: RADIOLOGY

## 2024-11-07 ENCOUNTER — TELEPHONE (OUTPATIENT)
Dept: PAIN MEDICINE | Facility: CLINIC | Age: 79
End: 2024-11-07
Payer: MEDICARE

## 2024-11-12 ENCOUNTER — OFFICE VISIT (OUTPATIENT)
Dept: PAIN MEDICINE | Facility: CLINIC | Age: 79
End: 2024-11-12
Payer: MEDICARE

## 2024-11-12 VITALS
DIASTOLIC BLOOD PRESSURE: 70 MMHG | HEIGHT: 71 IN | WEIGHT: 180 LBS | SYSTOLIC BLOOD PRESSURE: 160 MMHG | BODY MASS INDEX: 25.2 KG/M2

## 2024-11-12 DIAGNOSIS — M25.512 ACUTE PAIN OF LEFT SHOULDER: Primary | ICD-10-CM

## 2024-11-12 PROCEDURE — 99999 PR STA SHADOW: CPT | Mod: PBBFAC,,, | Performed by: ANESTHESIOLOGY

## 2024-11-12 PROCEDURE — 99213 OFFICE O/P EST LOW 20 MIN: CPT | Mod: PBBFAC | Performed by: ANESTHESIOLOGY

## 2024-11-12 PROCEDURE — 99214 OFFICE O/P EST MOD 30 MIN: CPT | Mod: S$PBB | Performed by: ANESTHESIOLOGY

## 2024-11-12 PROCEDURE — 99999 PR PBB SHADOW E&M-EST. PATIENT-LVL III: CPT | Mod: PBBFAC,,, | Performed by: ANESTHESIOLOGY

## 2024-11-12 PROCEDURE — G2211 COMPLEX E/M VISIT ADD ON: HCPCS | Mod: S$PBB | Performed by: ANESTHESIOLOGY

## 2024-11-12 NOTE — PROGRESS NOTES
EST Patient Evaluation  [facility]    Ghanshyam MATT Sanford Jr.  : 1945  Date: 2024     CHIEF COMPLAINT:  No chief complaint on file.    Referring Physician: No ref. provider found  Primary Care Physician: Kevin Lombardi MD    HPI:  This is a 79 y.o. male with a chief complaint of No chief complaint on file.  . The patient has Past medical history/Past surgical history of  compression fracture of L1 status post kyphoplasty, compression fracture of thoracic vertebrae, restless  legs syndrome, hypertension, chronic diastolic heart failure, left bundle branch block, polymyalgia rheumatica, HLAb27, TAVR, long-term anticoagulation    Patient was evaluated and referred by Dr Isael Garner.  He was referred from Neurosurgery team based on cardiology referral for right leg pain  Interval History 2024:  Ghanshyam Sanford Jr. is here for follow up visit  to establish care with me. Ghanshyam Sanford Jr. Had  right L4-L5 lumbar facet joint injection,  patient reported 50 % improvement in pain and functionality for 2 weeks.  Current pain score: 7/10    Interval History 2024:  Ghanshyam Sanford Jr. is here for *** follow up visit after***    Current pain score: ***/10      Diabetic: No    Anticoagulation medications: 81 mg Aspirin  and Plavix     Allergy To Iodine: No    Currently on Antibiotic: No    Current Description of Pain Symptoms:    History of Recent Fall or Trauma: No   Onset: Chronic, started several years  Pain Location: right low back pain  associated with tenderness  Radiates/associated symptoms:  bilateral knee pain,  left worse on kneeling  in the [place],  has a right lower extremity radiation worse with straight leg raising test  Pain is Getting worse over the last 2 months    The pain is described as aching and sharp.   Exacerbating factors: Sitting, Bending, and Getting out of bed/chair.   Mitigating factors None.   Symptoms interfere with daily activity,  sleeping.   The patient feels like symptoms have been worsening.   Patient denies night fever/night sweats, urinary incontinence, bowel incontinence, significant weight loss, significant motor weakness, and loss of sensations.    Pain score:   Best: 7/10  Worst: 10/10    Current pain medications:      gabapentin (NEURONTIN) 600 MG tablet, QHS-  can not take morning dose    Current Narcotics/Opioid /benzo Medications:  Opioids- None  Benzodiazepines: No    UDS:  NA    PDMP:  Reviewed and consistent with medication use as prescribed.     Previous Chronic Pain Treatment History:  Physical Therapy/HEP/Physician Lead Exercise Program:  Over the past 12 months, Patient has done 0 sessions.  PT response: Mildly Helpful.   Dates of the PT sessions: 2023.  Is patient participating in home exercise program (HEP)/ physician led exercise program: Yes.    Non-interventional Pain Therapy:  []Chiropractor.   []Acupuncture/Dry needle.  []TENS unit.  [x]Heat/ICE.  []Back Brace.    Medications previously tried:  NSAIDs: None  Topical Agent: Yes  TCA/SSRI/SNRI: None  Anti-convulsants: Gabapentin   Muscle Relaxants: None  Opioids- None.    Interventional Pain Procedures:  04/11/2024:  L4-L5 right transforaminal with IR-  80% relief for 3 weeks  September 6, 2024, right L4-L5 facet injection- 50% relief. For 2 weeks with Dexamethasone   Kyphoplasty    Previous spine/Relevant joint surgery:   Right knee arthroplasty  Surgical History:   has a past surgical history that includes Hernia repair; Knee Arthroplasty (Right); Carotid stent; Back surgery; Percutaneous transcatheter aortic valve replacement (TAVR) (Right, 12/14/2021); Percutaneous transcatheter aortic valve replacement (TAVR) (Right, 12/14/2021); Knee arthroscopy w/ meniscectomy (Right, 6/13/2023); Arthroscopic chondroplasty of knee joint (Right, 6/13/2023); Knee arthroscopy w/ plica excision (Right, 6/13/2023); Synovectomy of knee (Right, 6/13/2023); Knee debridement (Right,  "6/13/2023); [id number] (Right, 6/13/2023); and Injection of facet joint (Right, 9/6/2024).  Medical History:   has a past medical history of Anticoagulant long-term use, Aortic valve regurgitation, Aortic valve stenosis, nonrheumatic, Carotid stenosis, bilateral, Chronic diastolic heart failure, NYHA class 2, Chronic total occlusion of coronary artery, Coronary artery disease, Elevated PSA, Hypertension, Hypertensive heart disease, Osteoarthritis of right knee (3/22/2017), Polymyalgia, Restless legs, and SOB (shortness of breath).  Family History:  family history includes Diabetes Mellitus in his father; Heart disease in his mother and paternal grandmother; Hypertension in his mother; Stroke in his mother and paternal grandmother.  Allergies:  Atorvastatin calcium, Cortisone, Duloxetine, Flomax [tamsulosin], and Pravastatin   Social History/SUBSTANCE ABUSE HISTORY:  Personal history of substance abuse: No   reports that he has never smoked. He has never used smokeless tobacco. He reports that he does not drink alcohol and does not use drugs.  LABS:  CBC  Lab Results   Component Value Date    WBC 6.30 09/18/2023    HGB 13.7 (L) 09/18/2023    HCT 42.0 09/18/2023     Coagulation Profile   Lab Results   Component Value Date     09/18/2023       Lab Results   Component Value Date    INR 1.0 12/13/2021     CMP:  BMP  Lab Results   Component Value Date     09/18/2023    K 4.4 09/18/2023     09/18/2023    CO2 26 09/18/2023    BUN 15 09/18/2023    CREATININE 1.0 02/12/2024    CALCIUM 9.1 09/18/2023    ANIONGAP 8 09/18/2023    EGFRNORACEVR >60 02/12/2024     Lab Results   Component Value Date    ALT 16 09/18/2023    AST 18 09/18/2023    ALKPHOS 63 09/18/2023    BILITOT 0.7 09/18/2023     HGBA1C:  No results found for: "LABA1C", "HGBA1C"    ROS:    Review of Systems   GENERAL:  No weight loss, malaise or fevers.  HEENT:   No recent changes in vision or hearing  NECK:  Negative for lumps, no " difficulty with swallowing.  RESPIRATORY:  Negative for cough, wheezing or shortness of breath, patient denies any recent URI.  CARDIOVASCULAR:  Negative for chest pain or palpitations.  GI:  Negative for abdominal discomfort, blood in stools or black stools or change in bowel habits.  MUSCULOSKELETAL:  See HPI.  SKIN:  Negative for lesions, rash, and itching.  PSYCH:  No mood disorder or recent psychosocial stressors.   HEMATOLOGY/LYMPHOLOGY:  See the blood thinner sectioned in HPI.  NEURO:  See HPI  All other reviewed and negative other than HPI.    PHYSICAL EXAM:  VITALS: There were no vitals taken for this visit.  There is no height or weight on file to calculate BMI.  GENERAL: Well appearing, in no acute distress, alert and oriented x3, answers questions appropriately.   PSYCH: Flat affect.  SKIN: Skin color, texture, turgor normal, no rashes or lesions.  HEAD/FACE:  Normocephalic, atraumatic. Cranial nerves grossly intact.  CV: Regular rate  PULM: No evidence of respiratory difficulty, symmetric chest rise.  GI:  Soft and non-Distended.  BACK/SIJ/HIP:  Lumbar Spine Exam:       Inspection: No erythema, bruising.       Palpation: (+) TTP of lumbar paraspinals Right sided       ROM:  Limited in flexion, extension, lateral bending.       (-) Facet loading bilateral      (+) Straight Leg Raise, Right      (-) MICHAEL, Tenderness over the PSIS, Yeoman test, bilateral  Hip Exam:      Inspection: No gross deformity or apparent leg length discrepancy      Palpation:  No TTP to bilateral greater trochanteric bursas.       ROM:  No limitation Due to pain in internal rotation, external rotation b/l  Neurologic Exam:     Alert. Speech is fluent and appropriate.     Strength: 5/5 in bilateral hip flexion and knee extension     Sensation:  Grossly intact to light touch in bilateral lower extremities     Tone: No abnormality appreciated in bilateral lower extremities    GAIT:  normal gait    DIAGNOSTIC STUDIES AND MEDICAL  RECORDS REVIEW:  I have personally reviewed and interpreted relevant radiology reports and reviewed relevant records from other services in the EMR.      EMG/nerve conduction study showed right L4 and L5 radiculopathy   MRI lumbar spine   T12-L1: No spinal canal stenosis or neural foraminal narrowing.     L1-L2: No spinal canal stenosis or neural foraminal narrowing.     L2-L3: No spinal canal stenosis or neural foraminal narrowing.     L3-L4: There is asymmetric disc bulging to the left with mild facet arthropathy, contributing to mild spinal canal stenosis and moderate left-sided neural foraminal narrowing     L4-L5: Mild disc bulging and prominent facet joint arthropathy on the right side, noting joint hypertrophy with extensive subchondral marrow edema and surrounding inflammatory change (series 6, images 6-7).  There is a small right-sided synovial cyst measuring 10 mm (series 9, image 36) abutting and possibly impinging upon the descending right L5 nerve root.  There is overall mild spinal canal stenosis and moderate right and mild left-sided neural foraminal narrowing.  Slight anterolisthesis noted.     L5-S1: Mild disc bulge with posterior annular fissure.  There is moderate disc height loss on the left side.  These findings contribute to mild/moderate left-sided neural foraminal narrowing.  No spinal canal stenosis .     Impression:     Remote L1 compression fracture post vertebral plasty, stable.  No acute fractures.     Lumbar spondylosis, contributing to mild spinal canal stenosis L3-4 and L4-5 and moderate neural foraminal narrowing L3-4 through L5-S1, as above.     Prominent right-sided facet joint arthropathy at L4-5 with small anterior synovial cyst impinging upon the spinal canal, as above.    Clinical Impression:  This is a pleasant 79 y.o. male patient with PMH/PSH of compression fracture of L1 status post kyphoplasty, compression fracture of thoracic vertebrae, restless  legs syndrome,  hypertension, chronic diastolic heart failure, left bundle branch block, polymyalgia rheumatica, HLAb27, TAVR, long-term anticoagulation, coronary artery disease status post LAD and RCA stent, presenting with Rt sided LBP, Bl knee pain and  right lower extremity radiation,  patient has multiple sources of back and leg pain most probably unrelated,  he has no significant low back pain,  he has mild tenderness over the right-sided lumbar paraspinal muscle about iliac crest  which would get better with physical therapy and possible trigger point injection,  left knee pain worse on kneeling, no pain on prolonged standing and walking,  has a remote x-ray knee showed mild knee osteoarthritis in 2023,  he had right knee surgery 2023    Encounter Diagnosis:  Ghanshyam Sanford Jr. is a 79 y.o. male with the following diagnoses based on history, exam, and imaging:  There are no diagnoses linked to this encounter.     Treatment Plan:    Diagnostics/Referrals: X-ray bilateral knee    Medications:    NSAIDs: None  Topical Agent: No  TCA/SSRI/SNRI: None  Anti-convulsants: Gabapentin   Muscle Relaxants: None  Opioids: None    Interventional Therapy: may consider Left Knee Injection or RT lumbar TPI.  Sedation: NA.  Anticoagulation medications: 81 mg Aspirin  and Plavix  -Clearance to stop Blood thinner: NA    Regarding the above interventions, the patient has been educated regarding the risks (including bleeding, infection, increased pain, nerve damage, or allergic reaction), benefits, and alternatives. The patient states he understands and is eager to proceed.    START Physical Rehabilitation: Referral to Physical therapy for Lumbar stabilization, core strengthening, and a home exercise program    Patient Education: Counseled patient regarding the importance of activity modification, I have stressed the importance of physical activity and a home exercise plan to help with pain and improve health.    Follow-up: RTC 6 weeks.    May  consider: Right L4 and L5 TFESI,  lumbar medial branch block at  right L4-L5 and L5-S1    Angel Black MD  Anesthesiologist  Interventional Pain Medicine  11/12/2024    Disclaimer:  This note was prepared using voice recognition system and is likely to have sound alike errors that may have been overlooked even after proof reading.  Please call me with any questions.

## 2024-11-12 NOTE — PROGRESS NOTES
EST Patient Evaluation  [facility]    Ghanshyam Sanford Jr.  : 1945  Date: 2024     CHIEF COMPLAINT:  Leg Pain and Shoulder Pain    Referring Physician: No ref. provider found  Primary Care Physician: Kevin Lombardi MD    HPI:  This is a 79 y.o. male with a chief complaint of Leg Pain and Shoulder Pain  . The patient has Past medical history/Past surgical history of  compression fracture of L1 status post kyphoplasty, compression fracture of thoracic vertebrae, restless  legs syndrome, hypertension, chronic diastolic heart failure, left bundle branch block, polymyalgia rheumatica, HLAb27, TAVR, long-term anticoagulation    Patient was evaluated and referred by Dr Isael Garner.  He was referred from Neurosurgery team based on cardiology referral for right leg pain  Interval History 2024:  Ghanshyam Sanford Jr. is here for follow up visit  to establish care with me. Ghanshyam Sanford Jr. Had  right L4-L5 lumbar facet joint injection,  patient reported 50 % improvement in pain and functionality for 2 weeks.  Current pain score: 7/10    Interval History 2024:  Ghanshyam Sanford Jr. is here for follow up visit after 7 physical therapy sessions which provided him with a relief for his in the right knee pain.  Today he presents with the acute onset left shoulder pain from excessive throwing   while fishing.  Current pain score: 3/10, at night 5/10    Diabetic: No    Anticoagulation medications: 81 mg Aspirin  and Plavix     Allergy To Iodine: No    Currently on Antibiotic: No    Current Description of Pain Symptoms:    History of Recent Fall or Trauma: No   Onset: acute  Pain Location: left shoulder  Radiates/associated symptoms: bilateral knee pain,  left worse on kneeling  in the Taoist,  has a right lower extremity radiation worse with straight leg raising test  Pain is Getting worse over the last 2 weeks  The pain is described as aching and sharp.   Exacerbating  factors: Sitting, Bending, and Getting out of bed/chair.   Mitigating factors None.   Symptoms interfere with daily activity, sleeping.   The patient feels like symptoms have been worsening.   Patient denies night fever/night sweats, urinary incontinence, bowel incontinence, significant weight loss, significant motor weakness, and loss of sensations.    Pain score:  Best: 7/10  Worst: 10/10    Current pain medications:    gabapentin (NEURONTIN) 600 MG tablet, QHS-  can not take morning dose  Current Narcotics/Opioid /benzo Medications:  Opioids- None  Benzodiazepines: No    UDS:  NA    PDMP:  Reviewed and consistent with medication use as prescribed.     Previous Chronic Pain Treatment History:  Physical Therapy/HEP/Physician Lead Exercise Program:  Over the past 12 months, Patient has done 6+ sessions for lower back and shoulder.  PT response: Mildly Helpful.   Dates of the PT sessions: 10/2024-present.  Is patient participating in home exercise program (HEP)/ physician led exercise program: Yes.Completes HEP that was provided by PT in addition to formal therapy.    Non-interventional Pain Therapy:  []Chiropractor.   []Acupuncture/Dry needle.  []TENS unit.  [x]Heat/ICE.  []Back Brace.    Medications previously tried:  NSAIDs: None  Topical Agent: Yes  TCA/SSRI/SNRI: None  Anti-convulsants: Gabapentin   Muscle Relaxants: None  Opioids- None.    Interventional Pain Procedures:  04/11/2024:  L4-L5 right transforaminal with IR-  80% relief for 3 weeks  September 6, 2024, right L4-L5 facet injection- 50% relief. For 2 weeks with Dexamethasone   Kyphoplasty L1    Previous spine/Relevant joint surgery:   Right knee arthroplasty  Surgical History:   has a past surgical history that includes Hernia repair; Knee Arthroplasty (Right); Carotid stent; Back surgery; Percutaneous transcatheter aortic valve replacement (TAVR) (Right, 12/14/2021); Percutaneous transcatheter aortic valve replacement (TAVR) (Right, 12/14/2021); Knee  arthroscopy w/ meniscectomy (Right, 6/13/2023); Arthroscopic chondroplasty of knee joint (Right, 6/13/2023); Knee arthroscopy w/ plica excision (Right, 6/13/2023); Synovectomy of knee (Right, 6/13/2023); Knee debridement (Right, 6/13/2023); [id number] (Right, 6/13/2023); and Injection of facet joint (Right, 9/6/2024).  Medical History:   has a past medical history of Anticoagulant long-term use, Aortic valve regurgitation, Aortic valve stenosis, nonrheumatic, Carotid stenosis, bilateral, Chronic diastolic heart failure, NYHA class 2, Chronic total occlusion of coronary artery, Coronary artery disease, Elevated PSA, Hypertension, Hypertensive heart disease, Osteoarthritis of right knee (3/22/2017), Polymyalgia, Restless legs, and SOB (shortness of breath).  Family History:  family history includes Diabetes Mellitus in his father; Heart disease in his mother and paternal grandmother; Hypertension in his mother; Stroke in his mother and paternal grandmother.  Allergies:  Atorvastatin calcium, Cortisone, Duloxetine, Flomax [tamsulosin], and Pravastatin   Social History/SUBSTANCE ABUSE HISTORY:  Personal history of substance abuse: No   reports that he has never smoked. He has never used smokeless tobacco. He reports that he does not drink alcohol and does not use drugs.  LABS:  CBC  Lab Results   Component Value Date    WBC 6.30 09/18/2023    HGB 13.7 (L) 09/18/2023    HCT 42.0 09/18/2023     Coagulation Profile   Lab Results   Component Value Date     09/18/2023       Lab Results   Component Value Date    INR 1.0 12/13/2021     CMP:  BMP  Lab Results   Component Value Date     09/18/2023    K 4.4 09/18/2023     09/18/2023    CO2 26 09/18/2023    BUN 15 09/18/2023    CREATININE 1.0 02/12/2024    CALCIUM 9.1 09/18/2023    ANIONGAP 8 09/18/2023    EGFRNORACEVR >60 02/12/2024     Lab Results   Component Value Date    ALT 16 09/18/2023    AST 18 09/18/2023    ALKPHOS 63 09/18/2023     "BILITOT 0.7 09/18/2023     HGBA1C:  No results found for: "LABA1C", "HGBA1C"    ROS:    Review of Systems   GENERAL:  No weight loss, malaise or fevers.  HEENT:   No recent changes in vision or hearing  NECK:  Negative for lumps, no difficulty with swallowing.  RESPIRATORY:  Negative for cough, wheezing or shortness of breath, patient denies any recent URI.  CARDIOVASCULAR:  Negative for chest pain or palpitations.  GI:  Negative for abdominal discomfort, blood in stools or black stools or change in bowel habits.  MUSCULOSKELETAL:  See HPI.  SKIN:  Negative for lesions, rash, and itching.  PSYCH:  No mood disorder or recent psychosocial stressors.   HEMATOLOGY/LYMPHOLOGY:  See the blood thinner sectioned in HPI.  NEURO:  See HPI  All other reviewed and negative other than HPI.    PHYSICAL EXAM:  VITALS: BP (!) 160/70 (BP Location: Left arm, Patient Position: Sitting)   Ht 5' 10.5" (1.791 m)   Wt 81.6 kg (180 lb)   BMI 25.46 kg/m²   Body mass index is 25.46 kg/m².  GENERAL: Well appearing, in no acute distress, alert and oriented x3, answers questions appropriately.   PSYCH: Flat affect.  SKIN: Skin color, texture, turgor normal, no rashes or lesions.  HEAD/FACE:  Normocephalic, atraumatic. Cranial nerves grossly intact.  CV: Regular rate  PULM: No evidence of respiratory difficulty, symmetric chest rise.  GI:  Soft and non-Distended.  BACK/SIJ/HIP:  Lumbar Spine Exam:       Inspection: No erythema, bruising.       Palpation: (+) TTP of lumbar paraspinals Right sided       ROM:  Limited in flexion, extension, lateral bending.       (-) Facet loading bilateral      (+) Straight Leg Raise, Right      (-) MICHAEL, Tenderness over the PSIS, Yeoman test, bilateral  Hip Exam:      Inspection: No gross deformity or apparent leg length discrepancy      Palpation:  No TTP to bilateral greater trochanteric bursas.       ROM:  No limitation Due to pain in internal rotation, external rotation b/l  Neurologic Exam:     Alert. " Speech is fluent and appropriate.     Strength: 5/5 in bilateral hip flexion and knee extension     Sensation:  Grossly intact to light touch in bilateral lower extremities     Tone: No abnormality appreciated in bilateral lower extremities    GAIT:  normal gait  Left shoulder exam:  SHOULDERS: No gross deformity or atrophy noted.   ROM: +  limited in  abduction external rotation  Rotator cuff exam GAMING:  mild+  EMPTY CAN SIGN:  mild+    DIAGNOSTIC STUDIES AND MEDICAL RECORDS REVIEW:  I have personally reviewed and interpreted relevant radiology reports and reviewed relevant records from other services in the EMR.     -EMG/nerve conduction study showed right L4 and L5 radiculopathy    -MRI lumbar spine 02/2024:   T12-L1: No spinal canal stenosis or neural foraminal narrowing.     L1-L2: No spinal canal stenosis or neural foraminal narrowing.     L2-L3: No spinal canal stenosis or neural foraminal narrowing.     L3-L4: There is asymmetric disc bulging to the left with mild facet arthropathy, contributing to mild spinal canal stenosis and moderate left-sided neural foraminal narrowing     L4-L5: Mild disc bulging and prominent facet joint arthropathy on the right side, noting joint hypertrophy with extensive subchondral marrow edema and surrounding inflammatory change (series 6, images 6-7).  There is a small right-sided synovial cyst measuring 10 mm (series 9, image 36) abutting and possibly impinging upon the descending right L5 nerve root.  There is overall mild spinal canal stenosis and moderate right and mild left-sided neural foraminal narrowing.  Slight anterolisthesis noted.     L5-S1: Mild disc bulge with posterior annular fissure.  There is moderate disc height loss on the left side.  These findings contribute to mild/moderate left-sided neural foraminal narrowing.  No spinal canal stenosis .     Impression:     Remote L1 compression fracture post vertebral plasty, stable.  No acute fractures.     Lumbar  spondylosis, contributing to mild spinal canal stenosis L3-4 and L4-5 and moderate neural foraminal narrowing L3-4 through L5-S1, as above.     Prominent right-sided facet joint arthropathy at L4-5 with small anterior synovial cyst impinging upon the spinal canal, as above.    Clinical Impression:  This is a pleasant 79 y.o. male patient with PMH/PSH of compression fracture of L1 status post kyphoplasty, compression fracture of thoracic vertebrae, restless  legs syndrome, hypertension, chronic diastolic heart failure, left bundle branch block, polymyalgia rheumatica, HLAb27, TAVR, long-term anticoagulation, coronary artery disease status post LAD and RCA stent, presenting originally with Rt sided LBP, Bl knee pain and  right lower extremity radiation,  patient has multiple sources of back and leg pain most probably unrelated,  he has no significant low back pain,  he has mild tenderness over the right-sided lumbar paraspinal muscle about iliac crest  which would get better with physical therapy and possible trigger point injection,  left knee pain worse on kneeling, no pain on prolonged standing and walking,  has a remote x-ray knee showed mild knee osteoarthritis in ,  he had right knee surgery ,  bilateral knee x-ray showed no significant degenerative changes.  Today he presents with acute left shoulder pain.    Encounter Diagnosis:  Ghanshyam Sanford Jr. is a 79 y.o. male with the following diagnoses based on history, exam, and imagin. Acute pain of left shoulder  - Ambulatory referral/consult to Physical/Occupational Therapy; Future      Treatment Plan:    Diagnostics/Referrals: na    Medications:    NSAIDs: None  Topical Agent: No  TCA/SSRI/SNRI: None  Anti-convulsants: Gabapentin   Muscle Relaxants: None  Opioids: None    Interventional Therapy: NA  Sedation: NA.  Anticoagulation medications: 81 mg Aspirin  and Plavix  -Clearance to stop Blood thinner: NA    Regarding the above interventions, the  patient has been educated regarding the risks (including bleeding, infection, increased pain, nerve damage, or allergic reaction), benefits, and alternatives. The patient states he understands and is eager to proceed.    START Physical Rehabilitation:  for left shoulder pain.    Patient Education: Counseled patient regarding the importance of activity modification, I have stressed the importance of physical activity and a home exercise plan to help with pain and improve health.    Follow-up: RTC 6 weeks.    May consider: Right L4 and L5 TFESI,  lumbar medial branch block at  right L4-L5 and L5-S1,  left shoulder injection.    Angel Black MD  Anesthesiologist  Interventional Pain Medicine  11/12/2024    Disclaimer:  This note was prepared using voice recognition system and is likely to have sound alike errors that may have been overlooked even after proof reading.  Please call me with any questions.

## 2024-11-13 ENCOUNTER — PATIENT MESSAGE (OUTPATIENT)
Dept: PAIN MEDICINE | Facility: CLINIC | Age: 79
End: 2024-11-13
Payer: MEDICARE

## 2024-11-19 ENCOUNTER — PATIENT MESSAGE (OUTPATIENT)
Dept: RHEUMATOLOGY | Facility: CLINIC | Age: 79
End: 2024-11-19
Payer: MEDICARE

## 2024-11-20 ENCOUNTER — TELEPHONE (OUTPATIENT)
Dept: RHEUMATOLOGY | Facility: CLINIC | Age: 79
End: 2024-11-20
Payer: MEDICARE

## 2024-11-20 DIAGNOSIS — M35.3 PMR (POLYMYALGIA RHEUMATICA): Primary | ICD-10-CM

## 2024-11-22 ENCOUNTER — LAB VISIT (OUTPATIENT)
Dept: LAB | Facility: HOSPITAL | Age: 79
End: 2024-11-22
Attending: INTERNAL MEDICINE
Payer: MEDICARE

## 2024-11-22 DIAGNOSIS — M35.3 PMR (POLYMYALGIA RHEUMATICA): ICD-10-CM

## 2024-11-22 LAB
CRP SERPL-MCNC: 2.2 MG/L (ref 0–8.2)
ERYTHROCYTE [SEDIMENTATION RATE] IN BLOOD BY WESTERGREN METHOD: 1 MM/HR (ref 0–10)

## 2024-11-22 PROCEDURE — 85651 RBC SED RATE NONAUTOMATED: CPT | Performed by: INTERNAL MEDICINE

## 2024-11-22 PROCEDURE — 36415 COLL VENOUS BLD VENIPUNCTURE: CPT | Performed by: INTERNAL MEDICINE

## 2024-11-22 PROCEDURE — 86140 C-REACTIVE PROTEIN: CPT | Performed by: INTERNAL MEDICINE

## 2024-12-30 ENCOUNTER — OFFICE VISIT (OUTPATIENT)
Dept: SPORTS MEDICINE | Facility: CLINIC | Age: 79
End: 2024-12-30
Payer: MEDICARE

## 2024-12-30 ENCOUNTER — HOSPITAL ENCOUNTER (OUTPATIENT)
Dept: RADIOLOGY | Facility: HOSPITAL | Age: 79
Discharge: HOME | End: 2024-12-30
Attending: ORTHOPAEDIC SURGERY
Payer: MEDICARE

## 2024-12-30 VITALS
DIASTOLIC BLOOD PRESSURE: 69 MMHG | SYSTOLIC BLOOD PRESSURE: 163 MMHG | WEIGHT: 182.31 LBS | HEIGHT: 71 IN | HEART RATE: 66 BPM | BODY MASS INDEX: 25.52 KG/M2

## 2024-12-30 DIAGNOSIS — M79.604 RIGHT LEG PAIN: Primary | ICD-10-CM

## 2024-12-30 DIAGNOSIS — M62.89 HAMSTRING TIGHTNESS OF RIGHT LOWER EXTREMITY: ICD-10-CM

## 2024-12-30 DIAGNOSIS — M79.604 RIGHT LEG PAIN: ICD-10-CM

## 2024-12-30 PROCEDURE — 73562 X-RAY EXAM OF KNEE 3: CPT | Mod: TC,LT

## 2024-12-30 PROCEDURE — 99214 OFFICE O/P EST MOD 30 MIN: CPT | Mod: S$PBB,,, | Performed by: ORTHOPAEDIC SURGERY

## 2024-12-30 PROCEDURE — 99999 PR PBB SHADOW E&M-EST. PATIENT-LVL III: CPT | Mod: PBBFAC,,, | Performed by: ORTHOPAEDIC SURGERY

## 2024-12-30 PROCEDURE — 99213 OFFICE O/P EST LOW 20 MIN: CPT | Mod: PBBFAC,25 | Performed by: ORTHOPAEDIC SURGERY

## 2024-12-30 PROCEDURE — 73564 X-RAY EXAM KNEE 4 OR MORE: CPT | Mod: 26,50,, | Performed by: RADIOLOGY

## 2024-12-30 NOTE — PROGRESS NOTES
CC: right knee pain    History of present illness: Pt is here today for right knee pain. We have reviewed his findings and discussed plan of care and future treatment options.       Since his right knee surgery he has continued to have posterior knee pain. At his last visit he was referred to Dr. Gallegos in vascular surgery for evaluation of right leg symptoms, including hair loss and night time cramping, in this patient with a hx of PAD. He was seen by Dr. Gallegos as well as neurosurgery, Dr. Deshpande: EMG was positive for right L4 and L5 radiculopathies. He did get relief following L4-5 TF NELSON.  The patient continues to be uninterested in surgery at this time given his pain relief. He was also see by Dr. Black in pain management            Describes his knee pain as medial and posterior     Did physical therapy for his back about 2 months ago with HEP  He had dry needling to the knee area with some relief that is temporary     Pain has always been there and worsened over the past 2-3 weeks with no new injury or trauma     His pain has increased recently, he has been taking gabapantin, Tramadol at night   This is not helping his pain       DATE OF PROCEDURE: 06/13/2023  SURGEON:  Tari Gregory M.D  PROCEDURE PERFORMED:   right  1. knee arthroscopic chondroplasty (CPT 23359)  2. knee arthroscopic medial and lateral (CPT 63502) meniscectomy   3. knee arthroscopic partial synovectomy/debridement (CPT 07690).   4. knee arthroscopic plica excision(CPT 58760).    5. Knee arthroscopic lysis of adhesions (CPT 39038)    In the patellofemoral compartment, there was chondral damage to:  Patella 10 x 15 mm grade 3  Chondroplasty was performed using arthroscopic shaver.                                                                               Review of Systems   Constitution: Negative. Negative for chills, fever and night sweats.   HENT: Negative for congestion and headaches.    Eyes: Negative for blurred vision, left vision loss  and right vision loss.   Cardiovascular: Negative for chest pain and syncope.   Respiratory: Negative for cough and shortness of breath.    Endocrine: Negative for polydipsia, polyphagia and polyuria.   Hematologic/Lymphatic: Negative for bleeding problem. Does not bruise/bleed easily.   Skin: Negative for dry skin, itching and rash.   Musculoskeletal: Negative for falls and muscle weakness.   Gastrointestinal: Negative for abdominal pain and bowel incontinence.   Genitourinary: Negative for bladder incontinence and nocturia.   Neurological: Negative for disturbances in coordination, loss of balance and seizures.   Psychiatric/Behavioral: Negative for depression. The patient does not have insomnia.    Allergic/Immunologic: Negative for hives and persistent infections.     PAST MEDICAL HISTORY:   Past Medical History:   Diagnosis Date    Anticoagulant long-term use     Aortic valve regurgitation     Aortic valve stenosis, nonrheumatic     Carotid stenosis, bilateral     Chronic diastolic heart failure, NYHA class 2     Chronic total occlusion of coronary artery     Coronary artery disease     Elevated PSA     UNDER CARE    Hypertension     Hypertensive heart disease     Osteoarthritis of right knee 3/22/2017    Polymyalgia     Restless legs     SOB (shortness of breath)      PAST SURGICAL HISTORY:   Past Surgical History:   Procedure Laterality Date    ARTHROSCOPIC CHONDROPLASTY OF KNEE JOINT Right 6/13/2023    Procedure: ARTHROSCOPY, KNEE, WITH CHONDROPLASTY;  Surgeon: Tari Gregory MD;  Location: [facility] OR;  Service: Orthopedics;  Laterality: Right;    BACK SURGERY      CAROTID STENT      HERNIA REPAIR      INJECTION OF FACET JOINT Right 9/6/2024    Procedure: INJECTION, FACET JOINT (L4/5);  Surgeon: Angel Black MD;  Location: [facility] OR;  Service: Pain Management;  Laterality: Right;    KNEE ARTHROPLASTY Right     KNEE ARTHROSCOPY W/ MENISCECTOMY Right 6/13/2023    Procedure: ARTHROSCOPY, KNEE, WITH MEDIAL AND  LATERAL MENISCECTOMY;  Surgeon: Tari Gregory MD;  Location: [facility] OR;  Service: Orthopedics;  Laterality: Right;    KNEE ARTHROSCOPY W/ PLICA EXCISION Right 6/13/2023    Procedure: EXCISION, PLICA, KNEE, ARTHROSCOPIC;  Surgeon: Tari Gregory MD;  Location: [facility] OR;  Service: Orthopedics;  Laterality: Right;    KNEE DEBRIDEMENT Right 6/13/2023    Procedure: DEBRIDEMENT, KNEE;  Surgeon: Tari Gregory MD;  Location: [facility] OR;  Service: Orthopedics;  Laterality: Right;    [id number] Right 6/13/2023    Procedure: [id number];  Surgeon: Tari Gregory MD;  Location: [facility] OR;  Service: Orthopedics;  Laterality: Right;    PERCUTANEOUS TRANSCATHETER AORTIC VALVE REPLACEMENT (TAVR) Right 12/14/2021    Procedure: REPLACEMENT, AORTIC VALVE, PERCUTANEOUS, TRANSCATHETER;  Surgeon: Johny Lockett MD;  Location: [facility] CATH;  Service: Cardiology;  Laterality: Right;  ops # [id number]    PERCUTANEOUS TRANSCATHETER AORTIC VALVE REPLACEMENT (TAVR) Right 12/14/2021    Procedure: REPLACEMENT, AORTIC VALVE, PERCUTANEOUS, TRANSCATHETER;  Surgeon: Sushma Piña MD;  Location: [facility] CATH;  Service: Cardiovascular;  Laterality: Right;    SYNOVECTOMY OF KNEE Right 6/13/2023    Procedure: PARTIAL SYNOVECTOMY, KNEE;  Surgeon: Tari Gregory MD;  Location: [facility] OR;  Service: Orthopedics;  Laterality: Right;     FAMILY HISTORY:   Family History   Problem Relation Name Age of Onset    Heart disease Mother      Hypertension Mother      Stroke Mother      Diabetes Mellitus Father      Heart disease Paternal Grandmother      Stroke Paternal Grandmother       SOCIAL HISTORY:   Social History     Socioeconomic History    Marital status:    Tobacco Use    Smoking status: Never    Smokeless tobacco: Never   Substance and Sexual Activity    Alcohol use: Never    Drug use: Never     Social Drivers of Health     Financial Resource Strain: Low Risk  (1/26/2024)    Overall Financial Resource Strain (CARDIA)     Difficulty of Paying  Living Expenses: Not hard at all   Food Insecurity: No Food Insecurity (1/26/2024)    Hunger Vital Sign     Worried About Running Out of Food in the Last Year: Never true     Ran Out of Food in the Last Year: Never true   Transportation Needs: No Transportation Needs (1/26/2024)    PRAPARE - Transportation     Lack of Transportation (Medical): No     Lack of Transportation (Non-Medical): No   Physical Activity: Sufficiently Active (1/26/2024)    Exercise Vital Sign     Days of Exercise per Week: 5 days     Minutes of Exercise per Session: 60 min   Stress: No Stress Concern Present (1/26/2024)    [employer] - Occupational Stress Questionnaire     Feeling of Stress : Only a little   Housing Stability: Low Risk  (1/26/2024)    Housing Stability Vital Sign     Unable to Pay for Housing in the Last Year: No     Number of Places Lived in the Last Year: 0     Unstable Housing in the Last Year: No       MEDICATIONS:   Current Outpatient Medications:     acetaminophen (TYLENOL) 500 MG tablet, Take 1,000 mg by mouth every 6 (six) hours as needed for Pain., Disp: , Rfl:     ascorbic acid, vitamin C, (VITAMIN C) 250 MG tablet, Take 250 mg by mouth once daily., Disp: , Rfl:     aspirin (ECOTRIN) 81 MG EC tablet, Take 81 mg by mouth once daily., Disp: , Rfl:     clopidogreL (PLAVIX) 75 mg tablet, Take 75 mg by mouth once daily., Disp: , Rfl:     coenzyme Q10 100 mg capsule, Take 200 mg by mouth once daily., Disp: , Rfl:     gabapentin (NEURONTIN) 600 MG tablet, Take 1 tablet (600 mg total) by mouth 2 (two) times daily., Disp: 60 tablet, Rfl: 3    magnesium 250 mg Tab, Take 250 mg by mouth once daily., Disp: , Rfl:     multivitamin (THERAGRAN) per tablet, Take 1 tablet by mouth once daily., Disp: , Rfl:     mv-mn/iron/folic acid/herb 190 (VITAMIN D3 COMPLETE ORAL), Take 5,000 Units by mouth Daily., Disp: , Rfl:     NIFEdipine (PROCARDIA-XL) 30 MG (OSM) 24 hr tablet, Take 30 mg by mouth once daily.,  "Disp: , Rfl:     olmesartan (BENICAR) 40 MG tablet, Take 40 mg by mouth once daily., Disp: , Rfl:     rosuvastatin (CRESTOR) 20 MG tablet, Take 20 mg by mouth once daily., Disp: , Rfl:     traZODone (DESYREL) 150 MG tablet, Take 150 mg by mouth every evening., Disp: , Rfl:     zolpidem (AMBIEN) 10 mg Tab, Take 10 mg by mouth nightly as needed., Disp: , Rfl:   ALLERGIES:   Review of patient's allergies indicates:   Allergen Reactions    Atorvastatin calcium Other (See Comments)     Leg cramps    Cortisone      Inj- pt reports hiccups and feels spasm     Duloxetine Other (See Comments)     DRY MONTH  NOT ABLE TO SLEEP  NOT HUNDRY  SWEATING A LOT  FEELING TIRED AND WEAK  DIZZINESS  WEIGHT LOSS ( 9 LB.)    Flomax [tamsulosin] Diarrhea    Pravastatin Other (See Comments)     Leg cramps       VITAL SIGNS: BP (!) 163/69 (BP Location: Right arm, Patient Position: Sitting)   Pulse 66   Ht 5' 10.5" (1.791 m)   Wt 82.7 kg (182 lb 5.1 oz)   BMI 25.79 kg/m²        PHYSICAL EXAMINATION:     Incision sites healed well  No evidence of any erythema, infection or induration  Range of motion 0-135 degrees  No effusion  2+ DP pulse  No swelling, no calf tenderness  - Haresh's sign  Negative medial joint line tenderness  Mild quad atrophy    + tenderness distal hamstring tendons and pes bursa   + hamstring tightness (significant)  + tenderness medial and lateral gastrocnemius heads    Hamstring pain with forced knee extension   Calf pain with forced ankle dorsiflexion     Other Findings: He has lost most of the hair on his lower legs over the years                                                                               ASSESSMENT:                                                                                                                                               1. Status post above, hamstring pain and tightness.                                                                                                             "                   PLAN:                                                                                                                                                  1. Recommend chiropractic treatment with Dr. Fran Rouse   2. Emphasized hamstring stretching.  3. Follow up with us as need.   4. All questions were answered, surgical technique was reviewed and interpreted, and patient should contact us with any questions or concerns in the interim.                                                        I made the decision to obtain old records of the patient including previous notes and imaging. New imaging was ordered today of the extremity or extremities evaluated. I independently reviewed and interpreted the radiographs and/or MRIs today as well as prior imaging.

## 2024-12-31 ENCOUNTER — TELEPHONE (OUTPATIENT)
Dept: SPORTS MEDICINE | Facility: CLINIC | Age: 79
End: 2024-12-31
Payer: MEDICARE

## 2024-12-31 NOTE — TELEPHONE ENCOUNTER
----- Message from Bela sent at 12/31/2024  8:41 AM CST -----  Regarding: [facility]  Contact: THERAPY  Staff from [facility]  was calling regarding getting a pt's Xray faxed over to them.. The pt is currently at the location.       [phone] CONTACT    [phone] FAX

## 2025-02-18 RX ORDER — GABAPENTIN 600 MG/1
600 TABLET ORAL NIGHTLY
Qty: 30 TABLET | Refills: 0 | Status: SHIPPED | OUTPATIENT
Start: 2025-02-18

## 2025-02-19 ENCOUNTER — OFFICE VISIT (OUTPATIENT)
Dept: SPORTS MEDICINE | Facility: CLINIC | Age: 80
End: 2025-02-19
Payer: MEDICARE

## 2025-02-19 VITALS
WEIGHT: 187 LBS | SYSTOLIC BLOOD PRESSURE: 152 MMHG | HEART RATE: 72 BPM | BODY MASS INDEX: 26.18 KG/M2 | DIASTOLIC BLOOD PRESSURE: 68 MMHG | HEIGHT: 71 IN

## 2025-02-19 DIAGNOSIS — M79.604 RIGHT LEG PAIN: Primary | ICD-10-CM

## 2025-02-19 PROCEDURE — 99213 OFFICE O/P EST LOW 20 MIN: CPT | Mod: PBBFAC | Performed by: ORTHOPAEDIC SURGERY

## 2025-02-19 NOTE — PROGRESS NOTES
CC: right knee pain    History of present illness: Pt is here today for right knee pain. We have reviewed his findings and discussed plan of care and future treatment options.       Since his right knee surgery he has continued to have posterior knee pain. At his last visit he was referred to Dr. Gallegos in vascular surgery for evaluation of right leg symptoms, including hair loss and night time cramping, in this patient with a hx of PAD. He was seen by Dr. Gallegos as well as neurosurgery, Dr. Deshpande: EMG was positive for right L4 and L5 radiculopathies. He did get relief following L4-5 TF NELSON.  The patient continues to be uninterested in surgery at this time given his pain relief. He was also see by Dr. Black in pain management            Describes his knee pain as medial and posterior     Did physical therapy for his back about 2 months ago with HEP  He had dry needling to the knee area with some relief that is temporary     Pain has always been there and worsened over the past 2-3 weeks with no new injury or trauma     His pain has increased recently, he has been taking gabapantin, Tramadol at night   This is not helping his pain     S/p  DATE OF PROCEDURE: 09/06/2024  PROCEDURE: Right L4/5 Lumbar Facet Joint Injection under Fluoroscopy    PHYSICIAN: Angel Black MD  Helped back pain x3 weeks 50% but no better to back of knee area    Had  and soft tissue work with Dr. Peters no help  Had PT dry needling no help    Per Dr. Black note:  May consider: Right L4 and L5 TFESI, lumbar medial branch block at right L4-L5 and L5-S1       DATE OF PROCEDURE: 06/13/2023  SURGEON:  Tari Gregory M.D  PROCEDURE PERFORMED:   right  1. knee arthroscopic chondroplasty (CPT 57961)  2. knee arthroscopic medial and lateral (CPT 43107) meniscectomy   3. knee arthroscopic partial synovectomy/debridement (CPT 47884).   4. knee arthroscopic plica excision(CPT 61316).    5. Knee arthroscopic lysis of adhesions (CPT 04892)    In the  patellofemoral compartment, there was chondral damage to:  Patella 10 x 15 mm grade 3  Chondroplasty was performed using arthroscopic shaver.                                                                               Review of Systems   Constitution: Negative. Negative for chills, fever and night sweats.   HENT: Negative for congestion and headaches.    Eyes: Negative for blurred vision, left vision loss and right vision loss.   Cardiovascular: Negative for chest pain and syncope.   Respiratory: Negative for cough and shortness of breath.    Endocrine: Negative for polydipsia, polyphagia and polyuria.   Hematologic/Lymphatic: Negative for bleeding problem. Does not bruise/bleed easily.   Skin: Negative for dry skin, itching and rash.   Musculoskeletal: Negative for falls and muscle weakness.   Gastrointestinal: Negative for abdominal pain and bowel incontinence.   Genitourinary: Negative for bladder incontinence and nocturia.   Neurological: Negative for disturbances in coordination, loss of balance and seizures.   Psychiatric/Behavioral: Negative for depression. The patient does not have insomnia.    Allergic/Immunologic: Negative for hives and persistent infections.     PAST MEDICAL HISTORY:   Past Medical History:   Diagnosis Date    Anticoagulant long-term use     Aortic valve regurgitation     Aortic valve stenosis, nonrheumatic     Carotid stenosis, bilateral     Chronic diastolic heart failure, NYHA class 2     Chronic total occlusion of coronary artery     Coronary artery disease     Elevated PSA     UNDER CARE    Hypertension     Hypertensive heart disease     Osteoarthritis of right knee 3/22/2017    Polymyalgia     Restless legs     SOB (shortness of breath)      PAST SURGICAL HISTORY:   Past Surgical History:   Procedure Laterality Date    ARTHROSCOPIC CHONDROPLASTY OF KNEE JOINT Right 6/13/2023    Procedure: ARTHROSCOPY, KNEE, WITH CHONDROPLASTY;  Surgeon: Tari Gregory MD;  Location: [facility] OR;   Service: Orthopedics;  Laterality: Right;    BACK SURGERY      CAROTID STENT      HERNIA REPAIR      INJECTION OF FACET JOINT Right 9/6/2024    Procedure: INJECTION, FACET JOINT (L4/5);  Surgeon: Angel Black MD;  Location: [facility] OR;  Service: Pain Management;  Laterality: Right;    KNEE ARTHROPLASTY Right     KNEE ARTHROSCOPY W/ MENISCECTOMY Right 6/13/2023    Procedure: ARTHROSCOPY, KNEE, WITH MEDIAL AND LATERAL MENISCECTOMY;  Surgeon: Tari Gregory MD;  Location: [facility] OR;  Service: Orthopedics;  Laterality: Right;    KNEE ARTHROSCOPY W/ PLICA EXCISION Right 6/13/2023    Procedure: EXCISION, PLICA, KNEE, ARTHROSCOPIC;  Surgeon: Tari Gregory MD;  Location: [facility] OR;  Service: Orthopedics;  Laterality: Right;    KNEE DEBRIDEMENT Right 6/13/2023    Procedure: DEBRIDEMENT, KNEE;  Surgeon: Tari Gregory MD;  Location: [facility] OR;  Service: Orthopedics;  Laterality: Right;    [id number] Right 6/13/2023    Procedure: [id number];  Surgeon: Tari Gregory MD;  Location: [facility] OR;  Service: Orthopedics;  Laterality: Right;    PERCUTANEOUS TRANSCATHETER AORTIC VALVE REPLACEMENT (TAVR) Right 12/14/2021    Procedure: REPLACEMENT, AORTIC VALVE, PERCUTANEOUS, TRANSCATHETER;  Surgeon: Johny Lockett MD;  Location: [facility] CATH;  Service: Cardiology;  Laterality: Right;  ops # [id number]    PERCUTANEOUS TRANSCATHETER AORTIC VALVE REPLACEMENT (TAVR) Right 12/14/2021    Procedure: REPLACEMENT, AORTIC VALVE, PERCUTANEOUS, TRANSCATHETER;  Surgeon: Sushma Piña MD;  Location: [facility] CATH;  Service: Cardiovascular;  Laterality: Right;    SYNOVECTOMY OF KNEE Right 6/13/2023    Procedure: PARTIAL SYNOVECTOMY, KNEE;  Surgeon: Tari Gregory MD;  Location: [facility] OR;  Service: Orthopedics;  Laterality: Right;     FAMILY HISTORY:   Family History   Problem Relation Name Age of Onset    Heart disease Mother      Hypertension Mother      Stroke Mother      Diabetes Mellitus Father      Heart disease Paternal Grandmother       Stroke Paternal Grandmother       SOCIAL HISTORY:   Social History     Socioeconomic History    Marital status:    Tobacco Use    Smoking status: Never    Smokeless tobacco: Never   Substance and Sexual Activity    Alcohol use: Never    Drug use: Never     Social Drivers of Health     Financial Resource Strain: Low Risk  (2/12/2025)    Overall Financial Resource Strain (CARDIA)     Difficulty of Paying Living Expenses: Not hard at all   Food Insecurity: No Food Insecurity (2/12/2025)    Hunger Vital Sign     Worried About Running Out of Food in the Last Year: Never true     Ran Out of Food in the Last Year: Never true   Transportation Needs: No Transportation Needs (2/12/2025)    PRAPARE - Transportation     Lack of Transportation (Medical): No     Lack of Transportation (Non-Medical): No   Physical Activity: Sufficiently Active (2/12/2025)    Exercise Vital Sign     Days of Exercise per Week: 5 days     Minutes of Exercise per Session: 60 min   Stress: No Stress Concern Present (2/12/2025)    [employer] - Occupational Stress Questionnaire     Feeling of Stress : Not at all   Housing Stability: Low Risk  (2/12/2025)    Housing Stability Vital Sign     Unable to Pay for Housing in the Last Year: No     Homeless in the Last Year: No       MEDICATIONS:   Current Outpatient Medications:     acetaminophen (TYLENOL) 500 MG tablet, Take 1,000 mg by mouth every 6 (six) hours as needed for Pain., Disp: , Rfl:     ascorbic acid, vitamin C, (VITAMIN C) 250 MG tablet, Take 250 mg by mouth once daily., Disp: , Rfl:     aspirin (ECOTRIN) 81 MG EC tablet, Take 81 mg by mouth once daily., Disp: , Rfl:     clopidogreL (PLAVIX) 75 mg tablet, Take 75 mg by mouth once daily., Disp: , Rfl:     coenzyme Q10 100 mg capsule, Take 200 mg by mouth once daily., Disp: , Rfl:     gabapentin (NEURONTIN) 600 MG tablet, Take 1 tablet by mouth in the evening, Disp: 30 tablet, Rfl: 0    magnesium 250 mg Tab, Take  "250 mg by mouth once daily., Disp: , Rfl:     multivitamin (THERAGRAN) per tablet, Take 1 tablet by mouth once daily., Disp: , Rfl:     mv-mn/iron/folic acid/herb 190 (VITAMIN D3 COMPLETE ORAL), Take 5,000 Units by mouth Daily., Disp: , Rfl:     NIFEdipine (PROCARDIA-XL) 30 MG (OSM) 24 hr tablet, Take 30 mg by mouth once daily., Disp: , Rfl:     olmesartan (BENICAR) 40 MG tablet, Take 40 mg by mouth once daily., Disp: , Rfl:     rosuvastatin (CRESTOR) 20 MG tablet, Take 20 mg by mouth once daily., Disp: , Rfl:     traZODone (DESYREL) 150 MG tablet, Take 150 mg by mouth every evening., Disp: , Rfl:     zolpidem (AMBIEN) 10 mg Tab, Take 10 mg by mouth nightly as needed., Disp: , Rfl:   ALLERGIES:   Review of patient's allergies indicates:   Allergen Reactions    Atorvastatin calcium Other (See Comments)     Leg cramps    Cortisone      Inj- pt reports hiccups and feels spasm     Duloxetine Other (See Comments)     DRY MONTH  NOT ABLE TO SLEEP  NOT HUNDRY  SWEATING A LOT  FEELING TIRED AND WEAK  DIZZINESS  WEIGHT LOSS ( 9 LB.)    Flomax [tamsulosin] Diarrhea    Pravastatin Other (See Comments)     Leg cramps       VITAL SIGNS: BP (!) 152/68   Pulse 72   Ht 5' 10.5" (1.791 m)   Wt 84.8 kg (187 lb)   BMI 26.45 kg/m²        PHYSICAL EXAMINATION:     Incision sites healed well  No evidence of any erythema, infection or induration  Range of motion 0-135 degrees  No effusion  2+ DP pulse  No swelling, no calf tenderness  - Haresh's sign  Negative medial joint line tenderness  Mild quad atrophy    + tenderness distal hamstring tendons and pes bursa   + hamstring tightness (significant)  + tenderness medial and lateral gastrocnemius heads    Hamstring pain with forced knee extension   Calf pain with forced ankle dorsiflexion     Other Findings: He has lost most of the hair on his lower legs over the years                                                                               ASSESSMENT:                               "                                                                                                                 1. Status post above, hamstring pain and tightness.  Per Dr. Black consider: Right L4 and L5 TFESI, lumbar medial branch block at right L4-L5 and L5-S1                                                                                                                             PLAN:                                                                                                                                                  1. Recommend proceeding with Per Dr. Black: Right L4 and L5 TFESI, lumbar medial branch block at right L4-L5 and L5-S1   2. Emphasized hamstring stretching.  3. Follow up with us as need.   4. All questions were answered, surgical technique was reviewed and interpreted, and patient should contact us with any questions or concerns in the interim.                                                        I made the decision to obtain old records of the patient including previous notes and imaging. New imaging was ordered today of the extremity or extremities evaluated. I independently reviewed and interpreted the radiographs and/or MRIs today as well as prior imaging.

## 2025-02-20 ENCOUNTER — OFFICE VISIT (OUTPATIENT)
Dept: PAIN MEDICINE | Facility: CLINIC | Age: 80
End: 2025-02-20
Payer: MEDICARE

## 2025-02-20 ENCOUNTER — TELEPHONE (OUTPATIENT)
Dept: PAIN MEDICINE | Facility: CLINIC | Age: 80
End: 2025-02-20
Payer: MEDICARE

## 2025-02-20 ENCOUNTER — PATIENT MESSAGE (OUTPATIENT)
Dept: NEUROLOGY | Facility: CLINIC | Age: 80
End: 2025-02-20
Payer: MEDICARE

## 2025-02-20 VITALS
HEIGHT: 70 IN | OXYGEN SATURATION: 96 % | SYSTOLIC BLOOD PRESSURE: 129 MMHG | DIASTOLIC BLOOD PRESSURE: 62 MMHG | WEIGHT: 184 LBS | BODY MASS INDEX: 26.34 KG/M2 | HEART RATE: 78 BPM

## 2025-02-20 DIAGNOSIS — M48.061 LUMBAR FORAMINAL STENOSIS: ICD-10-CM

## 2025-02-20 DIAGNOSIS — M54.16 LUMBAR RADICULITIS: Primary | ICD-10-CM

## 2025-02-20 DIAGNOSIS — M51.26 LUMBAR HERNIATED DISC: ICD-10-CM

## 2025-02-20 DIAGNOSIS — G57.91 NEUROPATHY OF RIGHT LOWER EXTREMITY: ICD-10-CM

## 2025-02-20 DIAGNOSIS — M54.16 LUMBAR RADICULOPATHY: ICD-10-CM

## 2025-02-20 PROCEDURE — 99214 OFFICE O/P EST MOD 30 MIN: CPT | Mod: S$PBB | Performed by: ANESTHESIOLOGY

## 2025-02-20 PROCEDURE — 99214 OFFICE O/P EST MOD 30 MIN: CPT | Mod: PBBFAC | Performed by: ANESTHESIOLOGY

## 2025-02-20 RX ORDER — ATORVASTATIN CALCIUM 40 MG/1
TABLET, FILM COATED ORAL
COMMUNITY
Start: 2025-02-14

## 2025-02-20 NOTE — PROGRESS NOTES
EST Patient Evaluation  [facility] interventional pain management    Ghanshyam Sanford Jr.  : 1945  Date: 2025     CHIEF COMPLAINT:  Leg Pain and Low-back Pain    Referring Physician: No ref. provider found  Primary Care Physician: Kevin Lombardi MD    HPI:  This is a 79 y.o. male with a chief complaint of Leg Pain and Low-back Pain  . The patient has Past medical history/Past surgical history of  compression fracture of L1 status post kyphoplasty, compression fracture of thoracic vertebrae, restless  legs syndrome, hypertension, chronic diastolic heart failure, left bundle branch block, polymyalgia rheumatica, HLAb27, TAVR, long-term anticoagulation    Patient was evaluated and referred by Dr Isael Garner.  He was referred from Neurosurgery team based on cardiology referral for right leg pain  Interval History 2024:  Ghanshyam Sanford Jr. is here for follow up visit  to establish care with me. Ghanshyam Sanford Jr. Had  right L4-L5 lumbar facet joint injection,  patient reported 50 % improvement in pain and functionality for 2 weeks.  Current pain score: 7/10    Interval History 2024:  Ghanshyam Sanford Jr. is here for follow up visit after 7 physical therapy sessions which provided him with a relief for his in the right knee pain.  Today he presents with the acute onset left shoulder pain from excessive throwing   while fishing.  Current pain score: 3/10, at night 5/10    Interval History 2025:  Ghanshyam Sanford Jr. is here for follow up visit to discuss interventional procedure  in regards to his going right lower extremity pain/neuropathy  Current pain score: 4/10    Diabetic: No    Anticoagulation medications: 81 mg Aspirin  and Plavix -  last stent in     Allergy To Iodine: No    Currently on Antibiotic: No    Current Description of Pain Symptoms:  History of Recent Fall or Trauma: No   Onset: Chronic  Pain Location:  minimal low back pain,  posterior medial aspect of the  right knee  Radiates/associated symptoms: RT LE to the calf  associated with tenderness  Pain is Getting worse over the last 3 months    The pain is described as burning and pulling .   Exacerbating factors:  stretching  Mitigating factors N/A.   Symptoms interfere with daily activity, sleeping.   The patient feels like symptoms have been worsening.   Patient denies night fever/night sweats, urinary incontinence, bowel incontinence, significant weight loss, significant motor weakness, and loss of sensations.    Pain score:  Best: 7/10  Worst: 10/10    Current pain medications:    gabapentin (NEURONTIN) 600 MG tablet, QHS-  can not take morning dose  Current Narcotics/Opioid /benzo Medications:  Opioids- None  Benzodiazepines: No    UDS:  NA    PDMP:  Reviewed and consistent with medication use as prescribed.     Previous Chronic Pain Treatment History:  Physical Therapy/HEP/Physician Lead Exercise Program:  Over the past 12 months, Patient has done 6+ sessions for lower back and shoulder.  PT response: Mildly Helpful.   Dates of the PT sessions: 10/2024-present.  Is patient participating in home exercise program (HEP)/ physician led exercise program: Yes.Completes HEP that was provided by PT in addition to formal therapy.    Non-interventional Pain Therapy:  []Chiropractor.   []Acupuncture/Dry needle.  []TENS unit.  [x]Heat/ICE.  []Back Brace.    Medications previously tried:  NSAIDs: None  Topical Agent: Yes  TCA/SSRI/SNRI: None  Anti-convulsants: Gabapentin   Muscle Relaxants: None  Opioids- None.    Interventional Pain Procedures:  04/11/2024:  L4-L5 right transforaminal with IR-  80% relief for 3 weeks  09/6/ 2024:  right L4-L5 facet injection- 50% relief. For 2 weeks with Dexamethasone   Kyphoplasty L1    Previous spine/Relevant joint surgery:   Right knee arthroplasty  Surgical History:   has a past surgical history that includes Hernia repair; Knee Arthroplasty (Right); Carotid stent; Back surgery;  Percutaneous transcatheter aortic valve replacement (TAVR) (Right, 12/14/2021); Percutaneous transcatheter aortic valve replacement (TAVR) (Right, 12/14/2021); Knee arthroscopy w/ meniscectomy (Right, 6/13/2023); Arthroscopic chondroplasty of knee joint (Right, 6/13/2023); Knee arthroscopy w/ plica excision (Right, 6/13/2023); Synovectomy of knee (Right, 6/13/2023); Knee debridement (Right, 6/13/2023); [id number] (Right, 6/13/2023); and Injection of facet joint (Right, 9/6/2024).  Medical History:   has a past medical history of Anticoagulant long-term use, Aortic valve regurgitation, Aortic valve stenosis, nonrheumatic, Carotid stenosis, bilateral, Chronic diastolic heart failure, NYHA class 2, Chronic total occlusion of coronary artery, Coronary artery disease, Elevated PSA, Hypertension, Hypertensive heart disease, Osteoarthritis of right knee (3/22/2017), Polymyalgia, Restless legs, and SOB (shortness of breath).  Family History:  family history includes Diabetes Mellitus in his father; Heart disease in his mother and paternal grandmother; Hypertension in his mother; Stroke in his mother and paternal grandmother.  Allergies:  Atorvastatin calcium, Cortisone, Duloxetine, Flomax [tamsulosin], and Pravastatin   Social History/SUBSTANCE ABUSE HISTORY:  Personal history of substance abuse: No   reports that he has never smoked. He has never used smokeless tobacco. He reports that he does not drink alcohol and does not use drugs.  LABS:  CBC  Lab Results   Component Value Date    WBC 6.30 09/18/2023    HGB 13.7 (L) 09/18/2023    HCT 42.0 09/18/2023     Coagulation Profile   Lab Results   Component Value Date     09/18/2023       Lab Results   Component Value Date    INR 1.0 12/13/2021     CMP:  BMP  Lab Results   Component Value Date     09/18/2023    K 4.4 09/18/2023     09/18/2023    CO2 26 09/18/2023    BUN 15 09/18/2023    CREATININE 1.0 02/12/2024    CALCIUM 9.1 09/18/2023     "ANIONGAP 8 09/18/2023    EGFRNORACEVR >60 02/12/2024     Lab Results   Component Value Date    ALT 16 09/18/2023    AST 18 09/18/2023    ALKPHOS 63 09/18/2023    BILITOT 0.7 09/18/2023     HGBA1C:  No results found for: "LABA1C", "HGBA1C"    ROS:    Review of Systems   GENERAL:  No weight loss, malaise or fevers.  HEENT:   No recent changes in vision or hearing  NECK:  Negative for lumps, no difficulty with swallowing.  RESPIRATORY:  Negative for cough, wheezing or shortness of breath, patient denies any recent URI.  CARDIOVASCULAR:  Negative for chest pain or palpitations.  GI:  Negative for abdominal discomfort, blood in stools or black stools or change in bowel habits.  MUSCULOSKELETAL:  See HPI.  SKIN:  Negative for lesions, rash, and itching.  PSYCH:  No mood disorder or recent psychosocial stressors.   HEMATOLOGY/LYMPHOLOGY:  See the blood thinner sectioned in HPI.  NEURO:  See HPI  All other reviewed and negative other than HPI.    PHYSICAL EXAM:  VITALS: /62 (BP Location: Right arm, Patient Position: Sitting)   Pulse 78   Ht 5' 10" (1.778 m)   Wt 83.5 kg (184 lb)   SpO2 96%   BMI 26.40 kg/m²   Body mass index is 26.4 kg/m².  GENERAL: Well appearing, in no acute distress, alert and oriented x3, answers questions appropriately.   PSYCH: Flat affect.  SKIN: Skin color, texture, turgor normal, no rashes or lesions.  HEAD/FACE:  Normocephalic, atraumatic. Cranial nerves grossly intact.  CV: Regular rate  PULM: No evidence of respiratory difficulty, symmetric chest rise.  GI:  Soft and non-Distended.  BACK/SIJ/HIP:  Lumbar Spine Exam:       Inspection: No erythema, bruising.       Palpation: (+) TTP of lumbar paraspinals Right sided       ROM:  Limited in flexion, extension, lateral bending.       (-) Facet loading bilateral      (+) Straight Leg Raise, Right      (-) MICHAEL, Tenderness over the PSIS, Yeoman test, bilateral  Hip Exam:      Inspection: No gross deformity or apparent leg length " discrepancy      Palpation:  No TTP to bilateral greater trochanteric bursas.       ROM:  No limitation Due to pain in internal rotation, external rotation b/l  Neurologic Exam:     Alert. Speech is fluent and appropriate.     Strength: 5/5 in bilateral hip flexion and knee extension     Sensation:  Grossly intact to light touch in bilateral lower extremities     Tone: No abnormality appreciated in bilateral lower extremities    GAIT:  normal gait  Left shoulder exam:  SHOULDERS: No gross deformity or atrophy noted.   ROM: +  limited in  abduction external rotation  Rotator cuff exam GAMING:  mild+  EMPTY CAN SIGN:  mild+    DIAGNOSTIC STUDIES AND MEDICAL RECORDS REVIEW:  I have personally reviewed and interpreted relevant radiology reports and reviewed relevant records from other services in the EMR.     -EMG/nerve conduction study showed right L4 and L5 radiculopathy    -MRI lumbar spine 02/2024:   T12-L1: No spinal canal stenosis or neural foraminal narrowing.     L1-L2: No spinal canal stenosis or neural foraminal narrowing.     L2-L3: No spinal canal stenosis or neural foraminal narrowing.     L3-L4: There is asymmetric disc bulging to the left with mild facet arthropathy, contributing to mild spinal canal stenosis and moderate left-sided neural foraminal narrowing     L4-L5: Mild disc bulging and prominent facet joint arthropathy on the right side, noting joint hypertrophy with extensive subchondral marrow edema and surrounding inflammatory change (series 6, images 6-7).  There is a small right-sided synovial cyst measuring 10 mm (series 9, image 36) abutting and possibly impinging upon the descending right L5 nerve root.  There is overall mild spinal canal stenosis and moderate right and mild left-sided neural foraminal narrowing.  Slight anterolisthesis noted.     L5-S1: Mild disc bulge with posterior annular fissure.  There is moderate disc height loss on the left side.  These findings contribute to  mild/moderate left-sided neural foraminal narrowing.  No spinal canal stenosis .     Impression:     Remote L1 compression fracture post vertebral plasty, stable.  No acute fractures.     Lumbar spondylosis, contributing to mild spinal canal stenosis L3-4 and L4-5 and moderate neural foraminal narrowing L3-4 through L5-S1, as above.     Prominent right-sided facet joint arthropathy at L4-5 with small anterior synovial cyst impinging upon the spinal canal, as above.    Clinical Impression:  This is a pleasant 79 y.o. male patient with PMH/PSH of compression fracture of L1 status post kyphoplasty, compression fracture of thoracic vertebrae, restless  legs syndrome, hypertension, chronic diastolic heart failure, left bundle branch block, polymyalgia rheumatica, HLAb27, TAVR, long-term anticoagulation, coronary artery disease status post LAD and RCA stent, presenting  right knee pain mainly in the posteromedial aspect associated with tenderness  and radiation to the right calf above the ankle.    Encounter Diagnosis:  Ghanshyam Sanford Jr. is a 79 y.o. male with the following diagnoses based on history, exam, and imagin. Lumbar radiculitis    2. Lumbar radiculopathy    3. Lumbar herniated disc    4. Neuropathy of right lower extremity    5. Lumbar foraminal stenosis    Treatment Plan:    Diagnostics/Referrals:  continue with a home exercise    Medications:   Continue as prescribed  NSAIDs: None  Topical Agent: No  TCA/SSRI/SNRI: None  Anti-convulsants: Gabapentin   Muscle Relaxants: None  Opioids: None    Interventional Therapy: please schedule Right L4 and L5 TFESI  Sedation: IV sedation  Anticoagulation medications: 81 mg Aspirin  and Plavix  -Clearance to stop Blood thinner: yes, 7 days     Regarding the above interventions, the patient has been educated regarding the risks (including bleeding, infection, increased pain, nerve damage, or allergic reaction), benefits, and alternatives. The patient states he  understands and is eager to proceed.    Follow-up: RTC 6 weeks.    May consider:   femoral and sciatic pain PNS, right sympathetic nerve block    Angel Black MD  Anesthesiologist  Interventional Pain Medicine  02/20/2025    Disclaimer:  This note was prepared using voice recognition system and is likely to have sound alike errors that may have been overlooked even after proof reading.  Please call me with any questions.

## 2025-02-20 NOTE — PROGRESS NOTES
EST Patient Evaluation  [facility]    Ghanshyam Sanford Jr.  : 1945  Date: 2025     CHIEF COMPLAINT:  No chief complaint on file.    Referring Physician: No ref. provider found  Primary Care Physician: Kevin Lombardi MD    HPI:  This is a 79 y.o. male with a chief complaint of No chief complaint on file.  . The patient has Past medical history/Past surgical history of  compression fracture of L1 status post kyphoplasty, compression fracture of thoracic vertebrae, restless  legs syndrome, hypertension, chronic diastolic heart failure, left bundle branch block, polymyalgia rheumatica, HLAb27, TAVR, long-term anticoagulation    Patient was evaluated and referred by Dr Isael Garner.  He was referred from Neurosurgery team based on cardiology referral for right leg pain  Interval History 2024:  Ghanshyam Sanford Jr. is here for follow up visit  to establish care with me. Ghanshyam Sanford Jr. Had  right L4-L5 lumbar facet joint injection,  patient reported 50 % improvement in pain and functionality for 2 weeks.  Current pain score: 7/10    Interval History 2024:  Ghanshyam Sanford Jr. is here for follow up visit after 7 physical therapy sessions which provided him with a relief for his in the right knee pain.  Today he presents with the acute onset left shoulder pain from excessive throwing   while fishing.  Current pain score: 3/10, at night 5/10    Interval History 2025:  Ghanshyam Sanford Jr. is here for follow up visit after***    Current pain score: ***/10      Diabetic: No    Anticoagulation medications: 81 mg Aspirin  and Plavix     Allergy To Iodine: No    Currently on Antibiotic: No    Current Description of Pain Symptoms:  History of Recent Fall or Trauma: {GAYes/No/NA:14917}   Onset: Chronic, started ***  Pain Location: ***  Radiates/associated symptoms: ***.   Pain is Getting worse over the last *** months    The pain is described as {Desc; pain  character:82661}.   Exacerbating factors: {Causes; Pain:03684}.   Mitigating factors ***.   Symptoms interfere with daily activity, sleeping, and ***.   The patient feels like symptoms have been {IUW:78855}.   Patient {Denies / Reports:11240} {RED FLAGS:55366}.      Pain score:  Best: 7/10  Worst: 10/10    Current pain medications:    gabapentin (NEURONTIN) 600 MG tablet, QHS-  can not take morning dose  Current Narcotics/Opioid /benzo Medications:  Opioids- None  Benzodiazepines: No    UDS:  NA    PDMP:  Reviewed and consistent with medication use as prescribed.     Previous Chronic Pain Treatment History:  Physical Therapy/HEP/Physician Lead Exercise Program:  Over the past 12 months, Patient has done 6+ sessions for lower back and shoulder.  PT response: Mildly Helpful.   Dates of the PT sessions: 10/2024-present.  Is patient participating in home exercise program (HEP)/ physician led exercise program: Yes.Completes HEP that was provided by PT in addition to formal therapy.    Non-interventional Pain Therapy:  []Chiropractor.   []Acupuncture/Dry needle.  []TENS unit.  [x]Heat/ICE.  []Back Brace.    Medications previously tried:  NSAIDs: None  Topical Agent: Yes  TCA/SSRI/SNRI: None  Anti-convulsants: Gabapentin   Muscle Relaxants: None  Opioids- None.    Interventional Pain Procedures:  04/11/2024:  L4-L5 right transforaminal with IR-  80% relief for 3 weeks  09/6/ 2024:  right L4-L5 facet injection- 50% relief. For 2 weeks with Dexamethasone   Kyphoplasty L1    Previous spine/Relevant joint surgery:   Right knee arthroplasty  Surgical History:   has a past surgical history that includes Hernia repair; Knee Arthroplasty (Right); Carotid stent; Back surgery; Percutaneous transcatheter aortic valve replacement (TAVR) (Right, 12/14/2021); Percutaneous transcatheter aortic valve replacement (TAVR) (Right, 12/14/2021); Knee arthroscopy w/ meniscectomy (Right, 6/13/2023); Arthroscopic chondroplasty of knee joint  "(Right, 6/13/2023); Knee arthroscopy w/ plica excision (Right, 6/13/2023); Synovectomy of knee (Right, 6/13/2023); Knee debridement (Right, 6/13/2023); uvywm-wlqobybo-czfdygvzfmxk (Right, 6/13/2023); and Injection of facet joint (Right, 9/6/2024).  Medical History:   has a past medical history of Anticoagulant long-term use, Aortic valve regurgitation, Aortic valve stenosis, nonrheumatic, Carotid stenosis, bilateral, Chronic diastolic heart failure, NYHA class 2, Chronic total occlusion of coronary artery, Coronary artery disease, Elevated PSA, Hypertension, Hypertensive heart disease, Osteoarthritis of right knee (3/22/2017), Polymyalgia, Restless legs, and SOB (shortness of breath).  Family History:  family history includes Diabetes Mellitus in his father; Heart disease in his mother and paternal grandmother; Hypertension in his mother; Stroke in his mother and paternal grandmother.  Allergies:  Atorvastatin calcium, Cortisone, Duloxetine, Flomax [tamsulosin], and Pravastatin   Social History/SUBSTANCE ABUSE HISTORY:  Personal history of substance abuse: No   reports that he has never smoked. He has never used smokeless tobacco. He reports that he does not drink alcohol and does not use drugs.  LABS:  CBC  Lab Results   Component Value Date    WBC 6.30 09/18/2023    HGB 13.7 (L) 09/18/2023    HCT 42.0 09/18/2023     Coagulation Profile   Lab Results   Component Value Date     09/18/2023       Lab Results   Component Value Date    INR 1.0 12/13/2021     CMP:  BMP  Lab Results   Component Value Date     09/18/2023    K 4.4 09/18/2023     09/18/2023    CO2 26 09/18/2023    BUN 15 09/18/2023    CREATININE 1.0 02/12/2024    CALCIUM 9.1 09/18/2023    ANIONGAP 8 09/18/2023    EGFRNORACEVR >60 02/12/2024     Lab Results   Component Value Date    ALT 16 09/18/2023    AST 18 09/18/2023    ALKPHOS 63 09/18/2023    BILITOT 0.7 09/18/2023     HGBA1C:  No results found for: "LABA1C", "HGBA1C"    ROS:  "   Review of Systems   GENERAL:  No weight loss, malaise or fevers.  HEENT:   No recent changes in vision or hearing  NECK:  Negative for lumps, no difficulty with swallowing.  RESPIRATORY:  Negative for cough, wheezing or shortness of breath, patient denies any recent URI.  CARDIOVASCULAR:  Negative for chest pain or palpitations.  GI:  Negative for abdominal discomfort, blood in stools or black stools or change in bowel habits.  MUSCULOSKELETAL:  See HPI.  SKIN:  Negative for lesions, rash, and itching.  PSYCH:  No mood disorder or recent psychosocial stressors.   HEMATOLOGY/LYMPHOLOGY:  See the blood thinner sectioned in HPI.  NEURO:  See HPI  All other reviewed and negative other than HPI.    PHYSICAL EXAM:  VITALS: There were no vitals taken for this visit.  There is no height or weight on file to calculate BMI.  GENERAL: Well appearing, in no acute distress, alert and oriented x3, answers questions appropriately.   PSYCH: Flat affect.  SKIN: Skin color, texture, turgor normal, no rashes or lesions.  HEAD/FACE:  Normocephalic, atraumatic. Cranial nerves grossly intact.  CV: Regular rate  PULM: No evidence of respiratory difficulty, symmetric chest rise.  GI:  Soft and non-Distended.  BACK/SIJ/HIP:  Lumbar Spine Exam:       Inspection: No erythema, bruising.       Palpation: (+) TTP of lumbar paraspinals Right sided       ROM:  Limited in flexion, extension, lateral bending.       (-) Facet loading bilateral      (+) Straight Leg Raise, Right      (-) MICHAEL, Tenderness over the PSIS, Yeoman test, bilateral  Hip Exam:      Inspection: No gross deformity or apparent leg length discrepancy      Palpation:  No TTP to bilateral greater trochanteric bursas.       ROM:  No limitation Due to pain in internal rotation, external rotation b/l  Neurologic Exam:     Alert. Speech is fluent and appropriate.     Strength: 5/5 in bilateral hip flexion and knee extension     Sensation:  Grossly intact to light touch in bilateral  lower extremities     Tone: No abnormality appreciated in bilateral lower extremities    GAIT:  normal gait  Left shoulder exam:  SHOULDERS: No gross deformity or atrophy noted.   ROM: +  limited in  abduction external rotation  Rotator cuff exam GAMING:  mild+  EMPTY CAN SIGN:  mild+    DIAGNOSTIC STUDIES AND MEDICAL RECORDS REVIEW:  I have personally reviewed and interpreted relevant radiology reports and reviewed relevant records from other services in the EMR.     -EMG/nerve conduction study showed right L4 and L5 radiculopathy    -MRI lumbar spine 02/2024:   T12-L1: No spinal canal stenosis or neural foraminal narrowing.     L1-L2: No spinal canal stenosis or neural foraminal narrowing.     L2-L3: No spinal canal stenosis or neural foraminal narrowing.     L3-L4: There is asymmetric disc bulging to the left with mild facet arthropathy, contributing to mild spinal canal stenosis and moderate left-sided neural foraminal narrowing     L4-L5: Mild disc bulging and prominent facet joint arthropathy on the right side, noting joint hypertrophy with extensive subchondral marrow edema and surrounding inflammatory change (series 6, images 6-7).  There is a small right-sided synovial cyst measuring 10 mm (series 9, image 36) abutting and possibly impinging upon the descending right L5 nerve root.  There is overall mild spinal canal stenosis and moderate right and mild left-sided neural foraminal narrowing.  Slight anterolisthesis noted.     L5-S1: Mild disc bulge with posterior annular fissure.  There is moderate disc height loss on the left side.  These findings contribute to mild/moderate left-sided neural foraminal narrowing.  No spinal canal stenosis .     Impression:     Remote L1 compression fracture post vertebral plasty, stable.  No acute fractures.     Lumbar spondylosis, contributing to mild spinal canal stenosis L3-4 and L4-5 and moderate neural foraminal narrowing L3-4 through L5-S1, as above.     Prominent  right-sided facet joint arthropathy at L4-5 with small anterior synovial cyst impinging upon the spinal canal, as above.    Clinical Impression:  This is a pleasant 79 y.o. male patient with PMH/PSH of compression fracture of L1 status post kyphoplasty, compression fracture of thoracic vertebrae, restless  legs syndrome, hypertension, chronic diastolic heart failure, left bundle branch block, polymyalgia rheumatica, HLAb27, TAVR, long-term anticoagulation, coronary artery disease status post LAD and RCA stent, presenting originally with Rt sided LBP, Bl knee pain and  right lower extremity radiation,  patient has multiple sources of back and leg pain most probably unrelated,  he has no significant low back pain,  he has mild tenderness over the right-sided lumbar paraspinal muscle about iliac crest  which would get better with physical therapy and possible trigger point injection,  left knee pain worse on kneeling, no pain on prolonged standing and walking,  has a remote x-ray knee showed mild knee osteoarthritis in 2023,  he had right knee surgery 2023,  bilateral knee x-ray showed no significant degenerative changes.     Encounter Diagnosis:  Ghanshyam Sanford Jr. is a 79 y.o. male with the following diagnoses based on history, exam, and imaging:  There are no diagnoses linked to this encounter.      Treatment Plan:    Diagnostics/Referrals: na    Medications:    NSAIDs: None  Topical Agent: No  TCA/SSRI/SNRI: None  Anti-convulsants: Gabapentin   Muscle Relaxants: None  Opioids: None    Interventional Therapy: NA  Sedation: NA.  Anticoagulation medications: 81 mg Aspirin  and Plavix  -Clearance to stop Blood thinner: NA    Regarding the above interventions, the patient has been educated regarding the risks (including bleeding, infection, increased pain, nerve damage, or allergic reaction), benefits, and alternatives. The patient states he understands and is eager to proceed.    START Physical Rehabilitation:  for  left shoulder pain.    Patient Education: Counseled patient regarding the importance of activity modification, I have stressed the importance of physical activity and a home exercise plan to help with pain and improve health.    Follow-up: RTC 6 weeks.    May consider: Right L4 and L5 TFESI,  lumbar medial branch block at  right L4-L5 and L5-S1,  left shoulder injection.    Angel Black MD  Anesthesiologist  Interventional Pain Medicine  02/20/2025    Disclaimer:  This note was prepared using voice recognition system and is likely to have sound alike errors that may have been overlooked even after proof reading.  Please call me with any questions.

## 2025-02-20 NOTE — H&P (VIEW-ONLY)
EST Patient Evaluation  [facility]    Ghanshyam Sanford Jr.  : 1945  Date: 2025     CHIEF COMPLAINT:  Leg Pain and Low-back Pain    Referring Physician: No ref. provider found  Primary Care Physician: Kevin Lombardi MD    HPI:  This is a 79 y.o. male with a chief complaint of Leg Pain and Low-back Pain  . The patient has Past medical history/Past surgical history of  compression fracture of L1 status post kyphoplasty, compression fracture of thoracic vertebrae, restless  legs syndrome, hypertension, chronic diastolic heart failure, left bundle branch block, polymyalgia rheumatica, HLAb27, TAVR, long-term anticoagulation    Patient was evaluated and referred by Dr Isael Garner.  He was referred from Neurosurgery team based on cardiology referral for right leg pain  Interval History 2024:  Ghanshyam Sanford Jr. is here for follow up visit  to establish care with me. Ghanshyam Sanford Jr. Had  right L4-L5 lumbar facet joint injection,  patient reported 50 % improvement in pain and functionality for 2 weeks.  Current pain score: 7/10    Interval History 2024:  Ghanshyam Sanford Jr. is here for follow up visit after 7 physical therapy sessions which provided him with a relief for his in the right knee pain.  Today he presents with the acute onset left shoulder pain from excessive throwing   while fishing.  Current pain score: 3/10, at night 5/10    Interval History 2025:  Ghanshyam Sanford Jr. is here for follow up visit to discuss interventional procedure  in regards to his going right lower extremity pain/neuropathy  Current pain score: 4/10    Diabetic: No    Anticoagulation medications: 81 mg Aspirin  and Plavix -  last stent in     Allergy To Iodine: No    Currently on Antibiotic: No    Current Description of Pain Symptoms:  History of Recent Fall or Trauma: No   Onset: Chronic  Pain Location:  minimal low back pain,  posterior medial aspect of the  right knee  Radiates/associated symptoms: RT LE to the calf  associated with tenderness  Pain is Getting worse over the last 3 months    The pain is described as burning and pulling .   Exacerbating factors:  stretching  Mitigating factors N/A.   Symptoms interfere with daily activity, sleeping.   The patient feels like symptoms have been worsening.   Patient denies night fever/night sweats, urinary incontinence, bowel incontinence, significant weight loss, significant motor weakness, and loss of sensations.    Pain score:  Best: 7/10  Worst: 10/10    Current pain medications:    gabapentin (NEURONTIN) 600 MG tablet, QHS-  can not take morning dose  Current Narcotics/Opioid /benzo Medications:  Opioids- None  Benzodiazepines: No    UDS:  NA    PDMP:  Reviewed and consistent with medication use as prescribed.     Previous Chronic Pain Treatment History:  Physical Therapy/HEP/Physician Lead Exercise Program:  Over the past 12 months, Patient has done 6+ sessions for lower back and shoulder.  PT response: Mildly Helpful.   Dates of the PT sessions: 10/2024-present.  Is patient participating in home exercise program (HEP)/ physician led exercise program: Yes.Completes HEP that was provided by PT in addition to formal therapy.    Non-interventional Pain Therapy:  []Chiropractor.   []Acupuncture/Dry needle.  []TENS unit.  [x]Heat/ICE.  []Back Brace.    Medications previously tried:  NSAIDs: None  Topical Agent: Yes  TCA/SSRI/SNRI: None  Anti-convulsants: Gabapentin   Muscle Relaxants: None  Opioids- None.    Interventional Pain Procedures:  04/11/2024:  L4-L5 right transforaminal with IR-  80% relief for 3 weeks  09/6/ 2024:  right L4-L5 facet injection- 50% relief. For 2 weeks with Dexamethasone   Kyphoplasty L1    Previous spine/Relevant joint surgery:   Right knee arthroplasty  Surgical History:   has a past surgical history that includes Hernia repair; Knee Arthroplasty (Right); Carotid stent; Back surgery;  Percutaneous transcatheter aortic valve replacement (TAVR) (Right, 12/14/2021); Percutaneous transcatheter aortic valve replacement (TAVR) (Right, 12/14/2021); Knee arthroscopy w/ meniscectomy (Right, 6/13/2023); Arthroscopic chondroplasty of knee joint (Right, 6/13/2023); Knee arthroscopy w/ plica excision (Right, 6/13/2023); Synovectomy of knee (Right, 6/13/2023); Knee debridement (Right, 6/13/2023); opiga-kxewtetf-hsehdluthuid (Right, 6/13/2023); and Injection of facet joint (Right, 9/6/2024).  Medical History:   has a past medical history of Anticoagulant long-term use, Aortic valve regurgitation, Aortic valve stenosis, nonrheumatic, Carotid stenosis, bilateral, Chronic diastolic heart failure, NYHA class 2, Chronic total occlusion of coronary artery, Coronary artery disease, Elevated PSA, Hypertension, Hypertensive heart disease, Osteoarthritis of right knee (3/22/2017), Polymyalgia, Restless legs, and SOB (shortness of breath).  Family History:  family history includes Diabetes Mellitus in his father; Heart disease in his mother and paternal grandmother; Hypertension in his mother; Stroke in his mother and paternal grandmother.  Allergies:  Atorvastatin calcium, Cortisone, Duloxetine, Flomax [tamsulosin], and Pravastatin   Social History/SUBSTANCE ABUSE HISTORY:  Personal history of substance abuse: No   reports that he has never smoked. He has never used smokeless tobacco. He reports that he does not drink alcohol and does not use drugs.  LABS:  CBC  Lab Results   Component Value Date    WBC 6.30 09/18/2023    HGB 13.7 (L) 09/18/2023    HCT 42.0 09/18/2023     Coagulation Profile   Lab Results   Component Value Date     09/18/2023       Lab Results   Component Value Date    INR 1.0 12/13/2021     CMP:  BMP  Lab Results   Component Value Date     09/18/2023    K 4.4 09/18/2023     09/18/2023    CO2 26 09/18/2023    BUN 15 09/18/2023    CREATININE 1.0 02/12/2024    CALCIUM 9.1 09/18/2023     "ANIONGAP 8 09/18/2023    EGFRNORACEVR >60 02/12/2024     Lab Results   Component Value Date    ALT 16 09/18/2023    AST 18 09/18/2023    ALKPHOS 63 09/18/2023    BILITOT 0.7 09/18/2023     HGBA1C:  No results found for: "LABA1C", "HGBA1C"    ROS:    Review of Systems   GENERAL:  No weight loss, malaise or fevers.  HEENT:   No recent changes in vision or hearing  NECK:  Negative for lumps, no difficulty with swallowing.  RESPIRATORY:  Negative for cough, wheezing or shortness of breath, patient denies any recent URI.  CARDIOVASCULAR:  Negative for chest pain or palpitations.  GI:  Negative for abdominal discomfort, blood in stools or black stools or change in bowel habits.  MUSCULOSKELETAL:  See HPI.  SKIN:  Negative for lesions, rash, and itching.  PSYCH:  No mood disorder or recent psychosocial stressors.   HEMATOLOGY/LYMPHOLOGY:  See the blood thinner sectioned in HPI.  NEURO:  See HPI  All other reviewed and negative other than HPI.    PHYSICAL EXAM:  VITALS: /62 (BP Location: Right arm, Patient Position: Sitting)   Pulse 78   Ht 5' 10" (1.778 m)   Wt 83.5 kg (184 lb)   SpO2 96%   BMI 26.40 kg/m²   Body mass index is 26.4 kg/m².  GENERAL: Well appearing, in no acute distress, alert and oriented x3, answers questions appropriately.   PSYCH: Flat affect.  SKIN: Skin color, texture, turgor normal, no rashes or lesions.  HEAD/FACE:  Normocephalic, atraumatic. Cranial nerves grossly intact.  CV: Regular rate  PULM: No evidence of respiratory difficulty, symmetric chest rise.  GI:  Soft and non-Distended.  BACK/SIJ/HIP:  Lumbar Spine Exam:       Inspection: No erythema, bruising.       Palpation: (+) TTP of lumbar paraspinals Right sided       ROM:  Limited in flexion, extension, lateral bending.       (-) Facet loading bilateral      (+) Straight Leg Raise, Right      (-) MICHAEL, Tenderness over the PSIS, Yeoman test, bilateral  Hip Exam:      Inspection: No gross deformity or apparent leg length " discrepancy      Palpation:  No TTP to bilateral greater trochanteric bursas.       ROM:  No limitation Due to pain in internal rotation, external rotation b/l  Neurologic Exam:     Alert. Speech is fluent and appropriate.     Strength: 5/5 in bilateral hip flexion and knee extension     Sensation:  Grossly intact to light touch in bilateral lower extremities     Tone: No abnormality appreciated in bilateral lower extremities    GAIT:  normal gait  Left shoulder exam:  SHOULDERS: No gross deformity or atrophy noted.   ROM: +  limited in  abduction external rotation  Rotator cuff exam GAMING:  mild+  EMPTY CAN SIGN:  mild+    DIAGNOSTIC STUDIES AND MEDICAL RECORDS REVIEW:  I have personally reviewed and interpreted relevant radiology reports and reviewed relevant records from other services in the EMR.     -EMG/nerve conduction study showed right L4 and L5 radiculopathy    -MRI lumbar spine 02/2024:   T12-L1: No spinal canal stenosis or neural foraminal narrowing.     L1-L2: No spinal canal stenosis or neural foraminal narrowing.     L2-L3: No spinal canal stenosis or neural foraminal narrowing.     L3-L4: There is asymmetric disc bulging to the left with mild facet arthropathy, contributing to mild spinal canal stenosis and moderate left-sided neural foraminal narrowing     L4-L5: Mild disc bulging and prominent facet joint arthropathy on the right side, noting joint hypertrophy with extensive subchondral marrow edema and surrounding inflammatory change (series 6, images 6-7).  There is a small right-sided synovial cyst measuring 10 mm (series 9, image 36) abutting and possibly impinging upon the descending right L5 nerve root.  There is overall mild spinal canal stenosis and moderate right and mild left-sided neural foraminal narrowing.  Slight anterolisthesis noted.     L5-S1: Mild disc bulge with posterior annular fissure.  There is moderate disc height loss on the left side.  These findings contribute to  mild/moderate left-sided neural foraminal narrowing.  No spinal canal stenosis .     Impression:     Remote L1 compression fracture post vertebral plasty, stable.  No acute fractures.     Lumbar spondylosis, contributing to mild spinal canal stenosis L3-4 and L4-5 and moderate neural foraminal narrowing L3-4 through L5-S1, as above.     Prominent right-sided facet joint arthropathy at L4-5 with small anterior synovial cyst impinging upon the spinal canal, as above.    Clinical Impression:  This is a pleasant 79 y.o. male patient with PMH/PSH of compression fracture of L1 status post kyphoplasty, compression fracture of thoracic vertebrae, restless  legs syndrome, hypertension, chronic diastolic heart failure, left bundle branch block, polymyalgia rheumatica, HLAb27, TAVR, long-term anticoagulation, coronary artery disease status post LAD and RCA stent, presenting  right knee pain mainly in the posteromedial aspect associated with tenderness  and radiation to the right calf above the ankle.    Encounter Diagnosis:  Ghanshyam Sanford Jr. is a 79 y.o. male with the following diagnoses based on history, exam, and imagin. Lumbar radiculitis    2. Lumbar radiculopathy    3. Lumbar herniated disc    4. Neuropathy of right lower extremity    5. Lumbar foraminal stenosis    Treatment Plan:    Diagnostics/Referrals:  continue with a home exercise    Medications:   Continue as prescribed  NSAIDs: None  Topical Agent: No  TCA/SSRI/SNRI: None  Anti-convulsants: Gabapentin   Muscle Relaxants: None  Opioids: None    Interventional Therapy: please schedule Right L4 and L5 TFESI  Sedation: IV sedation  Anticoagulation medications: 81 mg Aspirin  and Plavix  -Clearance to stop Blood thinner: yes, 7 days     Regarding the above interventions, the patient has been educated regarding the risks (including bleeding, infection, increased pain, nerve damage, or allergic reaction), benefits, and alternatives. The patient states he  understands and is eager to proceed.    Follow-up: RTC 6 weeks.    May consider:   femoral and sciatic pain PNS, right sympathetic nerve block    Angel Black MD  Anesthesiologist  Interventional Pain Medicine  02/20/2025    Disclaimer:  This note was prepared using voice recognition system and is likely to have sound alike errors that may have been overlooked even after proof reading.  Please call me with any questions.

## 2025-02-20 NOTE — TELEPHONE ENCOUNTER
----- Message from Angel Black MD sent at 2/20/2025  3:42 PM CST -----  · Interventional Therapy: please schedule Right L4 and L5 TFESI· Sedation: IV sedation· Anticoagulation medications: 81 mg Aspirin  and Plavix  -Clearance to stop Blood thinner: yes, 7 days 4 weeks follow up

## 2025-02-26 RX ORDER — SODIUM CHLORIDE 9 MG/ML
500 INJECTION, SOLUTION INTRAVENOUS CONTINUOUS
OUTPATIENT
Start: 2025-02-26

## 2025-03-07 ENCOUNTER — HOSPITAL ENCOUNTER (OUTPATIENT)
Facility: HOSPITAL | Age: 80
Discharge: HOME | End: 2025-03-07
Attending: ANESTHESIOLOGY | Admitting: ANESTHESIOLOGY
Payer: MEDICARE

## 2025-03-07 ENCOUNTER — HOSPITAL ENCOUNTER (OUTPATIENT)
Dept: RADIOLOGY | Facility: HOSPITAL | Age: 80
Discharge: HOME | End: 2025-03-07
Attending: ANESTHESIOLOGY | Admitting: ANESTHESIOLOGY
Payer: MEDICARE

## 2025-03-07 VITALS
TEMPERATURE: 98 F | SYSTOLIC BLOOD PRESSURE: 141 MMHG | HEART RATE: 72 BPM | OXYGEN SATURATION: 95 % | RESPIRATION RATE: 18 BRPM | DIASTOLIC BLOOD PRESSURE: 66 MMHG

## 2025-03-07 DIAGNOSIS — M54.16 LUMBAR RADICULITIS: ICD-10-CM

## 2025-03-07 DIAGNOSIS — R52 PAIN: ICD-10-CM

## 2025-03-07 DIAGNOSIS — M51.16 LUMBAR DISC DISEASE WITH RADICULOPATHY: Primary | ICD-10-CM

## 2025-03-07 PROCEDURE — 25500020 PHARM REV CODE 255: Performed by: ANESTHESIOLOGY

## 2025-03-07 PROCEDURE — 64483 NJX AA&/STRD TFRM EPI L/S 1: CPT | Mod: RT,,, | Performed by: ANESTHESIOLOGY

## 2025-03-07 PROCEDURE — 64484 NJX AA&/STRD TFRM EPI L/S EA: CPT | Mod: RT,,, | Performed by: ANESTHESIOLOGY

## 2025-03-07 PROCEDURE — 64483 NJX AA&/STRD TFRM EPI L/S 1: CPT | Mod: RT | Performed by: ANESTHESIOLOGY

## 2025-03-07 PROCEDURE — 64484 NJX AA&/STRD TFRM EPI L/S EA: CPT | Mod: RT | Performed by: ANESTHESIOLOGY

## 2025-03-07 PROCEDURE — 63600175 PHARM REV CODE 636 W HCPCS: Performed by: ANESTHESIOLOGY

## 2025-03-07 PROCEDURE — 76000 FLUOROSCOPY <1 HR PHYS/QHP: CPT | Mod: TC

## 2025-03-07 RX ORDER — LIDOCAINE HYDROCHLORIDE 20 MG/ML
INJECTION, SOLUTION INFILTRATION; PERINEURAL
Status: DISCONTINUED | OUTPATIENT
Start: 2025-03-07 | End: 2025-03-07 | Stop reason: HOSPADM

## 2025-03-07 RX ORDER — DEXAMETHASONE SODIUM PHOSPHATE 10 MG/ML
INJECTION, SOLUTION INTRA-ARTICULAR; INTRALESIONAL; INTRAMUSCULAR; INTRAVENOUS; SOFT TISSUE
Status: DISCONTINUED | OUTPATIENT
Start: 2025-03-07 | End: 2025-03-07 | Stop reason: HOSPADM

## 2025-03-07 RX ORDER — MIDAZOLAM HYDROCHLORIDE 1 MG/ML
INJECTION INTRAMUSCULAR; INTRAVENOUS
Status: DISCONTINUED | OUTPATIENT
Start: 2025-03-07 | End: 2025-03-07 | Stop reason: HOSPADM

## 2025-03-07 RX ORDER — LIDOCAINE HYDROCHLORIDE 10 MG/ML
INJECTION, SOLUTION EPIDURAL; INFILTRATION; INTRACAUDAL; PERINEURAL
Status: DISCONTINUED | OUTPATIENT
Start: 2025-03-07 | End: 2025-03-07 | Stop reason: HOSPADM

## 2025-03-07 RX ORDER — FENTANYL CITRATE 50 UG/ML
INJECTION, SOLUTION INTRAMUSCULAR; INTRAVENOUS
Status: DISCONTINUED | OUTPATIENT
Start: 2025-03-07 | End: 2025-03-07 | Stop reason: HOSPADM

## 2025-03-07 NOTE — OP NOTE
Lumbar Transforaminal Epidural Steroid Injection under Fluoroscopic Guidance    The procedure, risks, benefits, and options were discussed with the patient. There are no contraindications to the procedure. The patent expressed understanding and agreed to the procedure. Informed written consent was obtained prior to the start of the procedure and can be found in the patient's chart.    PATIENT NAME: Ghanshyam Sanford Jr.   MRN: [id number]     DATE OF PROCEDURE: 03/07/2025    PROCEDURE:  Right  L4/5 and L5/S1 Lumbar Transforaminal Epidural Steroid Injection under Fluoroscopic Guidance    PRE-OP DIAGNOSIS: Lumbar radiculitis [M54.16] Lumbar radiculopathy [M54.16]    POST-OP DIAGNOSIS: Same    PHYSICIAN: Angel Black MD      MEDICATIONS INJECTED: Preservative-free Decadron 10mg with 5cc of Lidocaine 1% MPF     LOCAL ANESTHETIC INJECTED: Xylocaine 2%     SEDATION: Versed 0.5mg and Fentanyl 25mcg                                                                                                                                                                                     Conscious sedation ordered by M.D. Patient re-evaluation prior to administration of conscious sedation. No changes noted in patient's status from initial evaluation. The patient's vital signs were monitored by RN and patient remained hemodynamically stable throughout the procedure.  No case tracking events are documented in the log.    ESTIMATED BLOOD LOSS: None    COMPLICATIONS: None    TECHNIQUE: Time-out was performed to identify the patient and procedure to be performed. With the patient laying in a prone position, the surgical area was prepped and draped in the usual sterile fashion using ChloraPrep and a fenestrated drape.The levels were determined under fluoroscopy guidance. Skin anesthesia was achieved by injecting Lidocaine 2% over the injection sites. The transforaminal spaces were then approached with a 22 gauge, 3.5 inch spinal quinke needle  that was introduced under fluoroscopic guidance in the AP and Lateral views. Once the needle tip was in the area of the transforaminal space, and there was no blood, CSF or paraesthesias, contrast dye Omnipaque (300mg/mL) was injected to confirm placement and there was no vascular runoff. Fluoroscopic imaging in the AP and lateral views revealed a clear outline of the spinal nerve with proximal spread of agent through the neural foramen into the epidural space. 2 mL of the medication mixture listed above was injected slowly at each site. Displacement of the radio opaque contrast after injection of the medication confirmed that the medication went into the area of the transforaminal spaces. The needles were removed and bleeding was nil. A sterile dressing was applied. No specimens collected. The patient tolerated the procedure well.     The patient was monitored after the procedure in the recovery area. They were given post-procedure and discharge instructions to follow at home. The patient was discharged in a stable condition.  Angel Black MD  Anesthesiologist  Interventional Pain Management  03/07/2025

## 2025-03-07 NOTE — DISCHARGE SUMMARY
Discharge Note  Short Stay    Admit Date: 3/7/2025    Attending Physician: Angel Black    Discharge Physician: Angel Black    Discharge Date: 3/7/2025 9:36 AM    Procedure(s) (LRB):  TRANSFORAMINAL EPIDURAL STEROID INJECTION (L4&L5) (ASA, PLAVIX) (Right)    Final Diagnosis: Lumbar radiculitis [M54.16]    Disposition: Home or self care    Patient Instructions:   Current Discharge Medication List        CONTINUE these medications which have NOT CHANGED    Details   ascorbic acid, vitamin C, (VITAMIN C) 250 MG tablet Take 250 mg by mouth once daily.      aspirin (ECOTRIN) 81 MG EC tablet Take 81 mg by mouth once daily.      atorvastatin (LIPITOR) 40 MG tablet atorvastatin 40 mg tablet, [RxNorm: 171100]      clopidogreL (PLAVIX) 75 mg tablet Take 75 mg by mouth once daily.      gabapentin (NEURONTIN) 600 MG tablet Take 1 tablet by mouth in the evening  Qty: 30 tablet, Refills: 0      multivitamin (THERAGRAN) per tablet Take 1 tablet by mouth once daily.      mv-mn/iron/folic acid/herb 190 (VITAMIN D3 COMPLETE ORAL) Take 5,000 Units by mouth Daily.      NIFEdipine (PROCARDIA-XL) 30 MG (OSM) 24 hr tablet Take 30 mg by mouth once daily.      olmesartan (BENICAR) 40 MG tablet Take 40 mg by mouth once daily.      rosuvastatin (CRESTOR) 20 MG tablet Take 20 mg by mouth once daily.      traZODone (DESYREL) 150 MG tablet Take 150 mg by mouth every evening.      acetaminophen (TYLENOL) 500 MG tablet Take 1,000 mg by mouth every 6 (six) hours as needed for Pain.      coenzyme Q10 100 mg capsule Take 200 mg by mouth once daily.      magnesium 250 mg Tab Take 250 mg by mouth once daily.      zolpidem (AMBIEN) 10 mg Tab Take 10 mg by mouth nightly as needed.             Discharge Diagnosis: Lumbar radiculitis [M54.16]  Condition on Discharge: Stable with no complications to procedure   Diet on Discharge: Same as before.  Activity: as per instruction sheet.  Discharge to: Home with a responsible adult.  Follow up: 2-4 weeks        Please call my office or pager at [phone] if experienced any weakness or loss of sensation, fever > 101.5, pain uncontrolled with oral medications, persistent nausea/vomiting/or diarrhea, redness or drainage from the incisions, or any other worrisome concerns. If physician on call was not reached or could not communicate with our office for any reason please go to the nearest emergency department.     Angel Black  03/07/2025

## 2025-03-07 NOTE — DISCHARGE INSTRUCTIONS
DIET: You may resume your normal diet today.    BATHING: You may resume your normal bathing.          You may shower, no hot water directly on site for 24 hours.    DRESSING: You may remove your bandage today.    ACTIVITY LEVEL: You may resume your normal activities 24 hours after your  procedure.    If you have received sedation or an anesthetic, you may feel sleepy  for several hours. Rest until you are more awake. Gradually resume your normal activities tomorrow.    If you have received sedation or an anesthetic, do not drive or operate heavy machinery for at least 24 hours.    MEDICATION: You may resume your normal medications today.    You will receive instructions for any pain prescriptions. Pain medications should be taken only as directed.    SPECIAL INSTRUCTIONS: No heat to the injection site for 24 hours including: bath or shower, heating pad, moist heat, hot tubs.    Use ice pack to injection site for any pain or discomfort. Apply ice pack to 20 minutes then remove for 20 minutes before re-applying to site.    WHEN TO CALL DOCTOR: Redness or swelling around injection site    Fever of 101F    Drainage (pus) from the injection site    For any continuous bleeding (some dried blood over the incision is normal).    FOLLOW UP: Follow up phone call will be made by office.        Diet on Discharge: Same as before.  Activity: as per instruction sheet.  Discharge to: Home with a responsible adult.  Follow up: 2-4 weeks        Please call my office or pager at [phone] if experienced any weakness or loss of sensation, fever > 101.5, pain uncontrolled with oral medications, persistent nausea/vomiting/or diarrhea, redness or drainage from the incisions, or any other worrisome concerns. If physician on call was not reached or could not communicate with our office for any reason please go to the nearest emergency department.      Angel Black

## 2025-03-20 ENCOUNTER — HOSPITAL ENCOUNTER (OUTPATIENT)
Dept: RADIOLOGY | Facility: HOSPITAL | Age: 80
Discharge: HOME | End: 2025-03-20
Attending: INTERNAL MEDICINE
Payer: MEDICARE

## 2025-03-20 DIAGNOSIS — R91.1 SOLITARY PULMONARY NODULE: ICD-10-CM

## 2025-03-20 PROCEDURE — 71250 CT THORAX DX C-: CPT | Mod: TC

## 2025-03-20 PROCEDURE — 71250 CT THORAX DX C-: CPT | Mod: 26,,, | Performed by: RADIOLOGY

## 2025-03-23 ENCOUNTER — PATIENT MESSAGE (OUTPATIENT)
Dept: PULMONOLOGY | Facility: CLINIC | Age: 80
End: 2025-03-23
Payer: MEDICARE

## 2025-03-24 ENCOUNTER — RESULTS FOLLOW-UP (OUTPATIENT)
Dept: PULMONOLOGY | Facility: CLINIC | Age: 80
End: 2025-03-24

## 2025-03-24 DIAGNOSIS — R91.8 MULTIPLE LUNG NODULES ON CT: Primary | ICD-10-CM

## 2025-04-01 ENCOUNTER — OFFICE VISIT (OUTPATIENT)
Dept: PAIN MEDICINE | Facility: CLINIC | Age: 80
End: 2025-04-01
Payer: MEDICARE

## 2025-04-01 ENCOUNTER — TELEPHONE (OUTPATIENT)
Dept: PAIN MEDICINE | Facility: CLINIC | Age: 80
End: 2025-04-01

## 2025-04-01 VITALS
WEIGHT: 181 LBS | BODY MASS INDEX: 25.91 KG/M2 | HEART RATE: 74 BPM | SYSTOLIC BLOOD PRESSURE: 136 MMHG | HEIGHT: 70 IN | DIASTOLIC BLOOD PRESSURE: 81 MMHG | OXYGEN SATURATION: 94 %

## 2025-04-01 DIAGNOSIS — M25.561 CHRONIC PAIN OF RIGHT KNEE: ICD-10-CM

## 2025-04-01 DIAGNOSIS — G89.29 CHRONIC PAIN OF RIGHT KNEE: ICD-10-CM

## 2025-04-01 DIAGNOSIS — M48.061 LUMBAR FORAMINAL STENOSIS: ICD-10-CM

## 2025-04-01 DIAGNOSIS — G89.4 CHRONIC PAIN SYNDROME: ICD-10-CM

## 2025-04-01 DIAGNOSIS — G57.91 NEUROPATHY OF RIGHT LOWER EXTREMITY: Primary | ICD-10-CM

## 2025-04-01 PROCEDURE — 99999 PR STA SHADOW: CPT | Mod: PBBFAC,,, | Performed by: ANESTHESIOLOGY

## 2025-04-01 PROCEDURE — 99999 PR PBB SHADOW E&M-EST. PATIENT-LVL III: CPT | Mod: PBBFAC,,, | Performed by: ANESTHESIOLOGY

## 2025-04-01 PROCEDURE — 99214 OFFICE O/P EST MOD 30 MIN: CPT | Mod: S$PBB | Performed by: ANESTHESIOLOGY

## 2025-04-01 PROCEDURE — 99213 OFFICE O/P EST LOW 20 MIN: CPT | Mod: PBBFAC | Performed by: ANESTHESIOLOGY

## 2025-04-01 NOTE — PROGRESS NOTES
EST Patient Evaluation  [facility]    Ghanshyam Sanford Jr.  : 1945  Date: 2025     CHIEF COMPLAINT:  No chief complaint on file.    Referring Physician: No ref. provider found  Primary Care Physician: Kevin Lombardi MD    HPI:  This is a 79 y.o. male with a chief complaint of No chief complaint on file.  . The patient has Past medical history/Past surgical history of  compression fracture of L1 status post kyphoplasty, compression fracture of thoracic vertebrae, restless  legs syndrome, hypertension, chronic diastolic heart failure, left bundle branch block, polymyalgia rheumatica, HLAb27, TAVR, long-term anticoagulation    Patient was evaluated and referred by Dr Isael Garner.  He was referred from Neurosurgery team based on cardiology referral for right leg pain  Interval History 2024:  Ghanshyam Sanford Jr. is here for follow up visit  to establish care with me. Ghanshyam Sanford Jr. Had  right L4-L5 lumbar facet joint injection,  patient reported 50 % improvement in pain and functionality for 2 weeks.  Current pain score: 7/10    Interval History 2024:  Ghanshyam Sanford Jr. is here for follow up visit after 7 physical therapy sessions which provided him with a relief for his in the right knee pain.  Today he presents with the acute onset left shoulder pain from excessive throwing   while fishing.  Current pain score: 3/10, at night 5/10    Interval History 2025:  Ghanshyam Sanford Jr. is here for follow up visit to discuss interventional procedure  in regards to his going right lower extremity pain/neuropathy  Current pain score: 4/10    Interval History 2025:  Ghanshyam Sanford Jr. is here for procedure follow up visit after right L4 and L5 transforaminal epidural steroid injection,  patient reported *** % improvement in pain and functionality.  Current pain score: ***/10    Diabetic: No    Anticoagulation medications: 81 mg Aspirin  and Plavix -   last stent in 2020    Allergy To Iodine: No    Currently on Antibiotic: No    Current Description of Pain Symptoms:  History of Recent Fall or Trauma: No   Onset: Chronic  Pain Location:  minimal low back pain,  posterior medial aspect of the right knee  Radiates/associated symptoms: RT LE to the calf  associated with tenderness  Pain is Getting worse over the last 3 months    The pain is described as burning and pulling .   Exacerbating factors:  stretching  Mitigating factors N/A.   Symptoms interfere with daily activity, sleeping.   The patient feels like symptoms have been worsening.   Patient denies night fever/night sweats, urinary incontinence, bowel incontinence, significant weight loss, significant motor weakness, and loss of sensations.    Pain score:  Best: 7/10  Worst: 10/10    Current pain medications:    gabapentin (NEURONTIN) 600 MG tablet, QHS-  can not take morning dose  Current Narcotics/Opioid /benzo Medications:  Opioids- None  Benzodiazepines: No    UDS:  NA    PDMP:  Reviewed and consistent with medication use as prescribed.     Previous Chronic Pain Treatment History:  Physical Therapy/HEP/Physician Lead Exercise Program:  Over the past 12 months, Patient has done 6+ sessions for lower back and shoulder.  PT response: Mildly Helpful.   Dates of the PT sessions: 10/2024-present.  Is patient participating in home exercise program (HEP)/ physician led exercise program: Yes.Completes HEP that was provided by PT in addition to formal therapy.    Non-interventional Pain Therapy:  []Chiropractor.   []Acupuncture/Dry needle.  []TENS unit.  [x]Heat/ICE.  []Back Brace.    Medications previously tried:  NSAIDs: None  Topical Agent: Yes  TCA/SSRI/SNRI: None  Anti-convulsants: Gabapentin   Muscle Relaxants: None  Opioids- None.    Interventional Pain Procedures:  04/11/2024:  L4-L5 right transforaminal with IR-  80% relief for 3 weeks  09/6/ 2024:  right L4-L5 facet injection- 50% relief. For 2 weeks with  Dexamethasone   Kyphoplasty L1    Previous spine/Relevant joint surgery:   Right knee arthroplasty  Surgical History:   has a past surgical history that includes Hernia repair; Knee Arthroplasty (Right); Carotid stent; Back surgery; Percutaneous transcatheter aortic valve replacement (TAVR) (Right, 12/14/2021); Percutaneous transcatheter aortic valve replacement (TAVR) (Right, 12/14/2021); Knee arthroscopy w/ meniscectomy (Right, 6/13/2023); Arthroscopic chondroplasty of knee joint (Right, 6/13/2023); Knee arthroscopy w/ plica excision (Right, 6/13/2023); Synovectomy of knee (Right, 6/13/2023); Knee debridement (Right, 6/13/2023); [id number] (Right, 6/13/2023); Injection of facet joint (Right, 9/6/2024); and Transforaminal epidural injection of steroid (Right, 3/7/2025).  Medical History:   has a past medical history of Anticoagulant long-term use, Aortic valve regurgitation, Aortic valve stenosis, nonrheumatic, Carotid stenosis, bilateral, Chronic diastolic heart failure, NYHA class 2, Chronic total occlusion of coronary artery, Coronary artery disease, Elevated PSA, Hypertension, Hypertensive heart disease, Osteoarthritis of right knee (3/22/2017), Polymyalgia, Restless legs, and SOB (shortness of breath).  Family History:  family history includes Diabetes Mellitus in his father; Heart disease in his mother and paternal grandmother; Hypertension in his mother; Stroke in his mother and paternal grandmother.  Allergies:  Atorvastatin calcium, Cortisone, Duloxetine, Flomax [tamsulosin], and Pravastatin   Social History/SUBSTANCE ABUSE HISTORY:  Personal history of substance abuse: No   reports that he has never smoked. He has never used smokeless tobacco. He reports that he does not drink alcohol and does not use drugs.  LABS:  CBC  Lab Results   Component Value Date    WBC 6.30 09/18/2023    HGB 13.7 (L) 09/18/2023    HCT 42.0 09/18/2023     Coagulation Profile   Lab Results   Component Value Date  "    09/18/2023       Lab Results   Component Value Date    INR 1.0 12/13/2021     CMP:  BMP  Lab Results   Component Value Date     09/18/2023    K 4.4 09/18/2023     09/18/2023    CO2 26 09/18/2023    BUN 15 09/18/2023    CREATININE 1.0 02/12/2024    CALCIUM 9.1 09/18/2023    ANIONGAP 8 09/18/2023    EGFRNORACEVR >60 02/12/2024     Lab Results   Component Value Date    ALT 16 09/18/2023    AST 18 09/18/2023    ALKPHOS 63 09/18/2023    BILITOT 0.7 09/18/2023     HGBA1C:  No results found for: "LABA1C", "HGBA1C"    ROS:    Review of Systems   GENERAL:  No weight loss, malaise or fevers.  HEENT:   No recent changes in vision or hearing  NECK:  Negative for lumps, no difficulty with swallowing.  RESPIRATORY:  Negative for cough, wheezing or shortness of breath, patient denies any recent URI.  CARDIOVASCULAR:  Negative for chest pain or palpitations.  GI:  Negative for abdominal discomfort, blood in stools or black stools or change in bowel habits.  MUSCULOSKELETAL:  See HPI.  SKIN:  Negative for lesions, rash, and itching.  PSYCH:  No mood disorder or recent psychosocial stressors.   HEMATOLOGY/LYMPHOLOGY:  See the blood thinner sectioned in HPI.  NEURO:  See HPI  All other reviewed and negative other than HPI.    PHYSICAL EXAM:  VITALS: There were no vitals taken for this visit.  There is no height or weight on file to calculate BMI.  GENERAL: Well appearing, in no acute distress, alert and oriented x3, answers questions appropriately.   PSYCH: Flat affect.  SKIN: Skin color, texture, turgor normal, no rashes or lesions.  HEAD/FACE:  Normocephalic, atraumatic. Cranial nerves grossly intact.  CV: Regular rate  PULM: No evidence of respiratory difficulty, symmetric chest rise.  GI:  Soft and non-Distended.  BACK/SIJ/HIP:  Lumbar Spine Exam:       Inspection: No erythema, bruising.       Palpation: (+) TTP of lumbar paraspinals Right sided       ROM:  Limited in flexion, extension, lateral bending.     "   (-) Facet loading bilateral      (+) Straight Leg Raise, Right      (-) MICHAEL, Tenderness over the PSIS, Yeoman test, bilateral  Hip Exam:      Inspection: No gross deformity or apparent leg length discrepancy      Palpation:  No TTP to bilateral greater trochanteric bursas.       ROM:  No limitation Due to pain in internal rotation, external rotation b/l  Neurologic Exam:     Alert. Speech is fluent and appropriate.     Strength: 5/5 in bilateral hip flexion and knee extension     Sensation:  Grossly intact to light touch in bilateral lower extremities     Tone: No abnormality appreciated in bilateral lower extremities    GAIT:  normal gait  Left shoulder exam:  SHOULDERS: No gross deformity or atrophy noted.   ROM: +  limited in  abduction external rotation  Rotator cuff exam GAMING:  mild+  EMPTY CAN SIGN:  mild+    DIAGNOSTIC STUDIES AND MEDICAL RECORDS REVIEW:  I have personally reviewed and interpreted relevant radiology reports and reviewed relevant records from other services in the EMR.     -EMG/nerve conduction study showed right L4 and L5 radiculopathy    -MRI lumbar spine 02/2024:   T12-L1: No spinal canal stenosis or neural foraminal narrowing.     L1-L2: No spinal canal stenosis or neural foraminal narrowing.     L2-L3: No spinal canal stenosis or neural foraminal narrowing.     L3-L4: There is asymmetric disc bulging to the left with mild facet arthropathy, contributing to mild spinal canal stenosis and moderate left-sided neural foraminal narrowing     L4-L5: Mild disc bulging and prominent facet joint arthropathy on the right side, noting joint hypertrophy with extensive subchondral marrow edema and surrounding inflammatory change (series 6, images 6-7).  There is a small right-sided synovial cyst measuring 10 mm (series 9, image 36) abutting and possibly impinging upon the descending right L5 nerve root.  There is overall mild spinal canal stenosis and moderate right and mild left-sided neural  foraminal narrowing.  Slight anterolisthesis noted.     L5-S1: Mild disc bulge with posterior annular fissure.  There is moderate disc height loss on the left side.  These findings contribute to mild/moderate left-sided neural foraminal narrowing.  No spinal canal stenosis .     Impression:     Remote L1 compression fracture post vertebral plasty, stable.  No acute fractures.     Lumbar spondylosis, contributing to mild spinal canal stenosis L3-4 and L4-5 and moderate neural foraminal narrowing L3-4 through L5-S1, as above.     Prominent right-sided facet joint arthropathy at L4-5 with small anterior synovial cyst impinging upon the spinal canal, as above.    Clinical Impression:  This is a pleasant 79 y.o. male patient with PMH/PSH of compression fracture of L1 status post kyphoplasty, compression fracture of thoracic vertebrae, restless  legs syndrome, hypertension, chronic diastolic heart failure, left bundle branch block, polymyalgia rheumatica, HLAb27, TAVR, long-term anticoagulation, coronary artery disease status post LAD and RCA stent, presenting  right knee pain mainly in the posteromedial aspect associated with tenderness  and radiation to the right calf above the ankle.    Encounter Diagnosis:  Ghanshyam Sanford Jr. is a 79 y.o. male with the following diagnoses based on history, exam, and imaging:  There are no diagnoses linked to this encounter.    Treatment Plan:    Diagnostics/Referrals:  continue with a home exercise    Medications:   Continue as prescribed  NSAIDs: None  Topical Agent: No  TCA/SSRI/SNRI: None  Anti-convulsants: Gabapentin   Muscle Relaxants: None  Opioids: None    Interventional Therapy: please schedule femoral and sciatic PNS  Sedation: IV sedation  Anticoagulation medications: 81 mg Aspirin  and Plavix  -Clearance to stop Blood thinner: yes, 7 days     Regarding the above interventions, the patient has been educated regarding the risks (including bleeding, infection, increased pain,  nerve damage, or allergic reaction), benefits, and alternatives. The patient states he understands and is eager to proceed.    Follow-up: RTC 6 weeks.    May consider:   femoral and sciatic PNS, right sympathetic nerve block    Angel Black MD  Anesthesiologist  Interventional Pain Medicine  04/01/2025    Disclaimer:  This note was prepared using voice recognition system and is likely to have sound alike errors that may have been overlooked even after proof reading.  Please call me with any questions.

## 2025-04-01 NOTE — TELEPHONE ENCOUNTER
----- Message from Angel Black MD sent at 4/1/2025  2:32 PM CDT -----  · Interventional Therapy: please schedule Right femoral and sciatic PNS, SPR, IV sedation, Ancef· Sedation: IV sedation· Anticoagulation medications: 81 mg Aspirin  and Plavix  -Clearance to stop Blood thinner: yes, 7 days Psych clearance

## 2025-04-01 NOTE — PROGRESS NOTES
EST Patient Evaluation  [facility] interventional pain management    Ghanshyam Sanford Jr.  : 1945  Date: 2025     CHIEF COMPLAINT:  Follow-up and Low-back Pain    Referring Physician: No ref. provider found  Primary Care Physician: Kevin Lombardi MD    HPI:  This is a 79 y.o. male with a chief complaint of Follow-up and Low-back Pain  . The patient has Past medical history/Past surgical history of  compression fracture of L1 status post kyphoplasty, compression fracture of thoracic vertebrae, restless  legs syndrome, hypertension, chronic diastolic heart failure, left bundle branch block, polymyalgia rheumatica, HLAb27, TAVR, long-term anticoagulation    Patient was evaluated and referred by Dr Isael Garner.  He was referred from Neurosurgery team based on cardiology referral for right leg pain  Interval History 2024:  Ghanshyam Sanford Jr. is here for follow up visit  to establish care with me. Ghanshyam Sanford Jr. Had  right L4-L5 lumbar facet joint injection,  patient reported 50 % improvement in pain and functionality for 2 weeks.  Current pain score: 7/10    Interval History 2024:  Ghanshyam Sanford Jr. is here for follow up visit after 7 physical therapy sessions which provided him with a relief for his in the right knee pain.  Today he presents with the acute onset left shoulder pain from excessive throwing   while fishing.  Current pain score: 3/10, at night 5/10    Interval History 2025:  Ghanshyam Sanford Jr. is here for follow up visit to discuss interventional procedure  in regards to his going right lower extremity pain/neuropathy  Current pain score: 4/10    Interval History 2025:  Ghanshyam Sanford Jr. is here for procedure follow up visit after right L4 and L5 transforaminal epidural steroid injection,  patient reported 70 % improvement in pain for and functionality for 1 day for his back pain but not for his right knee pain& below-the-knee pain.  Current pain score:  5/10    Diabetic: No    Anticoagulation medications: 81 mg Aspirin  and Plavix -  last stent in 2020    Allergy To Iodine: No    Currently on Antibiotic: No    Current Description of Pain Symptoms:  History of Recent Fall or Trauma: No   Onset: Chronic  Pain Location:  minimal low back pain,  posterior medial aspect of the right knee  Radiates/associated symptoms: RT LE to the calf  associated with tenderness  Pain is Getting worse over the last 3 months    The pain is described as burning and pulling .   Exacerbating factors:  stretching  Mitigating factors N/A.   Symptoms interfere with daily activity, sleeping.   The patient feels like symptoms have been worsening.   Patient denies night fever/night sweats, urinary incontinence, bowel incontinence, significant weight loss, significant motor weakness, and loss of sensations.    Pain score:  Best: 7/10  Worst: 10/10    Current pain medications:  N/A  Current Narcotics/Opioid /benzo Medications:  Opioids- None  Benzodiazepines: No    UDS:  NA    PDMP:  Reviewed and consistent with medication use as prescribed.     Previous Chronic Pain Treatment History:  Physical Therapy/HEP/Physician Lead Exercise Program:  Over the past 12 months, Patient has done 6+ sessions for lower back and shoulder.  PT response: Mildly Helpful.   Dates of the PT sessions: 10/2024-present.  Is patient participating in home exercise program (HEP)/ physician led exercise program: Yes.Completes HEP that was provided by PT in addition to formal therapy.    Non-interventional Pain Therapy:  []Chiropractor.   []Acupuncture/Dry needle.  []TENS unit.  [x]Heat/ICE.  []Back Brace.    Medications previously tried:  NSAIDs: None  Topical Agent: Yes  TCA/SSRI/SNRI: None  Anti-convulsants: Gabapentin   Muscle Relaxants: None  Opioids- None.    Interventional Pain Procedures:  Knee CoolRFA-- 2022 with no relief   04/11/2024:  L4-L5 right transforaminal with IR-  80% relief for 3 weeks  09/6/ 2024:  right  L4-L5 facet injection- 50% relief For 2 weeks with Dexamethasone   Kyphoplasty L1  03/07/2025: Right  L4/5 and L5/S1 Lumbar Transforaminal Epidural Steroid Injection- 70% relief for 1 day.  Previous spine/Relevant joint surgery:   Right knee arthroplasty  Surgical History:   has a past surgical history that includes Hernia repair; Knee Arthroplasty (Right); Carotid stent; Back surgery; Percutaneous transcatheter aortic valve replacement (TAVR) (Right, 12/14/2021); Percutaneous transcatheter aortic valve replacement (TAVR) (Right, 12/14/2021); Knee arthroscopy w/ meniscectomy (Right, 6/13/2023); Arthroscopic chondroplasty of knee joint (Right, 6/13/2023); Knee arthroscopy w/ plica excision (Right, 6/13/2023); Synovectomy of knee (Right, 6/13/2023); Knee debridement (Right, 6/13/2023); [id number]-jndbgqxaesmy (Right, 6/13/2023); Injection of facet joint (Right, 9/6/2024); and Transforaminal epidural injection of steroid (Right, 3/7/2025).  Medical History:   has a past medical history of Anticoagulant long-term use, Aortic valve regurgitation, Aortic valve stenosis, nonrheumatic, Carotid stenosis, bilateral, Chronic diastolic heart failure, NYHA class 2, Chronic total occlusion of coronary artery, Coronary artery disease, Elevated PSA, Hypertension, Hypertensive heart disease, Osteoarthritis of right knee (3/22/2017), Polymyalgia, Restless legs, and SOB (shortness of breath).  Family History:  family history includes Diabetes Mellitus in his father; Heart disease in his mother and paternal grandmother; Hypertension in his mother; Stroke in his mother and paternal grandmother.  Allergies:  Atorvastatin calcium, Cortisone, Duloxetine, Flomax [tamsulosin], and Pravastatin   Social History/SUBSTANCE ABUSE HISTORY:  Personal history of substance abuse: No   reports that he has never smoked. He has never used smokeless tobacco. He reports that he does not drink alcohol and does not use drugs.  LABS:  CBC  Lab Results  "  Component Value Date    WBC 6.30 09/18/2023    HGB 13.7 (L) 09/18/2023    HCT 42.0 09/18/2023     Coagulation Profile   Lab Results   Component Value Date     09/18/2023       Lab Results   Component Value Date    INR 1.0 12/13/2021     CMP:  BMP  Lab Results   Component Value Date     09/18/2023    K 4.4 09/18/2023     09/18/2023    CO2 26 09/18/2023    BUN 15 09/18/2023    CREATININE 1.0 02/12/2024    CALCIUM 9.1 09/18/2023    ANIONGAP 8 09/18/2023    EGFRNORACEVR >60 02/12/2024     Lab Results   Component Value Date    ALT 16 09/18/2023    AST 18 09/18/2023    ALKPHOS 63 09/18/2023    BILITOT 0.7 09/18/2023     HGBA1C:  No results found for: "LABA1C", "HGBA1C"    ROS:    Review of Systems   GENERAL:  No weight loss, malaise or fevers.  HEENT:   No recent changes in vision or hearing  NECK:  Negative for lumps, no difficulty with swallowing.  RESPIRATORY:  Negative for cough, wheezing or shortness of breath, patient denies any recent URI.  CARDIOVASCULAR:  Negative for chest pain or palpitations.  GI:  Negative for abdominal discomfort, blood in stools or black stools or change in bowel habits.  MUSCULOSKELETAL:  See HPI.  SKIN:  Negative for lesions, rash, and itching.  PSYCH:  No mood disorder or recent psychosocial stressors.   HEMATOLOGY/LYMPHOLOGY:  See the blood thinner sectioned in HPI.  NEURO:  See HPI  All other reviewed and negative other than HPI.    PHYSICAL EXAM:  VITALS: /81 (BP Location: Right arm, Patient Position: Sitting)   Pulse 74   Ht 5' 10" (1.778 m)   Wt 82.1 kg (181 lb)   SpO2 (!) 94%   BMI 25.97 kg/m²   Body mass index is 25.97 kg/m².  GENERAL: Well appearing, in no acute distress, alert and oriented x3, answers questions appropriately.   PSYCH: Flat affect.  SKIN: Skin color, texture, turgor normal, no rashes or lesions.  HEAD/FACE:  Normocephalic, atraumatic. Cranial nerves grossly intact.  CV: Regular rate  PULM: No evidence of respiratory difficulty, " symmetric chest rise.  GI:  Soft and non-Distended.  BACK/SIJ/HIP:  Lumbar Spine Exam:       Inspection: No erythema, bruising.       Palpation: (+) TTP of lumbar paraspinals Right sided       ROM:  Limited in flexion, extension, lateral bending.       (-) Facet loading bilateral      (+) Straight Leg Raise, Right      (-) MICHAEL, Tenderness over the PSIS, Yeoman test, bilateral  Hip Exam:      Inspection: No gross deformity or apparent leg length discrepancy      Palpation:  No TTP to bilateral greater trochanteric bursas.       ROM:  No limitation Due to pain in internal rotation, external rotation b/l  Neurologic Exam:     Alert. Speech is fluent and appropriate.     Strength: 5/5 in bilateral hip flexion and knee extension     Sensation:  Grossly intact to light touch in bilateral lower extremities     Tone: No abnormality appreciated in bilateral lower extremities    GAIT:  normal gait  Left shoulder exam:  SHOULDERS: No gross deformity or atrophy noted.   ROM: +  limited in  abduction external rotation  Rotator cuff exam GAMING:  mild+  EMPTY CAN SIGN:  mild+    DIAGNOSTIC STUDIES AND MEDICAL RECORDS REVIEW:  I have personally reviewed and interpreted relevant radiology reports and reviewed relevant records from other services in the EMR.     -EMG/nerve conduction study showed right L4 and L5 radiculopathy    -MRI lumbar spine 02/2024:   T12-L1: No spinal canal stenosis or neural foraminal narrowing.     L1-L2: No spinal canal stenosis or neural foraminal narrowing.     L2-L3: No spinal canal stenosis or neural foraminal narrowing.     L3-L4: There is asymmetric disc bulging to the left with mild facet arthropathy, contributing to mild spinal canal stenosis and moderate left-sided neural foraminal narrowing     L4-L5: Mild disc bulging and prominent facet joint arthropathy on the right side, noting joint hypertrophy with extensive subchondral marrow edema and surrounding inflammatory change (series 6, images  6-7).  There is a small right-sided synovial cyst measuring 10 mm (series 9, image 36) abutting and possibly impinging upon the descending right L5 nerve root.  There is overall mild spinal canal stenosis and moderate right and mild left-sided neural foraminal narrowing.  Slight anterolisthesis noted.     L5-S1: Mild disc bulge with posterior annular fissure.  There is moderate disc height loss on the left side.  These findings contribute to mild/moderate left-sided neural foraminal narrowing.  No spinal canal stenosis .     Impression:     Remote L1 compression fracture post vertebral plasty, stable.  No acute fractures.     Lumbar spondylosis, contributing to mild spinal canal stenosis L3-4 and L4-5 and moderate neural foraminal narrowing L3-4 through L5-S1, as above.     Prominent right-sided facet joint arthropathy at L4-5 with small anterior synovial cyst impinging upon the spinal canal, as above.    Clinical Impression:  This is a pleasant 79 y.o. male patient with PMH/PSH of compression fracture of L1 status post kyphoplasty, compression fracture of thoracic vertebrae, restless  legs syndrome, hypertension, chronic diastolic heart failure, left bundle branch block, polymyalgia rheumatica, HLAb27, TAVR, long-term anticoagulation, coronary artery disease status post LAD and RCA stent, presenting  right knee pain mainly in the posteromedial aspect associated with tenderness  and radiation to the right calf above the ankle, worse at night.   Patient has failed multiple interventional procedures including knee cooled radiofrequency ablation and multiple transforaminal epidural steroid injection I would  expect some sort of sciatic /femoral nerve entrapment and I would recommend proceeding with right femoral and right sciatic peripheral nerve stimulator.    Encounter Diagnosis:  Ghanshyam Sanford Jr. is a 79 y.o. male with the following diagnoses based on history, exam, and imagin. Neuropathy of right lower  extremity    2. Chronic pain syndrome    3. Chronic pain of right knee    4. Lumbar foraminal stenosis    Treatment Plan:    Diagnostics/Referrals:  continue with a home exercise    Medications:   Continue as prescribed  NSAIDs: None  Topical Agent: No  TCA/SSRI/SNRI: None  Anti-convulsants: Gabapentin   Muscle Relaxants: None  Opioids: None    Interventional Therapy: please schedule Right femoral and high sciatic PNS, SPR, IV sedation, Ancef,   psych clearance  Sedation: IV sedation  Anticoagulation medications: 81 mg Aspirin  and Plavix  -Clearance to stop Blood thinner: yes, 7 days     Regarding the above interventions, the patient has been educated regarding the risks (including bleeding, infection, increased pain, nerve damage, or allergic reaction), benefits, and alternatives. The patient states he understands and is eager to proceed.    Follow-up: RTC 2 weeks.    May consider: right sympathetic nerve block, SCS/DRG    Angel Black MD  Anesthesiologist  Interventional Pain Medicine  04/01/2025    Disclaimer:  This note was prepared using voice recognition system and is likely to have sound alike errors that may have been overlooked even after proof reading.  Please call me with any questions.

## 2025-04-22 ENCOUNTER — PATIENT MESSAGE (OUTPATIENT)
Dept: NEUROLOGY | Facility: CLINIC | Age: 80
End: 2025-04-22
Payer: MEDICARE

## 2025-04-23 ENCOUNTER — TELEPHONE (OUTPATIENT)
Dept: PAIN MEDICINE | Facility: CLINIC | Age: 80
End: 2025-04-23
Payer: MEDICARE

## 2025-04-23 NOTE — TELEPHONE ENCOUNTER
Patient unable to proceed with 5/2 for sprint. Will need to reschedule for 5/9. Discussed to hold aspirin and plavix for  7 days. Voiced understanding.

## 2025-04-23 NOTE — TELEPHONE ENCOUNTER
----- Message from Hilda sent at 4/22/2025  4:59 PM CDT -----  Contact: PATIENT  Ghanshyam Sanford Jr.MRN: [id number]: 1945PCP: Kevin Lombardi.Home Phone      [phone] Phone      Not on file.Mobile          [phone]: Patient would like to postpone the procedure that is scheduled for 05/02/25 until after 05/05/25.Phone: [phone]

## 2025-05-09 ENCOUNTER — ANESTHESIA EVENT (OUTPATIENT)
Dept: SURGERY | Facility: HOSPITAL | Age: 80
End: 2025-05-09
Payer: MEDICARE

## 2025-05-09 ENCOUNTER — HOSPITAL ENCOUNTER (OUTPATIENT)
Facility: HOSPITAL | Age: 80
Discharge: HOME | End: 2025-05-09
Attending: ANESTHESIOLOGY | Admitting: ANESTHESIOLOGY
Payer: MEDICARE

## 2025-05-09 ENCOUNTER — ANESTHESIA (OUTPATIENT)
Dept: SURGERY | Facility: HOSPITAL | Age: 80
End: 2025-05-09
Payer: MEDICARE

## 2025-05-09 VITALS
OXYGEN SATURATION: 98 % | TEMPERATURE: 98 F | SYSTOLIC BLOOD PRESSURE: 135 MMHG | HEART RATE: 65 BPM | RESPIRATION RATE: 12 BRPM | DIASTOLIC BLOOD PRESSURE: 74 MMHG

## 2025-05-09 DIAGNOSIS — M79.604 RIGHT LEG PAIN: Primary | ICD-10-CM

## 2025-05-09 PROCEDURE — 37000009 HC ANESTHESIA EA ADD 15 MINS: Performed by: ANESTHESIOLOGY

## 2025-05-09 PROCEDURE — 99153 MOD SED SAME PHYS/QHP EA: CPT | Performed by: ANESTHESIOLOGY

## 2025-05-09 PROCEDURE — 36000706: Performed by: ANESTHESIOLOGY

## 2025-05-09 PROCEDURE — 71000039 HC RECOVERY, EACH ADD'L HOUR: Performed by: ANESTHESIOLOGY

## 2025-05-09 PROCEDURE — C1778 LEAD, NEUROSTIMULATOR: HCPCS | Performed by: ANESTHESIOLOGY

## 2025-05-09 PROCEDURE — 63600175 PHARM REV CODE 636 W HCPCS: Performed by: ANESTHESIOLOGY

## 2025-05-09 PROCEDURE — 99152 MOD SED SAME PHYS/QHP 5/>YRS: CPT | Performed by: ANESTHESIOLOGY

## 2025-05-09 PROCEDURE — 36000707: Performed by: ANESTHESIOLOGY

## 2025-05-09 PROCEDURE — 63600175 PHARM REV CODE 636 W HCPCS: Performed by: NURSE ANESTHETIST, CERTIFIED REGISTERED

## 2025-05-09 PROCEDURE — 64555 IMPLANT NEUROELECTRODES: CPT | Mod: RT,,, | Performed by: ANESTHESIOLOGY

## 2025-05-09 PROCEDURE — 37000008 HC ANESTHESIA 1ST 15 MINUTES: Performed by: ANESTHESIOLOGY

## 2025-05-09 PROCEDURE — 71000033 HC RECOVERY, INTIAL HOUR: Performed by: ANESTHESIOLOGY

## 2025-05-09 DEVICE — IMPLANTABLE DEVICE: Type: IMPLANTABLE DEVICE | Site: FEMORAL | Status: FUNCTIONAL

## 2025-05-09 RX ORDER — MIDAZOLAM HYDROCHLORIDE 1 MG/ML
INJECTION INTRAMUSCULAR; INTRAVENOUS
Status: DISCONTINUED | OUTPATIENT
Start: 2025-05-09 | End: 2025-05-09

## 2025-05-09 RX ORDER — LIDOCAINE HYDROCHLORIDE 20 MG/ML
INJECTION, SOLUTION INFILTRATION; PERINEURAL
Status: DISCONTINUED | OUTPATIENT
Start: 2025-05-09 | End: 2025-05-09 | Stop reason: HOSPADM

## 2025-05-09 RX ORDER — CEFAZOLIN 2 G/1
INJECTION, POWDER, FOR SOLUTION INTRAMUSCULAR; INTRAVENOUS
Status: DISCONTINUED | OUTPATIENT
Start: 2025-05-09 | End: 2025-05-09

## 2025-05-09 RX ORDER — FENTANYL CITRATE 50 UG/ML
INJECTION, SOLUTION INTRAMUSCULAR; INTRAVENOUS
Status: DISCONTINUED | OUTPATIENT
Start: 2025-05-09 | End: 2025-05-09

## 2025-05-09 RX ADMIN — CEFAZOLIN 2 G: 2 INJECTION, POWDER, FOR SOLUTION INTRAMUSCULAR; INTRAVENOUS at 10:05

## 2025-05-09 RX ADMIN — FENTANYL CITRATE 50 MCG: 50 INJECTION, SOLUTION INTRAMUSCULAR; INTRAVENOUS at 10:05

## 2025-05-09 RX ADMIN — SODIUM CHLORIDE, SODIUM LACTATE, POTASSIUM CHLORIDE, AND CALCIUM CHLORIDE: .6; .31; .03; .02 INJECTION, SOLUTION INTRAVENOUS at 09:05

## 2025-05-09 RX ADMIN — MIDAZOLAM HYDROCHLORIDE 1 MG: 1 INJECTION, SOLUTION INTRAMUSCULAR; INTRAVENOUS at 10:05

## 2025-05-09 NOTE — ANESTHESIA PREPROCEDURE EVALUATION
05/09/2025  Pre-operative evaluation for Procedure(s) (LRB):    Ghanshyam Sanford Jr. is a 79 y.o. male w/ PHMx of aortic stenosis s/p TAVR in 2021, LBBB (remained stable sine the procedure), CAD s/p PCI (RCA and LAD 2020, on plavix),  polymyalgia on steroids, HTN      TTE 12/2022   -The study quality is average.     -Suboptimal parasternal views.    -The left ventricle is normal in size. Global left ventricular systolic function is normal. The left ventricular ejection fraction is 55%. Left ventricular diastolic function is indeterminate. Mild concentric left ventricular hypertrophy is present.  -The left atrial diameter is mildly increased.    -Mild mitral annular calcification is noted.    -The bio prosthetic valve in the aortic position appears to be functioning normally. Aortic valve area continuity equation is 2.1 cm². Trace perivalvular leak.    -The pulmonary artery systolic pressure is 24 mmHg.         Patient Active Problem List   Diagnosis    Hypertension    Insomnia    HLA B27 (HLA B27 positive)    PMR (polymyalgia rheumatica)    Knee pain, bilateral    S/P medial meniscectomy of right knee    Right ACL tear    Right knee pain    Osteoarthritis of right knee    Osteopenia    Compression fracture of first lumbar vertebra    Closed compression fracture of thoracic vertebra    Restless legs syndrome (RLS)    Neuropathy    Chronic diastolic heart failure    LBBB (left bundle branch block)    Acute tear medial meniscus, right, initial encounter    Diastasis recti    Lumbar disc disease with radiculopathy    Right leg pain       Review of patient's allergies indicates:   Allergen Reactions    Atorvastatin calcium Other (See Comments)     Leg cramps    Cortisone      Inj- pt reports hiccups and feels spasm     Duloxetine Other (See Comments)     DRY MONTH  NOT ABLE TO SLEEP  NOT  HUNDRY  SWEATING A LOT  FEELING TIRED AND WEAK  DIZZINESS  WEIGHT LOSS ( 9 LB.)    Flomax [tamsulosin] Diarrhea    Pravastatin Other (See Comments)     Leg cramps       No current facility-administered medications on file prior to encounter.     Current Outpatient Medications on File Prior to Encounter   Medication Sig Dispense Refill    acetaminophen (TYLENOL) 500 MG tablet Take 1,000 mg by mouth every 6 (six) hours as needed for Pain.      ascorbic acid, vitamin C, (VITAMIN C) 250 MG tablet Take 250 mg by mouth once daily.      atorvastatin (LIPITOR) 40 MG tablet atorvastatin 40 mg tablet, [RxNorm: 481731]      coenzyme Q10 100 mg capsule Take 200 mg by mouth once daily.      gabapentin (NEURONTIN) 600 MG tablet Take 1 tablet by mouth in the evening 30 tablet 0    magnesium 250 mg Tab Take 250 mg by mouth once daily.      multivitamin (THERAGRAN) per tablet Take 1 tablet by mouth once daily.      mv-mn/iron/folic acid/herb 190 (VITAMIN D3 COMPLETE ORAL) Take 5,000 Units by mouth Daily.      NIFEdipine (PROCARDIA-XL) 30 MG (OSM) 24 hr tablet Take 30 mg by mouth once daily.      olmesartan (BENICAR) 40 MG tablet Take 40 mg by mouth once daily.      rosuvastatin (CRESTOR) 20 MG tablet Take 20 mg by mouth once daily.      traZODone (DESYREL) 150 MG tablet Take 150 mg by mouth every evening.      zolpidem (AMBIEN) 10 mg Tab Take 10 mg by mouth nightly as needed.      aspirin (ECOTRIN) 81 MG EC tablet Take 81 mg by mouth once daily.      clopidogreL (PLAVIX) 75 mg tablet Take 75 mg by mouth once daily.         Past Surgical History:   Procedure Laterality Date    ARTHROSCOPIC CHONDROPLASTY OF KNEE JOINT Right 6/13/2023    Procedure: ARTHROSCOPY, KNEE, WITH CHONDROPLASTY;  Surgeon: Tari Gregory MD;  Location: [facility] OR;  Service: Orthopedics;  Laterality: Right;    BACK SURGERY      CAROTID STENT      HERNIA REPAIR      INJECTION OF FACET JOINT Right 9/6/2024    Procedure: INJECTION, FACET JOINT  (L4/5);  Surgeon: Angel Black MD;  Location: [facility] OR;  Service: Pain Management;  Laterality: Right;    KNEE ARTHROPLASTY Right     KNEE ARTHROSCOPY W/ MENISCECTOMY Right 6/13/2023    Procedure: ARTHROSCOPY, KNEE, WITH MEDIAL AND LATERAL MENISCECTOMY;  Surgeon: Tari Gregory MD;  Location: [facility] OR;  Service: Orthopedics;  Laterality: Right;    KNEE ARTHROSCOPY W/ PLICA EXCISION Right 6/13/2023    Procedure: EXCISION, PLICA, KNEE, ARTHROSCOPIC;  Surgeon: Tari Gregory MD;  Location: [facility] OR;  Service: Orthopedics;  Laterality: Right;    KNEE DEBRIDEMENT Right 6/13/2023    Procedure: DEBRIDEMENT, KNEE;  Surgeon: Tari Gregory MD;  Location: [facility] OR;  Service: Orthopedics;  Laterality: Right;    [id number] Right 6/13/2023    Procedure: [id number];  Surgeon: Tari Gregory MD;  Location: [facility] OR;  Service: Orthopedics;  Laterality: Right;    PERCUTANEOUS TRANSCATHETER AORTIC VALVE REPLACEMENT (TAVR) Right 12/14/2021    Procedure: REPLACEMENT, AORTIC VALVE, PERCUTANEOUS, TRANSCATHETER;  Surgeon: Johny Lockett MD;  Location: [facility] CATH;  Service: Cardiology;  Laterality: Right;  ops # 8    PERCUTANEOUS TRANSCATHETER AORTIC VALVE REPLACEMENT (TAVR) Right 12/14/2021    Procedure: REPLACEMENT, AORTIC VALVE, PERCUTANEOUS, TRANSCATHETER;  Surgeon: Sushma Piña MD;  Location: [facility] CATH;  Service: Cardiovascular;  Laterality: Right;    SYNOVECTOMY OF KNEE Right 6/13/2023    Procedure: PARTIAL SYNOVECTOMY, KNEE;  Surgeon: Tari Gregory MD;  Location: [facility] OR;  Service: Orthopedics;  Laterality: Right;    TRANSFORAMINAL EPIDURAL INJECTION OF STEROID Right 3/7/2025    Procedure: TRANSFORAMINAL EPIDURAL STEROID INJECTION (L4/5,L5/S1);  Surgeon: Angel Black MD;  Location: [facility] OR;  Service: Pain Management;  Laterality: Right;       Social History     Socioeconomic History    Marital status:    Tobacco Use    Smoking status: Never    Smokeless tobacco: Never   Substance and  "Sexual Activity    Alcohol use: Never    Drug use: Never     Social Drivers of Health     Financial Resource Strain: Low Risk  (2025)    Overall Financial Resource Strain (CARDIA)     Difficulty of Paying Living Expenses: Not hard at all   Food Insecurity: No Food Insecurity (2025)    Hunger Vital Sign     Worried About Running Out of Food in the Last Year: Never true     Ran Out of Food in the Last Year: Never true   Transportation Needs: No Transportation Needs (2025)    PRAPARE - Transportation     Lack of Transportation (Medical): No     Lack of Transportation (Non-Medical): No   Physical Activity: Sufficiently Active (2025)    Exercise Vital Sign     Days of Exercise per Week: 5 days     Minutes of Exercise per Session: 60 min   Stress: No Stress Concern Present (2025)    [employer] - Occupational Stress Questionnaire     Feeling of Stress : Not at all   Housing Stability: Low Risk  (2025)    Housing Stability Vital Sign     Unable to Pay for Housing in the Last Year: No     Homeless in the Last Year: No         CBC: No results for input(s): "WBC", "RBC", "HGB", "HCT", "PLT", "MCV", "MCH", "MCHC" in the last 72 hours.    CMP: No results for input(s): "NA", "K", "CL", "CO2", "BUN", "CREATININE", "GLU", "MG", "PHOS", "CALCIUM", "ALBUMIN", "PROT", "ALKPHOS", "ALT", "AST", "BILITOT" in the last 72 hours.    INR  No results for input(s): "PT", "INR", "PROTIME", "APTT" in the last 72 hours.        Diagnostic Studies:      EKD Echo:  No results found for this or any previous visit.      Pre-op Assessment    I have reviewed the Patient Summary Reports.     I have reviewed the Nursing Notes. I have reviewed the NPO Status.   I have reviewed the Medications.     Review of Systems  Anesthesia Hx:   History of prior surgery of interest to airway management or planning:            Denies Personal Hx of Anesthesia complications.                  "   Cardiovascular:     Hypertension   CAD    Dysrhythmias                                          Physical Exam  General: Well nourished and Cooperative    Airway:  Mallampati: II   Mouth Opening: Normal  TM Distance: Normal  Tongue: Normal  Neck ROM: Normal ROM    Chest/Lungs:  Clear to auscultation, Normal Respiratory Rate    Heart:  Rate: Normal  Rhythm: Regular Rhythm  Sounds: Normal      Anesthesia Plan  Type of Anesthesia, risks & benefits discussed:    Anesthesia Type: MAC  Intra-op Monitoring Plan: Standard ASA Monitors  Post Op Pain Control Plan: multimodal analgesia and IV/PO Opioids PRN  Induction:  IV  Informed Consent: Informed consent signed with the Patient and all parties understand the risks and agree with anesthesia plan.  All questions answered.   ASA Score: 3  Day of Surgery Review of History & Physical: H&P Update referred to the surgeon/provider.I have interviewed and examined the patient. I have reviewed the patient's H&P dated: 5/9/25. There are no significant changes.     Ready For Surgery From Anesthesia Perspective.     .

## 2025-05-09 NOTE — TRANSFER OF CARE
Anesthesia Transfer of Care Note    Patient: Ghanshyam Sanford Jr.    Procedure(s) Performed: Procedure(s) (LRB):  INSERTION,ELECTRODE LEAD,NEUROSTIMULATOR,PERIPHERAL (FEMORAL AND SCIATIC) (SPRINT PNS) (ANCEF, ASA/PLAVIX) (Right)    Patient location: PACU    Anesthesia Type: MAC    Transport from OR: Transported from OR on room air with adequate spontaneous ventilation    Post pain: adequate analgesia    Post assessment: no apparent anesthetic complications and tolerated procedure well    Post vital signs: stable    Level of consciousness: awake, alert and oriented    Nausea/Vomiting: no nausea/vomiting    Complications: none    Transfer of care protocol was followed      Last vitals: Visit Vitals  BP (!) 144/83 (BP Location: Right arm, Patient Position: Lying)   Pulse 82   Temp 36.1 °C (97 °F) (Oral)   Resp 14   SpO2 99%

## 2025-05-09 NOTE — DISCHARGE SUMMARY
Discharge Note  Short Stay    Admit Date: 5/9/2025    Attending Physician: Angel Black    Discharge Physician: Angel Black    Discharge Date: 5/9/2025 10:58 AM    Procedure(s) (LRB):  INSERTION,ELECTRODE LEAD,NEUROSTIMULATOR,PERIPHERAL (FEMORAL AND SCIATIC) (SPRINT PNS) (ANCEF, ASA/PLAVIX) (Right)    Final Diagnosis: Neuropathy of right lower extremity [G57.91]    Disposition: Home or self care    Patient Instructions:   Current Discharge Medication List        CONTINUE these medications which have NOT CHANGED    Details   acetaminophen (TYLENOL) 500 MG tablet Take 1,000 mg by mouth every 6 (six) hours as needed for Pain.      ascorbic acid, vitamin C, (VITAMIN C) 250 MG tablet Take 250 mg by mouth once daily.      atorvastatin (LIPITOR) 40 MG tablet atorvastatin 40 mg tablet, [RxNorm: 134965]      coenzyme Q10 100 mg capsule Take 200 mg by mouth once daily.      gabapentin (NEURONTIN) 600 MG tablet Take 1 tablet by mouth in the evening  Qty: 30 tablet, Refills: 0      magnesium 250 mg Tab Take 250 mg by mouth once daily.      multivitamin (THERAGRAN) per tablet Take 1 tablet by mouth once daily.      mv-mn/iron/folic acid/herb 190 (VITAMIN D3 COMPLETE ORAL) Take 5,000 Units by mouth Daily.      NIFEdipine (PROCARDIA-XL) 30 MG (OSM) 24 hr tablet Take 30 mg by mouth once daily.      olmesartan (BENICAR) 40 MG tablet Take 40 mg by mouth once daily.      rosuvastatin (CRESTOR) 20 MG tablet Take 20 mg by mouth once daily.      traZODone (DESYREL) 150 MG tablet Take 150 mg by mouth every evening.      zolpidem (AMBIEN) 10 mg Tab Take 10 mg by mouth nightly as needed.      aspirin (ECOTRIN) 81 MG EC tablet Take 81 mg by mouth once daily.      clopidogreL (PLAVIX) 75 mg tablet Take 75 mg by mouth once daily.             Discharge Diagnosis: Neuropathy of right lower extremity [G57.91]  Condition on Discharge: Stable with no complications to procedure   Diet on Discharge: Same as before.  Activity: as per instruction  sheet.  Discharge to: Home with a responsible adult.  Follow up: 2-4 weeks       Please call my office or pager at [phone] if experienced any weakness or loss of sensation, fever > 101.5, pain uncontrolled with oral medications, persistent nausea/vomiting/or diarrhea, redness or drainage from the incisions, or any other worrisome concerns. If physician on call was not reached or could not communicate with our office for any reason please go to the nearest emergency department.     Angel Black  05/09/2025

## 2025-05-09 NOTE — OP NOTE
SPR Temporary PNS Implant (SPRINT)     INFORMED CONSENT: The patient was seen in preoperative holding. We had a good discussion regarding the relative risk of implantation of the peripheral nerve stimulator. Those risks were detailed and were certainly inclusive of but not limited to injury to the said nerve, infection, lead migration, failure of the pickup, unwinding, lead fracture, failure of the procedure to work, injury to the nerve, increased weakness, increased pain, and death.      Despite these risks, the patent expressed understanding and agreed to the procedure. I verify that I personally obtained Ghanshyam's informed consent prior to the start of the procedure and the signed consent can be found on the patient's chart. There are no contraindications to the procedure.     PATIENT NAME: Ghanshyam Sanford Jr.   MRN: [id number]     DATE OF PROCEDURE: 05/09/2025    PRE-OP DIAGNOSIS: Neuropathy of right lower extremity [G57.91] , Neuropathic Pain, Chronic Pain Syndrome    POST-OP DIAGNOSIS: Same    PHYSICIAN: Angel Black MD       PROCEDURE: Implantation of Peripheral Nerve Stimulator Lead with External Peripheral Nerve Stimulator  - External Pulse Generator (SPRINT)      PREOPERATIVE ANTIBIOTICS: 2g IV Cefazolin administered 30 minutes prior to starting procedure, see anesthesia record for time.      ANESTHESIA:   Local anesthetic    SEDATION: per anesthesia records     ESTIMATED BLOOD LOSS: None.      OPERATIVE PROCEDURE:   Patient was placed in the supine position and Right thigh was prepped first with Chlorhexidine in a circumferential wide sweep and then draped in the usual sterile fashion, and then ultrasound guidance was used to place the lead with direct observation as a conduction medium.      The Right femoral nerve (saphenous branch) in the adductor canal was visualized with ultrasound guidance. Then, we anesthetized the skin with 1% lidocaine and deeper to approximately a 1 cm depth. Through this,  we inserted a needle and placed a guidewire through and a testing wire down with direct visualization of the femoral artery right next to the femoral nerve. At this point, we confirmed a local stimulation with extremely low submilliampere amplitude and patient's report of stimulus-evoked sensations to regions of pain. Finding this to be in perfect location, we then deployed the SPR peripheral nerve stimulator lead  next to the Right femoral nerve. Pulling the passing needle out, we found all leads subcutaneous, reconfirmed our local sensation.      Then patient was placed in the lateral decubitus position and  the post aspect of the Right knee above the knee crease prepped first with Chlorhexidine in a circumferential wide sweep and then draped in the usual sterile fashion, and then ultrasound guidance was used to place the lead with direct observation as a conduction medium.      Right sciatic nerve was visualized with ultrasound guidance. Then, we anesthetized the skin with 1% lidocaine and deeper to approximately a 1 cm depth. Through this, we inserted a needle and placed a guidewire through and a testing wire down with direct visualization next to the sciatic nerve. At this point, we confirmed a local stimulation with extremely low submilliampere amplitude and patient's report of stimulus-evoked sensations to regions of pain. Finding this to be in perfect location, we then deployed the SPR peripheral nerve stimulator lead  next to the Right sciatic nerve.  Pulling the passing needle out, we found all leads subcutaneous, reconfirmed our local sensation. Then both leads were attached to external IPG        COMPLICATIONS: There were no complications.     Specimen: None     The patient was monitored after the procedure in the recovery area. They were given post-procedure and discharge instructions to follow at home. The patient was discharged in a stable condition.      Angel Black MD

## 2025-05-09 NOTE — DISCHARGE INSTRUCTIONS
DIET: You may resume your normal diet today.    BATHING: You may resume your normal bathing.          You may shower, no hot water directly on site for 24 hours.    DRESSING: You may remove your bandage today.    ACTIVITY LEVEL: You may resume your normal activities 24 hours after your  procedure.    If you have received sedation or an anesthetic, you may feel sleepy for several hours. Rest until you are more awake. Gradually resume your normal activities tomorrow.    If you have received sedation or an anesthetic, do not drive or operate heavy machinery for at least 24 hours.    MEDICATION: You may resume your normal medications today.    You will receive instructions for any pain prescriptions. Pain medications should be taken only as directed.    SPECIAL INSTRUCTIONS: No heat to the injection site for 24 hours including: bath or shower, heating pad, moist heat, hot tubs.    Use ice pack to injection site for any pain or discomfort. Apply ice pack to 20 minutes then remove for 20 minutes before re-applying to site.    WHEN TO CALL DOCTOR: Redness or swelling around injection site    Fever of 101F    Drainage (pus) from the injection site    For any continuous bleeding (some dried blood over the incision is normal).    FOLLOW UP: Follow up phone call will be made by office.    FOR EMERGENCIES: If any unusual problems or difficulties occur during clinic hours, call [phone] or 484.

## 2025-05-09 NOTE — H&P
HPI  Patient presenting for Procedure(s) (LRB):  INSERTION,ELECTRODE LEAD,NEUROSTIMULATOR,PERIPHERAL (FEMORAL AND SCIATIC) (SPRINT PNS) (ANCEF, ASA/PLAVIX) (Right)     Patient on Anti-coagulation No, stopped more than 7 days  Patient is not on antibiotic for the last 2 weeks    Patient has a Ride Home assistance.    No health changes since previous encounter    Past Medical History:   Diagnosis Date    Anticoagulant long-term use     Aortic valve regurgitation     Aortic valve stenosis, nonrheumatic     Carotid stenosis, bilateral     Chronic diastolic heart failure, NYHA class 2     Chronic total occlusion of coronary artery     Coronary artery disease     Elevated PSA     UNDER CARE    Hypertension     Hypertensive heart disease     Osteoarthritis of right knee 3/22/2017    Polymyalgia     Restless legs     SOB (shortness of breath)      Past Surgical History:   Procedure Laterality Date    ARTHROSCOPIC CHONDROPLASTY OF KNEE JOINT Right 6/13/2023    Procedure: ARTHROSCOPY, KNEE, WITH CHONDROPLASTY;  Surgeon: Tari Gregory MD;  Location: [facility] OR;  Service: Orthopedics;  Laterality: Right;    BACK SURGERY      CAROTID STENT      HERNIA REPAIR      INJECTION OF FACET JOINT Right 9/6/2024    Procedure: INJECTION, FACET JOINT (L4/5);  Surgeon: Angel Black MD;  Location: [facility] OR;  Service: Pain Management;  Laterality: Right;    KNEE ARTHROPLASTY Right     KNEE ARTHROSCOPY W/ MENISCECTOMY Right 6/13/2023    Procedure: ARTHROSCOPY, KNEE, WITH MEDIAL AND LATERAL MENISCECTOMY;  Surgeon: Tari Gregory MD;  Location: [facility] OR;  Service: Orthopedics;  Laterality: Right;    KNEE ARTHROSCOPY W/ PLICA EXCISION Right 6/13/2023    Procedure: EXCISION, PLICA, KNEE, ARTHROSCOPIC;  Surgeon: Tari Gregory MD;  Location: [facility] OR;  Service: Orthopedics;  Laterality: Right;    KNEE DEBRIDEMENT Right 6/13/2023    Procedure: DEBRIDEMENT, KNEE;  Surgeon: Tari Gregory MD;  Location: [facility] OR;  Service: Orthopedics;  Laterality: Right;     [id number] Right 6/13/2023    Procedure: [id number];  Surgeon: Tari Gregory MD;  Location: [facility] OR;  Service: Orthopedics;  Laterality: Right;    PERCUTANEOUS TRANSCATHETER AORTIC VALVE REPLACEMENT (TAVR) Right 12/14/2021    Procedure: REPLACEMENT, AORTIC VALVE, PERCUTANEOUS, TRANSCATHETER;  Surgeon: Johny Lockett MD;  Location: [facility] CATH;  Service: Cardiology;  Laterality: Right;  ops # 8    PERCUTANEOUS TRANSCATHETER AORTIC VALVE REPLACEMENT (TAVR) Right 12/14/2021    Procedure: REPLACEMENT, AORTIC VALVE, PERCUTANEOUS, TRANSCATHETER;  Surgeon: Sushma Piña MD;  Location: [facility] CATH;  Service: Cardiovascular;  Laterality: Right;    SYNOVECTOMY OF KNEE Right 6/13/2023    Procedure: PARTIAL SYNOVECTOMY, KNEE;  Surgeon: Tari Gregory MD;  Location: [facility] OR;  Service: Orthopedics;  Laterality: Right;    TRANSFORAMINAL EPIDURAL INJECTION OF STEROID Right 3/7/2025    Procedure: TRANSFORAMINAL EPIDURAL STEROID INJECTION (L4/5,L5/S1);  Surgeon: Angel Black MD;  Location: [facility] OR;  Service: Pain Management;  Laterality: Right;     Review of patient's allergies indicates:   Allergen Reactions    Atorvastatin calcium Other (See Comments)     Leg cramps    Cortisone      Inj- pt reports hiccups and feels spasm     Duloxetine Other (See Comments)     DRY MONTH  NOT ABLE TO SLEEP  NOT HUNDRY  SWEATING A LOT  FEELING TIRED AND WEAK  DIZZINESS  WEIGHT LOSS ( 9 LB.)    Flomax [tamsulosin] Diarrhea    Pravastatin Other (See Comments)     Leg cramps      No current facility-administered medications for this encounter.       PMHx, PSHx, Allergies, Medications reviewed in epic    ROS negative except pain complaints in HPI    OBJECTIVE:    BP (!) 144/83 (BP Location: Right arm, Patient Position: Lying)   Pulse 82   Temp 97 °F (36.1 °C) (Oral)   Resp 14   SpO2 99%     PHYSICAL EXAMINATION:    GENERAL: Well appearing, in no acute distress, alert and oriented x3.  PSYCH:  Mood and affect  appropriate.  SKIN: Skin color, texture, turgor normal, no rashes or lesions which will impact the procedure.  CV: RRR with palpation of the radial artery.  PULM: No evidence of respiratory difficulty, symmetric chest rise. Clear to auscultation.  NEURO: Cranial nerves grossly intact.    Plan:    Proceed with procedure as planned Procedure(s) (LRB):  INSERTION,ELECTRODE LEAD,NEUROSTIMULATOR,PERIPHERAL (FEMORAL AND SCIATIC) (SPRINT PNS) (ANCEF, ASA/PLAVIX) (Right)    Angel Black  Interventional Pain  05/09/2025

## 2025-05-09 NOTE — ANESTHESIA POSTPROCEDURE EVALUATION
Anesthesia Post Evaluation    Patient: Ghanshyam Sanford Jr.    Procedure(s) Performed: Procedure(s) (LRB):  INSERTION,ELECTRODE LEAD,NEUROSTIMULATOR,PERIPHERAL (FEMORAL AND SCIATIC) (SPRINT PNS) (ANCEF, ASA/PLAVIX) (Right)    Final Anesthesia Type: MAC      Patient location during evaluation: PACU  Patient participation: Yes- Able to Participate  Level of consciousness: awake and alert and oriented  Post-procedure vital signs: reviewed and stable  Pain management: adequate  Airway patency: patent    PONV status at discharge: No PONV  Anesthetic complications: no      Cardiovascular status: blood pressure returned to baseline and hemodynamically stable  Respiratory status: unassisted, spontaneous ventilation and room air  Hydration status: euvolemic  Follow-up not needed.              Vitals Value Taken Time   /74 05/09/25 12:00   Temp 36.4 °C (97.5 °F) 05/09/25 12:00   Pulse 65 05/09/25 12:00   Resp 12 05/09/25 12:00   SpO2 98 % 05/09/25 12:00         No case tracking events are documented in the log.      Pain/Allegra Score: Pain Rating Prior to Med Admin: 0 (5/9/2025 11:59 AM)  Pain Rating Post Med Admin: 0 (5/9/2025 11:59 AM)  Allegra Score: 10 (5/9/2025 11:59 AM)

## 2025-05-12 ENCOUNTER — PATIENT MESSAGE (OUTPATIENT)
Dept: PAIN MEDICINE | Facility: CLINIC | Age: 80
End: 2025-05-12
Payer: MEDICARE

## 2025-05-16 ENCOUNTER — OFFICE VISIT (OUTPATIENT)
Dept: PAIN MEDICINE | Facility: CLINIC | Age: 80
End: 2025-05-16
Payer: MEDICARE

## 2025-05-16 ENCOUNTER — HOSPITAL ENCOUNTER (EMERGENCY)
Facility: HOSPITAL | Age: 80
Discharge: HOME | End: 2025-05-16
Attending: SURGERY
Payer: MEDICARE

## 2025-05-16 VITALS
OXYGEN SATURATION: 93 % | HEIGHT: 70 IN | SYSTOLIC BLOOD PRESSURE: 119 MMHG | BODY MASS INDEX: 26.48 KG/M2 | DIASTOLIC BLOOD PRESSURE: 82 MMHG | WEIGHT: 185 LBS | HEART RATE: 78 BPM

## 2025-05-16 VITALS
DIASTOLIC BLOOD PRESSURE: 59 MMHG | HEIGHT: 70 IN | HEART RATE: 73 BPM | OXYGEN SATURATION: 97 % | BODY MASS INDEX: 26.51 KG/M2 | WEIGHT: 185.19 LBS | RESPIRATION RATE: 18 BRPM | TEMPERATURE: 98 F | SYSTOLIC BLOOD PRESSURE: 128 MMHG

## 2025-05-16 DIAGNOSIS — M79.89 RIGHT LEG SWELLING: ICD-10-CM

## 2025-05-16 DIAGNOSIS — Z96.82 S/P PLACEMENT OF NERVE STIMULATOR: Primary | ICD-10-CM

## 2025-05-16 DIAGNOSIS — L08.9 SKIN INFECTION: ICD-10-CM

## 2025-05-16 LAB
ABSOLUTE EOSINOPHIL (OHS): 0.24 K/UL
ABSOLUTE MONOCYTE (OHS): 0.65 K/UL (ref 0.3–1)
ABSOLUTE NEUTROPHIL COUNT (OHS): 6.82 K/UL (ref 1.8–7.7)
ALBUMIN SERPL BCP-MCNC: 3.9 G/DL (ref 3.5–5.2)
ALP SERPL-CCNC: 77 UNIT/L (ref 40–150)
ALT SERPL W/O P-5'-P-CCNC: 21 UNIT/L (ref 10–44)
ANION GAP (OHS): 8 MMOL/L (ref 8–16)
AST SERPL-CCNC: 16 UNIT/L (ref 11–45)
BASOPHILS # BLD AUTO: 0.08 K/UL
BASOPHILS NFR BLD AUTO: 0.8 %
BILIRUB SERPL-MCNC: 0.8 MG/DL (ref 0.1–1)
BUN SERPL-MCNC: 22 MG/DL (ref 8–23)
CALCIUM SERPL-MCNC: 9.1 MG/DL (ref 8.7–10.5)
CHLORIDE SERPL-SCNC: 107 MMOL/L (ref 95–110)
CO2 SERPL-SCNC: 24 MMOL/L (ref 23–29)
CREAT SERPL-MCNC: 1 MG/DL (ref 0.5–1.4)
ERYTHROCYTE [DISTWIDTH] IN BLOOD BY AUTOMATED COUNT: 13.7 % (ref 11.5–14.5)
GFR SERPLBLD CREATININE-BSD FMLA CKD-EPI: >60 ML/MIN/1.73/M2
GLUCOSE SERPL-MCNC: 92 MG/DL (ref 70–110)
HCT VFR BLD AUTO: 42.5 % (ref 40–54)
HGB BLD-MCNC: 14 GM/DL (ref 14–18)
IMM GRANULOCYTES # BLD AUTO: 0.06 K/UL (ref 0–0.04)
IMM GRANULOCYTES NFR BLD AUTO: 0.6 % (ref 0–0.5)
LACTATE SERPL-SCNC: 1 MMOL/L (ref 0.5–2.2)
LYMPHOCYTES # BLD AUTO: 2.11 K/UL (ref 1–4.8)
MCH RBC QN AUTO: 28.7 PG (ref 27–31)
MCHC RBC AUTO-ENTMCNC: 32.9 G/DL (ref 32–36)
MCV RBC AUTO: 87 FL (ref 82–98)
NUCLEATED RBC (/100WBC) (OHS): 0 /100 WBC
PLATELET # BLD AUTO: 186 K/UL (ref 150–450)
PMV BLD AUTO: 10.6 FL (ref 9.2–12.9)
POTASSIUM SERPL-SCNC: 4.4 MMOL/L (ref 3.5–5.1)
PROT SERPL-MCNC: 7.3 GM/DL (ref 6–8.4)
RBC # BLD AUTO: 4.88 M/UL (ref 4.6–6.2)
RELATIVE EOSINOPHIL (OHS): 2.4 %
RELATIVE LYMPHOCYTE (OHS): 21.2 % (ref 18–48)
RELATIVE MONOCYTE (OHS): 6.5 % (ref 4–15)
RELATIVE NEUTROPHIL (OHS): 68.5 % (ref 38–73)
SODIUM SERPL-SCNC: 139 MMOL/L (ref 136–145)
WBC # BLD AUTO: 9.96 K/UL (ref 3.9–12.7)

## 2025-05-16 PROCEDURE — 87040 BLOOD CULTURE FOR BACTERIA: CPT | Performed by: SURGERY

## 2025-05-16 PROCEDURE — 63600175 PHARM REV CODE 636 W HCPCS: Performed by: SURGERY

## 2025-05-16 PROCEDURE — 83605 ASSAY OF LACTIC ACID: CPT | Performed by: SURGERY

## 2025-05-16 PROCEDURE — 99285 EMERGENCY DEPT VISIT HI MDM: CPT | Mod: 25,27

## 2025-05-16 PROCEDURE — 96365 THER/PROPH/DIAG IV INF INIT: CPT

## 2025-05-16 PROCEDURE — 84460 ALANINE AMINO (ALT) (SGPT): CPT | Performed by: SURGERY

## 2025-05-16 PROCEDURE — 99213 OFFICE O/P EST LOW 20 MIN: CPT | Mod: PBBFAC | Performed by: ANESTHESIOLOGY

## 2025-05-16 PROCEDURE — 99999 PR PBB SHADOW E&M-EST. PATIENT-LVL III: CPT | Mod: PBBFAC,,, | Performed by: ANESTHESIOLOGY

## 2025-05-16 PROCEDURE — 85025 COMPLETE CBC W/AUTO DIFF WBC: CPT | Performed by: SURGERY

## 2025-05-16 RX ORDER — CLINDAMYCIN HYDROCHLORIDE 300 MG/1
300 CAPSULE ORAL 4 TIMES DAILY
Qty: 28 CAPSULE | Refills: 0 | Status: SHIPPED | OUTPATIENT
Start: 2025-05-16 | End: 2025-05-23

## 2025-05-16 RX ORDER — CLINDAMYCIN PHOSPHATE 150 MG/ML
600 INJECTION, SOLUTION INTRAVENOUS
Status: DISCONTINUED | OUTPATIENT
Start: 2025-05-16 | End: 2025-05-16

## 2025-05-16 RX ORDER — CLINDAMYCIN PHOSPHATE 600 MG/50ML
600 INJECTION, SOLUTION INTRAVENOUS
Status: COMPLETED | OUTPATIENT
Start: 2025-05-16 | End: 2025-05-16

## 2025-05-16 RX ADMIN — CLINDAMYCIN PHOSPHATE 600 MG: 600 INJECTION, SOLUTION INTRAVENOUS at 05:05

## 2025-05-16 NOTE — PROGRESS NOTES
EST Patient Evaluation  [facility]    Ghanshyam Sanford Jr.  : 1945  Date: 2025     CHIEF COMPLAINT:  Follow-up    Referring Physician: Self, Aaareferral  Primary Care Physician: Kevin Lombardi MD    HPI:  This is a 79 y.o. male with a chief complaint of Follow-up  . The patient has Past medical history/Past surgical history of  compression fracture of L1 status post kyphoplasty, compression fracture of thoracic vertebrae, restless  legs syndrome, hypertension, chronic diastolic heart failure, left bundle branch block, polymyalgia rheumatica, HLAb27, TAVR, long-term anticoagulation    Patient was evaluated and referred by Dr Isael Garner.  He was referred from Neurosurgery team based on cardiology referral for right leg pain  Interval History 2024:  Ghanshyam Sanford Jr. is here for follow up visit  to establish care with me. Ghanshyam Marteldemar  Had  right L4-L5 lumbar facet joint injection,  patient reported 50 % improvement in pain and functionality for 2 weeks.  Current pain score: 7/10    Interval History 2024:  Ghanshyam Sanford Jr. is here for follow up visit after 7 physical therapy sessions which provided him with a relief for his in the right knee pain.  Today he presents with the acute onset left shoulder pain from excessive throwing   while fishing.  Current pain score: 3/10, at night 5/10    Interval History 2025:  Ghanshyam Marteldemar  is here for follow up visit to discuss interventional procedure  in regards to his going right lower extremity pain/neuropathy  Current pain score: 4/10    Interval History 2025:  Ghanshyam GUTIERREZ Claribel Norm is here for procedure follow up visit after right L4 and L5 transforaminal epidural steroid injection,  patient reported 70 % improvement in pain for and functionality for 1 day for his back pain but not for his right knee pain& below-the-knee pain.  Current pain score: 5/10    Interval History  05/16/2025:  Ghanshyam Sanford JrNorm is here for  follow Up visit after [employer] rep raising her concern for infection when she saw him 2 days ago  Patient complains of drainage and swelling  from the right sciatic peripheral nerve stimulator entry. He stated yesterday he noticed swelling and light pinkish,yellow fluids on bandage oozing out the RT pad. He had to change it 3 times from the drainage. It is slightly warm and sensitive to touch. He is having trouble walking on RT leg.   Current pain score: 1/10 resting, 6/1 walking    Diabetic: No    Anticoagulation medications: 81 mg Aspirin  and Plavix -  last stent in 2020    Allergy To Iodine: No    Currently on Antibiotic: No    Previous Description of Pain Symptoms:  History of Recent Fall or Trauma: No   Onset: Chronic  Pain Location:  minimal low back pain,  posterior medial aspect of the right knee  Radiates/associated symptoms: RT LE to the calf  associated with tenderness  Pain is Getting worse over the last 3 months    The pain is described as burning and pulling.   Exacerbating factors:  stretching  Mitigating factors N/A.   Symptoms interfere with daily activity, sleeping.   The patient feels like symptoms have been worsening.   Patient denies night fever/night sweats, urinary incontinence, bowel incontinence, significant weight loss, significant motor weakness, and loss of sensations.    Pain score:  Best: 7/10  Worst: 10/10    Current pain medications:  N/A  Current Narcotics/Opioid /benzo Medications:  Opioids- None  Benzodiazepines: No    UDS:  NA    PDMP:  Reviewed and consistent with medication use as prescribed.     Previous Chronic Pain Treatment History:  Physical Therapy/HEP/Physician Lead Exercise Program:  Over the past 12 months, Patient has done 6+ sessions for lower back and shoulder.  PT response: Mildly Helpful.   Dates of the PT sessions: 10/2024-present.  Is patient participating in home exercise program (HEP)/ physician led exercise program:  Yes.Completes HEP that was provided by PT in addition to formal therapy.    Non-interventional Pain Therapy:  []Chiropractor.   []Acupuncture/Dry needle.  []TENS unit.  [x]Heat/ICE.  []Back Brace.    Medications previously tried:  NSAIDs: None  Topical Agent: Yes  TCA/SSRI/SNRI: None  Anti-convulsants: Gabapentin   Muscle Relaxants: None  Opioids- None.    Interventional Pain Procedures:  Knee CoolRFA-- 2022 with no relief   04/11/2024:  L4-L5 right transforaminal with IR-  80% relief for 3 weeks  09/6/ 2024:  right L4-L5 facet injection- 50% relief For 2 weeks with Dexamethasone   Kyphoplasty L1  03/07/2025: Right  L4/5 and L5/S1 Lumbar Transforaminal Epidural Steroid Injection- 70% relief for 1 day.  05/09/2025: right sciatic and femoral peripheral nerve stimulator, patient reported significant improvement in pain and functionality  Previous spine/Relevant joint surgery:   Right knee arthroplasty  Surgical History:   has a past surgical history that includes Hernia repair; Knee Arthroplasty (Right); Carotid stent; Back surgery; Percutaneous transcatheter aortic valve replacement (TAVR) (Right, 12/14/2021); Percutaneous transcatheter aortic valve replacement (TAVR) (Right, 12/14/2021); Knee arthroscopy w/ meniscectomy (Right, 6/13/2023); Arthroscopic chondroplasty of knee joint (Right, 6/13/2023); Knee arthroscopy w/ plica excision (Right, 6/13/2023); Synovectomy of knee (Right, 6/13/2023); Knee debridement (Right, 6/13/2023); wxmdh-zqaisbdw-beiblvtrxway (Right, 6/13/2023); Injection of facet joint (Right, 9/6/2024); and Transforaminal epidural injection of steroid (Right, 3/7/2025).  Medical History:   has a past medical history of Anticoagulant long-term use, Aortic valve regurgitation, Aortic valve stenosis, nonrheumatic, Carotid stenosis, bilateral, Chronic diastolic heart failure, NYHA class 2, Chronic total occlusion of coronary artery, Coronary artery disease, Elevated PSA, Hypertension, Hypertensive heart  "disease, Osteoarthritis of right knee (3/22/2017), Polymyalgia, Restless legs, and SOB (shortness of breath).  Family History:  family history includes Diabetes Mellitus in his father; Heart disease in his mother and paternal grandmother; Hypertension in his mother; Stroke in his mother and paternal grandmother.  Allergies:  Atorvastatin calcium, Cortisone, Duloxetine, Flomax [tamsulosin], and Pravastatin   Social History/SUBSTANCE ABUSE HISTORY:  Personal history of substance abuse: No   reports that he has never smoked. He has never used smokeless tobacco. He reports that he does not drink alcohol and does not use drugs.  LABS:  CBC  Lab Results   Component Value Date    WBC 6.30 09/18/2023    HGB 13.7 (L) 09/18/2023    HCT 42.0 09/18/2023     Coagulation Profile   Lab Results   Component Value Date     09/18/2023       Lab Results   Component Value Date    INR 1.0 12/13/2021     CMP:  BMP  Lab Results   Component Value Date     09/18/2023    K 4.4 09/18/2023     09/18/2023    CO2 26 09/18/2023    BUN 15 09/18/2023    CREATININE 1.0 02/12/2024    CALCIUM 9.1 09/18/2023    ANIONGAP 8 09/18/2023    EGFRNORACEVR >60 02/12/2024     Lab Results   Component Value Date    ALT 16 09/18/2023    AST 18 09/18/2023    ALKPHOS 63 09/18/2023    BILITOT 0.7 09/18/2023     HGBA1C:  No results found for: "LABA1C", "HGBA1C"    ROS:    Review of Systems   GENERAL:  No weight loss, malaise or fevers.  HEENT:   No recent changes in vision or hearing  NECK:  Negative for lumps, no difficulty with swallowing.  RESPIRATORY:  Negative for cough, wheezing or shortness of breath, patient denies any recent URI.  CARDIOVASCULAR:  Negative for chest pain or palpitations.  GI:  Negative for abdominal discomfort, blood in stools or black stools or change in bowel habits.  MUSCULOSKELETAL:  See HPI.  SKIN:  Negative for lesions, rash, and itching.  PSYCH:  No mood disorder or recent psychosocial stressors. " "  HEMATOLOGY/LYMPHOLOGY:  See the blood thinner sectioned in HPI.  NEURO:  See HPI  All other reviewed and negative other than HPI.    PHYSICAL EXAM:  VITALS: /82 (BP Location: Right arm, Patient Position: Sitting)   Pulse 78   Ht 5' 10" (1.778 m)   Wt 83.9 kg (185 lb)   SpO2 (!) 93%   BMI 26.54 kg/m²   Body mass index is 26.54 kg/m².  GENERAL: Well appearing, in no acute distress, alert and oriented x3, answers questions appropriately.   PSYCH: Flat affect.  SKIN: Skin color, texture, turgor normal, no rashes or lesions.  HEAD/FACE:  Normocephalic, atraumatic. Cranial nerves grossly intact.  CV: Regular rate  PULM: No evidence of respiratory difficulty, symmetric chest rise.  GI:  Soft and non-Distended.  BACK/SIJ/HIP:  Lumbar Spine Exam:       Inspection: No erythema, bruising.       Palpation: (+) TTP of lumbar paraspinals Right sided       ROM:  Limited in flexion, extension, lateral bending.       (-) Facet loading bilateral      (+) Straight Leg Raise, Right      (-) MICHAEL, Tenderness over the PSIS, Yeoman test, bilateral  Hip Exam:      Inspection: No gross deformity or apparent leg length discrepancy      Palpation:  No TTP to bilateral greater trochanteric bursas.       ROM:  No limitation Due to pain in internal rotation, external rotation b/l  Neurologic Exam:     Alert. Speech is fluent and appropriate.     Strength: 5/5 in bilateral hip flexion and knee extension     Sensation:  Grossly intact to light touch in bilateral lower extremities     Tone: No abnormality appreciated in bilateral lower extremities    GAIT:  normal gait  Left shoulder exam:  SHOULDERS: No gross deformity or atrophy noted.   ROM: +  limited in  abduction external rotation  Rotator cuff exam GAMING:  mild+  EMPTY CAN SIGN:  mild+    DIAGNOSTIC STUDIES AND MEDICAL RECORDS REVIEW:  I have personally reviewed and interpreted relevant radiology reports and reviewed relevant records from other services in the EMR. "     -EMG/nerve conduction study showed right L4 and L5 radiculopathy    -MRI lumbar spine 02/2024:   T12-L1: No spinal canal stenosis or neural foraminal narrowing.     L1-L2: No spinal canal stenosis or neural foraminal narrowing.     L2-L3: No spinal canal stenosis or neural foraminal narrowing.     L3-L4: There is asymmetric disc bulging to the left with mild facet arthropathy, contributing to mild spinal canal stenosis and moderate left-sided neural foraminal narrowing     L4-L5: Mild disc bulging and prominent facet joint arthropathy on the right side, noting joint hypertrophy with extensive subchondral marrow edema and surrounding inflammatory change (series 6, images 6-7).  There is a small right-sided synovial cyst measuring 10 mm (series 9, image 36) abutting and possibly impinging upon the descending right L5 nerve root.  There is overall mild spinal canal stenosis and moderate right and mild left-sided neural foraminal narrowing.  Slight anterolisthesis noted.     L5-S1: Mild disc bulge with posterior annular fissure.  There is moderate disc height loss on the left side.  These findings contribute to mild/moderate left-sided neural foraminal narrowing.  No spinal canal stenosis .     Impression:     Remote L1 compression fracture post vertebral plasty, stable.  No acute fractures.     Lumbar spondylosis, contributing to mild spinal canal stenosis L3-4 and L4-5 and moderate neural foraminal narrowing L3-4 through L5-S1, as above.     Prominent right-sided facet joint arthropathy at L4-5 with small anterior synovial cyst impinging upon the spinal canal, as above.    Clinical Impression:  This is a pleasant 79 y.o. male patient with PMH/PSH of compression fracture of L1 status post kyphoplasty, compression fracture of thoracic vertebrae, restless  legs syndrome, hypertension, chronic diastolic heart failure, left bundle branch block, polymyalgia rheumatica, HLAb27, TAVR, long-term anticoagulation, coronary  artery disease status post LAD and RCA stent, presenting  right knee pain mainly in the posteromedial aspect associated with tenderness  and radiation to the right calf above the ankle, worse at night.   Patient has failed multiple interventional procedures including knee cooled radiofrequency ablation and multiple transforaminal epidural steroid injection I would  expect some sort of sciatic /femoral nerve entrapment and I would recommend proceeding with right femoral and right sciatic peripheral nerve stimulator.    Encounter Diagnosis:  Ghanshyam Sanford Jr. is a 79 y.o. male with the following diagnoses based on history, exam, and imaging:  There are no diagnoses linked to this encounter.    Treatment Plan:    Diagnostics/Referrals:  continue with a home exercise    Medications:   Continue as prescribed  NSAIDs: None  Topical Agent: No  TCA/SSRI/SNRI: None  Anti-convulsants: Gabapentin   Muscle Relaxants: None  Opioids: None    Interventional Therapy: please schedule Right femoral and high sciatic PNS, SPR, IV sedation, Ancef,   psych clearance  Sedation: IV sedation  Anticoagulation medications: 81 mg Aspirin  and Plavix  -Clearance to stop Blood thinner: yes, 7 days     Regarding the above interventions, the patient has been educated regarding the risks (including bleeding, infection, increased pain, nerve damage, or allergic reaction), benefits, and alternatives. The patient states he understands and is eager to proceed.    Follow-up: RTC 2 weeks.    May consider: right sympathetic nerve block, SCS/DRG    Angel Black MD  Anesthesiologist  Interventional Pain Medicine  05/16/2025    Disclaimer:  This note was prepared using voice recognition system and is likely to have sound alike errors that may have been overlooked even after proof reading.  Please call me with any questions.

## 2025-05-16 NOTE — PROGRESS NOTES
EST Patient Evaluation  [facility]    Ghanshyam Sanford Jr.  : 1945  Date: 2025     CHIEF COMPLAINT:  No chief complaint on file.    Referring Physician: No ref. provider found  Primary Care Physician: Kevin Lombardi MD    HPI:  This is a 79 y.o. male with a chief complaint of No chief complaint on file.  . The patient has Past medical history/Past surgical history of  compression fracture of L1 status post kyphoplasty, compression fracture of thoracic vertebrae, restless  legs syndrome, hypertension, chronic diastolic heart failure, left bundle branch block, polymyalgia rheumatica, HLAb27, TAVR, long-term anticoagulation    Patient was evaluated and referred by Dr sIael Garner.  He was referred from Neurosurgery team based on cardiology referral for right leg pain  Interval History 2024:  Ghanshyam Sanford Jr. is here for follow up visit  to establish care with me. Ghanshyam Sanford Jr. Had  right L4-L5 lumbar facet joint injection,  patient reported 50 % improvement in pain and functionality for 2 weeks.  Current pain score: 7/10    Interval History 2024:  Ghanshyam Sanford Jr. is here for follow up visit after 7 physical therapy sessions which provided him with a relief for his in the right knee pain.  Today he presents with the acute onset left shoulder pain from excessive throwing   while fishing.  Current pain score: 3/10, at night 5/10    Interval History 2025:  Ghasnhyam Sanford Jr. is here for follow up visit to discuss interventional procedure  in regards to his going right lower extremity pain/neuropathy  Current pain score: 4/10    Interval History 2025:  Ghanshyam GUTIERREZ Claribel  is here for procedure follow up visit after right L4 and L5 transforaminal epidural steroid injection,  patient reported 70 % improvement in pain for and functionality for 1 day for his back pain but not for his right knee pain& below-the-knee pain.  Current pain  score: 5/10    Interval History 05/16/2025:  Ghanshyam Sanford Jr. is here for *** follow up visit after***    Ghanshyam Sanford Jr. is here for procedure follow up visit after***,  patient reported *** % improvement in pain and functionality.  Current pain score: ***/10    Diabetic: No    Anticoagulation medications: 81 mg Aspirin  and Plavix -  last stent in 2020    Allergy To Iodine: No    Currently on Antibiotic: No    Current Description of Pain Symptoms:  History of Recent Fall or Trauma: No   Onset: Chronic  Pain Location:  minimal low back pain,  posterior medial aspect of the right knee  Radiates/associated symptoms: RT LE to the calf  associated with tenderness  Pain is Getting worse over the last 3 months    The pain is described as burning and pulling.   Exacerbating factors:  stretching  Mitigating factors N/A.   Symptoms interfere with daily activity, sleeping.   The patient feels like symptoms have been worsening.   Patient denies night fever/night sweats, urinary incontinence, bowel incontinence, significant weight loss, significant motor weakness, and loss of sensations.    Pain score:  Best: 7/10  Worst: 10/10    Current pain medications:  N/A  Current Narcotics/Opioid /benzo Medications:  Opioids- None  Benzodiazepines: No    UDS:  NA    PDMP:  Reviewed and consistent with medication use as prescribed.     Previous Chronic Pain Treatment History:  Physical Therapy/HEP/Physician Lead Exercise Program:  Over the past 12 months, Patient has done 6+ sessions for lower back and shoulder.  PT response: Mildly Helpful.   Dates of the PT sessions: 10/2024-present.  Is patient participating in home exercise program (HEP)/ physician led exercise program: Yes.Completes HEP that was provided by PT in addition to formal therapy.    Non-interventional Pain Therapy:  []Chiropractor.   []Acupuncture/Dry needle.  []TENS unit.  [x]Heat/ICE.  []Back Brace.    Medications previously tried:  NSAIDs: None  Topical Agent:  Yes  TCA/SSRI/SNRI: None  Anti-convulsants: Gabapentin   Muscle Relaxants: None  Opioids- None.    Interventional Pain Procedures:  Knee CoolRFA-- 2022 with no relief   04/11/2024:  L4-L5 right transforaminal with IR-  80% relief for 3 weeks  09/6/ 2024:  right L4-L5 facet injection- 50% relief For 2 weeks with Dexamethasone   Kyphoplasty L1  03/07/2025: Right  L4/5 and L5/S1 Lumbar Transforaminal Epidural Steroid Injection- 70% relief for 1 day.  Previous spine/Relevant joint surgery:   Right knee arthroplasty  Surgical History:   has a past surgical history that includes Hernia repair; Knee Arthroplasty (Right); Carotid stent; Back surgery; Percutaneous transcatheter aortic valve replacement (TAVR) (Right, 12/14/2021); Percutaneous transcatheter aortic valve replacement (TAVR) (Right, 12/14/2021); Knee arthroscopy w/ meniscectomy (Right, 6/13/2023); Arthroscopic chondroplasty of knee joint (Right, 6/13/2023); Knee arthroscopy w/ plica excision (Right, 6/13/2023); Synovectomy of knee (Right, 6/13/2023); Knee debridement (Right, 6/13/2023); [id number]-[id number] (Right, 6/13/2023); Injection of facet joint (Right, 9/6/2024); and Transforaminal epidural injection of steroid (Right, 3/7/2025).  Medical History:   has a past medical history of Anticoagulant long-term use, Aortic valve regurgitation, Aortic valve stenosis, nonrheumatic, Carotid stenosis, bilateral, Chronic diastolic heart failure, NYHA class 2, Chronic total occlusion of coronary artery, Coronary artery disease, Elevated PSA, Hypertension, Hypertensive heart disease, Osteoarthritis of right knee (3/22/2017), Polymyalgia, Restless legs, and SOB (shortness of breath).  Family History:  family history includes Diabetes Mellitus in his father; Heart disease in his mother and paternal grandmother; Hypertension in his mother; Stroke in his mother and paternal grandmother.  Allergies:  Atorvastatin calcium, Cortisone, Duloxetine, Flomax [tamsulosin], and  "Pravastatin   Social History/SUBSTANCE ABUSE HISTORY:  Personal history of substance abuse: No   reports that he has never smoked. He has never used smokeless tobacco. He reports that he does not drink alcohol and does not use drugs.  LABS:  CBC  Lab Results   Component Value Date    WBC 6.30 09/18/2023    HGB 13.7 (L) 09/18/2023    HCT 42.0 09/18/2023     Coagulation Profile   Lab Results   Component Value Date     09/18/2023       Lab Results   Component Value Date    INR 1.0 12/13/2021     CMP:  BMP  Lab Results   Component Value Date     09/18/2023    K 4.4 09/18/2023     09/18/2023    CO2 26 09/18/2023    BUN 15 09/18/2023    CREATININE 1.0 02/12/2024    CALCIUM 9.1 09/18/2023    ANIONGAP 8 09/18/2023    EGFRNORACEVR >60 02/12/2024     Lab Results   Component Value Date    ALT 16 09/18/2023    AST 18 09/18/2023    ALKPHOS 63 09/18/2023    BILITOT 0.7 09/18/2023     HGBA1C:  No results found for: "LABA1C", "HGBA1C"    ROS:    Review of Systems   GENERAL:  No weight loss, malaise or fevers.  HEENT:   No recent changes in vision or hearing  NECK:  Negative for lumps, no difficulty with swallowing.  RESPIRATORY:  Negative for cough, wheezing or shortness of breath, patient denies any recent URI.  CARDIOVASCULAR:  Negative for chest pain or palpitations.  GI:  Negative for abdominal discomfort, blood in stools or black stools or change in bowel habits.  MUSCULOSKELETAL:  See HPI.  SKIN:  Negative for lesions, rash, and itching.  PSYCH:  No mood disorder or recent psychosocial stressors.   HEMATOLOGY/LYMPHOLOGY:  See the blood thinner sectioned in HPI.  NEURO:  See HPI  All other reviewed and negative other than HPI.    PHYSICAL EXAM:  VITALS: There were no vitals taken for this visit.  There is no height or weight on file to calculate BMI.  GENERAL: Well appearing, in no acute distress, alert and oriented x3, answers questions appropriately.   PSYCH: Flat affect.  SKIN: Skin color, texture, turgor " normal, no rashes or lesions.  HEAD/FACE:  Normocephalic, atraumatic. Cranial nerves grossly intact.  CV: Regular rate  PULM: No evidence of respiratory difficulty, symmetric chest rise.  GI:  Soft and non-Distended.  BACK/SIJ/HIP:  Lumbar Spine Exam:       Inspection: No erythema, bruising.       Palpation: (+) TTP of lumbar paraspinals Right sided       ROM:  Limited in flexion, extension, lateral bending.       (-) Facet loading bilateral      (+) Straight Leg Raise, Right      (-) MICHAEL, Tenderness over the PSIS, Yeoman test, bilateral  Hip Exam:      Inspection: No gross deformity or apparent leg length discrepancy      Palpation:  No TTP to bilateral greater trochanteric bursas.       ROM:  No limitation Due to pain in internal rotation, external rotation b/l  Neurologic Exam:     Alert. Speech is fluent and appropriate.     Strength: 5/5 in bilateral hip flexion and knee extension     Sensation:  Grossly intact to light touch in bilateral lower extremities     Tone: No abnormality appreciated in bilateral lower extremities    GAIT:  normal gait  Left shoulder exam:  SHOULDERS: No gross deformity or atrophy noted.   ROM: +  limited in  abduction external rotation  Rotator cuff exam GAMING:  mild+  EMPTY CAN SIGN:  mild+    DIAGNOSTIC STUDIES AND MEDICAL RECORDS REVIEW:  I have personally reviewed and interpreted relevant radiology reports and reviewed relevant records from other services in the EMR.     -EMG/nerve conduction study showed right L4 and L5 radiculopathy    -MRI lumbar spine 02/2024:   T12-L1: No spinal canal stenosis or neural foraminal narrowing.     L1-L2: No spinal canal stenosis or neural foraminal narrowing.     L2-L3: No spinal canal stenosis or neural foraminal narrowing.     L3-L4: There is asymmetric disc bulging to the left with mild facet arthropathy, contributing to mild spinal canal stenosis and moderate left-sided neural foraminal narrowing     L4-L5: Mild disc bulging and  prominent facet joint arthropathy on the right side, noting joint hypertrophy with extensive subchondral marrow edema and surrounding inflammatory change (series 6, images 6-7).  There is a small right-sided synovial cyst measuring 10 mm (series 9, image 36) abutting and possibly impinging upon the descending right L5 nerve root.  There is overall mild spinal canal stenosis and moderate right and mild left-sided neural foraminal narrowing.  Slight anterolisthesis noted.     L5-S1: Mild disc bulge with posterior annular fissure.  There is moderate disc height loss on the left side.  These findings contribute to mild/moderate left-sided neural foraminal narrowing.  No spinal canal stenosis .     Impression:     Remote L1 compression fracture post vertebral plasty, stable.  No acute fractures.     Lumbar spondylosis, contributing to mild spinal canal stenosis L3-4 and L4-5 and moderate neural foraminal narrowing L3-4 through L5-S1, as above.     Prominent right-sided facet joint arthropathy at L4-5 with small anterior synovial cyst impinging upon the spinal canal, as above.    Clinical Impression:  This is a pleasant 79 y.o. male patient with PMH/PSH of compression fracture of L1 status post kyphoplasty, compression fracture of thoracic vertebrae, restless  legs syndrome, hypertension, chronic diastolic heart failure, left bundle branch block, polymyalgia rheumatica, HLAb27, TAVR, long-term anticoagulation, coronary artery disease status post LAD and RCA stent, presenting  right knee pain mainly in the posteromedial aspect associated with tenderness  and radiation to the right calf above the ankle, worse at night.   Patient has failed multiple interventional procedures including knee cooled radiofrequency ablation and multiple transforaminal epidural steroid injection I would  expect some sort of sciatic /femoral nerve entrapment and I would recommend proceeding with right femoral and right sciatic peripheral nerve  stimulator.    Encounter Diagnosis:  Ghanshyam Sanford Jr. is a 79 y.o. male with the following diagnoses based on history, exam, and imaging:  There are no diagnoses linked to this encounter.    Treatment Plan:    Diagnostics/Referrals:  continue with a home exercise    Medications:   Continue as prescribed  NSAIDs: None  Topical Agent: No  TCA/SSRI/SNRI: None  Anti-convulsants: Gabapentin   Muscle Relaxants: None  Opioids: None    Interventional Therapy: please schedule Right femoral and high sciatic PNS, SPR, IV sedation, Ancef,   psych clearance  Sedation: IV sedation  Anticoagulation medications: 81 mg Aspirin  and Plavix  -Clearance to stop Blood thinner: yes, 7 days     Regarding the above interventions, the patient has been educated regarding the risks (including bleeding, infection, increased pain, nerve damage, or allergic reaction), benefits, and alternatives. The patient states he understands and is eager to proceed.    Follow-up: RTC 2 weeks.    May consider: right sympathetic nerve block, SCS/DRG    Angel Black MD  Anesthesiologist  Interventional Pain Medicine  05/16/2025    Disclaimer:  This note was prepared using voice recognition system and is likely to have sound alike errors that may have been overlooked even after proof reading.  Please call me with any questions.

## 2025-05-16 NOTE — PROGRESS NOTES
EST Patient Evaluation  [facility] interventional pain management    Ghanshyam Sanford Jr.  : 1945  Date: 2025     CHIEF COMPLAINT:  Follow-up    Referring Physician: Self, Aaareferral  Primary Care Physician: Kevin Lombardi MD    HPI:  This is a 79 y.o. male with a chief complaint of Follow-up  . The patient has Past medical history/Past surgical history of  compression fracture of L1 status post kyphoplasty, compression fracture of thoracic vertebrae, restless  legs syndrome, hypertension, chronic diastolic heart failure, left bundle branch block, polymyalgia rheumatica, HLAb27, TAVR, long-term anticoagulation    Patient was evaluated and referred by Dr Isael Garner.  He was referred from Neurosurgery team based on cardiology referral for right leg pain  Interval History 2024:  Ghanshyam Sanford Jr. is here for follow up visit  to establish care with me. Ghanshyam Marteldemar  Had  right L4-L5 lumbar facet joint injection,  patient reported 50 % improvement in pain and functionality for 2 weeks.  Current pain score: 7/10    Interval History 2024:  Ghanshyam Sanford Jr. is here for follow up visit after 7 physical therapy sessions which provided him with a relief for his in the right knee pain.  Today he presents with the acute onset left shoulder pain from excessive throwing   while fishing.  Current pain score: 3/10, at night 5/10    Interval History 2025:  Ghanshyam Marteldemar  is here for follow up visit to discuss interventional procedure  in regards to his going right lower extremity pain/neuropathy  Current pain score: 4/10    Interval History 2025:  Ghanshyam GUTIERREZ Claribel Norm is here for procedure follow up visit after right L4 and L5 transforaminal epidural steroid injection,  patient reported 70 % improvement in pain for and functionality for 1 day for his back pain but not for his right knee pain& below-the-knee pain.  Current pain score: 5/10    Interval History  05/16/2025:  Ghanshyam Sanford JrNorm is here for  follow up visit after placing right sciatic and femoral peripheral nerve stimulator  Patient complains of drainage and swelling in the RT knee. He stated yesterday he noticed swelling and light pinkish,yellow fluids on bandage oozing out the RT pad. He had to change it 3 times from the drainage. It is slightly warm and sensitive to touch. He is having trouble walking on RT leg.     Current pain score: 1/10 resting, 6/1 walking    Diabetic: No    Anticoagulation medications: 81 mg Aspirin  and Plavix -  last stent in 2020    Allergy To Iodine: No    Currently on Antibiotic: No    Current Description of Pain Symptoms:  History of Recent Fall or Trauma: No   Onset: Chronic  Pain Location:  minimal low back pain,  posterior medial aspect of the right knee  Radiates/associated symptoms: RT LE to the calf  associated with tenderness  Pain is Getting worse over the last 3 months    The pain is described as burning and pulling.   Exacerbating factors:  stretching  Mitigating factors N/A.   Symptoms interfere with daily activity, sleeping.   The patient feels like symptoms have been worsening.   Patient denies night fever/night sweats, urinary incontinence, bowel incontinence, significant weight loss, significant motor weakness, and loss of sensations.    Pain score:  Best: 7/10  Worst: 10/10    Current pain medications:  N/A  Current Narcotics/Opioid /benzo Medications:  Opioids- None  Benzodiazepines: No    UDS:  NA    PDMP:  Reviewed and consistent with medication use as prescribed.     Previous Chronic Pain Treatment History:  Physical Therapy/HEP/Physician Lead Exercise Program:  Over the past 12 months, Patient has done 6+ sessions for lower back and shoulder.  PT response: Mildly Helpful.   Dates of the PT sessions: 10/2024-present.  Is patient participating in home exercise program (HEP)/ physician led exercise program: Yes.Completes HEP that was provided by PT in  addition to formal therapy.    Non-interventional Pain Therapy:  []Chiropractor.   []Acupuncture/Dry needle.  []TENS unit.  [x]Heat/ICE.  []Back Brace.    Medications previously tried:  NSAIDs: None  Topical Agent: Yes  TCA/SSRI/SNRI: None  Anti-convulsants: Gabapentin   Muscle Relaxants: None  Opioids- None.    Interventional Pain Procedures:  Knee CoolRFA-- 2022 with no relief   04/11/2024:  L4-L5 right transforaminal with IR-  80% relief for 3 weeks  09/6/ 2024:  right L4-L5 facet injection- 50% relief For 2 weeks with Dexamethasone   Kyphoplasty L1  03/07/2025: Right  L4/5 and L5/S1 Lumbar Transforaminal Epidural Steroid Injection- 70% relief for 1 day.  Previous spine/Relevant joint surgery:   Right knee arthroplasty  Surgical History:   has a past surgical history that includes Hernia repair; Knee Arthroplasty (Right); Carotid stent; Back surgery; Percutaneous transcatheter aortic valve replacement (TAVR) (Right, 12/14/2021); Percutaneous transcatheter aortic valve replacement (TAVR) (Right, 12/14/2021); Knee arthroscopy w/ meniscectomy (Right, 6/13/2023); Arthroscopic chondroplasty of knee joint (Right, 6/13/2023); Knee arthroscopy w/ plica excision (Right, 6/13/2023); Synovectomy of knee (Right, 6/13/2023); Knee debridement (Right, 6/13/2023); [id number] (Right, 6/13/2023); Injection of facet joint (Right, 9/6/2024); and Transforaminal epidural injection of steroid (Right, 3/7/2025).  Medical History:   has a past medical history of Anticoagulant long-term use, Aortic valve regurgitation, Aortic valve stenosis, nonrheumatic, Carotid stenosis, bilateral, Chronic diastolic heart failure, NYHA class 2, Chronic total occlusion of coronary artery, Coronary artery disease, Elevated PSA, Hypertension, Hypertensive heart disease, Osteoarthritis of right knee (3/22/2017), Polymyalgia, Restless legs, and SOB (shortness of breath).  Family History:  family history includes Diabetes Mellitus in his  "father; Heart disease in his mother and paternal grandmother; Hypertension in his mother; Stroke in his mother and paternal grandmother.  Allergies:  Atorvastatin calcium, Cortisone, Duloxetine, Flomax [tamsulosin], and Pravastatin   Social History/SUBSTANCE ABUSE HISTORY:  Personal history of substance abuse: No   reports that he has never smoked. He has never used smokeless tobacco. He reports that he does not drink alcohol and does not use drugs.  LABS:  CBC  Lab Results   Component Value Date    WBC 6.30 09/18/2023    HGB 13.7 (L) 09/18/2023    HCT 42.0 09/18/2023     Coagulation Profile   Lab Results   Component Value Date     09/18/2023       Lab Results   Component Value Date    INR 1.0 12/13/2021     CMP:  BMP  Lab Results   Component Value Date     09/18/2023    K 4.4 09/18/2023     09/18/2023    CO2 26 09/18/2023    BUN 15 09/18/2023    CREATININE 1.0 02/12/2024    CALCIUM 9.1 09/18/2023    ANIONGAP 8 09/18/2023    EGFRNORACEVR >60 02/12/2024     Lab Results   Component Value Date    ALT 16 09/18/2023    AST 18 09/18/2023    ALKPHOS 63 09/18/2023    BILITOT 0.7 09/18/2023     HGBA1C:  No results found for: "LABA1C", "HGBA1C"    ROS:    Review of Systems   Musculoskeletal:  Positive for joint swelling.   Skin:  Positive for rash and wound.     GENERAL:  No weight loss, malaise or fevers.  HEENT:   No recent changes in vision or hearing  NECK:  Negative for lumps, no difficulty with swallowing.  RESPIRATORY:  Negative for cough, wheezing or shortness of breath, patient denies any recent URI.  CARDIOVASCULAR:  Negative for chest pain or palpitations.  GI:  Negative for abdominal discomfort, blood in stools or black stools or change in bowel habits.  MUSCULOSKELETAL:  See HPI.  SKIN:  Negative for lesions, rash, and itching.  PSYCH:  No mood disorder or recent psychosocial stressors.   HEMATOLOGY/LYMPHOLOGY:  See the blood thinner sectioned in HPI.  NEURO:  See HPI  All other reviewed and " "negative other than HPI.    PHYSICAL EXAM:  VITALS: /82 (BP Location: Right arm, Patient Position: Sitting)   Pulse 78   Ht 5' 10" (1.778 m)   Wt 83.9 kg (185 lb)   SpO2 (!) 93%   BMI 26.54 kg/m²   Body mass index is 26.54 kg/m².  GENERAL: Well appearing, in no acute distress, alert and oriented x3, answers questions appropriately.   PSYCH: Flat affect.  SKIN: Skin color, texture, turgor normal, no rashes or lesions.  HEAD/FACE:  Normocephalic, atraumatic. Cranial nerves grossly intact.  CV: Regular rate  PULM: No evidence of respiratory difficulty, symmetric chest rise.  GI:  Soft and non-Distended.   Right thigh examination:  Tenderness over the lateral aspect of the right knee associated with the swelling around entry site of sciatic peripheral nerve stimulator   Femoral peripheral nerve stimulator entry side:  no swelling  or sign of infection  DIAGNOSTIC STUDIES AND MEDICAL RECORDS REVIEW:  I have personally reviewed and interpreted relevant radiology reports and reviewed relevant records from other services in the EMR.     -EMG/nerve conduction study showed right L4 and L5 radiculopathy    -MRI lumbar spine 02/2024:   T12-L1: No spinal canal stenosis or neural foraminal narrowing.     L1-L2: No spinal canal stenosis or neural foraminal narrowing.     L2-L3: No spinal canal stenosis or neural foraminal narrowing.     L3-L4: There is asymmetric disc bulging to the left with mild facet arthropathy, contributing to mild spinal canal stenosis and moderate left-sided neural foraminal narrowing     L4-L5: Mild disc bulging and prominent facet joint arthropathy on the right side, noting joint hypertrophy with extensive subchondral marrow edema and surrounding inflammatory change (series 6, images 6-7).  There is a small right-sided synovial cyst measuring 10 mm (series 9, image 36) abutting and possibly impinging upon the descending right L5 nerve root.  There is overall mild spinal canal stenosis and " moderate right and mild left-sided neural foraminal narrowing.  Slight anterolisthesis noted.     L5-S1: Mild disc bulge with posterior annular fissure.  There is moderate disc height loss on the left side.  These findings contribute to mild/moderate left-sided neural foraminal narrowing.  No spinal canal stenosis .     Impression:     Remote L1 compression fracture post vertebral plasty, stable.  No acute fractures.     Lumbar spondylosis, contributing to mild spinal canal stenosis L3-4 and L4-5 and moderate neural foraminal narrowing L3-4 through L5-S1, as above.     Prominent right-sided facet joint arthropathy at L4-5 with small anterior synovial cyst impinging upon the spinal canal, as above.    Clinical Impression:  This is a pleasant 79 y.o. male patient with PMH/PSH of compression fracture of L1 status post kyphoplasty, compression fracture of thoracic vertebrae, restless  legs syndrome, hypertension, chronic diastolic heart failure, left bundle branch block, polymyalgia rheumatica, HLAb27, TAVR, long-term anticoagulation, coronary artery disease status post LAD and RCA stent, presenting   originally with right knee pain mainly in the posteromedial aspect associated with tenderness  and radiation to the right calf above the ankle, worse at night, he is status post right peripheral nerve stimulator over right sciatic and femoral nerve  Patient developed redness and swelling of the entry site of the right  sciatic peripheral nerve stimulator associated with right knee swelling, tenderness over the right calf muscle rasing concern for infection and possible DVT    Encounter Diagnosis:  Ghanshyam Sanford Jr. is a 79 y.o. male with the following diagnoses based on history, exam, and imagin. S/P placement of nerve stimulator    2. Skin infection    Treatment Plan:    Diagnostics/Referrals:   personally spoke with ER provider Ashutosh De La Cruz MD  for stat ultrasound right lower extremity to rule out DVT:   evaluate and treat    Medications:   I would recommend starting on oral antibiotic   Doxycycline: 2 capsules (200 mg total) by mouth 2 (two) times daily. for 5 days - Oral     Interventional Therapy:  removing sciatic and femoral peripheral nerve stimulator, in the office     Possible consider surgical consult for abscess drainage    Angel Black MD  Anesthesiologist  Interventional Pain Management  05/16/2025

## 2025-05-16 NOTE — ED PROVIDER NOTES
Encounter Date: 5/16/2025       History     Chief Complaint   Patient presents with    Post-op Problem     History of Present Illness  Ghanshyam aSnford Jr. is a 79 y.o. male that presents with right leg swelling  Patient has a peripheral pain pump placed in clinic last week on interview  Patient reassessed & pain management clinic tonight, right leg swelling  Pain management physician Dr. Black sent the patient to the ER today  MD requesting lab work & ultrasound to rule out deep vein thrombosis  Additionally Dr. Snider is asking for the patient to be discharged on ABX    The history is provided by the patient.     Review of patient's allergies indicates:   Allergen Reactions    Atorvastatin calcium Other (See Comments)     Leg cramps    Cortisone      Inj- pt reports hiccups and feels spasm     Duloxetine Other (See Comments)     DRY MONTH  NOT ABLE TO SLEEP  NOT HUNDRY  SWEATING A LOT  FEELING TIRED AND WEAK  DIZZINESS  WEIGHT LOSS ( 9 LB.)    Flomax [tamsulosin] Diarrhea    Pravastatin Other (See Comments)     Leg cramps     Past Medical History:   Diagnosis Date    Anticoagulant long-term use     Aortic valve regurgitation     Aortic valve stenosis, nonrheumatic     Carotid stenosis, bilateral     Chronic diastolic heart failure, NYHA class 2     Chronic total occlusion of coronary artery     Coronary artery disease     Elevated PSA     UNDER CARE    Hypertension     Hypertensive heart disease     Osteoarthritis of right knee 3/22/2017    Polymyalgia     Restless legs     SOB (shortness of breath)      Past Surgical History:   Procedure Laterality Date    ARTHROSCOPIC CHONDROPLASTY OF KNEE JOINT Right 6/13/2023    Procedure: ARTHROSCOPY, KNEE, WITH CHONDROPLASTY;  Surgeon: Tari Gregory MD;  Location: [facility];  Service: Orthopedics;  Laterality: Right;    BACK SURGERY      CAROTID STENT      HERNIA REPAIR      INJECTION OF FACET JOINT Right 9/6/2024    Procedure: INJECTION, FACET JOINT (L4/5);  Surgeon: Angel Black  MD OMI;  Location: [facility] OR;  Service: Pain Management;  Laterality: Right;    KNEE ARTHROPLASTY Right     KNEE ARTHROSCOPY W/ MENISCECTOMY Right 6/13/2023    Procedure: ARTHROSCOPY, KNEE, WITH MEDIAL AND LATERAL MENISCECTOMY;  Surgeon: Tari Gregory MD;  Location: [facility] OR;  Service: Orthopedics;  Laterality: Right;    KNEE ARTHROSCOPY W/ PLICA EXCISION Right 6/13/2023    Procedure: EXCISION, PLICA, KNEE, ARTHROSCOPIC;  Surgeon: Tari Gregory MD;  Location: [facility] OR;  Service: Orthopedics;  Laterality: Right;    KNEE DEBRIDEMENT Right 6/13/2023    Procedure: DEBRIDEMENT, KNEE;  Surgeon: Tari Gregory MD;  Location: [facility] OR;  Service: Orthopedics;  Laterality: Right;    ZMITI-[id number]-[id number] Right 6/13/2023    Procedure: [id number]-[id number]-[id number];  Surgeon: Tari Gregoyr MD;  Location: [facility] OR;  Service: Orthopedics;  Laterality: Right;    PERCUTANEOUS TRANSCATHETER AORTIC VALVE REPLACEMENT (TAVR) Right 12/14/2021    Procedure: REPLACEMENT, AORTIC VALVE, PERCUTANEOUS, TRANSCATHETER;  Surgeon: Johny Lockett MD;  Location: [facility] CATH;  Service: Cardiology;  Laterality: Right;  ops # 8    PERCUTANEOUS TRANSCATHETER AORTIC VALVE REPLACEMENT (TAVR) Right 12/14/2021    Procedure: REPLACEMENT, AORTIC VALVE, PERCUTANEOUS, TRANSCATHETER;  Surgeon: Sushma Piña MD;  Location: [facility] CATH;  Service: Cardiovascular;  Laterality: Right;    SYNOVECTOMY OF KNEE Right 6/13/2023    Procedure: PARTIAL SYNOVECTOMY, KNEE;  Surgeon: Tari Gregory MD;  Location: [facility] OR;  Service: Orthopedics;  Laterality: Right;    TRANSFORAMINAL EPIDURAL INJECTION OF STEROID Right 3/7/2025    Procedure: TRANSFORAMINAL EPIDURAL STEROID INJECTION (L4/5,L5/S1);  Surgeon: Angel Black MD;  Location: [facility] OR;  Service: Pain Management;  Laterality: Right;     Family History   Problem Relation Name Age of Onset    Heart disease Mother      Hypertension Mother      Stroke Mother      Diabetes Mellitus Father      Heart disease Paternal Grandmother       Stroke Paternal Grandmother       Social History[1]    Review of Systems   Constitutional:  Negative for activity change, appetite change, fatigue, fever and unexpected weight change.   HENT:  Negative for congestion, ear pain, mouth sores, nosebleeds, rhinorrhea, sinus pressure, sneezing and sore throat.    Eyes:  Negative for pain, discharge, redness and itching.   Respiratory:  Negative for apnea, cough, chest tightness and shortness of breath.    Cardiovascular:  Negative for chest pain, palpitations and leg swelling.   Gastrointestinal:  Negative for abdominal distention, abdominal pain, anal bleeding, constipation, diarrhea, nausea and vomiting.   Endocrine: Negative.    Genitourinary:  Negative for dysuria, enuresis, flank pain and frequency.   Musculoskeletal:  Negative for arthralgias, back pain, neck pain and neck stiffness.        (+) right leg swelling   Skin:  Negative for color change and wound.   Allergic/Immunologic: Negative.    Neurological:  Negative for dizziness, tremors, syncope, facial asymmetry, speech difficulty, weakness, light-headedness, numbness and headaches.   Hematological:  Negative for adenopathy. Does not bruise/bleed easily.   Psychiatric/Behavioral:  Negative for agitation, behavioral problems, hallucinations, self-injury and suicidal ideas. The patient is not nervous/anxious.        Physical Exam     Initial Vitals [05/16/25 1542]   BP Pulse Resp Temp SpO2   (!) 128/59 73 18 98.1 °F (36.7 °C) 97 %      MAP       --         Physical Exam    Nursing note and vitals reviewed.  Constitutional: Vital signs are normal. He appears well-developed and well-nourished. He is cooperative.   HENT:   Head: Normocephalic and atraumatic. Mouth/Throat: Uvula is midline and mucous membranes are normal.   Eyes: Conjunctivae, EOM and lids are normal. Pupils are equal, round, and reactive to light.   Neck: Neck supple.   Normal range of motion.   Full passive range of motion without pain.      Cardiovascular:  Normal rate, regular rhythm, S1 normal, S2 normal, normal heart sounds, intact distal pulses and normal pulses.           Pulmonary/Chest: Effort normal and breath sounds normal.   Abdominal: Abdomen is soft and flat. Bowel sounds are normal.   Musculoskeletal:      Cervical back: Full passive range of motion without pain, normal range of motion and neck supple.      Comments: (+) minimal right lower extremity lymphedema     Neurological: He is alert and oriented to person, place, and time. He has normal strength.   Skin: Skin is warm, dry and intact. Capillary refill takes less than 2 seconds.         ED Course   Procedures  Labs Reviewed   CBC WITH DIFFERENTIAL - Abnormal       Result Value    WBC 9.96      RBC 4.88      HGB 14.0      HCT 42.5      MCV 87      MCH 28.7      MCHC 32.9      RDW 13.7      Platelet Count 186      MPV 10.6      Nucleated RBC 0      Neut % 68.5      Lymph % 21.2      Mono % 6.5      Eos % 2.4      Basophil % 0.8      Imm Grans % 0.6 (*)     Neut # 6.82      Lymph # 2.11      Mono # 0.65      Eos # 0.24      Baso # 0.08      Imm Grans # 0.06 (*)    COMPREHENSIVE METABOLIC PANEL - Normal    Sodium 139      Potassium 4.4      Chloride 107      CO2 24      Glucose 92      BUN 22      Creatinine 1.0      Calcium 9.1      Protein Total 7.3      Albumin 3.9      Bilirubin Total 0.8      ALP 77      AST 16      ALT 21      Anion Gap 8      eGFR >60     LACTIC ACID, PLASMA - Normal    Lactic Acid Level 1.0      Narrative:     Falsely low lactic acid results can be found in samples containing >=13.0 mg/dL total bilirubin and/or >=3.5 mg/dL direct bilirubin.    CULTURE, BLOOD   CBC W/ AUTO DIFFERENTIAL    Narrative:     The following orders were created for panel order CBC Auto Differential.  Procedure                               Abnormality         Status                     ---------                               -----------         ------                     CBC with  Differential[[id number]]       Abnormal            Final result                 Please view results for these tests on the individual orders.          Imaging Results              US Lower Extremity Veins Right (Final result)  Result time 05/16/25 16:23:01      Final result by Arnel Whipple Jr., MD (05/16/25 16:23:01)                   Impression:      No evidence of deep venous thrombosis in the right lower extremity.      Electronically signed by: Arnel Whipple MD  Date:    05/16/2025  Time:    16:23               Narrative:    EXAMINATION:  US LOWER EXTREMITY VEINS RIGHT    CLINICAL HISTORY:  Other specified soft tissue disorders    TECHNIQUE:  Duplex and color flow Doppler evaluation and graded compression of the right lower extremity veins was performed.    COMPARISON:  None    FINDINGS:  Right thigh veins: The common femoral, femoral, popliteal, upper greater saphenous, and deep femoral veins are patent and free of thrombus. The veins are normally compressible and have normal phasic flow and augmentation response.    Right calf veins: The visualized calf veins are patent.    Contralateral CFV: The contralateral (left) common femoral vein is patent and free of thrombus.    Miscellaneous: None                                       Medications   clindamycin in D5W 600 mg/50 mL IVPB 600 mg (0 mg Intravenous Stopped 5/16/25 1804)     Medical Decision Making  Differential includes lymphedema, DVT, cellulitis, abscess    Problems Addressed:  Right leg swelling: complicated acute illness or injury    Amount and/or Complexity of Data Reviewed  Labs: ordered. Decision-making details documented in ED Course.    ED Management & Risks of Complication, Morbidity, & Mortality:  Lab work within normal limits, blood cultures pending  IV clindamycin given in the emergency room tonight  DVT ultrasound was (-) on ER evaluation tonight  Prescription of clindamycin on ER discharge tonight  Plan of care reviewed with pain  management physician on DC  Patient will follow-up & pain management clinic next Thursday  Pt/Family counseled to return to the ER with any concerns on DC    Need for Hospitalization or Surgery with Social Determinants of Health:  This patient does not need to be hospitalized on ER evaluation today  The patient's diagnosis is not limited by social determinants of health  Does not require surgery or procedure (major or minor), no risk factors    Final diagnoses:  [M79.89] Right leg swelling          ED Disposition Condition    Discharge Stable          ED Prescriptions       Medication Sig Dispense Start Date End Date Auth. Provider    clindamycin (CLEOCIN) 300 MG capsule Take 1 capsule (300 mg total) by mouth 4 (four) times daily. for 7 days 28 capsule 5/16/2025 5/23/2025 Ashutosh Larios MD          Follow-up Information       Follow up With Specialties Details Why Contact Info    Kevin Lombadri MD Family Medicine Schedule an appointment as soon as possible for a visit in 2 days  [address] [facility] [address]  [phone]      Angel Black MD Pain Medicine Go in 2 days  [address] [address]  [phone]                   [1]   Social History  Tobacco Use    Smoking status: Never    Smokeless tobacco: Never   Substance Use Topics    Alcohol use: Never    Drug use: Never        Ashutosh Larios MD  05/16/25 9219

## 2025-05-16 NOTE — PROGRESS NOTES
EST Patient Evaluation  [facility]    Ghanshyam Sanford Jr.  : 1945  Date: 2025     CHIEF COMPLAINT:  Follow-up    Referring Physician: Self, Aaareferral  Primary Care Physician: Kevin Lombardi MD    HPI:  This is a 79 y.o. male with a chief complaint of Follow-up  . The patient has Past medical history/Past surgical history of  compression fracture of L1 status post kyphoplasty, compression fracture of thoracic vertebrae, restless  legs syndrome, hypertension, chronic diastolic heart failure, left bundle branch block, polymyalgia rheumatica, HLAb27, TAVR, long-term anticoagulation    Patient was evaluated and referred by Dr Isael Garner.  He was referred from Neurosurgery team based on cardiology referral for right leg pain  Interval History 2024:  Ghanshyam Sanford Jr. is here for follow up visit  to establish care with me. Ghanshyam Marteldemar  Had  right L4-L5 lumbar facet joint injection,  patient reported 50 % improvement in pain and functionality for 2 weeks.  Current pain score: 7/10    Interval History 2024:  Ghanshyam Sanford Jr. is here for follow up visit after 7 physical therapy sessions which provided him with a relief for his in the right knee pain.  Today he presents with the acute onset left shoulder pain from excessive throwing   while fishing.  Current pain score: 3/10, at night 5/10    Interval History 2025:  Ghanshyam Marteldemar  is here for follow up visit to discuss interventional procedure  in regards to his going right lower extremity pain/neuropathy  Current pain score: 4/10    Interval History 2025:  Ghanshyam GUTIERREZ Claribel Norm is here for procedure follow up visit after right L4 and L5 transforaminal epidural steroid injection,  patient reported 70 % improvement in pain for and functionality for 1 day for his back pain but not for his right knee pain& below-the-knee pain.  Current pain score: 5/10    Interval History  05/16/2025:  Ghnashyam Sanford JrNorm is here for  follow up visit after placing right sciatic and femoral peripheral nerve stimulator  Patient complains of drainage and swelling in the RT knee. He stated yesterday he noticed swelling and light pinkish,yellow fluids on bandage oozing out the RT pad. He had to change it 3 times from the drainage. It is slightly warm and sensitive to touch. He is having trouble walking on RT leg.     Current pain score: 1/10 resting, 6/1 walking    Diabetic: No    Anticoagulation medications: 81 mg Aspirin  and Plavix -  last stent in 2020    Allergy To Iodine: No    Currently on Antibiotic: No    previous Description of Pain Symptoms:  History of Recent Fall or Trauma: No   Onset: Chronic  Pain Location:  minimal low back pain,  posterior medial aspect of the right knee  Radiates/associated symptoms: RT LE to the calf  associated with tenderness  Pain is Getting worse over the last 3 months    The pain is described as burning and pulling.   Exacerbating factors:  stretching  Mitigating factors N/A.   Symptoms interfere with daily activity, sleeping.   The patient feels like symptoms have been worsening.   Patient denies night fever/night sweats, urinary incontinence, bowel incontinence, significant weight loss, significant motor weakness, and loss of sensations.    Pain score:  Best: 7/10  Worst: 10/10    Current pain medications:   gabapentin  Current Narcotics/Opioid /benzo Medications:  Opioids- None  Benzodiazepines: No    UDS:  NA    PDMP:  Reviewed and consistent with medication use as prescribed.     Previous Chronic Pain Treatment History:  Physical Therapy/HEP/Physician Lead Exercise Program:  Over the past 12 months, Patient has done 6+ sessions for lower back and shoulder.  PT response: Mildly Helpful.   Dates of the PT sessions: 10/2024-present.  Is patient participating in home exercise program (HEP)/ physician led exercise program: Yes.Completes HEP that was provided by PT  in addition to formal therapy.    Non-interventional Pain Therapy:  []Chiropractor.   []Acupuncture/Dry needle.  []TENS unit.  [x]Heat/ICE.  []Back Brace.    Medications previously tried:  NSAIDs: None  Topical Agent: Yes  TCA/SSRI/SNRI: None  Anti-convulsants: Gabapentin   Muscle Relaxants: None  Opioids- None.    Interventional Pain Procedures:  Knee CoolRFA-- 2022 with no relief   04/11/2024:  L4-L5 right transforaminal with IR-  80% relief for 3 weeks  09/6/ 2024:  right L4-L5 facet injection- 50% relief For 2 weeks with Dexamethasone   Kyphoplasty L1  03/07/2025: Right  L4/5 and L5/S1 Lumbar Transforaminal Epidural Steroid Injection- 70% relief for 1 day.  05/09/2025: right sciatic and femoral peripheral nerve stimulator  Previous spine/Relevant joint surgery:  Surgical History:   has a past surgical history that includes Hernia repair; Knee Arthroplasty (Right); Carotid stent; Back surgery; Percutaneous transcatheter aortic valve replacement (TAVR) (Right, 12/14/2021); Percutaneous transcatheter aortic valve replacement (TAVR) (Right, 12/14/2021); Knee arthroscopy w/ meniscectomy (Right, 6/13/2023); Arthroscopic chondroplasty of knee joint (Right, 6/13/2023); Knee arthroscopy w/ plica excision (Right, 6/13/2023); Synovectomy of knee (Right, 6/13/2023); Knee debridement (Right, 6/13/2023); [id number] (Right, 6/13/2023); Injection of facet joint (Right, 9/6/2024); and Transforaminal epidural injection of steroid (Right, 3/7/2025).  Medical History:   has a past medical history of Anticoagulant long-term use, Aortic valve regurgitation, Aortic valve stenosis, nonrheumatic, Carotid stenosis, bilateral, Chronic diastolic heart failure, NYHA class 2, Chronic total occlusion of coronary artery, Coronary artery disease, Elevated PSA, Hypertension, Hypertensive heart disease, Osteoarthritis of right knee (3/22/2017), Polymyalgia, Restless legs, and SOB (shortness of breath).  Family History:  family  "history includes Diabetes Mellitus in his father; Heart disease in his mother and paternal grandmother; Hypertension in his mother; Stroke in his mother and paternal grandmother.  Allergies:  Atorvastatin calcium, Cortisone, Duloxetine, Flomax [tamsulosin], and Pravastatin   Social History/SUBSTANCE ABUSE HISTORY:  Personal history of substance abuse: No   reports that he has never smoked. He has never used smokeless tobacco. He reports that he does not drink alcohol and does not use drugs.  LABS:  CBC  Lab Results   Component Value Date    WBC 6.30 09/18/2023    HGB 13.7 (L) 09/18/2023    HCT 42.0 09/18/2023     Coagulation Profile   Lab Results   Component Value Date     09/18/2023       Lab Results   Component Value Date    INR 1.0 12/13/2021     CMP:  BMP  Lab Results   Component Value Date     09/18/2023    K 4.4 09/18/2023     09/18/2023    CO2 26 09/18/2023    BUN 15 09/18/2023    CREATININE 1.0 02/12/2024    CALCIUM 9.1 09/18/2023    ANIONGAP 8 09/18/2023    EGFRNORACEVR >60 02/12/2024     Lab Results   Component Value Date    ALT 16 09/18/2023    AST 18 09/18/2023    ALKPHOS 63 09/18/2023    BILITOT 0.7 09/18/2023     HGBA1C:  No results found for: "LABA1C", "HGBA1C"    ROS:    Review of Systems   Musculoskeletal:  Positive for joint swelling.   Skin:  Positive for rash and wound.     GENERAL:  No weight loss, malaise or fevers.  HEENT:   No recent changes in vision or hearing  NECK:  Negative for lumps, no difficulty with swallowing.  RESPIRATORY:  Negative for cough, wheezing or shortness of breath, patient denies any recent URI.  CARDIOVASCULAR:  Negative for chest pain or palpitations.  GI:  Negative for abdominal discomfort, blood in stools or black stools or change in bowel habits.  MUSCULOSKELETAL:  See HPI.  SKIN:  Negative for lesions, rash, and itching.  PSYCH:  No mood disorder or recent psychosocial stressors.   HEMATOLOGY/LYMPHOLOGY:  See the blood thinner sectioned in " "HPI.  NEURO:  See HPI  All other reviewed and negative other than HPI.    PHYSICAL EXAM:  VITALS: /82 (BP Location: Right arm, Patient Position: Sitting)   Pulse 78   Ht 5' 10" (1.778 m)   Wt 83.9 kg (185 lb)   SpO2 (!) 93%   BMI 26.54 kg/m²   Body mass index is 26.54 kg/m².  GENERAL: Well appearing, in no acute distress, alert and oriented x3, answers questions appropriately.   PSYCH: Flat affect.  SKIN: Skin color, texture, turgor normal, no rashes or lesions.  HEAD/FACE:  Normocephalic, atraumatic. Cranial nerves grossly intact.  CV: Regular rate  PULM: No evidence of respiratory difficulty, symmetric chest rise.  GI:  Soft and non-Distended.   Right thigh examination:  Tenderness over the lateral aspect of the right knee associated with the swelling around entry site of sciatic peripheral nerve stimulator   Femoral peripheral nerve stimulator entry side:  no swelling  or sign of infection  DIAGNOSTIC STUDIES AND MEDICAL RECORDS REVIEW:  I have personally reviewed and interpreted relevant radiology reports and reviewed relevant records from other services in the EMR.     -EMG/nerve conduction study showed right L4 and L5 radiculopathy    -MRI lumbar spine 02/2024:   T12-L1: No spinal canal stenosis or neural foraminal narrowing.     L1-L2: No spinal canal stenosis or neural foraminal narrowing.     L2-L3: No spinal canal stenosis or neural foraminal narrowing.     L3-L4: There is asymmetric disc bulging to the left with mild facet arthropathy, contributing to mild spinal canal stenosis and moderate left-sided neural foraminal narrowing     L4-L5: Mild disc bulging and prominent facet joint arthropathy on the right side, noting joint hypertrophy with extensive subchondral marrow edema and surrounding inflammatory change (series 6, images 6-7).  There is a small right-sided synovial cyst measuring 10 mm (series 9, image 36) abutting and possibly impinging upon the descending right L5 nerve root.  There " is overall mild spinal canal stenosis and moderate right and mild left-sided neural foraminal narrowing.  Slight anterolisthesis noted.     L5-S1: Mild disc bulge with posterior annular fissure.  There is moderate disc height loss on the left side.  These findings contribute to mild/moderate left-sided neural foraminal narrowing.  No spinal canal stenosis .     Impression:     Remote L1 compression fracture post vertebral plasty, stable.  No acute fractures.     Lumbar spondylosis, contributing to mild spinal canal stenosis L3-4 and L4-5 and moderate neural foraminal narrowing L3-4 through L5-S1, as above.     Prominent right-sided facet joint arthropathy at L4-5 with small anterior synovial cyst impinging upon the spinal canal, as above.    Clinical Impression:  This is a pleasant 79 y.o. male patient with PMH/PSH of compression fracture of L1 status post kyphoplasty, compression fracture of thoracic vertebrae, restless  legs syndrome, hypertension, chronic diastolic heart failure, left bundle branch block, polymyalgia rheumatica, HLAb27, TAVR, long-term anticoagulation, coronary artery disease status post LAD and RCA stent, presenting   originally with right knee pain mainly in the posteromedial aspect associated with tenderness  and radiation to the right calf above the ankle, worse at night, he is status post right peripheral nerve stimulator over right sciatic and femoral nerve  Patient developed redness and swelling of the entry site of the right  sciatic peripheral nerve stimulator associated with right knee swelling, tenderness over the right calf muscle rasing concern for infection and possible DVT    Encounter Diagnosis:  Ghanshyam Sanford Jr. is a 79 y.o. male with the following diagnoses based on history, exam, and imagin. S/P placement of nerve stimulator    2. Skin infection    Treatment Plan:    Diagnostics/Referrals:   personally spoke with ER provider Ashutosh De La Cruz MD  for stat ultrasound  right lower extremity to rule out DVT:  evaluate and treat    Medications:   I would recommend starting on oral antibiotic   Doxycycline: 2 capsules (200 mg total) by mouth 2 (two) times daily. for 5 days - Oral     Interventional Therapy:  removing sciatic and femoral peripheral nerve stimulator, in the office     Possible consider surgical consult for abscess drainage    Angel Black MD  Anesthesiologist  Interventional Pain Management  05/16/2025

## 2025-05-16 NOTE — ED NOTES
Bed: ED 01A  Expected date:   Expected time:   Means of arrival:   Comments:   [facility] Initial Evaluation  Subjective:     Type of problem:  Bilateral knees (L > R)   Condition occurred with:  Degenerative joint disease (patellar chomndromalacia). This is a new condition   Problem details: May 2019 began having pain in the left knee and then it progressed to the point where he had pain in both knees.  Diagnosed with patellar chondromalacia and small patellar osteophytes.  Previous treatment included NSAIDs and voltaren.  Not currently taking any medication or using ice or heat.  Rates pain as a 5/10, nature of the pain is an ache.  Prior to this he was able to walk/jog 2-3 miles on a treadmill but currently not able to do that..   Patient reports pain:  Anterior and sub patellar.  Associated with: tightness superior to the patella bilaterally. Exacerbated by: squatting, stairs, running, sit to stand transfers. Relieved by: avoiding aggravating activities.                        Objective:  Standing Alignment:                Hip:  Normal  Knee:  Normal        Gait:    Gait Type:  Normal   Assistive Devices:  None        Flexibility/Screens:   Positive screens:  KneeNegative screens: Hip or Lumbar      Lower Extremity:  Decreased left lower extremity flexibility:Hip Flexors and Quadriceps    Decreased right lower extremity flexibility:  Hip Flexors and Quadriceps                                                     Hip Evaluation     Hip Strength:    Flexion:   Left: 4+/5   Pain:  Right: 4+/5   Pain:                    Extension:  Left: 3+/5  Pain:Right: 3+/5    Pain:    Abduction:  Left: 3/5     Pain:Right: 3/5    Pain:        External Rotation:  Left: 3+/5   Pain:  Right: 3+/5   Pain:                          Knee Evaluation:  ROM:    AROM     Hyperextension:  Left:  5    Right: 5     Flexion: Left: 130    Right: 130  PROM     Hyperextension: Left: 5    Right:  5     Flexion: Left: 130    Right:  130        Strength:      Extension:  Left: 4+/5    Pain:++      Right:  4+/5    Pain:++  Flexion:  Left: 5-/5   Pain:      Right: 5-/5   Pain:    Quad Set Left: WNL    Pain:   Quad Set Right: WNL    Pain:     Special Tests:   Left knee positive for the following special tests:  Patellar Compression  Left knee negative for the following special tests:  Meninscal and Keya's  Right knee positive for the following tests:  Patellar Compression  Right knee negative for the following special tests:  Meniscal and Keya's                    General      ROS     Assessment/Plan:    Patient is a 42 year old male with both sides knee complaints.    Patient has the following significant findings with corresponding treatment plan.                Diagnosis 1:  Bilateral Knee Pain  Pain -  self management, education and home program  Decreased ROM/flexibility - manual therapy and therapeutic exercise  Decreased strength - therapeutic exercise and therapeutic activities  Impaired muscle performance - neuro re-education  Decreased function - therapeutic activities     Therapy Evaluation Codes:   1. History comprised of:              Personal factors that impact the plan of care:                            None.               Comorbidity factors that impact the plan of care are:                            Overweight, PF chondromalacia              Medications impacting care: None.  2. Examination of Body Systems comprised of:              Body structures and functions that impact the plan of care:                            Knee.              Activity limitations that impact the plan of care are:                            Running, Squatting/kneeling, Stairs and transfers.  3. Clinical presentation characteristics are:              Stable/Uncomplicated.  4. Decision-Making                          Low complexity using standardized patient assessment instrument and/or measureable assessment of functional outcome.  Cumulative Therapy Evaluation is: Low complexity.     Previous and current functional  limitations:  (See Goal Flow Sheet for this information)    Short term and Long term goals: (See Goal Flow Sheet for this information)      Communication ability:  Patient appears to be able to clearly communicate and understand verbal and written communication and follow directions correctly.  Treatment Explanation - The following has been discussed with the patient:   RX ordered/plan of care  Anticipated outcomes  Possible risks and side effects  This patient would benefit from PT intervention to resume normal activities.   Rehab potential is good.     Frequency:  2 X a month, once daily  Duration:  for 3 months  Discharge Plan:  Achieve all LTG.  Independent in home treatment program.  Reach maximal therapeutic benefit.     Please refer to the daily flowsheet for treatment today, total treatment time and time spent performing 1:1 timed codes.

## 2025-05-16 NOTE — ED TRIAGE NOTES
Patient sent to ED per Dr. Black for evaluation of pain, swelling, and warmth to right upper and lower leg since yesterday. Patient had a Sprint PNS Friday per Dr. Black.

## 2025-05-22 ENCOUNTER — OFFICE VISIT (OUTPATIENT)
Dept: PAIN MEDICINE | Facility: CLINIC | Age: 80
End: 2025-05-22
Payer: MEDICARE

## 2025-05-22 VITALS
WEIGHT: 181 LBS | DIASTOLIC BLOOD PRESSURE: 80 MMHG | HEART RATE: 74 BPM | SYSTOLIC BLOOD PRESSURE: 122 MMHG | OXYGEN SATURATION: 96 % | HEIGHT: 70 IN | BODY MASS INDEX: 25.91 KG/M2

## 2025-05-22 DIAGNOSIS — G89.4 CHRONIC PAIN SYNDROME: ICD-10-CM

## 2025-05-22 DIAGNOSIS — G57.91 NEUROPATHY OF RIGHT LOWER EXTREMITY: Primary | ICD-10-CM

## 2025-05-22 DIAGNOSIS — Z96.82 S/P PLACEMENT OF NERVE STIMULATOR: ICD-10-CM

## 2025-05-22 LAB — BACTERIA BLD CULT: NORMAL

## 2025-05-22 PROCEDURE — 99999 PR STA SHADOW: CPT | Mod: PBBFAC,,, | Performed by: ANESTHESIOLOGY

## 2025-05-22 PROCEDURE — 99999 PR PBB SHADOW E&M-EST. PATIENT-LVL IV: CPT | Mod: PBBFAC,,, | Performed by: ANESTHESIOLOGY

## 2025-05-22 PROCEDURE — 99214 OFFICE O/P EST MOD 30 MIN: CPT | Mod: PBBFAC | Performed by: ANESTHESIOLOGY

## 2025-05-22 PROCEDURE — 99214 OFFICE O/P EST MOD 30 MIN: CPT | Mod: S$PBB | Performed by: ANESTHESIOLOGY

## 2025-05-22 RX ORDER — PREGABALIN 100 MG/1
100 CAPSULE ORAL 3 TIMES DAILY
Qty: 90 CAPSULE | Refills: 0 | Status: SHIPPED | OUTPATIENT
Start: 2025-05-22

## 2025-05-22 NOTE — PROGRESS NOTES
EST Patient Evaluation  [facility]    Ghanshyam Sanford Jr.  : 1945  Date: 2025     CHIEF COMPLAINT:  No chief complaint on file.    Referring Physician: No ref. provider found  Primary Care Physician: Kevin Lombardi MD    HPI:  This is a 79 y.o. male with a chief complaint of No chief complaint on file.  . The patient has Past medical history/Past surgical history of  compression fracture of L1 status post kyphoplasty, compression fracture of thoracic vertebrae, restless  legs syndrome, hypertension, chronic diastolic heart failure, left bundle branch block, polymyalgia rheumatica, HLAb27, TAVR, long-term anticoagulation    Patient was evaluated and referred by Dr Isael Garner.  He was referred from Neurosurgery team based on cardiology referral for right leg pain  Interval History 2024:  Ghanshyam Sanford Jr. is here for follow up visit  to establish care with me. Ghanshyam Sanford Jr. Had  right L4-L5 lumbar facet joint injection,  patient reported 50 % improvement in pain and functionality for 2 weeks.  Current pain score: 7/10    Interval History 2024:  Ghanshyam Sanford Jr. is here for follow up visit after 7 physical therapy sessions which provided him with a relief for his in the right knee pain.  Today he presents with the acute onset left shoulder pain from excessive throwing   while fishing.  Current pain score: 3/10, at night 5/10    Interval History 2025:  Ghanshyam Sanford Jr. is here for follow up visit to discuss interventional procedure  in regards to his going right lower extremity pain/neuropathy  Current pain score: 4/10    Interval History 2025:  Ghanshyam GUTIERREZ Claribel  is here for procedure follow up visit after right L4 and L5 transforaminal epidural steroid injection,  patient reported 70 % improvement in pain for and functionality for 1 day for his back pain but not for his right knee pain& below-the-knee pain.  Current pain  score: 5/10    Interval History 05/16/2025:  Ghanshyam Sanford Jr. is here for  follow up visit after placing right sciatic and femoral peripheral nerve stimulator  Patient complains of drainage and swelling in the RT knee. He stated yesterday he noticed swelling and light pinkish,yellow fluids on bandage oozing out the RT pad. He had to change it 3 times from the drainage. It is slightly warm and sensitive to touch. He is having trouble walking on RT leg.     Current pain score: 1/10 resting, 6/1 walking    Diabetic: No    Anticoagulation medications: 81 mg Aspirin  and Plavix -  last stent in 2020    Allergy To Iodine: No    Currently on Antibiotic: No    previous Description of Pain Symptoms:  History of Recent Fall or Trauma: No   Onset: Chronic  Pain Location:  minimal low back pain,  posterior medial aspect of the right knee  Radiates/associated symptoms: RT LE to the calf  associated with tenderness  Pain is Getting worse over the last 3 months    The pain is described as burning and pulling.   Exacerbating factors:  stretching  Mitigating factors N/A.   Symptoms interfere with daily activity, sleeping.   The patient feels like symptoms have been worsening.   Patient denies night fever/night sweats, urinary incontinence, bowel incontinence, significant weight loss, significant motor weakness, and loss of sensations.    Pain score:  Best: 7/10  Worst: 10/10    Current pain medications:   gabapentin  Current Narcotics/Opioid /benzo Medications:  Opioids- None  Benzodiazepines: No    UDS:  NA    PDMP:  Reviewed and consistent with medication use as prescribed.     Previous Chronic Pain Treatment History:  Physical Therapy/HEP/Physician Lead Exercise Program:  Over the past 12 months, Patient has done 6+ sessions for lower back and shoulder.  PT response: Mildly Helpful.   Dates of the PT sessions: 10/2024-present.  Is patient participating in home exercise program (HEP)/ physician led exercise program:  Yes.Completes HEP that was provided by PT in addition to formal therapy.    Non-interventional Pain Therapy:  []Chiropractor.   []Acupuncture/Dry needle.  []TENS unit.  [x]Heat/ICE.  []Back Brace.    Medications previously tried:  NSAIDs: None  Topical Agent: Yes  TCA/SSRI/SNRI: None  Anti-convulsants: Gabapentin   Muscle Relaxants: None  Opioids- None.    Interventional Pain Procedures:  Knee CoolRFA-- 2022 with no relief   04/11/2024:  L4-L5 right transforaminal with IR-  80% relief for 3 weeks  09/6/ 2024:  right L4-L5 facet injection- 50% relief For 2 weeks with Dexamethasone   Kyphoplasty L1  03/07/2025: Right  L4/5 and L5/S1 Lumbar Transforaminal Epidural Steroid Injection- 70% relief for 1 day.  05/09/2025: right sciatic and femoral peripheral nerve stimulator  Previous spine/Relevant joint surgery:  Surgical History:   has a past surgical history that includes Hernia repair; Knee Arthroplasty (Right); Carotid stent; Back surgery; Percutaneous transcatheter aortic valve replacement (TAVR) (Right, 12/14/2021); Percutaneous transcatheter aortic valve replacement (TAVR) (Right, 12/14/2021); Knee arthroscopy w/ meniscectomy (Right, 6/13/2023); Arthroscopic chondroplasty of knee joint (Right, 6/13/2023); Knee arthroscopy w/ plica excision (Right, 6/13/2023); Synovectomy of knee (Right, 6/13/2023); Knee debridement (Right, 6/13/2023); [id number] (Right, 6/13/2023); Injection of facet joint (Right, 9/6/2024); Transforaminal epidural injection of steroid (Right, 3/7/2025); and insertion, electrode lead, neurostimulator, peripheral (Right, 5/9/2025).  Medical History:   has a past medical history of Anticoagulant long-term use, Aortic valve regurgitation, Aortic valve stenosis, nonrheumatic, Carotid stenosis, bilateral, Chronic diastolic heart failure, NYHA class 2, Chronic total occlusion of coronary artery, Coronary artery disease, Elevated PSA, Hypertension, Hypertensive heart disease,  "Osteoarthritis of right knee (3/22/2017), Polymyalgia, Restless legs, and SOB (shortness of breath).  Family History:  family history includes Diabetes Mellitus in his father; Heart disease in his mother and paternal grandmother; Hypertension in his mother; Stroke in his mother and paternal grandmother.  Allergies:  Atorvastatin calcium, Cortisone, Duloxetine, Flomax [tamsulosin], and Pravastatin   Social History/SUBSTANCE ABUSE HISTORY:  Personal history of substance abuse: No   reports that he has never smoked. He has never used smokeless tobacco. He reports that he does not drink alcohol and does not use drugs.  LABS:  CBC  Lab Results   Component Value Date    WBC 9.96 05/16/2025    HGB 14.0 05/16/2025    HCT 42.5 05/16/2025     Coagulation Profile   Lab Results   Component Value Date     05/16/2025       Lab Results   Component Value Date    INR 1.0 12/13/2021     CMP:  BMP  Lab Results   Component Value Date     05/16/2025    K 4.4 05/16/2025     05/16/2025    CO2 24 05/16/2025    BUN 22 05/16/2025    CREATININE 1.0 05/16/2025    CALCIUM 9.1 05/16/2025    ANIONGAP 8 05/16/2025    EGFRNORACEVR >60 05/16/2025     Lab Results   Component Value Date    ALT 21 05/16/2025    AST 16 05/16/2025    ALKPHOS 77 05/16/2025    BILITOT 0.8 05/16/2025     HGBA1C:  No results found for: "LABA1C", "HGBA1C"    ROS:    Review of Systems   Musculoskeletal:  Positive for joint swelling.   Skin:  Positive for rash and wound.     GENERAL:  No weight loss, malaise or fevers.  HEENT:   No recent changes in vision or hearing  NECK:  Negative for lumps, no difficulty with swallowing.  RESPIRATORY:  Negative for cough, wheezing or shortness of breath, patient denies any recent URI.  CARDIOVASCULAR:  Negative for chest pain or palpitations.  GI:  Negative for abdominal discomfort, blood in stools or black stools or change in bowel habits.  MUSCULOSKELETAL:  See HPI.  SKIN:  Negative for lesions, rash, and " itching.  PSYCH:  No mood disorder or recent psychosocial stressors.   HEMATOLOGY/LYMPHOLOGY:  See the blood thinner sectioned in HPI.  NEURO:  See HPI  All other reviewed and negative other than HPI.    PHYSICAL EXAM:  VITALS: There were no vitals taken for this visit.  There is no height or weight on file to calculate BMI.  GENERAL: Well appearing, in no acute distress, alert and oriented x3, answers questions appropriately.   PSYCH: Flat affect.  SKIN: Skin color, texture, turgor normal, no rashes or lesions.  HEAD/FACE:  Normocephalic, atraumatic. Cranial nerves grossly intact.  CV: Regular rate  PULM: No evidence of respiratory difficulty, symmetric chest rise.  GI:  Soft and non-Distended.   Right thigh examination:  Tenderness over the lateral aspect of the right knee associated with the swelling around entry site of sciatic peripheral nerve stimulator   Femoral peripheral nerve stimulator entry side:  no swelling  or sign of infection  DIAGNOSTIC STUDIES AND MEDICAL RECORDS REVIEW:  I have personally reviewed and interpreted relevant radiology reports and reviewed relevant records from other services in the EMR.     -EMG/nerve conduction study showed right L4 and L5 radiculopathy    -MRI lumbar spine 02/2024:   T12-L1: No spinal canal stenosis or neural foraminal narrowing.     L1-L2: No spinal canal stenosis or neural foraminal narrowing.     L2-L3: No spinal canal stenosis or neural foraminal narrowing.     L3-L4: There is asymmetric disc bulging to the left with mild facet arthropathy, contributing to mild spinal canal stenosis and moderate left-sided neural foraminal narrowing     L4-L5: Mild disc bulging and prominent facet joint arthropathy on the right side, noting joint hypertrophy with extensive subchondral marrow edema and surrounding inflammatory change (series 6, images 6-7).  There is a small right-sided synovial cyst measuring 10 mm (series 9, image 36) abutting and possibly impinging upon the  descending right L5 nerve root.  There is overall mild spinal canal stenosis and moderate right and mild left-sided neural foraminal narrowing.  Slight anterolisthesis noted.     L5-S1: Mild disc bulge with posterior annular fissure.  There is moderate disc height loss on the left side.  These findings contribute to mild/moderate left-sided neural foraminal narrowing.  No spinal canal stenosis .     Impression:     Remote L1 compression fracture post vertebral plasty, stable.  No acute fractures.     Lumbar spondylosis, contributing to mild spinal canal stenosis L3-4 and L4-5 and moderate neural foraminal narrowing L3-4 through L5-S1, as above.     Prominent right-sided facet joint arthropathy at L4-5 with small anterior synovial cyst impinging upon the spinal canal, as above.    Clinical Impression:  This is a pleasant 79 y.o. male patient with PMH/PSH of compression fracture of L1 status post kyphoplasty, compression fracture of thoracic vertebrae, restless  legs syndrome, hypertension, chronic diastolic heart failure, left bundle branch block, polymyalgia rheumatica, HLAb27, TAVR, long-term anticoagulation, coronary artery disease status post LAD and RCA stent, presenting   originally with right knee pain mainly in the posteromedial aspect associated with tenderness  and radiation to the right calf above the ankle, worse at night, he is status post right peripheral nerve stimulator over right sciatic and femoral nerve  Patient developed redness and swelling of the entry site of the right  sciatic peripheral nerve stimulator associated with right knee swelling, tenderness over the right calf muscle rasing concern for infection and possible DVT    Encounter Diagnosis:  Ghanshyam Sanford Jr. is a 79 y.o. male with the following diagnoses based on history, exam, and imaging:  There are no diagnoses linked to this encounter.    Treatment Plan:    Diagnostics/Referrals:   personally spoke with ER provider Dr Larios,  Ashutosh HERNANDEZ  for stat ultrasound right lower extremity to rule out DVT:  evaluate and treat    Medications:   I would recommend starting on oral antibiotic   Doxycycline: 2 capsules (200 mg total) by mouth 2 (two) times daily. for 5 days - Oral     Interventional Therapy:  removing sciatic and femoral peripheral nerve stimulator, in the office     Possible consider surgical consult for abscess drainage    Angel Black MD  Anesthesiologist  Interventional Pain Management  05/22/2025

## 2025-05-22 NOTE — PROGRESS NOTES
EST Patient Evaluation  [facility]    Ghanshyam Sanford Jr.  : 1945  Date: 2025     CHIEF COMPLAINT:  Follow-up    Referring Physician: No ref. provider found  Primary Care Physician: Kevin Lombardi MD    HPI:  This is a 79 y.o. male with a chief complaint of Follow-up  . The patient has Past medical history/Past surgical history of  compression fracture of L1 status post kyphoplasty, compression fracture of thoracic vertebrae, restless  legs syndrome, hypertension, chronic diastolic heart failure, left bundle branch block, polymyalgia rheumatica, HLAb27, TAVR, long-term anticoagulation    Patient was evaluated and referred by Dr Isael Garner.  He was referred from Neurosurgery team based on cardiology referral for right leg pain  Interval History 2024:  Ghanshyam Sanford Jr. is here for follow up visit  to establish care with me. Ghanshyam Sanford Jr. Had  right L4-L5 lumbar facet joint injection,  patient reported 50 % improvement in pain and functionality for 2 weeks.  Current pain score: 7/10    Interval History 2024:  Ghanshyam Sanford Jr. is here for follow up visit after 7 physical therapy sessions which provided him with a relief for his in the right knee pain.  Today he presents with the acute onset left shoulder pain from excessive throwing   while fishing.  Current pain score: 3/10, at night 5/10    Interval History 2025:  Ghanshyam Sanford Jr. is here for follow up visit to discuss interventional procedure  in regards to his going right lower extremity pain/neuropathy  Current pain score: 4/10    Interval History 2025:  Ghanshyam Marteldemar Norm is here for procedure follow up visit after right L4 and L5 transforaminal epidural steroid injection,  patient reported 70 % improvement in pain for and functionality for 1 day for his back pain but not for his right knee pain& below-the-knee pain.  Current pain score: 5/10    Interval History  05/16/2025:  Ghanshyam Sanford Jr. is here for  follow up visit after placing right sciatic and femoral peripheral nerve stimulator  Patient complains of drainage and swelling in the RT knee. He stated yesterday he noticed swelling and light pinkish,yellow fluids on bandage oozing out the RT pad. He had to change it 3 times from the drainage. It is slightly warm and sensitive to touch. He is having trouble walking on RT leg.   Current pain score: 1/10 resting, 6/1 walking    Interval History 05/22/2025:  Ghanshyam Sanford Jr. is here for  follow up visit after removing the peripheral nerve stimulator, he was started on antibiotic therapy  Current pain score: 5-6/10    Diabetic: No    Anticoagulation medications: 81 mg Aspirin  and Plavix -  last stent in 2020    Allergy To Iodine: No    Currently on Antibiotic: yes     Current Description of Pain Symptoms:  History of Recent Fall or Trauma: No   Onset: Chronic  Pain Location:  minimal low back pain,  posterior medial aspect of the right knee  Radiates/associated symptoms: RT LE to the calf  associated with tenderness  Pain is Getting worse over the last 3 months    The pain is described as burning and pulling.   Exacerbating factors:  stretching  Mitigating factors N/A.   Symptoms interfere with daily activity, sleeping.   The patient feels like symptoms have been worsening.   Patient denies night fever/night sweats, urinary incontinence, bowel incontinence, significant weight loss, significant motor weakness, and loss of sensations.    Pain score:  Best: 7/10  Worst: 10/10    Current pain medications:   gabapentin  Current Narcotics/Opioid /benzo Medications:  Opioids- None  Benzodiazepines: No    UDS:  NA    PDMP:  Reviewed and consistent with medication use as prescribed.     Previous Chronic Pain Treatment History:  Physical Therapy/HEP/Physician Lead Exercise Program:  Over the past 12 months, Patient has done 6+ sessions for lower back and shoulder.  PT response:  Mildly Helpful.   Dates of the PT sessions: 10/2024-present.  Is patient participating in home exercise program (HEP)/ physician led exercise program: Yes.Completes HEP that was provided by PT in addition to formal therapy.    Non-interventional Pain Therapy:  []Chiropractor.   []Acupuncture/Dry needle.  []TENS unit.  [x]Heat/ICE.  []Back Brace.    Medications previously tried:  NSAIDs: None  Topical Agent: Yes  TCA/SSRI/SNRI: None  Anti-convulsants: Gabapentin   Muscle Relaxants: None  Opioids- None.    Interventional Pain Procedures:  Knee CoolRFA-- 2022 with no relief   04/11/2024:  L4-L5 right transforaminal with IR-  80% relief for 3 weeks  09/6/ 2024:  right L4-L5 facet injection- 50% relief For 2 weeks with Dexamethasone   Kyphoplasty L1  03/07/2025: Right  L4/5 and L5/S1 Lumbar Transforaminal Epidural Steroid Injection- 70% relief for 1 day.  05/09/2025: right sciatic and femoral peripheral nerve stimulator  Previous spine/Relevant joint surgery:  Surgical History:   has a past surgical history that includes Hernia repair; Knee Arthroplasty (Right); Carotid stent; Back surgery; Percutaneous transcatheter aortic valve replacement (TAVR) (Right, 12/14/2021); Percutaneous transcatheter aortic valve replacement (TAVR) (Right, 12/14/2021); Knee arthroscopy w/ meniscectomy (Right, 6/13/2023); Arthroscopic chondroplasty of knee joint (Right, 6/13/2023); Knee arthroscopy w/ plica excision (Right, 6/13/2023); Synovectomy of knee (Right, 6/13/2023); Knee debridement (Right, 6/13/2023); [id number] (Right, 6/13/2023); Injection of facet joint (Right, 9/6/2024); Transforaminal epidural injection of steroid (Right, 3/7/2025); and insertion, electrode lead, neurostimulator, peripheral (Right, 5/9/2025).  Medical History:   has a past medical history of Anticoagulant long-term use, Aortic valve regurgitation, Aortic valve stenosis, nonrheumatic, Carotid stenosis, bilateral, Chronic diastolic heart failure,  "NYHA class 2, Chronic total occlusion of coronary artery, Coronary artery disease, Elevated PSA, Hypertension, Hypertensive heart disease, Osteoarthritis of right knee (3/22/2017), Polymyalgia, Restless legs, and SOB (shortness of breath).  Family History:  family history includes Diabetes Mellitus in his father; Heart disease in his mother and paternal grandmother; Hypertension in his mother; Stroke in his mother and paternal grandmother.  Allergies:  Atorvastatin calcium, Cortisone, Duloxetine, Flomax [tamsulosin], and Pravastatin   Social History/SUBSTANCE ABUSE HISTORY:  Personal history of substance abuse: No   reports that he has never smoked. He has never used smokeless tobacco. He reports that he does not drink alcohol and does not use drugs.  LABS:  CBC  Lab Results   Component Value Date    WBC 9.96 05/16/2025    HGB 14.0 05/16/2025    HCT 42.5 05/16/2025     Coagulation Profile   Lab Results   Component Value Date     05/16/2025       Lab Results   Component Value Date    INR 1.0 12/13/2021     CMP:  BMP  Lab Results   Component Value Date     05/16/2025    K 4.4 05/16/2025     05/16/2025    CO2 24 05/16/2025    BUN 22 05/16/2025    CREATININE 1.0 05/16/2025    CALCIUM 9.1 05/16/2025    ANIONGAP 8 05/16/2025    EGFRNORACEVR >60 05/16/2025     Lab Results   Component Value Date    ALT 21 05/16/2025    AST 16 05/16/2025    ALKPHOS 77 05/16/2025    BILITOT 0.8 05/16/2025     HGBA1C:  No results found for: "LABA1C", "HGBA1C"    ROS:    Review of Systems   Musculoskeletal:  Positive for joint swelling.   Skin:  Positive for rash and wound.     GENERAL:  No weight loss, malaise or fevers.  HEENT:   No recent changes in vision or hearing  NECK:  Negative for lumps, no difficulty with swallowing.  RESPIRATORY:  Negative for cough, wheezing or shortness of breath, patient denies any recent URI.  CARDIOVASCULAR:  Negative for chest pain or palpitations.  GI:  Negative for abdominal discomfort, " "blood in stools or black stools or change in bowel habits.  MUSCULOSKELETAL:  See HPI.  SKIN:  Negative for lesions, rash, and itching.  PSYCH:  No mood disorder or recent psychosocial stressors.   HEMATOLOGY/LYMPHOLOGY:  See the blood thinner sectioned in HPI.  NEURO:  See HPI  All other reviewed and negative other than HPI.    PHYSICAL EXAM:  VITALS: /80 (BP Location: Right arm, Patient Position: Sitting)   Pulse 74   Ht 5' 10" (1.778 m)   Wt 82.1 kg (181 lb)   SpO2 96%   BMI 25.97 kg/m²   Body mass index is 25.97 kg/m².  GENERAL: Well appearing, in no acute distress, alert and oriented x3, answers questions appropriately.   PSYCH: Flat affect.  SKIN: Skin color, texture, turgor normal, no rashes or lesions.  HEAD/FACE:  Normocephalic, atraumatic. Cranial nerves grossly intact.  CV: Regular rate  PULM: No evidence of respiratory difficulty, symmetric chest rise.  GI:  Soft and non-Distended.  Right thigh examination:   mild tenderness associated with rash around the  entry site of  previous right femoral peripheral nerve stimulator, no active sign of infection around the  entry site of previous right sciatic nerve stimulator    DIAGNOSTIC STUDIES AND MEDICAL RECORDS REVIEW:  I have personally reviewed and interpreted relevant radiology reports and reviewed relevant records from other services in the EMR.     -EMG/nerve conduction study showed right L4 and L5 radiculopathy    -MRI lumbar spine 02/2024:   T12-L1: No spinal canal stenosis or neural foraminal narrowing.     L1-L2: No spinal canal stenosis or neural foraminal narrowing.     L2-L3: No spinal canal stenosis or neural foraminal narrowing.     L3-L4: There is asymmetric disc bulging to the left with mild facet arthropathy, contributing to mild spinal canal stenosis and moderate left-sided neural foraminal narrowing     L4-L5: Mild disc bulging and prominent facet joint arthropathy on the right side, noting joint hypertrophy with extensive " subchondral marrow edema and surrounding inflammatory change (series 6, images 6-7).  There is a small right-sided synovial cyst measuring 10 mm (series 9, image 36) abutting and possibly impinging upon the descending right L5 nerve root.  There is overall mild spinal canal stenosis and moderate right and mild left-sided neural foraminal narrowing.  Slight anterolisthesis noted.     L5-S1: Mild disc bulge with posterior annular fissure.  There is moderate disc height loss on the left side.  These findings contribute to mild/moderate left-sided neural foraminal narrowing.  No spinal canal stenosis .     Impression:     Remote L1 compression fracture post vertebral plasty, stable.  No acute fractures.     Lumbar spondylosis, contributing to mild spinal canal stenosis L3-4 and L4-5 and moderate neural foraminal narrowing L3-4 through L5-S1, as above.     Prominent right-sided facet joint arthropathy at L4-5 with small anterior synovial cyst impinging upon the spinal canal, as above.    Clinical Impression:  This is a pleasant 79 y.o. male patient with PMH/PSH of compression fracture of L1 status post kyphoplasty, compression fracture of thoracic vertebrae, restless  legs syndrome, hypertension, chronic diastolic heart failure, left bundle branch block, polymyalgia rheumatica, HLAb27, TAVR, long-term anticoagulation, coronary artery disease status post LAD and RCA stent, presenting   originally with right knee pain mainly in the posteromedial aspect associated with tenderness  and radiation to the right calf above the ankle, worse at night, he is status post right peripheral nerve stimulator over right sciatic and femoral nerve  Patient developed redness and swelling of the entry site of the right  sciatic peripheral nerve stimulator associated with right knee swelling, tenderness over the right calf muscle,  ultrasound with neck for DVT, blood culture and lactic acid was normal, he is currently on clindamycin, on  physical exam there is no active  sign of infection    Encounter Diagnosis:  Ghanshyam Sanford Jr. is a 79 y.o. male with the following diagnoses based on history, exam, and imagin. Neuropathy of right lower extremity  - pregabalin (LYRICA) 100 MG capsule; Take 1 capsule (100 mg total) by mouth 3 (three) times daily.  Dispense: 90 capsule; Refill: 0    2. Chronic pain syndrome    3. S/P placement of nerve stimulator    Treatment Plan:    Diagnostics/Referrals: NA    Medications:   Switching gabapentin to Lyrica 100 mg 3 times a day    Interventional Therapy:   patient is not motivated to proceed with peripheral nerve stimulator revision      Angel Black MD  Anesthesiologist  Interventional Pain Management  2025

## 2025-06-30 ENCOUNTER — OFFICE VISIT (OUTPATIENT)
Dept: PAIN MEDICINE | Facility: CLINIC | Age: 80
End: 2025-06-30
Payer: MEDICARE

## 2025-06-30 VITALS
OXYGEN SATURATION: 97 % | DIASTOLIC BLOOD PRESSURE: 62 MMHG | BODY MASS INDEX: 25.77 KG/M2 | SYSTOLIC BLOOD PRESSURE: 109 MMHG | WEIGHT: 180 LBS | HEART RATE: 63 BPM | HEIGHT: 70 IN

## 2025-06-30 DIAGNOSIS — Z96.82 S/P PLACEMENT OF NERVE STIMULATOR: ICD-10-CM

## 2025-06-30 DIAGNOSIS — G57.91 NEUROPATHY OF RIGHT LOWER EXTREMITY: Primary | ICD-10-CM

## 2025-06-30 DIAGNOSIS — G89.4 CHRONIC PAIN SYNDROME: ICD-10-CM

## 2025-06-30 PROCEDURE — 99999 PR PBB SHADOW E&M-EST. PATIENT-LVL IV: CPT | Mod: PBBFAC,,, | Performed by: ANESTHESIOLOGY

## 2025-06-30 PROCEDURE — 99213 OFFICE O/P EST LOW 20 MIN: CPT | Mod: S$PBB | Performed by: ANESTHESIOLOGY

## 2025-06-30 PROCEDURE — 99214 OFFICE O/P EST MOD 30 MIN: CPT | Mod: PBBFAC | Performed by: ANESTHESIOLOGY

## 2025-06-30 PROCEDURE — 99999 PR STA SHADOW: CPT | Mod: PBBFAC,,,

## 2025-06-30 RX ORDER — TAMSULOSIN HYDROCHLORIDE 0.4 MG/1
1 CAPSULE ORAL NIGHTLY
COMMUNITY

## 2025-06-30 RX ORDER — GABAPENTIN 600 MG/1
600 TABLET ORAL DAILY
COMMUNITY
Start: 2024-06-25

## 2025-06-30 NOTE — PROGRESS NOTES
EST Patient Evaluation  [facility]    Ghanshyam Sanford Jr.  : 1945  Date: 2025     CHIEF COMPLAINT:  No chief complaint on file.    Referring Physician: No ref. provider found  Primary Care Physician: Kevin Lombardi MD    HPI:  This is a 80 y.o. male with a chief complaint of No chief complaint on file.  . The patient has Past medical history/Past surgical history of  compression fracture of L1 status post kyphoplasty, compression fracture of thoracic vertebrae, restless  legs syndrome, hypertension, chronic diastolic heart failure, left bundle branch block, polymyalgia rheumatica, HLAb27, TAVR, long-term anticoagulation    Patient was evaluated and referred by Dr Isael Garner.  He was referred from Neurosurgery team based on cardiology referral for right leg pain  Interval History 2024:  Ghanshyam Sanford Jr. is here for follow up visit  to establish care with me. Ghanshyam Sanford Jr. Had  right L4-L5 lumbar facet joint injection,  patient reported 50 % improvement in pain and functionality for 2 weeks.  Current pain score: 7/10    Interval History 2024:  Ghanshyam Sanford Jr. is here for follow up visit after 7 physical therapy sessions which provided him with a relief for his in the right knee pain.  Today he presents with the acute onset left shoulder pain from excessive throwing   while fishing.  Current pain score: 3/10, at night 5/10    Interval History 2025:  Ghanshyam Sanford Jr. is here for follow up visit to discuss interventional procedure  in regards to his going right lower extremity pain/neuropathy  Current pain score: 4/10    Interval History 2025:  Ghanshyam GUTIERREZ Claribel  is here for procedure follow up visit after right L4 and L5 transforaminal epidural steroid injection,  patient reported 70 % improvement in pain for and functionality for 1 day for his back pain but not for his right knee pain& below-the-knee pain.  Current pain  score: 5/10    Interval History 05/16/2025:  Ghanshyam Sanford Jr. is here for  follow up visit after placing right sciatic and femoral peripheral nerve stimulator  Patient complains of drainage and swelling in the RT knee. He stated yesterday he noticed swelling and light pinkish,yellow fluids on bandage oozing out the RT pad. He had to change it 3 times from the drainage. It is slightly warm and sensitive to touch. He is having trouble walking on RT leg.   Current pain score: 1/10 resting, 6/1 walking    Interval History 05/22/2025:  Ghanshyam Sanford Jr. is here for  follow up visit after removing the peripheral nerve stimulator, he was started on antibiotic therapy  Current pain score: 5-6/10    Interval History 06/30/2025:  Ghanshyam Sanford Jr. is here for  follow up visit, he was switched from gabapentin to Lyrica 100 mg 3 times a day,  patient reported    Current pain score: ***/10      Diabetic: No    Anticoagulation medications: 81 mg Aspirin  and Plavix -  last stent in 2020    Allergy To Iodine: No    Currently on Antibiotic: yes     Current Description of Pain Symptoms, similar to previous:  History of Recent Fall or Trauma: No   Onset: Chronic  Pain Location:  minimal low back pain,  posterior medial aspect of the right knee  Radiates/associated symptoms: RT LE to the calf  associated with tenderness  Pain is Getting worse over the last 3 months    The pain is described as burning and pulling.   Exacerbating factors:  stretching  Mitigating factors N/A.   Symptoms interfere with daily activity, sleeping.   The patient feels like symptoms have been worsening.   Patient denies night fever/night sweats, urinary incontinence, bowel incontinence, significant weight loss, significant motor weakness, and loss of sensations.    Pain score:  Best: 7/10  Worst: 10/10    Current pain medications:   Lyrica 100 mg 3 times a day  Current Narcotics/Opioid /benzo Medications:  Opioids- None  Benzodiazepines:  No    UDS:  NA    PDMP:  Reviewed and consistent with medication use as prescribed.     Previous Chronic Pain Treatment History:  Physical Therapy/HEP/Physician Lead Exercise Program:  Over the past 12 months, Patient has done 6+ sessions for lower back and shoulder.  PT response: Mildly Helpful.   Dates of the PT sessions: 10/2024-present.  Is patient participating in home exercise program (HEP)/ physician led exercise program: Yes.Completes HEP that was provided by PT in addition to formal therapy.    Non-interventional Pain Therapy:  []Chiropractor.   []Acupuncture/Dry needle.  []TENS unit.  [x]Heat/ICE.  []Back Brace.    Medications previously tried:  NSAIDs: None  Topical Agent: Yes  TCA/SSRI/SNRI: None  Anti-convulsants: Gabapentin   Muscle Relaxants: None  Opioids- None.    Interventional Pain Procedures:  Knee CoolRFA-- 2022 with no relief   04/11/2024:  L4-L5 right transforaminal with IR-  80% relief for 3 weeks  09/6/ 2024:  right L4-L5 facet injection- 50% relief For 2 weeks with Dexamethasone   Kyphoplasty L1  03/07/2025: Right  L4/5 and L5/S1 Lumbar Transforaminal Epidural Steroid Injection- 70% relief for 1 day.  05/09/2025: right sciatic and femoral peripheral nerve stimulator,  explanted in May 16 due to infection    Previous spine/Relevant joint surgery:  Surgical History:   has a past surgical history that includes Hernia repair; Knee Arthroplasty (Right); Carotid stent; Back surgery; Percutaneous transcatheter aortic valve replacement (TAVR) (Right, 12/14/2021); Percutaneous transcatheter aortic valve replacement (TAVR) (Right, 12/14/2021); Knee arthroscopy w/ meniscectomy (Right, 6/13/2023); Arthroscopic chondroplasty of knee joint (Right, 6/13/2023); Knee arthroscopy w/ plica excision (Right, 6/13/2023); Synovectomy of knee (Right, 6/13/2023); Knee debridement (Right, 6/13/2023); ixarg-yzlccigx-uszvjesmjlvw (Right, 6/13/2023); Injection of facet joint (Right, 9/6/2024); Transforaminal epidural  "injection of steroid (Right, 3/7/2025); and insertion, electrode lead, neurostimulator, peripheral (Right, 5/9/2025).  Medical History:   has a past medical history of Anticoagulant long-term use, Aortic valve regurgitation, Aortic valve stenosis, nonrheumatic, Carotid stenosis, bilateral, Chronic diastolic heart failure, NYHA class 2, Chronic total occlusion of coronary artery, Coronary artery disease, Elevated PSA, Hypertension, Hypertensive heart disease, Osteoarthritis of right knee (3/22/2017), Polymyalgia, Restless legs, and SOB (shortness of breath).  Family History:  family history includes Diabetes Mellitus in his father; Heart disease in his mother and paternal grandmother; Hypertension in his mother; Stroke in his mother and paternal grandmother.  Allergies:  Atorvastatin calcium, Cortisone, Duloxetine, Flomax [tamsulosin], and Pravastatin   Social History/SUBSTANCE ABUSE HISTORY:  Personal history of substance abuse: No   reports that he has never smoked. He has never used smokeless tobacco. He reports that he does not drink alcohol and does not use drugs.  LABS:  CBC  Lab Results   Component Value Date    WBC 9.96 05/16/2025    HGB 14.0 05/16/2025    HCT 42.5 05/16/2025     Coagulation Profile   Lab Results   Component Value Date     05/16/2025       Lab Results   Component Value Date    INR 1.0 12/13/2021     CMP:  BMP  Lab Results   Component Value Date     05/16/2025    K 4.4 05/16/2025     05/16/2025    CO2 24 05/16/2025    BUN 22 05/16/2025    CREATININE 1.0 05/16/2025    CALCIUM 9.1 05/16/2025    ANIONGAP 8 05/16/2025    EGFRNORACEVR >60 05/16/2025     Lab Results   Component Value Date    ALT 21 05/16/2025    AST 16 05/16/2025    ALKPHOS 77 05/16/2025    BILITOT 0.8 05/16/2025     HGBA1C:  No results found for: "LABA1C", "HGBA1C"    ROS:    Review of Systems   Musculoskeletal:  Positive for joint swelling.   Skin:  Positive for rash and wound.     GENERAL:  No weight loss, " malaise or fevers.  HEENT:   No recent changes in vision or hearing  NECK:  Negative for lumps, no difficulty with swallowing.  RESPIRATORY:  Negative for cough, wheezing or shortness of breath, patient denies any recent URI.  CARDIOVASCULAR:  Negative for chest pain or palpitations.  GI:  Negative for abdominal discomfort, blood in stools or black stools or change in bowel habits.  MUSCULOSKELETAL:  See HPI.  SKIN:  Negative for lesions, rash, and itching.  PSYCH:  No mood disorder or recent psychosocial stressors.   HEMATOLOGY/LYMPHOLOGY:  See the blood thinner sectioned in HPI.  NEURO:  See HPI  All other reviewed and negative other than HPI.    PHYSICAL EXAM:  VITALS: There were no vitals taken for this visit.  There is no height or weight on file to calculate BMI.  GENERAL: Well appearing, in no acute distress, alert and oriented x3, answers questions appropriately.   PSYCH: Flat affect.  SKIN: Skin color, texture, turgor normal, no rashes or lesions.  HEAD/FACE:  Normocephalic, atraumatic. Cranial nerves grossly intact.  CV: Regular rate  PULM: No evidence of respiratory difficulty, symmetric chest rise.  GI:  Soft and non-Distended.  Right thigh examination:   mild tenderness associated with rash around the  entry site of  previous right femoral peripheral nerve stimulator, no active sign of infection around the  entry site of previous right sciatic nerve stimulator    DIAGNOSTIC STUDIES AND MEDICAL RECORDS REVIEW:  I have personally reviewed and interpreted relevant radiology reports and reviewed relevant records from other services in the EMR.     -EMG/nerve conduction study showed right L4 and L5 radiculopathy    -MRI lumbar spine 02/2024:   T12-L1: No spinal canal stenosis or neural foraminal narrowing.     L1-L2: No spinal canal stenosis or neural foraminal narrowing.     L2-L3: No spinal canal stenosis or neural foraminal narrowing.     L3-L4: There is asymmetric disc bulging to the left with mild facet  arthropathy, contributing to mild spinal canal stenosis and moderate left-sided neural foraminal narrowing     L4-L5: Mild disc bulging and prominent facet joint arthropathy on the right side, noting joint hypertrophy with extensive subchondral marrow edema and surrounding inflammatory change (series 6, images 6-7).  There is a small right-sided synovial cyst measuring 10 mm (series 9, image 36) abutting and possibly impinging upon the descending right L5 nerve root.  There is overall mild spinal canal stenosis and moderate right and mild left-sided neural foraminal narrowing.  Slight anterolisthesis noted.     L5-S1: Mild disc bulge with posterior annular fissure.  There is moderate disc height loss on the left side.  These findings contribute to mild/moderate left-sided neural foraminal narrowing.  No spinal canal stenosis .     Impression:     Remote L1 compression fracture post vertebral plasty, stable.  No acute fractures.     Lumbar spondylosis, contributing to mild spinal canal stenosis L3-4 and L4-5 and moderate neural foraminal narrowing L3-4 through L5-S1, as above.     Prominent right-sided facet joint arthropathy at L4-5 with small anterior synovial cyst impinging upon the spinal canal, as above.    Clinical Impression:  This is a pleasant 80 y.o. male patient with PMH/PSH of compression fracture of L1 status post kyphoplasty, compression fracture of thoracic vertebrae, restless  legs syndrome, hypertension, chronic diastolic heart failure, left bundle branch block, polymyalgia rheumatica, HLAb27, TAVR, long-term anticoagulation, coronary artery disease status post LAD and RCA stent, presenting   originally with right knee pain mainly in the posteromedial aspect associated with tenderness  and radiation to the right calf above the ankle, worse at night, he is status post right peripheral nerve stimulator over right sciatic and femoral nerve  Patient developed redness and swelling of the entry site of the  right  sciatic peripheral nerve stimulator associated with right knee swelling, tenderness over the right calf muscle,  ultrasound with neck for DVT, blood culture and lactic acid were normal,  with the switch from gabapentin to Lyrica reported mild relief     Encounter Diagnosis:  Ghanshyam Sanford Jr. is a 80 y.o. male with the following diagnoses based on history, exam, and imagin. Neuropathy of right lower extremity    2. Chronic pain syndrome    3. S/P placement of nerve stimulator      Treatment Plan:    Diagnostics/Referrals: NA    Medications:   Switching gabapentin to Lyrica 100 mg 3 times a day    Interventional Therapy:   patient is not motivated to proceed with peripheral nerve stimulator revision      Angel Black MD  Anesthesiologist  Interventional Pain Management  2025

## 2025-06-30 NOTE — PROGRESS NOTES
EST Patient Evaluation  [facility]    Ghanshyam Sanford Jr.  : 1945  Date: 2025     CHIEF COMPLAINT:  Knee Pain and Follow-up    Referring Physician: No ref. provider found  Primary Care Physician: Kevin Lombardi MD    HPI:  This is a 80 y.o. male with a chief complaint of Knee Pain and Follow-up  . The patient has Past medical history/Past surgical history of  compression fracture of L1 status post kyphoplasty, compression fracture of thoracic vertebrae, restless  legs syndrome, hypertension, chronic diastolic heart failure, left bundle branch block, polymyalgia rheumatica, HLAb27, TAVR, long-term anticoagulation    Patient was evaluated and referred by Dr Isael Garner.  He was referred from Neurosurgery team based on cardiology referral for right leg pain  Interval History 2024:  Ghanshyam Sanford Jr. is here for follow up visit  to establish care with me. Ghanshyam Sanford Jr. Had  right L4-L5 lumbar facet joint injection,  patient reported 50 % improvement in pain and functionality for 2 weeks.  Current pain score: 7/10    Interval History 2024:  Ghanshyam Sanford Jr. is here for follow up visit after 7 physical therapy sessions which provided him with a relief for his in the right knee pain.  Today he presents with the acute onset left shoulder pain from excessive throwing   while fishing.  Current pain score: 3/10, at night 5/10    Interval History 2025:  Ghanshyam Sanford Jr. is here for follow up visit to discuss interventional procedure  in regards to his going right lower extremity pain/neuropathy  Current pain score: 4/10    Interval History 2025:  Ghanshyam Marteldemar  is here for procedure follow up visit after right L4 and L5 transforaminal epidural steroid injection,  patient reported 70 % improvement in pain for and functionality for 1 day for his back pain but not for his right knee pain& below-the-knee pain.  Current pain score:  5/10    Interval History 05/16/2025:  Ghanshyam Sanford Jr. is here for  follow up visit after placing right sciatic and femoral peripheral nerve stimulator  Patient complains of drainage and swelling in the RT knee. He stated yesterday he noticed swelling and light pinkish,yellow fluids on bandage oozing out the RT pad. He had to change it 3 times from the drainage. It is slightly warm and sensitive to touch. He is having trouble walking on RT leg.   Current pain score: 1/10 resting, 6/1 walking    Interval History 05/22/2025:  Ghanshyam Sanford Jr. is here for  follow up visit after removing the peripheral nerve stimulator, he was started on antibiotic therapy  Current pain score: 5-6/10    Interval History 06/30/2025:  Ghanshyam Sanford Jr. is here for  follow up visit, he was switched from gabapentin to Lyrica 100 mg 3 times a day,  patient  start Lyrica due to concern of side effects  Current pain score: 4/10    Diabetic: No    Anticoagulation medications: 81 mg Aspirin  and Plavix -  last stent in 2020    Allergy To Iodine: No    Currently on Antibiotic: yes     Current Description of Pain Symptoms, similar to previous:  History of Recent Fall or Trauma: No   Onset: Chronic  Pain Location:  minimal low back pain,  posterior medial aspect of the right knee  Radiates/associated symptoms: RT LE to the calf  associated with tenderness  Pain is Getting worse over the last 3 months    The pain is described as burning and pulling.   Exacerbating factors:  stretching  Mitigating factors N/A.   Symptoms interfere with daily activity, sleeping.   The patient feels like symptoms have been worsening.   Patient denies night fever/night sweats, urinary incontinence, bowel incontinence, significant weight loss, significant motor weakness, and loss of sensations.    Pain score:  Best: 7/10  Worst: 10/10    Current pain medications:  Gabapentin 600mg QHS  Current Narcotics/Opioid /benzo Medications:  Opioids- None  Benzodiazepines:  No    UDS:  NA    PDMP:  Reviewed and consistent with medication use as prescribed.     Previous Chronic Pain Treatment History:  Physical Therapy/HEP/Physician Lead Exercise Program:  Over the past 12 months, Patient has done 6+ sessions for lower back and shoulder.  PT response: Mildly Helpful.   Dates of the PT sessions: 10/2024-present.  Is patient participating in home exercise program (HEP)/ physician led exercise program: Yes.Completes HEP that was provided by PT in addition to formal therapy.    Non-interventional Pain Therapy:  []Chiropractor.   []Acupuncture/Dry needle.  []TENS unit.  [x]Heat/ICE.  []Back Brace.    Medications previously tried:  NSAIDs: None  Topical Agent: Yes  TCA/SSRI/SNRI: None  Anti-convulsants: Gabapentin   Muscle Relaxants: None  Opioids- None.    Interventional Pain Procedures:  Knee CoolRFA-- 2022 with no relief   04/11/2024:  L4-L5 right transforaminal with IR-  80% relief for 3 weeks  09/6/ 2024:  right L4-L5 facet injection- 50% relief For 2 weeks with Dexamethasone   Kyphoplasty L1  03/07/2025: Right  L4/5 and L5/S1 Lumbar Transforaminal Epidural Steroid Injection- 70% relief for 1 day.  05/09/2025: right sciatic and femoral peripheral nerve stimulator,  explanted in May 16 due to infection    Previous spine/Relevant joint surgery:  Surgical History:   has a past surgical history that includes Hernia repair; Knee Arthroplasty (Right); Carotid stent; Back surgery; Percutaneous transcatheter aortic valve replacement (TAVR) (Right, 12/14/2021); Percutaneous transcatheter aortic valve replacement (TAVR) (Right, 12/14/2021); Knee arthroscopy w/ meniscectomy (Right, 6/13/2023); Arthroscopic chondroplasty of knee joint (Right, 6/13/2023); Knee arthroscopy w/ plica excision (Right, 6/13/2023); Synovectomy of knee (Right, 6/13/2023); Knee debridement (Right, 6/13/2023); [id number] (Right, 6/13/2023); Injection of facet joint (Right, 9/6/2024); Transforaminal epidural  "injection of steroid (Right, 3/7/2025); and insertion, electrode lead, neurostimulator, peripheral (Right, 5/9/2025).  Medical History:   has a past medical history of Anticoagulant long-term use, Aortic valve regurgitation, Aortic valve stenosis, nonrheumatic, Carotid stenosis, bilateral, Chronic diastolic heart failure, NYHA class 2, Chronic total occlusion of coronary artery, Coronary artery disease, Elevated PSA, Hypertension, Hypertensive heart disease, Osteoarthritis of right knee (3/22/2017), Polymyalgia, Restless legs, and SOB (shortness of breath).  Family History:  family history includes Diabetes Mellitus in his father; Heart disease in his mother and paternal grandmother; Hypertension in his mother; Stroke in his mother and paternal grandmother.  Allergies:  Atorvastatin calcium, Cortisone, Duloxetine, Flomax [tamsulosin], and Pravastatin   Social History/SUBSTANCE ABUSE HISTORY:  Personal history of substance abuse: No   reports that he has never smoked. He has never used smokeless tobacco. He reports that he does not drink alcohol and does not use drugs.  LABS:  CBC  Lab Results   Component Value Date    WBC 9.96 05/16/2025    HGB 14.0 05/16/2025    HCT 42.5 05/16/2025     Coagulation Profile   Lab Results   Component Value Date     05/16/2025       Lab Results   Component Value Date    INR 1.0 12/13/2021     CMP:  BMP  Lab Results   Component Value Date     05/16/2025    K 4.4 05/16/2025     05/16/2025    CO2 24 05/16/2025    BUN 22 05/16/2025    CREATININE 1.0 05/16/2025    CALCIUM 9.1 05/16/2025    ANIONGAP 8 05/16/2025    EGFRNORACEVR >60 05/16/2025     Lab Results   Component Value Date    ALT 21 05/16/2025    AST 16 05/16/2025    ALKPHOS 77 05/16/2025    BILITOT 0.8 05/16/2025     HGBA1C:  No results found for: "LABA1C", "HGBA1C"    ROS:    Review of Systems   Musculoskeletal:  Positive for joint swelling.   Skin:  Positive for rash and wound.     GENERAL:  No weight loss, " "malaise or fevers.  HEENT:   No recent changes in vision or hearing  NECK:  Negative for lumps, no difficulty with swallowing.  RESPIRATORY:  Negative for cough, wheezing or shortness of breath, patient denies any recent URI.  CARDIOVASCULAR:  Negative for chest pain or palpitations.  GI:  Negative for abdominal discomfort, blood in stools or black stools or change in bowel habits.  MUSCULOSKELETAL:  See HPI.  SKIN:  Negative for lesions, rash, and itching.  PSYCH:  No mood disorder or recent psychosocial stressors.   HEMATOLOGY/LYMPHOLOGY:  See the blood thinner sectioned in HPI.  NEURO:  See HPI  All other reviewed and negative other than HPI.    PHYSICAL EXAM:  VITALS: /62 (BP Location: Right arm, Patient Position: Sitting)   Pulse 63   Ht 5' 10" (1.778 m)   Wt 81.6 kg (180 lb)   SpO2 97%   BMI 25.83 kg/m²   Body mass index is 25.83 kg/m².  GENERAL: Well appearing, in no acute distress, alert and oriented x3, answers questions appropriately.   PSYCH: Flat affect.  SKIN: Skin color, texture, turgor normal, no rashes or lesions.  HEAD/FACE:  Normocephalic, atraumatic. Cranial nerves grossly intact.  CV: Regular rate  PULM: No evidence of respiratory difficulty, symmetric chest rise.  GI:  Soft and non-Distended.    DIAGNOSTIC STUDIES AND MEDICAL RECORDS REVIEW:  I have personally reviewed and interpreted relevant radiology reports and reviewed relevant records from other services in the EMR.     -EMG/nerve conduction study showed right L4 and L5 radiculopathy    -MRI lumbar spine 02/2024:   T12-L1: No spinal canal stenosis or neural foraminal narrowing.     L1-L2: No spinal canal stenosis or neural foraminal narrowing.     L2-L3: No spinal canal stenosis or neural foraminal narrowing.     L3-L4: There is asymmetric disc bulging to the left with mild facet arthropathy, contributing to mild spinal canal stenosis and moderate left-sided neural foraminal narrowing     L4-L5: Mild disc bulging and prominent " facet joint arthropathy on the right side, noting joint hypertrophy with extensive subchondral marrow edema and surrounding inflammatory change (series 6, images 6-7).  There is a small right-sided synovial cyst measuring 10 mm (series 9, image 36) abutting and possibly impinging upon the descending right L5 nerve root.  There is overall mild spinal canal stenosis and moderate right and mild left-sided neural foraminal narrowing.  Slight anterolisthesis noted.     L5-S1: Mild disc bulge with posterior annular fissure.  There is moderate disc height loss on the left side.  These findings contribute to mild/moderate left-sided neural foraminal narrowing.  No spinal canal stenosis .     Impression:     Remote L1 compression fracture post vertebral plasty, stable.  No acute fractures.     Lumbar spondylosis, contributing to mild spinal canal stenosis L3-4 and L4-5 and moderate neural foraminal narrowing L3-4 through L5-S1, as above.     Prominent right-sided facet joint arthropathy at L4-5 with small anterior synovial cyst impinging upon the spinal canal, as above.    Clinical Impression:  This is a pleasant 80 y.o. male patient with PMH/PSH of compression fracture of L1 status post kyphoplasty, compression fracture of thoracic vertebrae, restless  legs syndrome, hypertension, chronic diastolic heart failure, left bundle branch block, polymyalgia rheumatica, HLAb27, TAVR, long-term anticoagulation, coronary artery disease status post LAD and RCA stent, presenting  originally with right knee pain mainly in the posteromedial aspect associated with tenderness  and radiation to the right calf above the ankle, worse at night, he is status post right peripheral nerve stimulator over right sciatic and femoral nerve  Patient developed redness and swelling of the entry site of the right  sciatic peripheral nerve stimulator associated with right knee swelling, tenderness over the right calf muscle, ultrasound with neck for DVT,  blood culture and lactic acid were normal   Currently his pain is mild, under control with the compound cream and gabapentin.    Encounter Diagnosis:  Ghanshyam Sanford Jr. is a 80 y.o. male with the following diagnoses based on history, exam, and imagin. Neuropathy of right lower extremity    2. Chronic pain syndrome    3. S/P placement of nerve stimulator    Treatment Plan:    Diagnostics/Referrals: NA    Medications:   Continue with gabapentin as prescribed   Continue with compound cream, may call for refill    Interventional Therapy:   patient is not motivated to proceed with peripheral nerve stimulator revision   Follow up as needed      Angel Black MD  Anesthesiologist  Interventional Pain Management  2025

## 2025-07-14 ENCOUNTER — OFFICE VISIT (OUTPATIENT)
Dept: SPORTS MEDICINE | Facility: CLINIC | Age: 80
End: 2025-07-14
Payer: MEDICARE

## 2025-07-14 ENCOUNTER — HOSPITAL ENCOUNTER (OUTPATIENT)
Dept: RADIOLOGY | Facility: HOSPITAL | Age: 80
Discharge: HOME | End: 2025-07-14
Attending: ORTHOPAEDIC SURGERY
Payer: MEDICARE

## 2025-07-14 VITALS
HEART RATE: 80 BPM | BODY MASS INDEX: 25.42 KG/M2 | SYSTOLIC BLOOD PRESSURE: 150 MMHG | DIASTOLIC BLOOD PRESSURE: 67 MMHG | WEIGHT: 177.13 LBS

## 2025-07-14 DIAGNOSIS — M25.561 RIGHT KNEE PAIN, UNSPECIFIED CHRONICITY: ICD-10-CM

## 2025-07-14 DIAGNOSIS — M54.50 LOW BACK PAIN, UNSPECIFIED BACK PAIN LATERALITY, UNSPECIFIED CHRONICITY, UNSPECIFIED WHETHER SCIATICA PRESENT: Primary | ICD-10-CM

## 2025-07-14 DIAGNOSIS — M25.561 RIGHT KNEE PAIN, UNSPECIFIED CHRONICITY: Primary | ICD-10-CM

## 2025-07-14 PROCEDURE — 73564 X-RAY EXAM KNEE 4 OR MORE: CPT | Mod: TC,50

## 2025-07-14 PROCEDURE — 99999 PR PBB SHADOW E&M-EST. PATIENT-LVL III: CPT | Mod: PBBFAC,,, | Performed by: ORTHOPAEDIC SURGERY

## 2025-07-14 PROCEDURE — 99214 OFFICE O/P EST MOD 30 MIN: CPT | Mod: S$PBB,,, | Performed by: ORTHOPAEDIC SURGERY

## 2025-07-14 PROCEDURE — 99213 OFFICE O/P EST LOW 20 MIN: CPT | Mod: PBBFAC,25 | Performed by: ORTHOPAEDIC SURGERY

## 2025-07-14 PROCEDURE — 73564 X-RAY EXAM KNEE 4 OR MORE: CPT | Mod: 26,50,, | Performed by: RADIOLOGY

## 2025-07-14 NOTE — PROGRESS NOTES
CC: right leg radicular     History of present illness:   Ghanshyam Sanford Jr., presents today for follow up appointment of his right knee. He had nerve stimulator placed by Dr. Black 05/09/25, reports mild improvement (pain down to 2-3/10 from 6-7/10) but developed an infection. Sciatic & femoral peripheral lead wires were removed in office 05/16/25. He continues to have a pulling sensation posteriorly & medial joint line pain. Current pain level is 6/10.     DATE OF PROCEDURE: 05/09/2025  PHYSICIAN: Angel Black MD  PROCEDURE: Implantation of Peripheral Nerve Stimulator Lead with External Peripheral Nerve Stimulator  - External Pulse Generator (SPRINT)     Prior Hx 2/19/2025:   Pt is here today for right knee pain. We have reviewed his findings and discussed plan of care and future treatment options.       Since his right knee surgery he has continued to have posterior knee pain. At his last visit he was referred to Dr. Gallegos in vascular surgery for evaluation of right leg symptoms, including hair loss and night time cramping, in this patient with a hx of PAD. He was seen by Dr. Gallegos as well as neurosurgery, Dr. Deshpande: EMG was positive for right L4 and L5 radiculopathies. He did get relief following L4-5 TF NELSON.  The patient continues to be uninterested in surgery at this time given his pain relief. He was also see by Dr. Black in pain management          Describes his knee pain as medial and posterior     Did physical therapy for his back about 2 months ago with HEP  He had dry needling to the knee area with some relief that is temporary     Pain has always been there and worsened over the past 2-3 weeks with no new injury or trauma     His pain has increased recently, he has been taking gabapantin, Tramadol at night   This is not helping his pain     S/p  DATE OF PROCEDURE: 09/06/2024  PROCEDURE: Right L4/5 Lumbar Facet Joint Injection under Fluoroscopy    PHYSICIAN: Angel Black MD  Helped back  pain x3 weeks 50% but no better to back of knee area    Had  and soft tissue work with Dr. Peters no help  Had PT dry needling no help    Per Dr. Black note:  May consider: Right L4 and L5 TFESI, lumbar medial branch block at right L4-L5 and L5-S1       DATE OF PROCEDURE: 06/13/2023  SURGEON:  Tari Gregory M.D  PROCEDURE PERFORMED:   right  1. knee arthroscopic chondroplasty (CPT 05342)  2. knee arthroscopic medial and lateral (CPT 73988) meniscectomy   3. knee arthroscopic partial synovectomy/debridement (CPT 96604).   4. knee arthroscopic plica excision(CPT 43402).    5. Knee arthroscopic lysis of adhesions (CPT 14479)    In the patellofemoral compartment, there was chondral damage to:  Patella 10 x 15 mm grade 3  Chondroplasty was performed using arthroscopic shaver.                                                                               Review of Systems   Constitution: Negative. Negative for chills, fever and night sweats.   HENT: Negative for congestion and headaches.    Eyes: Negative for blurred vision, left vision loss and right vision loss.   Cardiovascular: Negative for chest pain and syncope.   Respiratory: Negative for cough and shortness of breath.    Endocrine: Negative for polydipsia, polyphagia and polyuria.   Hematologic/Lymphatic: Negative for bleeding problem. Does not bruise/bleed easily.   Skin: Negative for dry skin, itching and rash.   Musculoskeletal: Negative for falls and muscle weakness.   Gastrointestinal: Negative for abdominal pain and bowel incontinence.   Genitourinary: Negative for bladder incontinence and nocturia.   Neurological: Negative for disturbances in coordination, loss of balance and seizures.   Psychiatric/Behavioral: Negative for depression. The patient does not have insomnia.    Allergic/Immunologic: Negative for hives and persistent infections.     PAST MEDICAL HISTORY:   Past Medical History:   Diagnosis Date    Anticoagulant long-term use     Aortic valve  regurgitation     Aortic valve stenosis, nonrheumatic     Carotid stenosis, bilateral     Chronic diastolic heart failure, NYHA class 2     Chronic total occlusion of coronary artery     Coronary artery disease     Elevated PSA     UNDER CARE    Hypertension     Hypertensive heart disease     Osteoarthritis of right knee 3/22/2017    Polymyalgia     Restless legs     SOB (shortness of breath)      PAST SURGICAL HISTORY:   Past Surgical History:   Procedure Laterality Date    ARTHROSCOPIC CHONDROPLASTY OF KNEE JOINT Right 6/13/2023    Procedure: ARTHROSCOPY, KNEE, WITH CHONDROPLASTY;  Surgeon: Tari Gregory MD;  Location: [facility] OR;  Service: Orthopedics;  Laterality: Right;    BACK SURGERY      CAROTID STENT      HERNIA REPAIR      INJECTION OF FACET JOINT Right 9/6/2024    Procedure: INJECTION, FACET JOINT (L4/5);  Surgeon: Angel Black MD;  Location: [facility] OR;  Service: Pain Management;  Laterality: Right;    INSERTION, ELECTRODE LEAD, NEUROSTIMULATOR, PERIPHERAL Right 5/9/2025    Procedure: INSERTION,ELECTRODE LEAD,NEUROSTIMULATOR,PERIPHERAL WITH EXTERNAL PERIPHERAL NERVE STIMULATOR  - EXTERNAL PULSE GENERATOR (FEMORAL AND SCIATIC) (SPRINT);  Surgeon: Angel Black MD;  Location: [facility] OR;  Service: Pain Management;  Laterality: Right;    KNEE ARTHROPLASTY Right     KNEE ARTHROSCOPY W/ MENISCECTOMY Right 6/13/2023    Procedure: ARTHROSCOPY, KNEE, WITH MEDIAL AND LATERAL MENISCECTOMY;  Surgeon: Tari Gregory MD;  Location: [facility] OR;  Service: Orthopedics;  Laterality: Right;    KNEE ARTHROSCOPY W/ PLICA EXCISION Right 6/13/2023    Procedure: EXCISION, PLICA, KNEE, ARTHROSCOPIC;  Surgeon: Tari Gregory MD;  Location: [facility] OR;  Service: Orthopedics;  Laterality: Right;    KNEE DEBRIDEMENT Right 6/13/2023    Procedure: DEBRIDEMENT, KNEE;  Surgeon: Tari Gregory MD;  Location: [facility] OR;  Service: Orthopedics;  Laterality: Right;    [id number] Right 6/13/2023    Procedure: [id number];   Surgeon: Tari Gregory MD;  Location: [facility] OR;  Service: Orthopedics;  Laterality: Right;    PERCUTANEOUS TRANSCATHETER AORTIC VALVE REPLACEMENT (TAVR) Right 12/14/2021    Procedure: REPLACEMENT, AORTIC VALVE, PERCUTANEOUS, TRANSCATHETER;  Surgeon: Johny Lockett MD;  Location: [facility] CATH;  Service: Cardiology;  Laterality: Right;  ops # 8    PERCUTANEOUS TRANSCATHETER AORTIC VALVE REPLACEMENT (TAVR) Right 12/14/2021    Procedure: REPLACEMENT, AORTIC VALVE, PERCUTANEOUS, TRANSCATHETER;  Surgeon: Sushma Piña MD;  Location: [facility] CATH;  Service: Cardiovascular;  Laterality: Right;    SYNOVECTOMY OF KNEE Right 6/13/2023    Procedure: PARTIAL SYNOVECTOMY, KNEE;  Surgeon: Tari Gregory MD;  Location: [facility] OR;  Service: Orthopedics;  Laterality: Right;    TRANSFORAMINAL EPIDURAL INJECTION OF STEROID Right 3/7/2025    Procedure: TRANSFORAMINAL EPIDURAL STEROID INJECTION (L4/5,L5/S1);  Surgeon: Angel Black MD;  Location: [facility] OR;  Service: Pain Management;  Laterality: Right;     FAMILY HISTORY:   Family History   Problem Relation Name Age of Onset    Heart disease Mother      Hypertension Mother      Stroke Mother      Diabetes Mellitus Father      Heart disease Paternal Grandmother      Stroke Paternal Grandmother       SOCIAL HISTORY:   Social History     Socioeconomic History    Marital status:    Tobacco Use    Smoking status: Never    Smokeless tobacco: Never   Substance and Sexual Activity    Alcohol use: Never    Drug use: Never     Social Drivers of Health     Financial Resource Strain: Low Risk  (2/12/2025)    Overall Financial Resource Strain (CARDIA)     Difficulty of Paying Living Expenses: Not hard at all   Food Insecurity: No Food Insecurity (2/12/2025)    Hunger Vital Sign     Worried About Running Out of Food in the Last Year: Never true     Ran Out of Food in the Last Year: Never true   Transportation Needs: No Transportation Needs (2/12/2025)    PRAPARE - Transportation     Lack of  Transportation (Medical): No     Lack of Transportation (Non-Medical): No   Physical Activity: Sufficiently Active (2/12/2025)    Exercise Vital Sign     Days of Exercise per Week: 5 days     Minutes of Exercise per Session: 60 min   Stress: No Stress Concern Present (2/12/2025)    [employer] - Occupational Stress Questionnaire     Feeling of Stress : Not at all   Housing Stability: Low Risk  (2/12/2025)    Housing Stability Vital Sign     Unable to Pay for Housing in the Last Year: No     Homeless in the Last Year: No       MEDICATIONS:   Current Outpatient Medications:     acetaminophen (TYLENOL) 500 MG tablet, Take 1,000 mg by mouth every 6 (six) hours as needed for Pain., Disp: , Rfl:     ascorbic acid, vitamin C, (VITAMIN C) 250 MG tablet, Take 250 mg by mouth once daily., Disp: , Rfl:     atorvastatin (LIPITOR) 40 MG tablet, atorvastatin 40 mg tablet, [RxNorm: 080677], Disp: , Rfl:     clopidogreL (PLAVIX) 75 mg tablet, Take 75 mg by mouth once daily., Disp: , Rfl:     magnesium 250 mg Tab, Take 250 mg by mouth once daily., Disp: , Rfl:     multivitamin (THERAGRAN) per tablet, Take 1 tablet by mouth once daily., Disp: , Rfl:     mv-mn/iron/folic acid/herb 190 (VITAMIN D3 COMPLETE ORAL), Take 5,000 Units by mouth Daily., Disp: , Rfl:     NIFEdipine (PROCARDIA-XL) 30 MG (OSM) 24 hr tablet, Take 30 mg by mouth once daily., Disp: , Rfl:     olmesartan (BENICAR) 40 MG tablet, Take 40 mg by mouth once daily., Disp: , Rfl:     tamsulosin (FLOMAX) 0.4 mg Cap, Take 1 capsule by mouth every evening., Disp: , Rfl:     traZODone (DESYREL) 150 MG tablet, Take 150 mg by mouth every evening., Disp: , Rfl:     aspirin (ECOTRIN) 81 MG EC tablet, Take 81 mg by mouth once daily. (Patient not taking: Reported on 7/14/2025), Disp: , Rfl:     coenzyme Q10 100 mg capsule, Take 200 mg by mouth once daily. (Patient not taking: Reported on 7/14/2025), Disp: , Rfl:     gabapentin (NEURONTIN) 600 MG tablet,  Take 600 mg by mouth once daily. (Patient not taking: Reported on 7/14/2025), Disp: , Rfl:     rosuvastatin (CRESTOR) 20 MG tablet, Take 20 mg by mouth once daily. (Patient not taking: Reported on 7/14/2025), Disp: , Rfl:     zolpidem (AMBIEN) 10 mg Tab, Take 10 mg by mouth nightly as needed. (Patient not taking: Reported on 7/14/2025), Disp: , Rfl:   ALLERGIES:   Review of patient's allergies indicates:   Allergen Reactions    Atorvastatin calcium Other (See Comments)     Leg cramps    Cortisone      Inj- pt reports hiccups and feels spasm     Duloxetine Other (See Comments)     DRY MONTH  NOT ABLE TO SLEEP  NOT HUNDRY  SWEATING A LOT  FEELING TIRED AND WEAK  DIZZINESS  WEIGHT LOSS ( 9 LB.)    Flomax [tamsulosin] Diarrhea    Pravastatin Other (See Comments)     Leg cramps       VITAL SIGNS: BP (!) 150/67 (BP Location: Right arm)   Pulse 80   Wt 80.3 kg (177 lb 2.2 oz)   BMI 25.42 kg/m²        PHYSICAL EXAMINATION:     Incision sites healed well  No evidence of any erythema, infection or induration  Range of motion 0-135 degrees  No effusion  2+ DP pulse  No swelling, no calf tenderness  - Haresh's sign  Negative medial joint line tenderness  Mild quad atrophy  TTP along medial joint line    + tenderness distal hamstring tendons and pes bursa   + hamstring tightness (significant)  + tenderness medial and lateral gastrocnemius heads    Hamstring pain with forced knee extension   Calf pain with forced ankle dorsiflexion     Other Findings: He has lost most of the hair on his lower legs over the years                                                                               ASSESSMENT:                                                                                                                                               1. Status post above, radicular pain                                                                                                                      PLAN:                                                                                                                                                   1. Recommend proceeding  with Sofia   Per Dr. Black note:  May consider: Right L4 and L5 TFESI, lumbar medial branch block at right L4-L5 and L5-S1       Also Spine referral      I made the decision to obtain old records of the patient including previous notes and imaging. New imaging was ordered today of the extremity or extremities evaluated. I independently reviewed and interpreted the radiographs and/or MRIs today as well as prior imaging.

## 2025-07-15 ENCOUNTER — TELEPHONE (OUTPATIENT)
Dept: ORTHOPEDICS | Facility: CLINIC | Age: 80
End: 2025-07-15
Payer: MEDICARE

## 2025-07-21 ENCOUNTER — HOSPITAL ENCOUNTER (OUTPATIENT)
Dept: RADIOLOGY | Facility: HOSPITAL | Age: 80
Discharge: HOME | End: 2025-07-21
Attending: PHYSICIAN ASSISTANT
Payer: MEDICARE

## 2025-07-21 DIAGNOSIS — M54.50 LOW BACK PAIN, UNSPECIFIED BACK PAIN LATERALITY, UNSPECIFIED CHRONICITY, UNSPECIFIED WHETHER SCIATICA PRESENT: ICD-10-CM

## 2025-07-21 PROCEDURE — 72110 X-RAY EXAM L-2 SPINE 4/>VWS: CPT | Mod: TC

## 2025-07-21 PROCEDURE — 72110 X-RAY EXAM L-2 SPINE 4/>VWS: CPT | Mod: 26,,, | Performed by: RADIOLOGY

## 2025-07-30 ENCOUNTER — OFFICE VISIT (OUTPATIENT)
Dept: ORTHOPEDICS | Facility: CLINIC | Age: 80
End: 2025-07-30
Payer: MEDICARE

## 2025-07-30 VITALS — HEIGHT: 70 IN | WEIGHT: 182.75 LBS | BODY MASS INDEX: 26.16 KG/M2

## 2025-07-30 DIAGNOSIS — M43.10 SPONDYLOLISTHESIS, UNSPECIFIED SPINAL REGION: ICD-10-CM

## 2025-07-30 DIAGNOSIS — M54.16 LUMBAR RADICULOPATHY: Primary | ICD-10-CM

## 2025-07-30 DIAGNOSIS — M54.16 LUMBAR RADICULOPATHY, CHRONIC: ICD-10-CM

## 2025-07-30 PROCEDURE — 99999 PR PBB SHADOW E&M-EST. PATIENT-LVL III: CPT | Mod: PBBFAC,,, | Performed by: PHYSICIAN ASSISTANT

## 2025-07-30 PROCEDURE — 99213 OFFICE O/P EST LOW 20 MIN: CPT | Mod: PBBFAC | Performed by: PHYSICIAN ASSISTANT

## 2025-07-30 PROCEDURE — 99214 OFFICE O/P EST MOD 30 MIN: CPT | Mod: S$PBB,,, | Performed by: PHYSICIAN ASSISTANT

## 2025-07-30 NOTE — PROGRESS NOTES
DATE: 7/30/2025  PATIENT: Ghanshyam Sanford Jr.    Supervising Physician: Donald Bolaños M.D.    CHIEF COMPLAINT: back and right leg pain    HISTORY:  Ghanshyam Sanford Jr. is a 80 y.o. male with PMH of kyphoplasty 8-10 years ago here for initial evaluation of low back and right posterior leg pain (Back - 6, Leg - 6).  The pain in the leg is what bothers him most.  The pain has been present for 5-6 years. The patient describes the pain as pulling.  The pain is worse with driving, straightening his leg and lying down and improved by nothing in particular. There is no associated numbness and tingling. There is no subjective weakness. Prior treatments have included gabapentin, physical therapy and ESIs, but no surgery.    The patient denies myelopathic symptoms such as handwriting changes or difficulty with buttons/coins/keys. Denies perineal paresthesias, bowel/bladder dysfunction.    PAST MEDICAL/SURGICAL HISTORY:  Past Medical History:   Diagnosis Date    Anticoagulant long-term use     Aortic valve regurgitation     Aortic valve stenosis, nonrheumatic     Carotid stenosis, bilateral     Chronic diastolic heart failure, NYHA class 2     Chronic total occlusion of coronary artery     Coronary artery disease     Elevated PSA     UNDER CARE    Hypertension     Hypertensive heart disease     Osteoarthritis of right knee 3/22/2017    Polymyalgia     Restless legs     SOB (shortness of breath)      Past Surgical History:   Procedure Laterality Date    ARTHROSCOPIC CHONDROPLASTY OF KNEE JOINT Right 6/13/2023    Procedure: ARTHROSCOPY, KNEE, WITH CHONDROPLASTY;  Surgeon: Tari Gregory MD;  Location: [facility] OR;  Service: Orthopedics;  Laterality: Right;    BACK SURGERY      CAROTID STENT      HERNIA REPAIR      INJECTION OF FACET JOINT Right 9/6/2024    Procedure: INJECTION, FACET JOINT (L4/5);  Surgeon: Angel Black MD;  Location: [facility] OR;  Service: Pain Management;  Laterality: Right;    INSERTION, ELECTRODE LEAD,  NEUROSTIMULATOR, PERIPHERAL Right 5/9/2025    Procedure: INSERTION,ELECTRODE LEAD,NEUROSTIMULATOR,PERIPHERAL WITH EXTERNAL PERIPHERAL NERVE STIMULATOR  - EXTERNAL PULSE GENERATOR (FEMORAL AND SCIATIC) (SPRINT);  Surgeon: Angel Black MD;  Location: [facility];  Service: Pain Management;  Laterality: Right;    KNEE ARTHROPLASTY Right     KNEE ARTHROSCOPY W/ MENISCECTOMY Right 6/13/2023    Procedure: ARTHROSCOPY, KNEE, WITH MEDIAL AND LATERAL MENISCECTOMY;  Surgeon: Tari Gregory MD;  Location: [facility] OR;  Service: Orthopedics;  Laterality: Right;    KNEE ARTHROSCOPY W/ PLICA EXCISION Right 6/13/2023    Procedure: EXCISION, PLICA, KNEE, ARTHROSCOPIC;  Surgeon: Tari Gregory MD;  Location: [facility] OR;  Service: Orthopedics;  Laterality: Right;    KNEE DEBRIDEMENT Right 6/13/2023    Procedure: DEBRIDEMENT, KNEE;  Surgeon: Tari Gregory MD;  Location: [facility] OR;  Service: Orthopedics;  Laterality: Right;    [id number] Right 6/13/2023    Procedure: [id number];  Surgeon: Tari Gregory MD;  Location: [facility] OR;  Service: Orthopedics;  Laterality: Right;    PERCUTANEOUS TRANSCATHETER AORTIC VALVE REPLACEMENT (TAVR) Right 12/14/2021    Procedure: REPLACEMENT, AORTIC VALVE, PERCUTANEOUS, TRANSCATHETER;  Surgeon: Johny Lockett MD;  Location: [facility] CATH;  Service: Cardiology;  Laterality: Right;  ops # 8    PERCUTANEOUS TRANSCATHETER AORTIC VALVE REPLACEMENT (TAVR) Right 12/14/2021    Procedure: REPLACEMENT, AORTIC VALVE, PERCUTANEOUS, TRANSCATHETER;  Surgeon: Sushma Piña MD;  Location: [facility] CATH;  Service: Cardiovascular;  Laterality: Right;    SYNOVECTOMY OF KNEE Right 6/13/2023    Procedure: PARTIAL SYNOVECTOMY, KNEE;  Surgeon: Tari Gregory MD;  Location: [facility] OR;  Service: Orthopedics;  Laterality: Right;    TRANSFORAMINAL EPIDURAL INJECTION OF STEROID Right 3/7/2025    Procedure: TRANSFORAMINAL EPIDURAL STEROID INJECTION (L4/5,L5/S1);  Surgeon: Angel Black MD;  Location: [facility];   "Service: Pain Management;  Laterality: Right;       Medications:   Medications Ordered Prior to Encounter[1]    Social History: Social History[2]    REVIEW OF SYSTEMS:  Constitution: Negative. Negative for chills, fever and night sweats.   Cardiovascular: Negative for chest pain and syncope.   Respiratory: Negative for cough and shortness of breath.   Gastrointestinal: See HPI. Negative for nausea/vomiting. Negative for abdominal pain.  Genitourinary: See HPI. Negative for discoloration or dysuria.  Skin: Negative for dry skin, itching and rash.   Hematologic/Lymphatic: Negative for bleeding problem. Does not bruise/bleed easily.   Musculoskeletal: Negative for falls and muscle weakness.   Neurological: See HPI. No seizures.   Endocrine: Negative for polydipsia, polyphagia and polyuria.   Allergic/Immunologic: Negative for hives and persistent infections.     EXAM:  Ht 5' 10" (1.778 m)   Wt 82.9 kg (182 lb 12.2 oz)   BMI 26.22 kg/m²     General: The patient is a very pleasant 80 y.o. male in no apparent distress, the patient is oriented to person, place and time.  Psych: Normal mood and affect  HEENT: Vision grossly intact, hearing intact to the spoken word.  Lungs: Respirations unlabored.  Gait: Normal station and gait, no difficulty with toe or heel walk.   Skin: Dorsal lumbar skin negative for rashes, lesions, hairy patches and surgical scars. There is mild lumbar tenderness to palpation.  Range of motion: Lumbar range of motion is acceptable.  Spinal Balance: Global saggital and coronal spinal balance acceptable, not significant for scoliosis and kyphosis.  Musculoskeletal: No pain with the range of motion of the bilateral hips. No trochanteric tenderness to palpation.  Vascular: Bilateral lower extremities warm and well perfused, dorsalis pedis pulses 2+ bilaterally.  Neurological: Normal strength and tone in all major motor groups in the bilateral lower extremities. Normal sensation to light touch in the " "L2-S1 dermatomes bilaterally.  Deep tendon reflexes symmetric 2+ in the bilateral lower extremities.  Negative Babinski bilaterally. Straight leg raise positive on the right, negative on the left.    IMAGING:      Today I personally reviewed AP, Lat and Flex/Ex  upright L-spine films that demonstrate kyphoplasty at T11 and L1. Slight L4/5 anterolisthesis. Multilevel degenerative changes.       MRI lumbar spine from 2024 demonstrates a right sided synovial cyst at L4/5.      Body mass index is 26.22 kg/m².    No results found for: "HGBA1C"        ASSESSMENT/PLAN:    Ghanshyam was seen today for pain and pain.    Diagnoses and all orders for this visit:    Lumbar radiculopathy    Spondylolisthesis, unspecified spinal region    Lumbar radiculopathy, chronic  -     MRI Lumbar Spine Without Contrast; Future        Today we discussed at length all of the different treatment options including anti-inflammatories, acetaminophen, rest, ice, heat, physical therapy including strengthening and stretching exercises, home exercises, ROM, aerobic conditioning, aqua therapy, other modalities including ultrasound, massage, and dry needling, epidural steroid injections and finally surgical intervention.      I would like to get a new MRI. Virtual for results.          [1]   Current Outpatient Medications on File Prior to Visit   Medication Sig Dispense Refill    acetaminophen (TYLENOL) 500 MG tablet Take 1,000 mg by mouth every 6 (six) hours as needed for Pain.      ascorbic acid, vitamin C, (VITAMIN C) 250 MG tablet Take 250 mg by mouth once daily.      atorvastatin (LIPITOR) 40 MG tablet atorvastatin 40 mg tablet, [RxNorm: 051841]      clopidogreL (PLAVIX) 75 mg tablet Take 75 mg by mouth once daily.      magnesium 250 mg Tab Take 250 mg by mouth once daily.      multivitamin (THERAGRAN) per tablet Take 1 tablet by mouth once daily.      mv-mn/iron/folic acid/herb 190 (VITAMIN D3 COMPLETE ORAL) Take 5,000 Units by mouth Daily.      " NIFEdipine (PROCARDIA-XL) 30 MG (OSM) 24 hr tablet Take 30 mg by mouth once daily.      olmesartan (BENICAR) 40 MG tablet Take 40 mg by mouth once daily.      tamsulosin (FLOMAX) 0.4 mg Cap Take 1 capsule by mouth every evening.      traZODone (DESYREL) 150 MG tablet Take 150 mg by mouth every evening.      aspirin (ECOTRIN) 81 MG EC tablet Take 81 mg by mouth once daily. (Patient not taking: Reported on 7/30/2025)      coenzyme Q10 100 mg capsule Take 200 mg by mouth once daily. (Patient not taking: Reported on 7/30/2025)      gabapentin (NEURONTIN) 600 MG tablet Take 600 mg by mouth once daily. (Patient not taking: Reported on 7/30/2025)      rosuvastatin (CRESTOR) 20 MG tablet Take 20 mg by mouth once daily. (Patient not taking: Reported on 7/30/2025)      zolpidem (AMBIEN) 10 mg Tab Take 10 mg by mouth nightly as needed. (Patient not taking: Reported on 5/16/2025)       No current facility-administered medications on file prior to visit.   [2]   Social History  Socioeconomic History    Marital status:    Tobacco Use    Smoking status: Never    Smokeless tobacco: Never   Substance and Sexual Activity    Alcohol use: Never    Drug use: Never     Social Drivers of Health     Financial Resource Strain: Low Risk  (2/12/2025)    Overall Financial Resource Strain (CARDIA)     Difficulty of Paying Living Expenses: Not hard at all   Food Insecurity: No Food Insecurity (2/12/2025)    Hunger Vital Sign     Worried About Running Out of Food in the Last Year: Never true     Ran Out of Food in the Last Year: Never true   Transportation Needs: No Transportation Needs (2/12/2025)    PRAPARE - Transportation     Lack of Transportation (Medical): No     Lack of Transportation (Non-Medical): No   Physical Activity: Sufficiently Active (2/12/2025)    Exercise Vital Sign     Days of Exercise per Week: 5 days     Minutes of Exercise per Session: 60 min   Stress: No Stress Concern Present (2/12/2025)    [facility]  [employer] - Occupational Stress Questionnaire     Feeling of Stress : Not at all   Housing Stability: Low Risk  (2/12/2025)    Housing Stability Vital Sign     Unable to Pay for Housing in the Last Year: No     Homeless in the Last Year: No

## 2025-08-04 ENCOUNTER — HOSPITAL ENCOUNTER (OUTPATIENT)
Dept: RADIOLOGY | Facility: HOSPITAL | Age: 80
Discharge: HOME | End: 2025-08-04
Attending: PHYSICIAN ASSISTANT
Payer: MEDICARE

## 2025-08-04 ENCOUNTER — OFFICE VISIT (OUTPATIENT)
Dept: ORTHOPEDICS | Facility: CLINIC | Age: 80
End: 2025-08-04
Payer: MEDICARE

## 2025-08-04 ENCOUNTER — TELEPHONE (OUTPATIENT)
Dept: PAIN MEDICINE | Facility: CLINIC | Age: 80
End: 2025-08-04
Payer: MEDICARE

## 2025-08-04 DIAGNOSIS — M54.16 LUMBAR RADICULOPATHY: ICD-10-CM

## 2025-08-04 DIAGNOSIS — M43.10 SPONDYLOLISTHESIS, UNSPECIFIED SPINAL REGION: Primary | ICD-10-CM

## 2025-08-04 DIAGNOSIS — M54.16 LUMBAR RADICULOPATHY, CHRONIC: ICD-10-CM

## 2025-08-04 PROCEDURE — 98004 SYNCH AUDIO-VIDEO EST SF 10: CPT | Mod: 95,,, | Performed by: PHYSICIAN ASSISTANT

## 2025-08-04 PROCEDURE — 72148 MRI LUMBAR SPINE W/O DYE: CPT | Mod: 26,,, | Performed by: RADIOLOGY

## 2025-08-04 PROCEDURE — 72148 MRI LUMBAR SPINE W/O DYE: CPT | Mod: TC

## 2025-08-04 NOTE — PROGRESS NOTES
The patient location is: [place]  The chief complaint leading to consultation is: MRi results    Visit type: audiovisual    Face to Face time with patient: 5 minutes  10 minutes of total time spent on the encounter, which includes face to face time and non-face to face time preparing to see the patient (eg, review of tests), Obtaining and/or reviewing separately obtained history, Documenting clinical information in the electronic or other health record, Independently interpreting results (not separately reported) and communicating results to the patient/family/caregiver, or Care coordination (not separately reported).         Each patient to whom he or she provides medical services by telemedicine is:  (1) informed of the relationship between the physician and patient and the respective role of any other health care provider with respect to management of the patient; and (2) notified that he or she may decline to receive medical services by telemedicine and may withdraw from such care at any time.    Notes:   The patient presents for MRI results.    MRI lumbar spine was personally reviewed by me and demonstrates mild right foraminal narrowing at L3/4 and L4/5.     A/P:  Today we discussed at length all of the different treatment options including anti-inflammatories, acetaminophen, rest, ice, heat, physical therapy including strengthening and stretching exercises, home exercises, ROM, aerobic conditioning, aqua therapy, other modalities including ultrasound, massage, and dry needling, epidural steroid injections and finally surgical intervention.      Plan for TFESI. Follow up 2 weeks after injection to re-evaluate and possibly talk about surgery if no relief.

## 2025-08-04 NOTE — TELEPHONE ENCOUNTER
----- Message from Angel Black MD sent at 2025  2:29 PM CDT -----  Regarding: FW: Order for ELISABETH MCCOLLUM JR.   Office visit  ----- Message -----  From: Leandra Allred LPN  Sent: 2025   2:28 PM CDT  To: Angel Black MD  Subject: FW: Order for ELISABETH MCCOLLUM JR.                  ----- Message -----  From: Betsy Clay PA-C  Sent: 2025  10:27 AM CDT  To: [facility] Management Schedulers  Subject: Order for ELISABETH MCCOLLUM JR.                      Patient Name: ELISABETH MCCOLLUM JR.([id number])  Sex: Male  : 1945      PCP: KENN JACKSON    Center: [facility]     Types of orders made on 2025: Procedure Request    Order Date:2025  Ordering User:BETSY CLAY [[id number]]  Encounter Provider:Betsy Clay PA-C [[id number]]  Authorizing Provider: Betsy Clay PA-C [[id number]]  Supervising Provider:DOMO DAVIDSON [[id number]]  Type of Supervision:Supervision Required  Department:[facility][[id number]]    Common Order Information  Procedure -> Transforaminal Injection (Specify level and laterality) Cmt: right             L3/4 and L4/5    Order Specific Information  Order: Procedure Request Order for Pain Management [Custom: [id number]]  Order #:          [id number]: 1 FUTURE    Priority: STAT  Class: Clinic Performed    Future Order Information      Expires on:2026            Expected by:2025                   Comment:Patient requesting sedation please    Associated Diagnoses      M43.10 Spondylolisthesis, unspecified spinal region      M54.16 Lumbar radiculopathy      Facility Name: -> [place]         Follow-up: -> 2 weeks           Priority: STAT  Class: Clinic Performed    Future Order Information      Expires on:2026            Expected by:2025                   Comment:Patient requesting sedation please    Associated Diagnoses      M43.10 Spondylolisthesis, unspecified spinal region      M54.16 Lumbar radiculopathy       Procedure -> Transforaminal Injection (Specify level and laterality) Cmt:                 right L3/4 and L4/5        Facility Name: -> [facility]         Follow-up: -> 2 weeks

## 2025-08-07 ENCOUNTER — OFFICE VISIT (OUTPATIENT)
Dept: PAIN MEDICINE | Facility: CLINIC | Age: 80
End: 2025-08-07
Payer: MEDICARE

## 2025-08-07 ENCOUNTER — TELEPHONE (OUTPATIENT)
Dept: PAIN MEDICINE | Facility: CLINIC | Age: 80
End: 2025-08-07

## 2025-08-07 VITALS
DIASTOLIC BLOOD PRESSURE: 60 MMHG | OXYGEN SATURATION: 95 % | WEIGHT: 183 LBS | BODY MASS INDEX: 26.2 KG/M2 | SYSTOLIC BLOOD PRESSURE: 148 MMHG | HEART RATE: 71 BPM | HEIGHT: 70 IN

## 2025-08-07 DIAGNOSIS — G57.91 NEUROPATHY OF RIGHT LOWER EXTREMITY: Primary | ICD-10-CM

## 2025-08-07 DIAGNOSIS — G89.29 CHRONIC PAIN OF RIGHT KNEE: ICD-10-CM

## 2025-08-07 DIAGNOSIS — M48.061 LUMBAR FORAMINAL STENOSIS: ICD-10-CM

## 2025-08-07 DIAGNOSIS — M54.16 LUMBAR RADICULITIS: ICD-10-CM

## 2025-08-07 DIAGNOSIS — M25.561 CHRONIC PAIN OF RIGHT KNEE: ICD-10-CM

## 2025-08-07 DIAGNOSIS — G89.4 CHRONIC PAIN SYNDROME: ICD-10-CM

## 2025-08-07 DIAGNOSIS — M54.16 LUMBAR RADICULOPATHY: Primary | ICD-10-CM

## 2025-08-07 PROCEDURE — 99214 OFFICE O/P EST MOD 30 MIN: CPT | Mod: PBBFAC | Performed by: ANESTHESIOLOGY

## 2025-08-07 PROCEDURE — 99214 OFFICE O/P EST MOD 30 MIN: CPT | Mod: S$PBB | Performed by: ANESTHESIOLOGY

## 2025-08-07 PROCEDURE — 99999 PR PBB SHADOW E&M-EST. PATIENT-LVL IV: CPT | Mod: PBBFAC,,, | Performed by: ANESTHESIOLOGY

## 2025-08-07 PROCEDURE — 99999 PR STA SHADOW: CPT | Mod: PBBFAC,,,

## 2025-08-07 NOTE — TELEPHONE ENCOUNTER
----- Message from Angel Black MD sent at 8/7/2025 10:01 AM CDT -----  Interventional Therapy:  Please schedule for Right Transforaminal epidural steroid injection at L3 and L4, Iv sedation, Plavix clearance  .

## 2025-08-07 NOTE — PROGRESS NOTES
EST Patient Evaluation  [facility] interventional pain management    Ghanshyam Sanford Jr.  : 1945  Date: 2025     CHIEF COMPLAINT:  Low-back Pain, Neuropathy of right lower extremity, and Discuss options     Referring Physician: No ref. provider found  Primary Care Physician: Kevin Lombardi MD    HPI:  This is a 80 y.o. male with a chief complaint of Low-back Pain, Neuropathy of right lower extremity, and Discuss options   . The patient has Past medical history/Past surgical history of  compression fracture of L1 status post kyphoplasty, compression fracture of thoracic vertebrae, restless  legs syndrome, hypertension, chronic diastolic heart failure, left bundle branch block, polymyalgia rheumatica, HLAb27, TAVR, long-term anticoagulation    Patient was evaluated and referred by Dr Isael Garner.  He was referred from Neurosurgery team based on cardiology referral for right leg pain  Interval History 2024:  Ghanshyam Marteldemar  is here for follow up visit  to establish care with me. Ghanshyam GUTIERREZ Claribel  Had  right L4-L5 lumbar facet joint injection,  patient reported 50 % improvement in pain and functionality for 2 weeks.  Current pain score: 7/10    Interval History 2024:  Ghanshyam Sanford Jr. is here for follow up visit after 7 physical therapy sessions which provided him with a relief for his in the right knee pain.  Today he presents with the acute onset left shoulder pain from excessive throwing   while fishing.  Current pain score: 3/10, at night 5/10    Interval History 2025:  Ghanshyam GUTIERREZ Claribel  is here for follow up visit to discuss interventional procedure  in regards to his going right lower extremity pain/neuropathy  Current pain score: 4/10    Interval History 2025:  Ghanshyam MATT Sanford Norm is here for procedure follow up visit after right L4 and L5 transforaminal epidural steroid injection,  patient reported 70 % improvement in pain for and functionality for 1 day  for his back pain but not for his right knee pain& below-the-knee pain.  Current pain score: 5/10    Interval History 05/16/2025:  Ghanshyam Sanford Jr. is here for  follow up visit after placing right sciatic and femoral peripheral nerve stimulator  Patient complains of drainage and swelling in the RT knee. He stated yesterday he noticed swelling and light pinkish,yellow fluids on bandage oozing out the RT pad. He had to change it 3 times from the drainage. It is slightly warm and sensitive to touch. He is having trouble walking on RT leg.   Current pain score: 1/10 resting, 6/1 walking    Interval History 05/22/2025:  Ghanshyam Sanford Jr. is here for  follow up visit after removing the peripheral nerve stimulator, he was started on antibiotic therapy  Current pain score: 5-6/10    Interval History 06/30/2025:  Ghanshyam Sanford Jr. is here for  follow up visit, he was switched from gabapentin to Lyrica 100 mg 3 times a day,  patient  start Lyrica due to concern of side effects  Current pain score: 4/10    Interval History 08/07/2025:  Ghanshyam Sanford Jr. is here for follow up visit   Was seen by orthopedic provider who recommended to have transforaminal epidural steroid injection right L3 and L4 transforaminal epidural steroid injection.    Current pain score: 7/10      Diabetic: No    Anticoagulation medications: 81 mg Aspirin  and Plavix -  last stent in 2020    Allergy To Iodine: No    Currently on Antibiotic: yes     Current Description of Pain Symptoms, similar to previous:  History of Recent Fall or Trauma: No   Onset: Chronic  Pain Location:  minimal low back pain,  posterior medial aspect of the right knee  Radiates/associated symptoms: RT LE to the calf  associated with tenderness  Pain is Getting worse over the last 3 months    The pain is described as burning and pulling.   Exacerbating factors:  stretching  Mitigating factors N/A.   Symptoms interfere with daily activity, sleeping.   The patient feels like  symptoms have been worsening.   Patient denies night fever/night sweats, urinary incontinence, bowel incontinence, significant weight loss, significant motor weakness, and loss of sensations.    Pain score:  Best: 7/10  Worst: 10/10    Current pain medications:  Gabapentin 600mg QHS stopped not helping   Current Narcotics/Opioid /benzo Medications:  Opioids- None  Benzodiazepines: No    UDS:  NA    PDMP:  Reviewed and consistent with medication use as prescribed.     Previous Chronic Pain Treatment History:  Physical Therapy/HEP/Physician Lead Exercise Program:  Over the past 12 months, Patient has done 6+ sessions for lower back and shoulder.  PT response: Mildly Helpful.   Dates of the PT sessions: 10/2024-present.  Is patient participating in home exercise program (HEP)/ physician led exercise program: Yes.Completes HEP that was provided by PT in addition to formal therapy.    Non-interventional Pain Therapy:  []Chiropractor.   []Acupuncture/Dry needle.  []TENS unit.  [x]Heat/ICE.  []Back Brace.    Medications previously tried:  NSAIDs: None  Topical Agent: Yes  TCA/SSRI/SNRI: None  Anti-convulsants: Gabapentin and lyrica   Muscle Relaxants: None  Opioids- None.    Interventional Pain Procedures:  - 202: Knee CoolRFA-2 with no relief   04/11/2024:  L4-L5 right transforaminal with IR-  80% relief for 3 weeks  09/6/ 2024:  right L4-L5 facet injection- 50% relief For 2 weeks with Dexamethasone   Kyphoplasty L1  03/07/2025: Right  L4/5 and L5/S1 Lumbar Transforaminal Epidural Steroid Injection- 70% relief for 1 day.  05/09/2025: right sciatic and femoral peripheral nerve stimulator,  explanted in May 16 due to infection    Previous spine/Relevant joint surgery:  Surgical History:   has a past surgical history that includes Hernia repair; Knee Arthroplasty (Right); Carotid stent; Back surgery; Percutaneous transcatheter aortic valve replacement (TAVR) (Right, 12/14/2021); Percutaneous transcatheter aortic valve  replacement (TAVR) (Right, 12/14/2021); Knee arthroscopy w/ meniscectomy (Right, 6/13/2023); Arthroscopic chondroplasty of knee joint (Right, 6/13/2023); Knee arthroscopy w/ plica excision (Right, 6/13/2023); Synovectomy of knee (Right, 6/13/2023); Knee debridement (Right, 6/13/2023); dxkmx-cbrngmiq-ibcplhelqiny (Right, 6/13/2023); Injection of facet joint (Right, 9/6/2024); Transforaminal epidural injection of steroid (Right, 3/7/2025); and insertion, electrode lead, neurostimulator, peripheral (Right, 5/9/2025).  Medical History:   has a past medical history of Anticoagulant long-term use, Aortic valve regurgitation, Aortic valve stenosis, nonrheumatic, Carotid stenosis, bilateral, Chronic diastolic heart failure, NYHA class 2, Chronic total occlusion of coronary artery, Coronary artery disease, Elevated PSA, Hypertension, Hypertensive heart disease, Osteoarthritis of right knee (3/22/2017), Polymyalgia, Restless legs, and SOB (shortness of breath).  Family History:  family history includes Diabetes Mellitus in his father; Heart disease in his mother and paternal grandmother; Hypertension in his mother; Stroke in his mother and paternal grandmother.  Allergies:  Atorvastatin calcium, Cortisone, Duloxetine, Flomax [tamsulosin], and Pravastatin   Social History/SUBSTANCE ABUSE HISTORY:  Personal history of substance abuse: No   reports that he has never smoked. He has never used smokeless tobacco. He reports that he does not drink alcohol and does not use drugs.  LABS:  CBC  Lab Results   Component Value Date    WBC 9.96 05/16/2025    HGB 14.0 05/16/2025    HCT 42.5 05/16/2025     Coagulation Profile   Lab Results   Component Value Date     05/16/2025       Lab Results   Component Value Date    INR 1.0 12/13/2021     CMP:  BMP  Lab Results   Component Value Date     05/16/2025    K 4.4 05/16/2025     05/16/2025    CO2 24 05/16/2025    BUN 22 05/16/2025    CREATININE 1.0 05/16/2025    CALCIUM 9.1  "05/16/2025    ANIONGAP 8 05/16/2025    EGFRNORACEVR >60 05/16/2025     Lab Results   Component Value Date    ALT 21 05/16/2025    AST 16 05/16/2025    ALKPHOS 77 05/16/2025    BILITOT 0.8 05/16/2025     HGBA1C:  No results found for: "LABA1C", "HGBA1C"    ROS:    Review of Systems   Musculoskeletal:  Positive for joint swelling.   Skin:  Positive for rash and wound.     GENERAL:  No weight loss, malaise or fevers.  HEENT:   No recent changes in vision or hearing  NECK:  Negative for lumps, no difficulty with swallowing.  RESPIRATORY:  Negative for cough, wheezing or shortness of breath, patient denies any recent URI.  CARDIOVASCULAR:  Negative for chest pain or palpitations.  GI:  Negative for abdominal discomfort, blood in stools or black stools or change in bowel habits.  MUSCULOSKELETAL:  See HPI.  SKIN:  Negative for lesions, rash, and itching.  PSYCH:  No mood disorder or recent psychosocial stressors.   HEMATOLOGY/LYMPHOLOGY:  See the blood thinner sectioned in HPI.  NEURO:  See HPI  All other reviewed and negative other than HPI.    PHYSICAL EXAM:  VITALS: BP (!) 148/60 (BP Location: Left arm, Patient Position: Sitting)   Pulse 71   Ht 5' 10" (1.778 m)   Wt 83 kg (183 lb)   SpO2 95%   BMI 26.26 kg/m²   Body mass index is 26.26 kg/m².  GENERAL: Well appearing, in no acute distress, alert and oriented x3, answers questions appropriately.   PSYCH: Flat affect.  SKIN: Skin color, texture, turgor normal, no rashes or lesions.  HEAD/FACE:  Normocephalic, atraumatic. Cranial nerves grossly intact.  CV: Regular rate  PULM: No evidence of respiratory difficulty, symmetric chest rise.  GI:  Soft and non-Distended.    DIAGNOSTIC STUDIES AND MEDICAL RECORDS REVIEW:  I have personally reviewed and interpreted relevant radiology reports and reviewed relevant records from other services in the EMR.     -EMG/nerve conduction study showed right L4 and L5 radiculopathy    -MRI lumbar spine 02/2024:   T12-L1: No spinal " canal stenosis or neural foraminal narrowing.     L1-L2: No spinal canal stenosis or neural foraminal narrowing.     L2-L3: No spinal canal stenosis or neural foraminal narrowing.     L3-L4: There is asymmetric disc bulging to the left with mild facet arthropathy, contributing to mild spinal canal stenosis and moderate left-sided neural foraminal narrowing     L4-L5: Mild disc bulging and prominent facet joint arthropathy on the right side, noting joint hypertrophy with extensive subchondral marrow edema and surrounding inflammatory change (series 6, images 6-7).  There is a small right-sided synovial cyst measuring 10 mm (series 9, image 36) abutting and possibly impinging upon the descending right L5 nerve root.  There is overall mild spinal canal stenosis and moderate right and mild left-sided neural foraminal narrowing.  Slight anterolisthesis noted.     L5-S1: Mild disc bulge with posterior annular fissure.  There is moderate disc height loss on the left side.  These findings contribute to mild/moderate left-sided neural foraminal narrowing.  No spinal canal stenosis .     Impression:     Remote L1 compression fracture post vertebral plasty, stable.  No acute fractures.     Lumbar spondylosis, contributing to mild spinal canal stenosis L3-4 and L4-5 and moderate neural foraminal narrowing L3-4 through L5-S1, as above.     Prominent right-sided facet joint arthropathy at L4-5 with small anterior synovial cyst impinging upon the spinal canal, as above.       MRI lumbar spine 08/2025  Marrow signal is appropriate.  There are compression fractures of T11 and L1 status post vertebroplasty.  There is no evidence for recent fracture.  The conus terminates at L1.  There is slight anterolisthesis of L4-5 and L5-S1.  Multilevel facet arthropathy ranges from mild to moderate.     From T12 through L3 there is no disc herniation, spinal canal narrowing, or neuroforaminal narrowing.     L3-4 demonstrates a small broad-based  posterior disc bulge contributing to mild spinal canal narrowing and mild left neuroforaminal narrowing.     L4-5 demonstrates slight anterolisthesis with uncovering the posterior disc.  This along with moderate facet arthropathy results in mild spinal canal narrowing and mild right neuroforaminal narrowing.     L5-S1 demonstrates slight anterolisthesis with uncovering the posterior disc and mild broad-based posterior disc bulging.  No significant spinal canal or neuroforaminal narrowing.    Clinical Impression:  This is a pleasant 80 y.o. male patient with PMH/PSH of compression fracture of L1 status post kyphoplasty, compression fracture of thoracic vertebrae, restless  legs syndrome, hypertension, chronic diastolic heart failure, left bundle branch block, polymyalgia rheumatica, HLAb27, TAVR, long-term anticoagulation, coronary artery disease status post LAD and RCA stent, presenting  originally with right knee pain mainly in the posteromedial aspect associated with tenderness  and radiation to the right calf above the ankle, worse at night, he is status post right peripheral nerve stimulator over right sciatic and femoral nerve  Patient developed redness and swelling of the entry site of the right  sciatic peripheral nerve stimulator associated with right knee swelling, tenderness over the right calf muscle, ultrasound with neck for DVT, blood culture and lactic acid were normal   Updated MRI lumbar spine 2025 showed no more right-sided synovial cyst   He was seen by neurosurgery recommended right L3 and L4 transforaminal epidural steroid injection to address his right knee and below-the-knee pain    Encounter Diagnosis:  Ghanshyam GUTIERREZ Claribel Norm is a 80 y.o. male with the following diagnoses based on history, exam, and imagin. Neuropathy of right lower extremity    2. Chronic pain syndrome    3. Chronic pain of right knee    4. Lumbar foraminal stenosis    5. Lumbar radiculitis      Treatment  Plan:    Diagnostics/Referrals: NA    Medications:   Continue cannabis cream    Interventional Therapy:  Please schedule for Right Transforaminal epidural steroid injection at L3 and L4, Iv sedation, Plavix clearance .     May consider peripheral nerve stimulator if he has no relief from TF epidural steroid injection      Angel Black MD  Anesthesiologist  Interventional Pain Management  08/07/2025

## 2025-08-07 NOTE — PROGRESS NOTES
EST Patient Evaluation  [facility]    Ghanshyam Sanford Jr.  : 1945  Date: 2025     CHIEF COMPLAINT:  No chief complaint on file.    Referring Physician: No ref. provider found  Primary Care Physician: Kevin Lombardi MD    HPI:  This is a 80 y.o. male with a chief complaint of No chief complaint on file.  . The patient has Past medical history/Past surgical history of  compression fracture of L1 status post kyphoplasty, compression fracture of thoracic vertebrae, restless  legs syndrome, hypertension, chronic diastolic heart failure, left bundle branch block, polymyalgia rheumatica, HLAb27, TAVR, long-term anticoagulation    Patient was evaluated and referred by Dr Isael Garner.  He was referred from Neurosurgery team based on cardiology referral for right leg pain  Interval History 2024:  Ghanshyam Sanford Jr. is here for follow up visit  to establish care with me. Ghanshyam Sanford Jr. Had  right L4-L5 lumbar facet joint injection,  patient reported 50 % improvement in pain and functionality for 2 weeks.  Current pain score: 7/10    Interval History 2024:  Ghanshyam Sanford Jr. is here for follow up visit after 7 physical therapy sessions which provided him with a relief for his in the right knee pain.  Today he presents with the acute onset left shoulder pain from excessive throwing   while fishing.  Current pain score: 3/10, at night 5/10    Interval History 2025:  Ghanshyam Sanford Jr. is here for follow up visit to discuss interventional procedure  in regards to his going right lower extremity pain/neuropathy  Current pain score: 4/10    Interval History 2025:  Ghanshyam Marteldemar  is here for procedure follow up visit after right L4 and L5 transforaminal epidural steroid injection,  patient reported 70 % improvement in pain for and functionality for 1 day for his back pain but not for his right knee pain& below-the-knee pain.  Current pain score:  5/10    Interval History 05/16/2025:  Ghanshyam Sanford Jr. is here for  follow up visit after placing right sciatic and femoral peripheral nerve stimulator  Patient complains of drainage and swelling in the RT knee. He stated yesterday he noticed swelling and light pinkish,yellow fluids on bandage oozing out the RT pad. He had to change it 3 times from the drainage. It is slightly warm and sensitive to touch. He is having trouble walking on RT leg.   Current pain score: 1/10 resting, 6/1 walking    Interval History 05/22/2025:  Ghanshyam Sanford Jr. is here for  follow up visit after removing the peripheral nerve stimulator, he was started on antibiotic therapy  Current pain score: 5-6/10    Interval History 06/30/2025:  Ghanshyam Sanford Jr. is here for  follow up visit, he was switched from gabapentin to Lyrica 100 mg 3 times a day,  patient  start Lyrica due to concern of side effects  Current pain score: 4/10    Interval History 08/07/2025:  Ghanshyam Sanford Jr. is here for follow up visit   Was seen by orthopedic provider who recommended to have transforaminal epidural steroid injection right L3 and L4 transforaminal epidural steroid injection.    He was also seen by sports Medicine      Current pain score: ***/10      Diabetic: No    Anticoagulation medications: 81 mg Aspirin  and Plavix -  last stent in 2020    Allergy To Iodine: No    Currently on Antibiotic: yes     Current Description of Pain Symptoms, similar to previous:  History of Recent Fall or Trauma: No   Onset: Chronic  Pain Location:  minimal low back pain,  posterior medial aspect of the right knee  Radiates/associated symptoms: RT LE to the calf  associated with tenderness  Pain is Getting worse over the last 3 months    The pain is described as burning and pulling.   Exacerbating factors:  stretching  Mitigating factors N/A.   Symptoms interfere with daily activity, sleeping.   The patient feels like symptoms have been worsening.   Patient denies  night fever/night sweats, urinary incontinence, bowel incontinence, significant weight loss, significant motor weakness, and loss of sensations.    Pain score:  Best: 7/10  Worst: 10/10    Current pain medications:  Gabapentin 600mg QHS  Current Narcotics/Opioid /benzo Medications:  Opioids- None  Benzodiazepines: No    UDS:  NA    PDMP:  Reviewed and consistent with medication use as prescribed.     Previous Chronic Pain Treatment History:  Physical Therapy/HEP/Physician Lead Exercise Program:  Over the past 12 months, Patient has done 6+ sessions for lower back and shoulder.  PT response: Mildly Helpful.   Dates of the PT sessions: 10/2024-present.  Is patient participating in home exercise program (HEP)/ physician led exercise program: Yes.Completes HEP that was provided by PT in addition to formal therapy.    Non-interventional Pain Therapy:  []Chiropractor.   []Acupuncture/Dry needle.  []TENS unit.  [x]Heat/ICE.  []Back Brace.    Medications previously tried:  NSAIDs: None  Topical Agent: Yes  TCA/SSRI/SNRI: None  Anti-convulsants: Gabapentin   Muscle Relaxants: None  Opioids- None.    Interventional Pain Procedures:  - 202: Knee CoolRFA-2 with no relief   04/11/2024:  L4-L5 right transforaminal with IR-  80% relief for 3 weeks  09/6/ 2024:  right L4-L5 facet injection- 50% relief For 2 weeks with Dexamethasone   Kyphoplasty L1  03/07/2025: Right  L4/5 and L5/S1 Lumbar Transforaminal Epidural Steroid Injection- 70% relief for 1 day.  05/09/2025: right sciatic and femoral peripheral nerve stimulator,  explanted in May 16 due to infection    Previous spine/Relevant joint surgery:  Surgical History:   has a past surgical history that includes Hernia repair; Knee Arthroplasty (Right); Carotid stent; Back surgery; Percutaneous transcatheter aortic valve replacement (TAVR) (Right, 12/14/2021); Percutaneous transcatheter aortic valve replacement (TAVR) (Right, 12/14/2021); Knee arthroscopy w/ meniscectomy (Right,  6/13/2023); Arthroscopic chondroplasty of knee joint (Right, 6/13/2023); Knee arthroscopy w/ plica excision (Right, 6/13/2023); Synovectomy of knee (Right, 6/13/2023); Knee debridement (Right, 6/13/2023); [id number] (Right, 6/13/2023); Injection of facet joint (Right, 9/6/2024); Transforaminal epidural injection of steroid (Right, 3/7/2025); and insertion, electrode lead, neurostimulator, peripheral (Right, 5/9/2025).  Medical History:   has a past medical history of Anticoagulant long-term use, Aortic valve regurgitation, Aortic valve stenosis, nonrheumatic, Carotid stenosis, bilateral, Chronic diastolic heart failure, NYHA class 2, Chronic total occlusion of coronary artery, Coronary artery disease, Elevated PSA, Hypertension, Hypertensive heart disease, Osteoarthritis of right knee (3/22/2017), Polymyalgia, Restless legs, and SOB (shortness of breath).  Family History:  family history includes Diabetes Mellitus in his father; Heart disease in his mother and paternal grandmother; Hypertension in his mother; Stroke in his mother and paternal grandmother.  Allergies:  Atorvastatin calcium, Cortisone, Duloxetine, Flomax [tamsulosin], and Pravastatin   Social History/SUBSTANCE ABUSE HISTORY:  Personal history of substance abuse: No   reports that he has never smoked. He has never used smokeless tobacco. He reports that he does not drink alcohol and does not use drugs.  LABS:  CBC  Lab Results   Component Value Date    WBC 9.96 05/16/2025    HGB 14.0 05/16/2025    HCT 42.5 05/16/2025     Coagulation Profile   Lab Results   Component Value Date     05/16/2025       Lab Results   Component Value Date    INR 1.0 12/13/2021     CMP:  BMP  Lab Results   Component Value Date     05/16/2025    K 4.4 05/16/2025     05/16/2025    CO2 24 05/16/2025    BUN 22 05/16/2025    CREATININE 1.0 05/16/2025    CALCIUM 9.1 05/16/2025    ANIONGAP 8 05/16/2025    EGFRNORACEVR >60 05/16/2025     Lab Results  "  Component Value Date    ALT 21 05/16/2025    AST 16 05/16/2025    ALKPHOS 77 05/16/2025    BILITOT 0.8 05/16/2025     HGBA1C:  No results found for: "LABA1C", "HGBA1C"    ROS:    Review of Systems   Musculoskeletal:  Positive for joint swelling.   Skin:  Positive for rash and wound.     GENERAL:  No weight loss, malaise or fevers.  HEENT:   No recent changes in vision or hearing  NECK:  Negative for lumps, no difficulty with swallowing.  RESPIRATORY:  Negative for cough, wheezing or shortness of breath, patient denies any recent URI.  CARDIOVASCULAR:  Negative for chest pain or palpitations.  GI:  Negative for abdominal discomfort, blood in stools or black stools or change in bowel habits.  MUSCULOSKELETAL:  See HPI.  SKIN:  Negative for lesions, rash, and itching.  PSYCH:  No mood disorder or recent psychosocial stressors.   HEMATOLOGY/LYMPHOLOGY:  See the blood thinner sectioned in HPI.  NEURO:  See HPI  All other reviewed and negative other than HPI.    PHYSICAL EXAM:  VITALS: There were no vitals taken for this visit.  There is no height or weight on file to calculate BMI.  GENERAL: Well appearing, in no acute distress, alert and oriented x3, answers questions appropriately.   PSYCH: Flat affect.  SKIN: Skin color, texture, turgor normal, no rashes or lesions.  HEAD/FACE:  Normocephalic, atraumatic. Cranial nerves grossly intact.  CV: Regular rate  PULM: No evidence of respiratory difficulty, symmetric chest rise.  GI:  Soft and non-Distended.    DIAGNOSTIC STUDIES AND MEDICAL RECORDS REVIEW:  I have personally reviewed and interpreted relevant radiology reports and reviewed relevant records from other services in the EMR.     -EMG/nerve conduction study showed right L4 and L5 radiculopathy    -MRI lumbar spine 02/2024:   T12-L1: No spinal canal stenosis or neural foraminal narrowing.     L1-L2: No spinal canal stenosis or neural foraminal narrowing.     L2-L3: No spinal canal stenosis or neural foraminal " narrowing.     L3-L4: There is asymmetric disc bulging to the left with mild facet arthropathy, contributing to mild spinal canal stenosis and moderate left-sided neural foraminal narrowing     L4-L5: Mild disc bulging and prominent facet joint arthropathy on the right side, noting joint hypertrophy with extensive subchondral marrow edema and surrounding inflammatory change (series 6, images 6-7).  There is a small right-sided synovial cyst measuring 10 mm (series 9, image 36) abutting and possibly impinging upon the descending right L5 nerve root.  There is overall mild spinal canal stenosis and moderate right and mild left-sided neural foraminal narrowing.  Slight anterolisthesis noted.     L5-S1: Mild disc bulge with posterior annular fissure.  There is moderate disc height loss on the left side.  These findings contribute to mild/moderate left-sided neural foraminal narrowing.  No spinal canal stenosis .     Impression:     Remote L1 compression fracture post vertebral plasty, stable.  No acute fractures.     Lumbar spondylosis, contributing to mild spinal canal stenosis L3-4 and L4-5 and moderate neural foraminal narrowing L3-4 through L5-S1, as above.     Prominent right-sided facet joint arthropathy at L4-5 with small anterior synovial cyst impinging upon the spinal canal, as above.    Clinical Impression:  This is a pleasant 80 y.o. male patient with PMH/PSH of compression fracture of L1 status post kyphoplasty, compression fracture of thoracic vertebrae, restless  legs syndrome, hypertension, chronic diastolic heart failure, left bundle branch block, polymyalgia rheumatica, HLAb27, TAVR, long-term anticoagulation, coronary artery disease status post LAD and RCA stent, presenting  originally with right knee pain mainly in the posteromedial aspect associated with tenderness  and radiation to the right calf above the ankle, worse at night, he is status post right peripheral nerve stimulator over right sciatic  and femoral nerve  Patient developed redness and swelling of the entry site of the right  sciatic peripheral nerve stimulator associated with right knee swelling, tenderness over the right calf muscle, ultrasound with neck for DVT, blood culture and lactic acid were normal   Currently his pain is mild, under control with the compound cream and gabapentin.    Encounter Diagnosis:  Ghanshyam Sanford Jr. is a 80 y.o. male with the following diagnoses based on history, exam, and imaging:  There are no diagnoses linked to this encounter.    Treatment Plan:    Diagnostics/Referrals: NA    Medications:   Continue with gabapentin as prescribed   Continue with compound cream, may call for refill    Interventional Therapy:   patient is not motivated to proceed with peripheral nerve stimulator revision   Follow up as needed      Angel Black MD  Anesthesiologist  Interventional Pain Management  08/07/2025

## 2025-08-13 ENCOUNTER — TELEPHONE (OUTPATIENT)
Dept: PREADMISSION TESTING | Facility: HOSPITAL | Age: 80
End: 2025-08-13
Payer: MEDICARE

## 2025-08-15 ENCOUNTER — HOSPITAL ENCOUNTER (OUTPATIENT)
Facility: HOSPITAL | Age: 80
Discharge: HOME | End: 2025-08-15
Attending: ANESTHESIOLOGY | Admitting: ANESTHESIOLOGY
Payer: MEDICARE

## 2025-08-15 ENCOUNTER — HOSPITAL ENCOUNTER (OUTPATIENT)
Dept: RADIOLOGY | Facility: HOSPITAL | Age: 80
Discharge: HOME | End: 2025-08-15
Attending: ANESTHESIOLOGY
Payer: MEDICARE

## 2025-08-15 VITALS
TEMPERATURE: 97 F | SYSTOLIC BLOOD PRESSURE: 144 MMHG | DIASTOLIC BLOOD PRESSURE: 64 MMHG | OXYGEN SATURATION: 94 % | HEART RATE: 66 BPM | RESPIRATION RATE: 14 BRPM

## 2025-08-15 DIAGNOSIS — M51.16 LUMBAR DISC DISEASE WITH RADICULOPATHY: Primary | ICD-10-CM

## 2025-08-15 DIAGNOSIS — R52 PAIN: ICD-10-CM

## 2025-08-15 DIAGNOSIS — M54.16 LUMBAR RADICULOPATHY: ICD-10-CM

## 2025-08-15 PROCEDURE — 64483 NJX AA&/STRD TFRM EPI L/S 1: CPT | Mod: RT,,, | Performed by: ANESTHESIOLOGY

## 2025-08-15 PROCEDURE — 25500020 PHARM REV CODE 255: Performed by: ANESTHESIOLOGY

## 2025-08-15 PROCEDURE — 63600175 PHARM REV CODE 636 W HCPCS: Performed by: ANESTHESIOLOGY

## 2025-08-15 PROCEDURE — 64483 NJX AA&/STRD TFRM EPI L/S 1: CPT | Mod: RT | Performed by: ANESTHESIOLOGY

## 2025-08-15 PROCEDURE — 76000 FLUOROSCOPY <1 HR PHYS/QHP: CPT | Mod: TC

## 2025-08-15 PROCEDURE — 64484 NJX AA&/STRD TFRM EPI L/S EA: CPT | Mod: RT | Performed by: ANESTHESIOLOGY

## 2025-08-15 PROCEDURE — 64484 NJX AA&/STRD TFRM EPI L/S EA: CPT | Mod: RT,,, | Performed by: ANESTHESIOLOGY

## 2025-08-15 RX ORDER — MIDAZOLAM HYDROCHLORIDE 1 MG/ML
INJECTION INTRAMUSCULAR; INTRAVENOUS
Status: DISCONTINUED | OUTPATIENT
Start: 2025-08-15 | End: 2025-08-15 | Stop reason: HOSPADM

## 2025-08-15 RX ORDER — SODIUM CHLORIDE 9 MG/ML
500 INJECTION, SOLUTION INTRAVENOUS CONTINUOUS
Status: DISCONTINUED | OUTPATIENT
Start: 2025-08-15 | End: 2025-08-15 | Stop reason: HOSPADM

## 2025-08-15 RX ORDER — DEXAMETHASONE SODIUM PHOSPHATE 10 MG/ML
INJECTION, SOLUTION INTRA-ARTICULAR; INTRALESIONAL; INTRAMUSCULAR; INTRAVENOUS; SOFT TISSUE
Status: DISCONTINUED | OUTPATIENT
Start: 2025-08-15 | End: 2025-08-15 | Stop reason: HOSPADM

## 2025-08-15 RX ORDER — FENTANYL CITRATE 50 UG/ML
INJECTION, SOLUTION INTRAMUSCULAR; INTRAVENOUS
Status: DISCONTINUED | OUTPATIENT
Start: 2025-08-15 | End: 2025-08-15 | Stop reason: HOSPADM

## 2025-08-15 RX ORDER — LIDOCAINE HYDROCHLORIDE 20 MG/ML
INJECTION, SOLUTION INFILTRATION; PERINEURAL
Status: DISCONTINUED | OUTPATIENT
Start: 2025-08-15 | End: 2025-08-15 | Stop reason: HOSPADM

## 2025-08-15 RX ORDER — LIDOCAINE HYDROCHLORIDE 10 MG/ML
INJECTION, SOLUTION EPIDURAL; INFILTRATION; INTRACAUDAL; PERINEURAL
Status: DISCONTINUED | OUTPATIENT
Start: 2025-08-15 | End: 2025-08-15 | Stop reason: HOSPADM

## 2025-09-04 ENCOUNTER — OFFICE VISIT (OUTPATIENT)
Dept: PAIN MEDICINE | Facility: CLINIC | Age: 80
End: 2025-09-04
Payer: MEDICARE

## 2025-09-04 VITALS
HEART RATE: 68 BPM | BODY MASS INDEX: 25.91 KG/M2 | SYSTOLIC BLOOD PRESSURE: 138 MMHG | HEIGHT: 70 IN | DIASTOLIC BLOOD PRESSURE: 62 MMHG | OXYGEN SATURATION: 99 % | WEIGHT: 181 LBS

## 2025-09-04 DIAGNOSIS — M25.561 CHRONIC PAIN OF RIGHT KNEE: ICD-10-CM

## 2025-09-04 DIAGNOSIS — G89.4 CHRONIC PAIN SYNDROME: ICD-10-CM

## 2025-09-04 DIAGNOSIS — G89.29 CHRONIC PAIN OF RIGHT KNEE: ICD-10-CM

## 2025-09-04 DIAGNOSIS — M48.061 LUMBAR FORAMINAL STENOSIS: ICD-10-CM

## 2025-09-04 DIAGNOSIS — G57.91 NEUROPATHY OF RIGHT LOWER EXTREMITY: Primary | ICD-10-CM

## 2025-09-04 PROCEDURE — 99999 PR PBB SHADOW E&M-EST. PATIENT-LVL IV: CPT | Mod: PBBFAC,,, | Performed by: ANESTHESIOLOGY

## 2025-09-04 PROCEDURE — 99214 OFFICE O/P EST MOD 30 MIN: CPT | Mod: S$PBB | Performed by: ANESTHESIOLOGY

## 2025-09-04 PROCEDURE — 99999 PR STA SHADOW: CPT | Mod: PBBFAC,,,

## 2025-09-04 PROCEDURE — 99214 OFFICE O/P EST MOD 30 MIN: CPT | Mod: PBBFAC | Performed by: ANESTHESIOLOGY

## 2025-09-05 ENCOUNTER — TELEPHONE (OUTPATIENT)
Dept: PAIN MEDICINE | Facility: CLINIC | Age: 80
End: 2025-09-05
Payer: MEDICARE

## 2025-09-05 DIAGNOSIS — M25.561 CHRONIC PAIN OF RIGHT KNEE: ICD-10-CM

## 2025-09-05 DIAGNOSIS — G89.4 CHRONIC PAIN SYNDROME: ICD-10-CM

## 2025-09-05 DIAGNOSIS — G57.91 NEUROPATHY OF RIGHT LOWER EXTREMITY: Primary | ICD-10-CM

## 2025-09-05 DIAGNOSIS — G89.29 CHRONIC PAIN OF RIGHT KNEE: ICD-10-CM

## (undated) DEVICE — GAUZE SPONGE 4X4 12PLY

## (undated) DEVICE — PAD CAST SPECIALIST STRL 6

## (undated) DEVICE — GLOVE BIOGEL SKINSENSE PI 7.0

## (undated) DEVICE — GOWN ECLIPSE REINF LVL4 TWL XL

## (undated) DEVICE — STRIP MEDI WND CLSR 1/2X4IN

## (undated) DEVICE — BLADE 4.2MM PREBENT ULTRACUT

## (undated) DEVICE — PAD ABD 8X10 STERILE

## (undated) DEVICE — CLOSURE SKIN STERI STRIP 1/2X4

## (undated) DEVICE — TUBE SET INFLOW/OUTFLOW

## (undated) DEVICE — DRAPE ARTHSCP FLD CTRL POUCH

## (undated) DEVICE — BNDG COFLEX FOAM LF2 ST 6X5YD

## (undated) DEVICE — UNDERGLOVES BIOGEL PI SIZE 7.5

## (undated) DEVICE — NDL 18GA X1 1/2 REG BEVEL

## (undated) DEVICE — COVER MAYO STAND REINFRCD 30

## (undated) DEVICE — GLOVE SURGEON SYN PF SZ 9

## (undated) DEVICE — SOL NACL IRR 3000ML

## (undated) DEVICE — WRAP KNEE ACCU THERM GEL PACK

## (undated) DEVICE — APPLICATOR CHLORAPREP ORN 26ML

## (undated) DEVICE — GOWN ECLIPSE REINF LV4 XLNG XL

## (undated) DEVICE — Device

## (undated) DEVICE — ADHESIVE MASTISOL VIAL 48/BX

## (undated) DEVICE — SLEEVE PROTECTIVE 6X9 NON STER

## (undated) DEVICE — PAD ABDOMINAL STERILE 8X10IN

## (undated) DEVICE — PAD ELECTRODE STER 1.5X3

## (undated) DEVICE — DRESSING XEROFORM FOIL PK 1X8

## (undated) DEVICE — SYR B-D DISP CONTROL 10CC100/C

## (undated) DEVICE — CHLORAPREP 10.5 ML APPLICATOR

## (undated) DEVICE — SUT MCRYL PLUS 4-0 PS2 27IN

## (undated) DEVICE — GOWN SMART IMP BREATHABLE XXLG

## (undated) DEVICE — NDL HYPO STD REG BVL 25GX5/8IN

## (undated) DEVICE — DRESSING XEROFORM NONADH 1X8IN

## (undated) DEVICE — NDL HYPO REG 25G X 1 1/2

## (undated) DEVICE — GLOVE ORTHO PF SZ 8.5

## (undated) DEVICE — SOL NACL IRR 1000ML BTL

## (undated) DEVICE — SYR 10CC LUER LOCK

## (undated) DEVICE — KIT NERVE BLOCK PREP BAPTIST

## (undated) DEVICE — MARKER SKIN RULER AND LABEL